# Patient Record
Sex: MALE | Race: WHITE | NOT HISPANIC OR LATINO | Employment: OTHER | URBAN - METROPOLITAN AREA
[De-identification: names, ages, dates, MRNs, and addresses within clinical notes are randomized per-mention and may not be internally consistent; named-entity substitution may affect disease eponyms.]

---

## 2023-03-27 LAB
ANION GAP IN SER/PLAS: 11 MMOL/L (ref 10–20)
CALCIUM (MG/DL) IN SER/PLAS: 9.3 MG/DL (ref 8.6–10.3)
CARBON DIOXIDE, TOTAL (MMOL/L) IN SER/PLAS: 29 MMOL/L (ref 21–32)
CHLORIDE (MMOL/L) IN SER/PLAS: 106 MMOL/L (ref 98–107)
CREATININE (MG/DL) IN SER/PLAS: 1.02 MG/DL (ref 0.5–1.3)
ERYTHROCYTE DISTRIBUTION WIDTH (RATIO) BY AUTOMATED COUNT: 14 % (ref 11.5–14.5)
ERYTHROCYTE MEAN CORPUSCULAR HEMOGLOBIN CONCENTRATION (G/DL) BY AUTOMATED: 30.2 G/DL (ref 32–36)
ERYTHROCYTE MEAN CORPUSCULAR VOLUME (FL) BY AUTOMATED COUNT: 98 FL (ref 80–100)
ERYTHROCYTES (10*6/UL) IN BLOOD BY AUTOMATED COUNT: 3.12 X10E12/L (ref 4.5–5.9)
GFR MALE: 72 ML/MIN/1.73M2
GLUCOSE (MG/DL) IN SER/PLAS: 75 MG/DL (ref 74–99)
HEMATOCRIT (%) IN BLOOD BY AUTOMATED COUNT: 30.5 % (ref 41–52)
HEMOGLOBIN (G/DL) IN BLOOD: 9.2 G/DL (ref 13.5–17.5)
LEUKOCYTES (10*3/UL) IN BLOOD BY AUTOMATED COUNT: 3.2 X10E9/L (ref 4.4–11.3)
NRBC (PER 100 WBCS) BY AUTOMATED COUNT: 0 /100 WBC (ref 0–0)
PLATELETS (10*3/UL) IN BLOOD AUTOMATED COUNT: 263 X10E9/L (ref 150–450)
POTASSIUM (MMOL/L) IN SER/PLAS: 4.3 MMOL/L (ref 3.5–5.3)
SODIUM (MMOL/L) IN SER/PLAS: 142 MMOL/L (ref 136–145)
UREA NITROGEN (MG/DL) IN SER/PLAS: 17 MG/DL (ref 6–23)

## 2023-05-17 LAB
ANION GAP IN SER/PLAS: 14 MMOL/L (ref 10–20)
CALCIUM (MG/DL) IN SER/PLAS: 9.1 MG/DL (ref 8.6–10.3)
CARBON DIOXIDE, TOTAL (MMOL/L) IN SER/PLAS: 26 MMOL/L (ref 21–32)
CHLORIDE (MMOL/L) IN SER/PLAS: 106 MMOL/L (ref 98–107)
CREATININE (MG/DL) IN SER/PLAS: 0.89 MG/DL (ref 0.5–1.3)
ERYTHROCYTE DISTRIBUTION WIDTH (RATIO) BY AUTOMATED COUNT: 14.9 % (ref 11.5–14.5)
ERYTHROCYTE MEAN CORPUSCULAR HEMOGLOBIN CONCENTRATION (G/DL) BY AUTOMATED: 29.8 G/DL (ref 32–36)
ERYTHROCYTE MEAN CORPUSCULAR VOLUME (FL) BY AUTOMATED COUNT: 85 FL (ref 80–100)
ERYTHROCYTES (10*6/UL) IN BLOOD BY AUTOMATED COUNT: 4.25 X10E12/L (ref 4.5–5.9)
GFR MALE: 84 ML/MIN/1.73M2
GLUCOSE (MG/DL) IN SER/PLAS: 103 MG/DL (ref 74–99)
HEMATOCRIT (%) IN BLOOD BY AUTOMATED COUNT: 36.2 % (ref 41–52)
HEMOGLOBIN (G/DL) IN BLOOD: 10.8 G/DL (ref 13.5–17.5)
IRON (UG/DL) IN SER/PLAS: 25 UG/DL (ref 35–150)
IRON BINDING CAPACITY (UG/DL) IN SER/PLAS: 447 UG/DL (ref 240–445)
IRON SATURATION (%) IN SER/PLAS: 6 % (ref 25–45)
LEUKOCYTES (10*3/UL) IN BLOOD BY AUTOMATED COUNT: 4.4 X10E9/L (ref 4.4–11.3)
NATRIURETIC PEPTIDE B (PG/ML) IN SER/PLAS: 396 PG/ML (ref 0–99)
NRBC (PER 100 WBCS) BY AUTOMATED COUNT: 0 /100 WBC (ref 0–0)
PLATELETS (10*3/UL) IN BLOOD AUTOMATED COUNT: 276 X10E9/L (ref 150–450)
POTASSIUM (MMOL/L) IN SER/PLAS: 4.6 MMOL/L (ref 3.5–5.3)
SODIUM (MMOL/L) IN SER/PLAS: 141 MMOL/L (ref 136–145)
UREA NITROGEN (MG/DL) IN SER/PLAS: 15 MG/DL (ref 6–23)

## 2023-09-24 ENCOUNTER — HOSPITAL ENCOUNTER (OUTPATIENT)
Dept: DATA CONVERSION | Facility: HOSPITAL | Age: 88
Setting detail: OBSERVATION
Discharge: OTHER INSTITUTION | End: 2023-09-24
Attending: INTERNAL MEDICINE | Admitting: INTERNAL MEDICINE
Payer: MEDICARE

## 2023-09-24 DIAGNOSIS — K92.2 GASTROINTESTINAL HEMORRHAGE, UNSPECIFIED: ICD-10-CM

## 2023-09-24 LAB
ABO GROUP (TYPE) IN BLOOD: NORMAL
ACTIVATED PARTIAL THROMBOPLASTIN TIME IN PPP BY COAGULATION ASSAY: 31 SEC (ref 27–38)
ALANINE AMINOTRANSFERASE (SGPT) (U/L) IN SER/PLAS: 11 U/L (ref 10–52)
ALBUMIN (G/DL) IN SER/PLAS: 3.7 G/DL (ref 3.4–5)
ALKALINE PHOSPHATASE (U/L) IN SER/PLAS: 63 U/L (ref 33–136)
ANION GAP IN SER/PLAS: 11 MMOL/L (ref 10–20)
ANTIBODY SCREEN: NORMAL
ASPARTATE AMINOTRANSFERASE (SGOT) (U/L) IN SER/PLAS: 18 U/L (ref 9–39)
BASOPHILS (10*3/UL) IN BLOOD BY AUTOMATED COUNT: 0.02 X10E9/L (ref 0–0.1)
BASOPHILS/100 LEUKOCYTES IN BLOOD BY AUTOMATED COUNT: 0.3 % (ref 0–2)
BILIRUBIN DIRECT (MG/DL) IN SER/PLAS: 0.2 MG/DL (ref 0–0.3)
BILIRUBIN TOTAL (MG/DL) IN SER/PLAS: 0.7 MG/DL (ref 0–1.2)
CALCIUM (MG/DL) IN SER/PLAS: 9.1 MG/DL (ref 8.6–10.3)
CARBON DIOXIDE, TOTAL (MMOL/L) IN SER/PLAS: 28 MMOL/L (ref 21–32)
CHLORIDE (MMOL/L) IN SER/PLAS: 104 MMOL/L (ref 98–107)
CREATININE (MG/DL) IN SER/PLAS: 0.98 MG/DL (ref 0.5–1.3)
EOSINOPHILS (10*3/UL) IN BLOOD BY AUTOMATED COUNT: 0.14 X10E9/L (ref 0–0.4)
EOSINOPHILS/100 LEUKOCYTES IN BLOOD BY AUTOMATED COUNT: 1.8 % (ref 0–6)
ERYTHROCYTE DISTRIBUTION WIDTH (RATIO) BY AUTOMATED COUNT: 17.3 % (ref 11.5–14.5)
ERYTHROCYTE MEAN CORPUSCULAR HEMOGLOBIN CONCENTRATION (G/DL) BY AUTOMATED: 31.8 G/DL (ref 32–36)
ERYTHROCYTE MEAN CORPUSCULAR VOLUME (FL) BY AUTOMATED COUNT: 86 FL (ref 80–100)
ERYTHROCYTES (10*6/UL) IN BLOOD BY AUTOMATED COUNT: 4.63 X10E12/L (ref 4.5–5.9)
GFR MALE: 75 ML/MIN/1.73M2
GLUCOSE (MG/DL) IN SER/PLAS: 110 MG/DL (ref 74–99)
HEMATOCRIT (%) IN BLOOD BY AUTOMATED COUNT: 39.9 % (ref 41–52)
HEMOGLOBIN (G/DL) IN BLOOD: 12.7 G/DL (ref 13.5–17.5)
IMMATURE GRANULOCYTES/100 LEUKOCYTES IN BLOOD BY AUTOMATED COUNT: 0.1 % (ref 0–0.9)
INR IN PPP BY COAGULATION ASSAY: 1.1 (ref 0.9–1.1)
LACTATE (MMOL/L) IN SER/PLAS: 1.2 MMOL/L (ref 0.4–2)
LEUKOCYTES (10*3/UL) IN BLOOD BY AUTOMATED COUNT: 8 X10E9/L (ref 4.4–11.3)
LYMPHOCYTES (10*3/UL) IN BLOOD BY AUTOMATED COUNT: 1.03 X10E9/L (ref 0.8–3)
LYMPHOCYTES/100 LEUKOCYTES IN BLOOD BY AUTOMATED COUNT: 12.9 % (ref 13–44)
MONOCYTES (10*3/UL) IN BLOOD BY AUTOMATED COUNT: 0.54 X10E9/L (ref 0.05–0.8)
MONOCYTES/100 LEUKOCYTES IN BLOOD BY AUTOMATED COUNT: 6.8 % (ref 2–10)
NEUTROPHILS (10*3/UL) IN BLOOD BY AUTOMATED COUNT: 6.24 X10E9/L (ref 1.6–5.5)
NEUTROPHILS/100 LEUKOCYTES IN BLOOD BY AUTOMATED COUNT: 78.1 % (ref 40–80)
NRBC (PER 100 WBCS) BY AUTOMATED COUNT: 0 /100 WBC (ref 0–0)
OCCULT BLOOD, STOOL X1: POSITIVE
PLATELETS (10*3/UL) IN BLOOD AUTOMATED COUNT: 208 X10E9/L (ref 150–450)
POTASSIUM (MMOL/L) IN SER/PLAS: 3.6 MMOL/L (ref 3.5–5.3)
PROTEIN TOTAL: 6.2 G/DL (ref 6.4–8.2)
PROTHROMBIN TIME (PT) IN PPP BY COAGULATION ASSAY: 12.2 SEC (ref 9.8–12.8)
RH FACTOR: NORMAL
SODIUM (MMOL/L) IN SER/PLAS: 139 MMOL/L (ref 136–145)
UREA NITROGEN (MG/DL) IN SER/PLAS: 12 MG/DL (ref 6–23)

## 2023-09-25 LAB
APPEARANCE, URINE: NORMAL
ASCORBIC ACID: NORMAL MG/DL
BILIRUBIN, URINE: NORMAL
BLOOD, URINE: NORMAL
COLOR, URINE: NORMAL
ERYTHROCYTE DISTRIBUTION WIDTH (RATIO) BY AUTOMATED COUNT: 17.4 % (ref 11.5–14.5)
ERYTHROCYTE DISTRIBUTION WIDTH (RATIO) BY AUTOMATED COUNT: NORMAL
ERYTHROCYTE MEAN CORPUSCULAR HEMOGLOBIN CONCENTRATION (G/DL) BY AUTOMATED: 31.3 G/DL (ref 32–36)
ERYTHROCYTE MEAN CORPUSCULAR HEMOGLOBIN CONCENTRATION (G/DL) BY AUTOMATED: NORMAL
ERYTHROCYTE MEAN CORPUSCULAR VOLUME (FL) BY AUTOMATED COUNT: 87 FL (ref 80–100)
ERYTHROCYTE MEAN CORPUSCULAR VOLUME (FL) BY AUTOMATED COUNT: NORMAL
ERYTHROCYTES (10*6/UL) IN BLOOD BY AUTOMATED COUNT: 4.48 X10E12/L (ref 4.5–5.9)
ERYTHROCYTES (10*6/UL) IN BLOOD BY AUTOMATED COUNT: NORMAL
GLUCOSE, URINE: NORMAL
HEMATOCRIT (%) IN BLOOD BY AUTOMATED COUNT: 39 % (ref 41–52)
HEMATOCRIT (%) IN BLOOD BY AUTOMATED COUNT: NORMAL
HEMOGLOBIN (G/DL) IN BLOOD: 12.2 G/DL (ref 13.5–17.5)
HEMOGLOBIN (G/DL) IN BLOOD: NORMAL
KETONES, URINE: NORMAL
LEUKOCYTE ESTERASE, URINE: NORMAL
LEUKOCYTES (10*3/UL) IN BLOOD BY AUTOMATED COUNT: 5.4 X10E9/L (ref 4.4–11.3)
LEUKOCYTES (10*3/UL) IN BLOOD BY AUTOMATED COUNT: NORMAL
NITRITE, URINE: NORMAL
NRBC (PER 100 WBCS) BY AUTOMATED COUNT: 0 /100 WBC (ref 0–0)
NRBC (PER 100 WBCS) BY AUTOMATED COUNT: NORMAL
PH, URINE: NORMAL
PLATELETS (10*3/UL) IN BLOOD AUTOMATED COUNT: 167 X10E9/L (ref 150–450)
PLATELETS (10*3/UL) IN BLOOD AUTOMATED COUNT: NORMAL
PROTEIN, URINE: NORMAL
SPECIFIC GRAVITY, URINE: NORMAL
UROBILINOGEN, URINE: NORMAL

## 2023-09-26 LAB
AMORPHOUS CRYSTALS, URINE: NORMAL /HPF
ANION GAP IN SER/PLAS: 12 MMOL/L (ref 10–20)
APPEARANCE, URINE: ABNORMAL
BILIRUBIN, URINE: NEGATIVE
BLOOD, URINE: ABNORMAL
CALCIUM (MG/DL) IN SER/PLAS: 8.7 MG/DL (ref 8.6–10.3)
CARBON DIOXIDE, TOTAL (MMOL/L) IN SER/PLAS: 31 MMOL/L (ref 21–32)
CHLORIDE (MMOL/L) IN SER/PLAS: 103 MMOL/L (ref 98–107)
COLOR, URINE: YELLOW
CREATININE (MG/DL) IN SER/PLAS: 1.12 MG/DL (ref 0.5–1.3)
ERYTHROCYTE DISTRIBUTION WIDTH (RATIO) BY AUTOMATED COUNT: 17.4 % (ref 11.5–14.5)
ERYTHROCYTE MEAN CORPUSCULAR HEMOGLOBIN CONCENTRATION (G/DL) BY AUTOMATED: 31.1 G/DL (ref 32–36)
ERYTHROCYTE MEAN CORPUSCULAR VOLUME (FL) BY AUTOMATED COUNT: 87 FL (ref 80–100)
ERYTHROCYTES (10*6/UL) IN BLOOD BY AUTOMATED COUNT: 4.65 X10E12/L (ref 4.5–5.9)
GFR MALE: 64 ML/MIN/1.73M2
GLUCOSE (MG/DL) IN SER/PLAS: 85 MG/DL (ref 74–99)
GLUCOSE, URINE: NEGATIVE MG/DL
HEMATOCRIT (%) IN BLOOD BY AUTOMATED COUNT: 40.5 % (ref 41–52)
HEMOGLOBIN (G/DL) IN BLOOD: 12.6 G/DL (ref 13.5–17.5)
KETONES, URINE: NEGATIVE MG/DL
LEUKOCYTE ESTERASE, URINE: NEGATIVE
LEUKOCYTES (10*3/UL) IN BLOOD BY AUTOMATED COUNT: 4.9 X10E9/L (ref 4.4–11.3)
MAGNESIUM (MG/DL) IN SER/PLAS: 2.28 MG/DL (ref 1.6–2.4)
NITRITE, URINE: NEGATIVE
NRBC (PER 100 WBCS) BY AUTOMATED COUNT: 0 /100 WBC (ref 0–0)
PH, URINE: 7 (ref 5–8)
PLATELETS (10*3/UL) IN BLOOD AUTOMATED COUNT: 175 X10E9/L (ref 150–450)
POTASSIUM (MMOL/L) IN SER/PLAS: 3.5 MMOL/L (ref 3.5–5.3)
PROTEIN, URINE: NEGATIVE MG/DL
RBC, URINE: 1 /HPF (ref 0–5)
SODIUM (MMOL/L) IN SER/PLAS: 142 MMOL/L (ref 136–145)
SPECIFIC GRAVITY, URINE: 1.01 (ref 1–1.03)
UREA NITROGEN (MG/DL) IN SER/PLAS: 10 MG/DL (ref 6–23)
UROBILINOGEN, URINE: <2 MG/DL (ref 0–1.9)
WBC, URINE: <1 /HPF (ref 0–5)

## 2023-09-27 LAB
ANION GAP IN SER/PLAS: 11 MMOL/L (ref 10–20)
CALCIUM (MG/DL) IN SER/PLAS: 8.7 MG/DL (ref 8.6–10.3)
CARBON DIOXIDE, TOTAL (MMOL/L) IN SER/PLAS: 30 MMOL/L (ref 21–32)
CHLORIDE (MMOL/L) IN SER/PLAS: 105 MMOL/L (ref 98–107)
CREATININE (MG/DL) IN SER/PLAS: 1.05 MG/DL (ref 0.5–1.3)
ERYTHROCYTE DISTRIBUTION WIDTH (RATIO) BY AUTOMATED COUNT: 17.1 % (ref 11.5–14.5)
ERYTHROCYTE MEAN CORPUSCULAR HEMOGLOBIN CONCENTRATION (G/DL) BY AUTOMATED: 31.8 G/DL (ref 32–36)
ERYTHROCYTE MEAN CORPUSCULAR VOLUME (FL) BY AUTOMATED COUNT: 86 FL (ref 80–100)
ERYTHROCYTES (10*6/UL) IN BLOOD BY AUTOMATED COUNT: 4.56 X10E12/L (ref 4.5–5.9)
GFR MALE: 69 ML/MIN/1.73M2
GLUCOSE (MG/DL) IN SER/PLAS: 89 MG/DL (ref 74–99)
HEMATOCRIT (%) IN BLOOD BY AUTOMATED COUNT: 39.3 % (ref 41–52)
HEMOGLOBIN (G/DL) IN BLOOD: 12.5 G/DL (ref 13.5–17.5)
LEUKOCYTES (10*3/UL) IN BLOOD BY AUTOMATED COUNT: 4.3 X10E9/L (ref 4.4–11.3)
MAGNESIUM (MG/DL) IN SER/PLAS: 2.28 MG/DL (ref 1.6–2.4)
NRBC (PER 100 WBCS) BY AUTOMATED COUNT: 0 /100 WBC (ref 0–0)
PLATELETS (10*3/UL) IN BLOOD AUTOMATED COUNT: 181 X10E9/L (ref 150–450)
POTASSIUM (MMOL/L) IN SER/PLAS: 3.3 MMOL/L (ref 3.5–5.3)
SODIUM (MMOL/L) IN SER/PLAS: 143 MMOL/L (ref 136–145)
UREA NITROGEN (MG/DL) IN SER/PLAS: 11 MG/DL (ref 6–23)

## 2023-09-29 VITALS
SYSTOLIC BLOOD PRESSURE: 101 MMHG | OXYGEN SATURATION: 97 % | WEIGHT: 195.33 LBS | HEART RATE: 60 BPM | TEMPERATURE: 97.5 F | HEIGHT: 70 IN | DIASTOLIC BLOOD PRESSURE: 52 MMHG | BODY MASS INDEX: 27.96 KG/M2 | RESPIRATION RATE: 16 BRPM

## 2023-09-30 NOTE — PROGRESS NOTES
Service: General Internal Medicine     Subjective Data:   RICA FALLON is a 87 year old Male who is Hospital Day # 2.     Today patient continues to endorse bright red rectal bleeding with clots and no diarrhea. He states he had abdomen pain in the LLQ and suprapubic area. The patient has been urinating more frequently  but this is attributed to being started on torsemide last week.  Patient denies dysuria, nausea or vomiting, chest pain, SOB, and weakness.    Objective Data:     Objective Information:      T   P  R  BP   MAP  SpO2   Value  36.4  60  19  157/74   107  96%  Date/Time 9/24 21:07 9/25 7:00 9/25 7:00 9/25 4:00  9/25 4:00 9/25 4:00  Range  (36.4C - 36.4C )  (59 - 60 )  (13 - 39 )  (99 - 157 )/ (52 - 83 )  (74 - 107 )  (95% - 98% )      Pain reported at 9/24 21:07: 3 = Mild    Physical Exam Narrative:  ·  Physical Exam:    GENERAL: Mild acute distress, alert and interactive   HEENT: NC/AT, clear sclera, nares patent   NECK: Supple, no JVD, no obvious injuries   CARDIOVASCULAR: Regular rate and rhythm  RESPIRATORY: Normal respiratory effort, CTAB, no adventitious sounds   ABDOMEN: LLQ pain on palpation, RLQ pain on palpation with radiation of pain to penis. Soft, non-distended. No guarding.   SKIN: Warm and dry, intact.   EXTREMITIES: Spontaneous ROM intact. No peripheral edema   NEUROLOGY: A/O x 3, no focal neurological deficits   PSYCH: Appropriate mood and behavior     Recent Lab Results:    Results:    CBC: 9/24/2023 21:31              \     Hgb     /                              \     12.7 L    /  WBC  ----------------  Plt               8.0       ----------------    208              /     Hct     \                              /     39.9 L    \            RBC: 4.63     MCV: 86     Neutrophil %: 78.1      BMP: 9/24/2023 21:31  NA+        Cl-     BUN  /                         139    104    12  /  --------------------------------  Glucose                ---------------------------  110  H    K+     HCO3-   Creat \                         3.6  28    0.98  \  Calcium : 9.1     Anion Gap : 11      Coagulation: 9/24/2023 21:31  PT  /                    12.2  /  -------<    INR          ----------<      1.1  PTT\                    31  \                       Assessment and Plan:   Code Status:  ·  Code Status Full Code     Assessment:    Acute diverticulitis,  Lower GI bleed  History of rectal ulcer 3/2023  -Continue IV zosyn   -GI consulted  -NPO until GI consult, continue IVF while NPO  -Trend CBC for hemodynamic stability  -Hold DVT prophylaxis because current bleed  -Obtain access with 2 large bore IV. In anticipation of potential hemodynamic instability due to current bleed    Potential UTI  -CT abd showed concentric bladder wall thickening and surrounding standing  -Obtain UA. On zosyn as above.    Chronic Medical Conditions:  Chronic HFpEF  HTN  HLD  Permanent A fib s/p watchman  SSS s/p PPM  GERD   CAD s/p PCI  History of prostate cancer s/p radiation (1/2023)  -Continue home pantoprazole. Hold plavix, statin, torsemide for now    Mecca Montes   PA-S2    Attestation:   Note Completion:  I am a:  Resident/Fellow   Attending Attestation I saw and evaluated the patient.  I personally obtained the key and critical portions of the history and physical exam or was physically present for key and  critical portions performed by the resident/fellow. I reviewed the resident/fellow?s documentation and discussed the patient with the resident/fellow.  I agree with the resident/fellow?s medical decision making as documented in the note.     I personally evaluated the patient on 25-Sep-2023   Comments/ Additional Findings    Continue with zosyn.  NPO for GI eval.           Electronic Signatures:  mecca montes (JESSICA)  (Signed 25-Sep-2023 13:41)   Authored: Service, Subjective Data, Objective Data, Assessment  and Plan  Jagdish Cagle)  (Signed 25-Sep-2023 14:36)   Authored: Note  Completion   Co-Signer: Service, Subjective Data, Objective Data, Assessment and Plan, Note Completion  Malgorzata Silva (DO (Resident))  (Signed 25-Sep-2023 13:59)   Entered: Subjective Data, Assessment and Plan, Note Completion   Authored: Service, Subjective Data, Objective Data, Assessment and Plan, Note Completion      Last Updated: 25-Sep-2023 14:36 by Jagdish Cagle)

## 2023-09-30 NOTE — PROGRESS NOTES
Service: Gastroenterology     Subjective Data:   RICA FALLON is a 87 year old Male who is Hospital Day # 3.     Patient states his symptoms have improved.  States he had 4-5 bowel movements yesterday and one this morning which was clear, no blood noted.    Objective Data:     Objective Information:      T   P  R  BP   MAP  SpO2   Value  36.6  60  18  104/56   73  95%  Date/Time 9/26 13:18 9/26 13:18 9/26 4:21 9/26 13:18  9/26 13:18 9/26 13:18  Range  (35.9C - 36.6C )  (59 - 70 )  (11 - 25 )  (104 - 157 )/ (56 - 92 )  (73 - 108 )  (95% - 97% )      Pain reported at 9/26 8:25: 0 = None    Physical Exam Narrative:  ·  Physical Exam:    Physical Exam:  General: Alert, awake.  Cooperative.  HEENT:  Normocephalic, atraumatic.  Neck:  Trachea midline.  No JVD.    Chest:  Clear to auscultation bilaterally. No wheezes, rales, or rhonchi.  CV:  Regular rate and rhythm.  Positive S1/S2. No murmur, no gallops, no rubs  GI: Bowel sounds present in all four quadrants, abdomen is soft, nondistended, tender to palpation LLQ.  Extremities:  No lower extremity edema or cyanosis.   Neurological:  AAOx3.  Moving all extremities.  Skin:  Warm and dry.    Medication:    Medications:          Continuous Medications       --------------------------------  No continuous medications are active       Scheduled Medications       --------------------------------    1. Clopidogrel:  75  mg  Oral  Daily    2. Pantoprazole:  40  mg  Oral  Daily    3. Piperacillin - Tazobactam 3.375 gram/Iso-osmotic 50 mL Premix IVPB:  50  mL  IntraVenous Piggyback  Every 6 Hours         PRN Medications       --------------------------------    1. Acetaminophen:  650  mg  Oral  Every 4 Hours    2. Ondansetron Injectable:  4  mg  IntraVenous Push  Every 4 Hours    3. oxyCODONE Immediate Release:  5  mg  Oral  Every 4 Hours    4. Sodium Chloride 0.9% Injectable Flush:  10  mL  IntraVenous Flush  Every 8 Hours and as Needed        Recent Lab  Results:    Results:    CBC: 9/26/2023 06:59              \     Hgb     /                              \     12.6 L    /  WBC  ----------------  Plt               4.9       ----------------    175              /     Hct     \                              /     40.5 L    \            RBC: 4.65     MCV: 87           BMP: 9/26/2023 06:59  NA+        Cl-     BUN  /                         142    103    10  /  --------------------------------  Glucose                ---------------------------  85    K+     HCO3-   Creat \                         3.5  31    1.12  \  Calcium : 8.7     Anion Gap : 12      Assessment and Plan:   Code Status:  ·  Code Status Full Code     Assessment:    87 year old Male with past medical history significant fo rCAD with h/o PCI, chronic diastolic CHF (EF 65-70% 5/2023), HTN, HLD, permanent afib not on AC due to GI  bleeding s/p Watchman, mild AS, SSS s/p PPM, 4.9 cm ascending aortic aneurysm (TTE 5/2023), PFO, CVA, GERD, anemia, BPH, and prostate CA s/p radical prostatectomy 2000 who presented for BRBPR and lower abdominal pain.     #Acute sigmoid diverticulitis  -Blood pressure and hemoglobin remain stable.  Symptoms are improving.  No indication for colonoscopy at this time.  -Advance diet as tolerated  -Maintain outpatient follow-up with Dr. Garcia.  -GI will sign off at this time.    Nga Wagoner D.O.  Internal Medicine PGY-3  [id number] or [facility]    This is a preliminary note with physician attestation to follow.      Attestation:   Note Completion:  I am a:  Resident/Fellow   Attending Attestation I saw and evaluated the patient.  I personally obtained the key and critical portions of the history and physical exam or was physically present for key and  critical portions performed by the resident/fellow. I reviewed the resident/fellow?s documentation and discussed the patient with the resident/fellow.  I agree with the resident/fellow?s medical decision making as documented in the  note.     I personally evaluated the patient on 26-Sep-2023   Comments/ Additional Findings    Patient told to follow up with Dr. Garcia regarding rectal ulcer. At this time he is getting treated for sigmoid diverticulitis. bleeding has stopped  and does not need intervention with colonoscopy as risk is increased given acute sigmoid diverticulitis.          Electronic Signatures:  Nga Wagoner (DO (Resident))  (Signed 26-Sep-2023 15:18)   Authored: Service, Subjective Data, Objective Data, Assessment  and Plan, Note Completion  Araceli Bolanos)  (Signed 27-Sep-2023 21:37)   Authored: Note Completion   Co-Signer: Service, Subjective Data, Objective Data, Assessment and Plan, Note Completion      Last Updated: 27-Sep-2023 21:37 by Araceli Bolanos)

## 2023-09-30 NOTE — H&P
History of Present Illness:   HPI:    RICA FALLON is a 87 year old Male who presented to the ED with BRBPR and lower abd pain since yesterday afternoon. Symptoms initially started as non-bloody diarrhea  then turned bloody. He noted passing clots of blood. In march of this year patient was found with bleeding rectal ulcer. CT imaging in the ED consistent with acute diverticulitis. While not a CTA, IV contrast was given without any extravasation. Hgb and  vitals are stable.     A 10 point ROS was performed with the patient denying any complaints at this time aside from those listed in the HPI above.    Past Medical History:  1.  CAD with previous PCI  2.  Chronic HFpEF  3.  HTN  4.  HLD  5.  Permanent Afib s/p watchman  6.  SSS s/p PPM  7.  PFO  8.  Ascending Aortic Aneurysm   9.  GERD  10.  Prostate CA  11.  Rectal Ulcer March 2023  12.  Diverticulosis     Past Surgical History:  1.  L atrial appendage ligation   2.  PPM   3.  LHC with PCI  4.  Cholecystectomy   5.  Prostatectomy   6.  Colonoscopy with epi injection to rectal ulcer  7.  tonsillectomy       Social Hx: denies smoking, drugs or etoh abuse   Family Hx: CAD, HTN, dementia     Physical Exam:    GENERAL: no distress, alert and cooperative  VITALS: reviewed, see below  HEENT: normocephalic, atraumatic  CARDIOVASCULAR: irregular rate and rhythm. S1/S2  RESPIRATORY: Clear to auscultation b/l. No distress  ABDOMEN: Soft, non-distended. No rebound or guarding. LLQ tenderness   EXTREMITIES: No peripheral edema  NEUROLOGICAL: A&O X 3. CN II-XII grossly intact. No focal deficits.   SKIN: Warm and dry, no lesions, no rashes  PSYCH: appropriate mood and affect      Assessment/Plan:    1.  Acute Diverticulitis  2.  Lower GIB  3.  CAD with previous PCI  4.  Chronic HFpEF  5.  HTN  6.  HLD  7.  Permanent Afib s/p watchman  8.  SSS s/p PPM  9.  PFO  10.  Ascending Aortic Aneurysm   11.  GERD  12.  Hx of Prostate CA  13.  Rectal Ulcer March 2023  14.   Diverticulosis       ·  IV Zosyn, NPO, trend H&H, hold blood thinners, consult GI   ·  reconcile appropriate home meds     DVT Prophylaxis: SCDs only due to bleeding             Allergies:  ·  No Known Allergies :     Medications Prior to Admission:   Admission Medication Reconciliation has not been completed for this patient.    Objective:     Objective Information:      T   P  R  BP   MAP  SpO2   Value  36.4  60  13  142/68   95  96%  Date/Time 9/24 21:07 9/25 2:30 9/25 2:30 9/25 2:30  9/25 2:30 9/25 2:30  Range  (36.4C - 36.4C )  (60 - 60 )  (13 - 39 )  (99 - 142 )/ (52 - 83 )  (74 - 95 )  (95% - 98% )      Pain reported at 9/24 21:07: 3 = Mild    Recent Lab Results:    Results:    CBC: 9/24/2023 21:31              \     Hgb     /                              \     12.7 L    /  WBC  ----------------  Plt               8.0       ----------------    208              /     Hct     \                              /     39.9 L    \            RBC: 4.63     MCV: 86     Neutrophil %: 78.1      BMP: 9/24/2023 21:31  NA+        Cl-     BUN  /                         139    104    12  /  --------------------------------  Glucose                ---------------------------  110 H    K+     HCO3-   Creat \                         3.6  28    0.98  \  Calcium : 9.1     Anion Gap : 11      Coagulation: 9/24/2023 21:31  PT  /                    12.2  /  -------<    INR          ----------<      1.1  PTT\                    31  \                         Electronic Signatures:  Brandon Bolanos)  (Signed 25-Sep-2023 04:35)   Authored: History of Present Illness, Allergies, Medications  Prior to Admission, Objective, Note Completion      Last Updated: 25-Sep-2023 04:35 by Brandon Bolanos ()

## 2023-09-30 NOTE — DISCHARGE SUMMARY
Send Summary:   Discharge Summary Providers:   Provider Role Provider Name   · Attending Elpidio Mauro   · Consulting Darren Garcia       Note Recipients: Darren Garcia MD       Discharge:    Summary:   Admission Date: .24-Sep-2023 20:28:00   Discharge Date: 27-Sep-2023   Attending Physician at Discharge: Elpidio Mauro   Admission Reason: diverticulitis, lower GI bleed   Final Discharge Diagnoses: Prostate cancer, A-fib,  Bleeding per rectum, CAD (coronary artery disease), Congestive heart failure, H/O sick sinus syndrome, diverticulitis, Presence of Watchman left atrial appendage closure device, Primary hypertension   Procedures: none   Condition at Discharge: Fair   Disposition at Discharge: .Home   Vital Signs:        T   P  R  BP   MAP  SpO2   Value  35.6  83  16  129/73   79  94%  Date/Time 9/27 3:55 9/27 3:55 9/27 3:55 9/27 3:55  9/26 19:58 9/27 3:55  Range  (35.6C - 36.6C )  (60 - 83 )  (16 - 18 )  (104 - 129 )/ (55 - 73 )  (73 - 79 )  (94% - 97% )    Date:            Weight/Scale Type:  Height:   25-Sep-2023 09:35  88.6  kg / bed  177.8  cm  Physical Exam:    Physical Exam:  General:  Pleasant and cooperative. No apparent distress.  HEENT:  Normocephalic, atraumatic, mucus membranes moist.   Chest:  Clear to auscultation bilaterally. No wheezes, rales, or rhonchi. Non-labored breathing.  CV:  Regular rate and rhythm.  No murmur, no gallops, no rubs  Abdomen: Abdomen is soft, mild LLQ tenderness, non-distended. No guarding or rigidity.  Extremities:  Trace LE edema  Neurological:  Alert. Moves all extremities.  Skin:  Warm and dry.  Psych: Appropriate mood and behavior.    Hospital Course:    87 year old male with a-fib s/p watchman, GERD, CAD s/p PCI, HFpEF, SSS s/p PPM, history of rectal ulcer (3/2023) and prostate cancer s/p prostatectomy and radiation  who presented to [facility] for lower abdominal pain and bloody diarrhea. CT revealed diverticulitis. He was started on IV fluids and zosyn.  GI was consulted, no  inpatient endoscopy indicated given his diverticulitis, stable hemoglobin and bleeding resolved.  The patient's pain improved and he was able to tolerate oral intake. He will be discharged home with augmentin for 8 days to complete a 10 day total course. It was also recommended to start colace and metamucil to avoid constipation. Patient was instructed  to establish with a PCP to be seen next week and to follow up with Dr. Garcia.      Discharge Information:    and Continuing Care:   Lab Results - Pending:    None  Radiology Results - Pending: None   Discharge Instructions:    Additional Orders:           Additional Instructions:   Thank you for choosing [facility] - it has been a pleasure taking part in your medical care. Please follow up with your Primary Medical Physician within 1 week of discharge and any specialists  as noted for further workup. If your symptoms should persist or worsen, please return immediately to the nearest Emergency Room for further care. If you have any questions about the care you received please call [facility] at [phone].    Additional Instructions  *You have been started on a new medication, please carefully review dosing regimen.  Start augmentin 1 tab three times a day for 8 days.   Start taking metamucil daily and docusate 1 tab twice a day for constipation.    Please call to establish and follow up with a primary care provider to be seen next week.   Dr. Jose Antonio Hinton  [address], [address]  [place] [address]  [phone]    Please follow up with Dr. Garcia (GI) in 2-3 months, earlier if you have any bleeding again.    Discharge Medications: Home Medication   calcium (as carbonate) 600 mg oral tablet - 1 tab(s) orally once a day  magnesium oxide 400 mg oral tablet - 1 tab(s) orally once a day  Turmeric 500 mg oral capsule - 1 cap(s) orally once a day  B Complex 50 oral tablet, extended release - 1 tab(s) orally once a day  Co Q-10  100 mg oral capsule - 1 cap(s) orally once a day  Multiple Vitamins with Minerals oral tablet - 1 tab(s) orally once a day  clopidogrel 75 mg oral tablet - 1 tab(s) orally once a day (in the morning)  potassium gluconate 595 mg (99 mg elemental potassium) oral tablet - 1 tab(s) orally once a day  docusate sodium 100 mg oral tablet - 1 tab(s) orally 2 times a day   psyllium 3.4 g/11 g oral powder for reconstitution - 3.4 gram(s) orally once a day   amoxicillin-clavulanate 875 mg-125 mg oral tablet - 1 tab(s) orally 3 times a day   rosuvastatin 20 mg oral tablet - 1 tab(s) orally 4 times a week  torsemide 10 mg oral tablet - 1 tab(s) orally once a day     PRN Medication   pantoprazole 40 mg oral delayed release tablet - 1 tab(s) orally once a day before breakfast, As Needed  ondansetron 4 mg oral tablet - 1 tab(s) orally every 8 hours, As Needed - for nausea and vomiting     DNR Status:   ·  Code Status Code Status order at time of discharge: Full Code     Attestation:   Note Completion:  I am a:  Resident/Fellow   Attending Attestation I saw and evaluated the patient.  I personally obtained the key and critical portions of the history and physical exam or was physically present for key and  critical portions performed by the resident/fellow. I reviewed the resident/fellow?s documentation and discussed the patient with the resident/fellow.  I agree with the resident/fellow?s medical decision making as documented in the note.     I personally evaluated the patient on 27-Sep-2023         Electronic Signatures:  Elpidio Mauro (DO)  (Signed 27-Sep-2023 14:16)   Authored: Send Summary, Note Completion   Co-Signer: Send Summary, Summary Content, Ongoing Care, DNR Status, Note Completion  Malgorzata Silva ( (Resident))  (Signed 27-Sep-2023 13:55)   Authored: Send Summary, Summary Content, Ongoing Care,  DNR Status, Note Completion      Last Updated: 27-Sep-2023 14:16 by Elpidio Mauro ()

## 2023-09-30 NOTE — PROGRESS NOTES
Service: General Internal Medicine     Subjective Data:   RICA FALLON is a 87 year old Male who is Hospital Day # 3.     Patient had no acute overnight events. Today he reported loose stool but was no longer reporting passing blood. Patient states he is feeling much better and only has LLQ discomfort that persists. Patient  requests discharge soon.    Objective Data:     Objective Information:      T   P  R  BP   MAP  SpO2   Value  35.9  70  18  129/68   98  97%  Date/Time 9/26 4:21 9/26 4:21 9/26 4:21 9/26 4:21  9/25 20:33 9/26 4:21  Range  (35.9C - 36.2C )  (59 - 70 )  (11 - 25 )  (113 - 157 )/ (66 - 92 )  (86 - 108 )  (95% - 97% )      Pain reported at 9/26 8:25: 0 = None    Physical Exam Narrative:  ·  Physical Exam:    GENERAL: no distress, alert and interactive   HEENT: NC/AT, clear sclera, nares patent   NECK: Supple, no JVD, no obvious injuries   CARDIOVASCULAR: Regular rate and rhythm  RESPIRATORY: Normal respiratory effort, CTAB, no adventitious sounds   ABDOMEN: LLQ tender on palpation, other quadrants non-tender. Soft, non-distended. No guarding.   SKIN: Warm and dry, intact.   EXTREMITIES: Spontaneous ROM intact. No peripheral edema   NEUROLOGY: A/O x 3, no focal neurological deficits   PSYCH: Appropriate mood and behavior     Medication:    Medications:          Continuous Medications       --------------------------------  No continuous medications are active       Scheduled Medications       --------------------------------    1. Clopidogrel:  75  mg  Oral  Daily    2. Pantoprazole:  40  mg  Oral  Daily    3. Piperacillin - Tazobactam 3.375 gram/Iso-osmotic 50 mL Premix IVPB:  50  mL  IntraVenous Piggyback  Every 6 Hours         PRN Medications       --------------------------------    1. Acetaminophen:  650  mg  Oral  Every 4 Hours    2. Ondansetron Injectable:  4  mg  IntraVenous Push  Every 4 Hours    3. oxyCODONE Immediate Release:  5  mg  Oral  Every 4 Hours    4. Sodium Chloride 0.9%  Injectable Flush:  10  mL  IntraVenous Flush  Every 8 Hours and as Needed        Recent Lab Results:    Results:    CBC: 9/26/2023 06:59              \     Hgb     /                              \     12.6 L    /  WBC  ----------------  Plt               4.9       ----------------    175              /     Hct     \                              /     40.5 L    \            RBC: 4.65     MCV: 87           BMP: 9/26/2023 06:59  NA+        Cl-     BUN  /                         142    103    10  /  --------------------------------  Glucose                ---------------------------  85    K+     HCO3-   Creat \                         3.5  31    1.12  \  Calcium : 8.7     Anion Gap : 12      Assessment and Plan:   Code Status:  ·  Code Status Full Code     Assessment:    Acute diverticulitis,  Lower GI bleed, resolved  History of rectal ulcer 3/2023  -Continue IV zosyn   -GI consulted, no inpatient scopes indicated. Hgb stable. Can follow up outpatient.  -Advance to soft diet    Potential UTI  -CT abd showed concentric bladder wall thickening and surrounding standing  -UA was negative for acute cystitis findings but had also received multiple zosyn doses before UA was obtained. Either way, he is already on antibiotics which should cover urinary pathogens.    Chronic Medical Conditions:  Chronic HFpEF  HTN  HLD  Permanent A fib s/p watchman  SSS s/p PPM  GERD   CAD s/p PCI  History of prostate cancer s/p radiation (1/2023)  -Continue home pantoprazole. Hgb stable, resume plavix. Hold statin and torsemide for now    Mecca LEGER-S2    Attestation:   Note Completion:  I am a:  Resident/Fellow   Attending Attestation I saw and evaluated the patient.  I personally obtained the key and critical portions of the history and physical exam or was physically present for key and  critical portions performed by the resident/fellow. I reviewed the resident/fellow?s documentation and discussed the patient with the  resident/fellow.  I agree with the resident/fellow?s medical decision making as documented in the note.     I personally evaluated the patient on 26-Sep-2023   Comments/ Additional Findings    Agree with above.  Patient clinically doing well and improving, has some tenderness in LLQ.  Advance diet to soft.  Explained to patient what diverticulitis  is and treatment plan.  All questions answered to his satisfaction.  Anticipate DC tmrw if patient is able to tolerate a diet.  No further episodes of GI bleeding.           Electronic Signatures:  Elpidio Mauro ()  (Signed 26-Sep-2023 14:13)   Authored: Note Completion   Co-Signer: Service, Subjective Data, Objective Data, Assessment and Plan, Note Completion  luther chavez (JESSICA)  (Signed 26-Sep-2023 12:59)   Authored: Service, Subjective Data, Objective Data, Assessment  and Plan  Malgorzata Silva (DO (Resident))  (Signed 26-Sep-2023 13:14)   Entered: Objective Data, Assessment and Plan, Note Completion   Authored: Service, Subjective Data, Objective Data, Assessment and Plan, Note Completion      Last Updated: 26-Sep-2023 14:13 by Elpidio Mauro ()

## 2023-10-17 PROBLEM — I20.89 EXERTIONAL ANGINA (CMS-HCC): Status: ACTIVE | Noted: 2023-10-17

## 2023-10-17 PROBLEM — G62.9 POLYNEUROPATHY: Status: ACTIVE | Noted: 2023-10-17

## 2023-10-17 PROBLEM — I49.5 SICK SINUS SYNDROME (MULTI): Status: ACTIVE | Noted: 2023-10-17

## 2023-10-17 PROBLEM — Z95.0 PACEMAKER: Status: ACTIVE | Noted: 2023-10-17

## 2023-10-17 PROBLEM — I63.9 CEREBRAL INFARCTION (MULTI): Status: ACTIVE | Noted: 2023-10-17

## 2023-10-17 PROBLEM — D50.0 IRON DEFICIENCY ANEMIA DUE TO CHRONIC BLOOD LOSS: Status: ACTIVE | Noted: 2023-10-17

## 2023-10-17 PROBLEM — K92.1 HEMATOCHEZIA: Status: ACTIVE | Noted: 2023-10-17

## 2023-10-17 PROBLEM — R42 VERTIGO: Status: ACTIVE | Noted: 2023-10-17

## 2023-10-17 PROBLEM — I70.213 INTERMITTENT CLAUDICATION OF BOTH LOWER EXTREMITIES DUE TO ATHEROSCLEROSIS (CMS-HCC): Status: ACTIVE | Noted: 2023-10-17

## 2023-10-17 PROBLEM — I34.0 SEVERE MITRAL REGURGITATION: Status: ACTIVE | Noted: 2023-10-17

## 2023-10-17 PROBLEM — I77.4 CELIAC ARTERY STENOSIS: Status: ACTIVE | Noted: 2023-10-17

## 2023-10-17 PROBLEM — Z92.3 HISTORY OF RADIATION THERAPY: Status: ACTIVE | Noted: 2023-10-17

## 2023-10-17 PROBLEM — R42 EPISODIC LIGHTHEADEDNESS: Status: ACTIVE | Noted: 2023-10-17

## 2023-10-17 PROBLEM — Z85.46 HISTORY OF PROSTATE CANCER: Status: ACTIVE | Noted: 2023-10-17

## 2023-10-17 PROBLEM — I50.32 CHRONIC DIASTOLIC CONGESTIVE HEART FAILURE (MULTI): Status: ACTIVE | Noted: 2023-10-17

## 2023-10-17 PROBLEM — Z86.59 HISTORY OF CLAUSTROPHOBIA: Status: ACTIVE | Noted: 2023-10-17

## 2023-10-17 PROBLEM — I10 HYPERTENSION: Status: ACTIVE | Noted: 2023-10-17

## 2023-10-17 PROBLEM — I77.1 CELIAC ARTERY STENOSIS (CMS-HCC): Status: ACTIVE | Noted: 2023-10-17

## 2023-10-17 PROBLEM — K40.90 RIGHT INGUINAL HERNIA: Status: ACTIVE | Noted: 2023-10-17

## 2023-10-17 PROBLEM — R07.89 CHEST TIGHTNESS: Status: ACTIVE | Noted: 2023-10-17

## 2023-10-17 PROBLEM — I48.91 ATRIAL FIBRILLATION WITH SLOW VENTRICULAR RESPONSE (MULTI): Status: ACTIVE | Noted: 2023-10-17

## 2023-10-17 PROBLEM — M75.101 ROTATOR CUFF TEAR, RIGHT: Status: ACTIVE | Noted: 2023-10-17

## 2023-10-17 PROBLEM — I71.21 ASCENDING AORTIC ANEURYSM (CMS-HCC): Status: ACTIVE | Noted: 2023-10-17

## 2023-10-17 PROBLEM — R60.0 LEG EDEMA: Status: ACTIVE | Noted: 2023-10-17

## 2023-10-17 PROBLEM — I25.10 CORONARY ARTERY DISEASE: Status: ACTIVE | Noted: 2023-10-17

## 2023-10-17 PROBLEM — Z95.5 PRESENCE OF STENT IN CORONARY ARTERY: Status: ACTIVE | Noted: 2023-10-17

## 2023-10-17 PROBLEM — R00.1 BRADYCARDIA: Status: ACTIVE | Noted: 2023-10-17

## 2023-10-17 PROBLEM — R29.898 WEAKNESS OF BOTH LOWER EXTREMITIES: Status: ACTIVE | Noted: 2023-10-17

## 2023-10-17 PROBLEM — I07.1 MODERATE TRICUSPID REGURGITATION: Status: ACTIVE | Noted: 2023-10-17

## 2023-10-17 PROBLEM — E78.5 HYPERLIPIDEMIA: Status: ACTIVE | Noted: 2023-10-17

## 2023-10-17 RX ORDER — LOSARTAN POTASSIUM 50 MG/1
1 TABLET ORAL DAILY
COMMUNITY
Start: 2019-11-12 | End: 2023-10-25 | Stop reason: ALTCHOICE

## 2023-10-17 RX ORDER — TORSEMIDE 10 MG/1
1 TABLET ORAL
COMMUNITY
Start: 2021-11-19 | End: 2023-12-08 | Stop reason: SDUPTHER

## 2023-10-17 RX ORDER — VIT C/E/ZN/COPPR/LUTEIN/ZEAXAN 250MG-90MG
25 CAPSULE ORAL DAILY
COMMUNITY
Start: 2022-07-25

## 2023-10-17 RX ORDER — PANTOPRAZOLE SODIUM 40 MG/1
40 TABLET, DELAYED RELEASE ORAL
COMMUNITY
Start: 2021-01-20 | End: 2023-12-08 | Stop reason: ALTCHOICE

## 2023-10-17 RX ORDER — ROSUVASTATIN CALCIUM 20 MG/1
20 TABLET, COATED ORAL
COMMUNITY
Start: 2020-11-18 | End: 2023-12-08 | Stop reason: SDUPTHER

## 2023-10-17 RX ORDER — PARSLEY 450 MG
1 CAPSULE ORAL DAILY
COMMUNITY
Start: 2022-07-25

## 2023-10-17 RX ORDER — ASCORBIC ACID 500 MG
TABLET ORAL
COMMUNITY
Start: 2022-07-25 | End: 2023-10-25 | Stop reason: ALTCHOICE

## 2023-10-17 RX ORDER — LANOLIN ALCOHOL/MO/W.PET/CERES
1 CREAM (GRAM) TOPICAL DAILY
COMMUNITY
Start: 2022-07-25

## 2023-10-17 RX ORDER — VITAMIN B COMPLEX
1 CAPSULE ORAL DAILY
COMMUNITY
Start: 2022-07-25

## 2023-10-17 RX ORDER — CLOPIDOGREL BISULFATE 75 MG/1
75 TABLET ORAL DAILY
COMMUNITY
Start: 2021-09-30 | End: 2023-12-08 | Stop reason: SDUPTHER

## 2023-10-17 RX ORDER — CYANOCOBALAMIN 1000 UG/ML
1000 INJECTION, SOLUTION INTRAMUSCULAR; SUBCUTANEOUS
COMMUNITY
End: 2024-05-24 | Stop reason: HOSPADM

## 2023-10-17 RX ORDER — PROPRANOLOL/HYDROCHLOROTHIAZID 40 MG-25MG
1 TABLET ORAL DAILY
COMMUNITY

## 2023-10-17 RX ORDER — EPINEPHRINE 0.22MG
100 AEROSOL WITH ADAPTER (ML) INHALATION NIGHTLY
COMMUNITY
Start: 2022-07-25

## 2023-10-17 RX ORDER — NITROGLYCERIN 0.4 MG/1
0.4 TABLET SUBLINGUAL EVERY 5 MIN PRN
COMMUNITY
Start: 2020-03-23

## 2023-10-17 RX ORDER — CALCIUM CARBONATE 600 MG
1 TABLET ORAL
COMMUNITY
Start: 2022-07-25

## 2023-10-17 RX ORDER — L.ACID,FERM,PLA,RHA/B.BIF,LONG 126 MG
1 TABLET, DELAYED AND EXTENDED RELEASE ORAL DAILY
COMMUNITY
Start: 2022-07-25 | End: 2024-05-24 | Stop reason: HOSPADM

## 2023-10-17 RX ORDER — WARFARIN SODIUM 5 MG/1
TABLET ORAL
COMMUNITY
End: 2023-10-25 | Stop reason: ALTCHOICE

## 2023-10-17 RX ORDER — LUTEIN 6 MG
1 TABLET ORAL DAILY
COMMUNITY
Start: 2022-07-25

## 2023-10-17 RX ORDER — FUROSEMIDE 20 MG/1
TABLET ORAL
COMMUNITY
End: 2023-10-25 | Stop reason: ALTCHOICE

## 2023-10-18 ENCOUNTER — EVALUATION (OUTPATIENT)
Dept: PHYSICAL THERAPY | Facility: CLINIC | Age: 88
End: 2023-10-18
Payer: MEDICARE

## 2023-10-18 DIAGNOSIS — R42 DIZZINESS AND GIDDINESS: ICD-10-CM

## 2023-10-18 DIAGNOSIS — R42 DIZZINESS: ICD-10-CM

## 2023-10-18 DIAGNOSIS — R26.81 UNSTEADY GAIT: Primary | ICD-10-CM

## 2023-10-18 PROCEDURE — 97112 NEUROMUSCULAR REEDUCATION: CPT | Mod: GP

## 2023-10-18 PROCEDURE — 97162 PT EVAL MOD COMPLEX 30 MIN: CPT | Mod: GP

## 2023-10-18 ASSESSMENT — PATIENT HEALTH QUESTIONNAIRE - PHQ9
1. LITTLE INTEREST OR PLEASURE IN DOING THINGS: NOT AT ALL
SUM OF ALL RESPONSES TO PHQ9 QUESTIONS 1 AND 2: 0
2. FEELING DOWN, DEPRESSED OR HOPELESS: NOT AT ALL

## 2023-10-18 ASSESSMENT — BALANCE ASSESSMENTS
PLACE ALTERNATE FOOT ON STEP OR STOOL WHILE STANDING UNSUPPORTED: NEEDS ASSISTANCE TO KEEP FROM FALLING/UNABLE TO TRY
TURN 360 DEGREES: NEEDS ASSISTANCE WHILE TURNING
PICK UP OBJECT FROM THE FLOOR FROM A STANDING POSITION: UNABLE TO TRY/NEEDS ASSIST TO KEEP FROM LOSING BALANCE OR FALLING
SITTING WITH BACK UNSUPPORTED BUT FEET SUPPORTED ON FLOOR OR ON A STOOL: ABLE TO SIT SAFELY AND SECURELY FOR 2 MINUTES
STANDING UNSUPPORTED ONE FOOT IN FRONT: LOSES BALANCE WHILE STEPPING OR STANDING
LONG VERSION TOTAL SCORE (MAX 56): 9
PLACE ALTERNATE FOOT ON STEP OR STOOL WHILE STANDING UNSUPPORTED: NEEDS ASSIST TO KEEP FROM LOSING BALANCE OR FALLING
STANDING UNSUPPORTED WITH EYES CLOSED: NEEDS HELP TO KEEP FROM FALLING
STANDING UNSUPPORTED: UNABLE TO STAND 30 SECONDS UNSUPPORTED
STANDING ON ONE LEG: UNABLE TO TRY NEEDS ASSIST TO PREVENT FALL
STANDING TO SITTING: CONTROLS DESCENT BY USING HANDS
REACHING FORWARD WITH OUTSTRETCHED ARM WHILE STANDING: LOSES BALANCE WHILE TRYING/REQUIRES EXTERNAL SUPPORT
STANDING UNSUPPORTED WITH FEET TOGETHER: NEEDS HELP TO ATTAIN POSITION AND UNABLE TO HOLD FOR 15 SECONDS
STANDING TO SITTING: ABLE TO STAND USING HANDS AFTER SEVERAL TRIES
TRANSFERS: NEEDS TWO PEOPLE TO ASSIST OR SUPERVISE TO BE SAFE

## 2023-10-18 ASSESSMENT — PAIN SCALES - GENERAL: PAINLEVEL_OUTOF10: 0 - NO PAIN

## 2023-10-18 ASSESSMENT — PAIN - FUNCTIONAL ASSESSMENT: PAIN_FUNCTIONAL_ASSESSMENT: 0-10

## 2023-10-18 NOTE — PROGRESS NOTES
Physical Therapy    Physical Therapy Evaluation    Patient Name: Giuseppe Davila  MRN: [id number]  Today's Date: 10/18/2023  Time Calculation  Start Time: 0915  Stop Time: 1010  Time Calculation (min): 55 min    Assessment  PT Assessment Results: Decreased strength, Decreased endurance, Impaired balance, Decreased mobility, Decreased safety awareness  Rehab Prognosis: Fair    Giuseppe Davila  is a 87 y.o. old patient who participated in a physical therapy evaluation today due to chronic dizziness which he describes as imbalance associated with all mobility. Pt's impairments include: balance deficits, B foot drop, functional strength deficits.  Due to these impairments, he has the following functional limitations and participation restrictions: requires device or assistance with walking, increased fall risk, difficulty with transfers, difficulty performing household/recreational activities. Oculomotor testing noted impaired smooth pursuits, VOR and VOR cancellation which are central findings, but pt has hx of CVAs.  He is at increased fall risk based on SOLANO and 5xSTS score and skilled PT warranted to decrease fall risk.  Skilled physical therapy services are appropriate and beneficial in order to achieve measurable and meaningful change in the above objective tests and measures. Utilization of skilled physical therapy services will aid in advancing his functional status and attaining his therapy-related goals. The patient verbalized understanding and is in agreement with all goals and plan of care.     Plan  Treatment/Interventions: Canalith repositioning, Education/ Instruction, Gait training, Neuromuscular re-education, Self care/ home management, Therapeutic activities, Therapeutic exercises  PT Plan: Skilled PT  PT Frequency: 2 times per week  Duration: 8 weeks  Onset Date: 10/18/83  Plan of Care Agreement: Patient      Subjective   General:    Giuseppe presenting to the clinic with chief complaint of dizziness which  "he describes as imbalance.  Pt denies any vertigo/spinning. Chief complaint is unsteady when walking.  Started using a walker x 1-2 months due to difficulty in the community due to imbalance. Giuseppe reports symptoms began approximately 40 years ago. Pt reports gradual onset and at the same time he reports onset of neuropathy. Pt reports hx of motion sickness and drop feet which \"runs in the family.\"     Giuseppe reports symptoms are intermittent associated with walking/mobility.       Reports symptoms are better with sitting.      Reports symptoms are worse with walking.      Giuseppe denies any current  numbness, tingling, diplopia, drop attacks, dysarthria.      Prior Treatment/diagnostic images: none    Ear pain - denies  Previous Ear surgery - denies  TInnitus -  denies, takes medication which helps  Hearing aides in B ears    Pt goals: to be able to walk without having to lean on objects or using WW    Functional limitations: uses wall or walker in the community, difficulty getting in/out of chairs (low chairs)  General  Reason for Referral: Dizziness/imbalance  Referred By: Donnie Carmichael  Precautions:  Precautions  Precautions Comment: Fall risk: high. Use gait belt. The patient was educated regarding their fall risk and PT recommendation of use of WW at all times on 10/18/23.  PMH: multiple CVAs (last one in 2013), neuropathy (familiar x 40 years per pt), B foot drop but no longer wears braces, pacemaker, a-fib s/p watchman, GERD, CAD s/p PCI, HFpEF, SSS s/p PPM, history of rectal ulcer (3/2023) and prostate cancer s/p prostatectomy and radiation.    Pain:  Pain Assessment: 0-10  Pain Score: 0 - No pain  Home Living:  Home Living Comment[place] basement, 1st floor laundry.  Lives with wife.  Wife does indoor/outdoor home managment.  Pt uses riding lawnmower.  Prior Function Per Pt/Caregiver Report:  Level of [place]:  (Does not drive due to neuroapthy, using WW in the community, no device at home. IND " with bathing/dressing.)    Objective   Posture:   Forward head and rounded shoulders  Strength:   Decreased LE strength noted in tranfers     Gait:    Ambulates with WW, slow andrew with steppage gait due to B foot drop     Transfers:   Increased use of B UE to stand, mild unsteadiness, decreased eccentric control and plops to sit  Unable to stand without UE, and in clinic chair during evaluation required mod A x1 to stabilize due to posterior LOB when standing with B UE and 9 seconds to complete.  Other:   Oculomotor Exam:   Convergence =  WNL  Extraocular ROM = WNL  Smooth pursuits =  impaired, but mild improvement after decreased speed with hx of CVA  Horizontal saccades = WNL with no consistent deviations  Vertical saccades = WNL with no consistent deviations  Spontaneous Nystagmus = negative  Gaze Evoked Nystagmus = negative  VOR = unable to perform to the R with corrective saccade all trials with increase nausea that resolved quickly with hx of CVA  Cover/uncover = negative  Cross cover = no consistent deviations but mild horizontal saccades at times  VOR cancellation = impaired with corrective saccade most trials with increase nausea that resolved quickly with hx of CVA    Outcome Measures:  Buchanan Balance Scale  1. Sitting to Standing: Able to stand using hands after several tries  2. Standing Unsupported: Unable to stand 30 seconds unsupported  3. Sitting with Back Unsupported but Feet Supported on Floor or on a Stool: Able to sit safely and securely for 2 minutes  4. Standing to Sitting: Controls descent by using hands  5.  Transfers: Needs two people to assist or supervise to be safe  6. Standing Unsupported with Eyes Closed: Needs help to keep from falling  7. Standing Unsupported with Feet Together: Needs help to attain position and unable to hold for 15 seconds  8. Reach Forward with Outstretched Arm While Standing: Loses balance while trying/requires external support  9.  Object from Floor from  a Standing Position: Unable to try/needs assist to keep from losing balance or falling  10. Turning to Look Behind Over Left and Right Shoulders While Standing: Needs assist to keep from losing balance or falling  11. Turn 360 Degrees: Needs assistance while turning  12. Place Alternate Foot on Step or Stool While Standing Unsupported: Needs assistance to keep from falling/unable to try  13. Standing Unsupported One Foot in Front: Loses balance while stepping or standing  14. Standing on One Leg: Unable to try needs assist to prevent fall  Buchanan Balance Score: 9    Other Measures  5x Sit to Stand: 0 unable without UE; attempted with B UE but required PT A (mod A x1)  due to LOB  Dizziness Handicap Inventory: 64/100     Treatment:   -Role of PT and PT POC  -Educated Giuseppe regarding benefit and purpose of skilled PT services along with results of examination findings and how this correlates to their chief complaint.   -Answered Giuseppe' questions in full.  -Reviewed balance system and components and rationale for balance exercise  -safety with balance ex and to perform near stable surface  -fall risk and safety info  -recommendation of resume using AFO's to decrease fall risk    Access Code: [email or web address]  URL: [email or web address]  Date: 10/18/2023  Prepared by:     Exercises  - Wide Stance with Counter Support  - 2-3 x daily - 7 x weekly - 10 reps - 5-10 sec hold    Adjusted WW and instructed how to use/PT recommendation of using WW at all times based on outcomes    Next session: need HEP for strengthening, balance ex as tolerated    OP EDUCATION:  Education  Individual(s) Educated: Patient  Education Provided: Fall Risk, Home Exercise Program, Home Safety  Risk and Benefits Discussed with Patient/Caregiver/Other: yes  Patient/Caregiver Demonstrated Understanding: yes  Plan of Care Discussed and Agreed Upon: yes  Patient Response to Education: Patient/Caregiver Verbalized Understanding of  Information    Goals:    STG's (within 2 weeks)    Giuseppe will be consistently use WW at all times in order to decrease fall risk to safely participate in ADLs.     Giuseppe will demonstrate independence, excellent understanding of and report compliance with at least 3 exercises in his HEP to facilitate the learning process to maximize PT benefits and to facilitate independent rehab program upon discharge.    LTG's (by discharge)    Giuseppe will increase SOLANO by >/= 45 points in order to improve safety at home and decrease falls risk.  Scores less that 45 indicates individuals may be at increased fall risk with scores less that 40 is associated with almost 100% fall risk.     Giuseppe will perform 5x sit to  < 15 seconds to decrease fall risk.     Giuseppe will complete TUG within 12 seconds or less (indicative of higher fall risk) in order to INC balance and enhance safety during ADLs/ IADLs. (> or equal to 12 seconds indicates high risk for falls in older adults. 11-20 seconds is normal for the frail and elderly.)    Giuseppe will complete sit <> stand transfers using BUE, equal weight bearing on LEs, and no major LOB at completion of transfer during 3/3 trials in order to enhance functional mobility and safety.    Giuseppe will demonstrate independence and report compliance with HEP to facilitate independent rehab program upon discharge.    Giuseppe will IND ambulate WW on even surfaces for community distances without imbalance to safely return to [facility] and enhance access to the community.

## 2023-10-18 NOTE — LETTER
October 18, 2023     Patient: Giuseppe Davila   YOB: 1935   Date of Visit: 10/18/2023       To Whom it May Concern:    Giuseppe Davila was seen in my clinic on 10/18/2023. He {Return to school/sport:21756}.    If you have any questions or concerns, please don't hesitate to call.         Sincerely,          Ambarse BE Moser, PT        CC: No Recipients

## 2023-10-18 NOTE — LETTER
October 18, 2023     Patient: Giuseppe Davila   YOB: 1935   Date of Visit: 10/18/2023       To Whom It May Concern:    It is my medical opinion that Giuseppe Davila {Work release (duty restriction):50799}.    If you have any questions or concerns, please don't hesitate to call.         Sincerely,        Ambar Moser, PT    CC: No Recipients

## 2023-10-25 ENCOUNTER — OFFICE VISIT (OUTPATIENT)
Dept: PRIMARY CARE | Facility: CLINIC | Age: 88
End: 2023-10-25
Payer: MEDICARE

## 2023-10-25 VITALS
WEIGHT: 192 LBS | HEIGHT: 70 IN | SYSTOLIC BLOOD PRESSURE: 128 MMHG | DIASTOLIC BLOOD PRESSURE: 76 MMHG | BODY MASS INDEX: 27.49 KG/M2

## 2023-10-25 DIAGNOSIS — D50.9 IRON DEFICIENCY ANEMIA, UNSPECIFIED IRON DEFICIENCY ANEMIA TYPE: Primary | ICD-10-CM

## 2023-10-25 DIAGNOSIS — E55.9 VITAMIN D DEFICIENCY, UNSPECIFIED: ICD-10-CM

## 2023-10-25 DIAGNOSIS — F41.9 ANXIETY DISORDER, UNSPECIFIED TYPE: ICD-10-CM

## 2023-10-25 DIAGNOSIS — Z00.00 HEALTHCARE MAINTENANCE: ICD-10-CM

## 2023-10-25 PROBLEM — I48.91 ATRIAL FIBRILLATION (MULTI): Status: ACTIVE | Noted: 2022-06-23

## 2023-10-25 PROBLEM — M21.371 FOOT DROP, BILATERAL: Status: ACTIVE | Noted: 2022-06-23

## 2023-10-25 PROBLEM — Z95.5 STENTED CORONARY ARTERY: Status: ACTIVE | Noted: 2022-06-23

## 2023-10-25 PROBLEM — C79.51 PROSTATE CANCER METASTATIC TO BONE (MULTI): Status: ACTIVE | Noted: 2017-10-03

## 2023-10-25 PROBLEM — I71.20 THORACIC AORTIC ANEURYSM (TAA) (CMS-HCC): Status: ACTIVE | Noted: 2022-06-23

## 2023-10-25 PROBLEM — M21.372 FOOT DROP, BILATERAL: Status: ACTIVE | Noted: 2022-06-23

## 2023-10-25 PROBLEM — C61: Status: ACTIVE | Noted: 2021-12-03

## 2023-10-25 PROBLEM — I50.32 CHRONIC DIASTOLIC HEART FAILURE (MULTI): Status: ACTIVE | Noted: 2022-06-29

## 2023-10-25 PROBLEM — I63.9 CVA (CEREBROVASCULAR ACCIDENT) (MULTI): Chronic | Status: ACTIVE | Noted: 2023-02-05

## 2023-10-25 PROBLEM — C79.51 MALIGNANT NEOPLASM METASTATIC TO BONE (MULTI): Status: ACTIVE | Noted: 2017-10-05

## 2023-10-25 PROBLEM — Z86.73 HISTORY OF CEREBROVASCULAR ACCIDENT: Status: ACTIVE | Noted: 2022-06-23

## 2023-10-25 PROBLEM — I25.10 CAD (CORONARY ARTERY DISEASE): Status: ACTIVE | Noted: 2022-06-23

## 2023-10-25 PROBLEM — K62.6 RECTAL ULCERATION: Status: ACTIVE | Noted: 2023-03-01

## 2023-10-25 PROBLEM — G62.9 NEUROPATHY: Status: ACTIVE | Noted: 2022-06-23

## 2023-10-25 PROBLEM — C61 PROSTATE CANCER METASTATIC TO BONE (MULTI): Status: ACTIVE | Noted: 2017-10-03

## 2023-10-25 PROCEDURE — 1160F RVW MEDS BY RX/DR IN RCRD: CPT | Performed by: STUDENT IN AN ORGANIZED HEALTH CARE EDUCATION/TRAINING PROGRAM

## 2023-10-25 PROCEDURE — 1159F MED LIST DOCD IN RCRD: CPT | Performed by: STUDENT IN AN ORGANIZED HEALTH CARE EDUCATION/TRAINING PROGRAM

## 2023-10-25 PROCEDURE — 99204 OFFICE O/P NEW MOD 45 MIN: CPT | Performed by: STUDENT IN AN ORGANIZED HEALTH CARE EDUCATION/TRAINING PROGRAM

## 2023-10-25 PROCEDURE — 3078F DIAST BP <80 MM HG: CPT | Performed by: STUDENT IN AN ORGANIZED HEALTH CARE EDUCATION/TRAINING PROGRAM

## 2023-10-25 PROCEDURE — 1126F AMNT PAIN NOTED NONE PRSNT: CPT | Performed by: STUDENT IN AN ORGANIZED HEALTH CARE EDUCATION/TRAINING PROGRAM

## 2023-10-25 PROCEDURE — 3074F SYST BP LT 130 MM HG: CPT | Performed by: STUDENT IN AN ORGANIZED HEALTH CARE EDUCATION/TRAINING PROGRAM

## 2023-10-25 PROCEDURE — 1036F TOBACCO NON-USER: CPT | Performed by: STUDENT IN AN ORGANIZED HEALTH CARE EDUCATION/TRAINING PROGRAM

## 2023-10-25 RX ORDER — LOSARTAN POTASSIUM 25 MG/1
12.5 TABLET ORAL DAILY
COMMUNITY
Start: 2023-07-06 | End: 2023-10-25 | Stop reason: ALTCHOICE

## 2023-10-25 RX ORDER — FAMOTIDINE 40 MG/1
40 TABLET, FILM COATED ORAL NIGHTLY
COMMUNITY
Start: 2023-04-18 | End: 2023-10-25 | Stop reason: ALTCHOICE

## 2023-10-25 RX ORDER — ERYTHROMYCIN 5 MG/G
OINTMENT OPHTHALMIC
COMMUNITY
Start: 2023-10-16 | End: 2023-10-25 | Stop reason: ALTCHOICE

## 2023-10-25 RX ORDER — MULTIVITAMIN
1 TABLET ORAL
COMMUNITY

## 2023-10-25 RX ORDER — ASPIRIN 81 MG/1
81 TABLET ORAL DAILY
COMMUNITY
Start: 2023-04-21 | End: 2023-10-25 | Stop reason: ALTCHOICE

## 2023-10-25 RX ORDER — ENOXAPARIN SODIUM 100 MG/ML
INJECTION SUBCUTANEOUS
COMMUNITY
Start: 2023-01-04 | End: 2023-10-25 | Stop reason: ALTCHOICE

## 2023-10-25 RX ORDER — PSYLLIUM HUSK 3.4 G/5.8G
1 POWDER ORAL DAILY PRN
COMMUNITY
Start: 2023-03-22 | End: 2024-05-24 | Stop reason: HOSPADM

## 2023-10-25 RX ORDER — HYDROCORTISONE 25 MG/G
1 CREAM TOPICAL 2 TIMES DAILY PRN
COMMUNITY
Start: 2023-03-02

## 2023-10-25 RX ORDER — CHOLESTYRAMINE 4 G/9G
POWDER, FOR SUSPENSION ORAL
COMMUNITY
Start: 2023-01-04 | End: 2023-10-25 | Stop reason: ALTCHOICE

## 2023-10-25 RX ORDER — ONDANSETRON 4 MG/1
4 TABLET, FILM COATED ORAL EVERY 8 HOURS PRN
COMMUNITY
Start: 2023-08-19 | End: 2023-11-27 | Stop reason: SDUPTHER

## 2023-10-25 RX ORDER — TAMSULOSIN HYDROCHLORIDE 0.4 MG/1
CAPSULE ORAL
COMMUNITY
Start: 2023-08-07 | End: 2023-10-25 | Stop reason: ALTCHOICE

## 2023-10-25 RX ORDER — SUCRALFATE 1 G/1
TABLET ORAL
COMMUNITY
Start: 2023-03-02 | End: 2023-12-08 | Stop reason: ALTCHOICE

## 2023-10-25 RX ORDER — DOCUSATE SODIUM 100 MG/1
100 CAPSULE, LIQUID FILLED ORAL 2 TIMES DAILY
COMMUNITY
Start: 2023-09-27 | End: 2023-11-27 | Stop reason: SDUPTHER

## 2023-10-25 NOTE — PROGRESS NOTES
Subjective   Patient ID: Giuseppe Davila is a 87 y.o. male who presents for \[facility]\"" Care.  HPI  Giuseppe presents to [facility] today. Pre[vious patient of Dr. Dave. He has been admitted to the hospital ~12 times in the past 1 year for various GI bleeding, diverticulitis, etc. His Cardiologist is Dr. Carmichael.  He tries to focus on eating a balanced diet. Does not eat seeds/nuts. Is careful about limiting salt intake. He does get some leg swelling, weight has been stable. He feels well at this time. Does get intermittently nauseous, takes zofran PRN.    PMHx: Prostate CA, CAD, CHF, atrial fibrillation, diverticulitis, primary HTN  SurgHx: Cholecystectomy, CAD s/p PCI x2, s/p ppm, s/p watchman procedure  FamHx: CAD  SocialHx: Never smoked cigarette. Cmoked a pipe for 2 years remoately. No EtOH use. No drug use. Lives in [facility] [place]. They have a luna restriever.    Review of Systems  12-point ROS was reviewed and is negative, unless otherwise noted in HPI    Objective   Vitals:    10/25/23 0958   BP: 128/76      Physical Exam  GEN: alert, conversant, NAD  HEENT: PERRL, EOMI, MMM, Tms pearly gray bilaterally  NECK: supple, no LAD appreciated  CHEST: CTAB  CV: S1, S2, RRR, no murmurs appreciated  ABD: soft, NT, ND  EXT: no significant LE edema  SKIN: warm, dry    Assessment/Plan   #prostate CA  PSA trended with Urology at [facility]  - lupron injections with Urology    #CAD  #DLD  #HFpEF  #lower extremity swelling 2/2 above  - Followed by Dr. Carmichael  - Maintained on Torsemide 10mg nightly  - Wearing compression stocking when he can, prescribes to a low salt diet per patient and wife  - maintained on plavix, rosuvastatin 20mg    #iron deficiency anemia 2/2 GI bleeding  - check CBC, iron studies  - maintained on protonix  - Zofran PRN    #bilateral lower extremity neuropathy  #ambulatory dysfunction 2/2 above  - Check B12, folate, TSH, HgbA1c    Health Maintenance:  Vaccines: COVID (declines), Flu (declines), TDAP (declines),  Shingles (declines), Pneumonia (declines)  Screening: N/A  Labs: obtain CBC, CMP, TSH, HgbA1c, iron studies, folate, B12     RTC in 6 months, or sooner PRN    Jose Antonio Hinton, DO

## 2023-10-30 NOTE — PROGRESS NOTES
"Physical Therapy    Physical Therapy Treatment    Patient Name: Giuseppe Davila  MRN: [id number]  Today's Date: 10/31/2023  Time Calculation  Start Time: 0800  Stop Time: 0843  Time Calculation (min): 43 min  [facility]  Authorization: 15 visits 10/18/23-1/16/24  Visit 2/15    Assessment:   Added seated LE strengthening and pt was challenged requiring rest after 5 reps all ex but alternated legs when able to improve tolerance.  Denies any pain with ex. With standing ex, B LE weakness noted and only able to stand without UE A 1 sec max and B LE buckled 1x due to weakness using // bars to A.  Added standing ex to address this weakness but did not add to HEP due to mod cues for technique/correct muscle activation. He does require mod-max cues for safe transfers including hand placement, body position behind chair prior to sitting and hand placement. Patient left session with all questions answered and no increase in symptoms.      Plan:   Monitor fatigue, compliance with HEP, sit to stands using airex next session or on elevated plinth    Current Problem  1. Dizziness        2. Unsteady gait            Subjective   General   Pt reports that he doesn't want to wear his AFO's.  Denies any falls since last session.  Reports that he feels his \"knees give out\" when performing balance ex at counter.      Precautions  Precautions  Precautions Comment: Fall risk: high. Use gait belt. The patient was educated regarding their fall risk and PT recommendation of use of WW at all times on 10/18/23. PMH: multiple CVAs (last one in 2013), neuropathy (familiar x 40 years per pt), B foot drop but no longer wears braces, pacemaker, a-fib s/p watchman, GERD, CAD s/p PCI, HFpEF, SSS s/p PPM, history of rectal ulcer (3/2023) and prostate cancer s/p prostatectomy and radiation.    Pain  Pain Assessment: 0-10  Pain Score: 0 - No pain    Objective   Seated ankle AROM: no AROM B without LE compensation  ankle DF: R 3-, L 2  Ankle EV: R/L " 2  Ankle IV: R/L 2    Treatments: Access Code: [email or web address]  NuStep, seat 12, UE 9, L4 x 5 minutes  - Wide Stance with Counter Support  - 10 reps - 1 sec hold max  - Seated Long Arc Quad  - 2 sets - 5 reps with 5 sec hold  - Seated Isometric Hip Adduction with Ball    - 2 sets - 5 reps   - Seated Hip Abduction with yellow Resistance   - 2 sets - 5 reps   - Seated March  - 2 sets - 5 reps   - Seated Gluteal Sets  - 2 sets - 5 reps with 5 sec hold  - Isometric Ankle Inversion using ball - 2 sets - 5 reps with 5 sec hold  -standing march with B UE A - 2 sets - 10 reps   -standing hip abduction with B UE A - 2 sets - 5 reps each leg  -standing hip extension with B UE A - 2 sets - 5 reps   -standing HS curl - 2 sets - 5 reps   Cues for technique with ex (upright posture, correct muscle activation), rationale for ex provided, cues to avoid valsalva with ex, cues for technique for safe transfers/mod A to stabilize chair with transfers  Next session: sit to stands from airex, progress strengthening as tolerated

## 2023-10-31 ENCOUNTER — TREATMENT (OUTPATIENT)
Dept: PHYSICAL THERAPY | Facility: CLINIC | Age: 88
End: 2023-10-31
Payer: MEDICARE

## 2023-10-31 DIAGNOSIS — R26.81 UNSTEADY GAIT: ICD-10-CM

## 2023-10-31 DIAGNOSIS — R42 DIZZINESS: Primary | ICD-10-CM

## 2023-10-31 PROCEDURE — 97110 THERAPEUTIC EXERCISES: CPT | Mod: GP

## 2023-10-31 ASSESSMENT — PAIN - FUNCTIONAL ASSESSMENT: PAIN_FUNCTIONAL_ASSESSMENT: 0-10

## 2023-10-31 ASSESSMENT — PAIN SCALES - GENERAL: PAINLEVEL_OUTOF10: 0 - NO PAIN

## 2023-11-01 NOTE — PROGRESS NOTES
"Physical Therapy    Physical Therapy Treatment    Patient Name: Giuseppe Davila  MRN: [id number]  Today's Date: 11/2/2023  Time Calculation  Start Time: 0900  Stop Time: 0943  Time Calculation (min): 43 min  [facility]  Authorization: 15 visits 10/18/23-1/16/24  Visit 3/15    Assessment:  Increased quad strengthening to improve sit to stand transfers: Pt MOD to SEVERE challenged standing from 22\" surface w/ use of B Ues.  Improved quad activation noted w/ sit to stands and 4\" step ups after verbal cuing and demo from clinician. Pt receptive towards all info provided.  Gait: pt demo steppage gait due to B foot drop (does not wear AFOs).  Initially, flexed posture w/ heavy reliance on WW and pt stands posterior to device.  This improved following gait in clinic, pt was able to demo carryover of correct gait pattern exiting clinic.  W/o use of Ues, pt demo MOD to SEVERE unsteady gait: decreased proprioception of B feet and MOD lateral sway.  Pt able to complete tx session today w/ minimal rest periods required, no signs of fatigue.   Addl PT is recommended to address these deficits and maximize safety in home and community.      Plan:  Cont per poc    Current Problem  1. Dizziness        2. Unsteady gait            Subjective   General  \"SERGIO\"  Pt denies any pain, significant fatigue, nor falls since last visit.  Pt reports he does not use WW at all times in home: grabs on to walls, etc.  Feels this works better for him vs using cane.  Pt reports very difficult standing from chairs or toilet: uses UE support on windowsill to stand from toilet.     Precautions    Precautions Comment: Fall risk: high. Use gait belt. The patient was educated regarding their fall risk and PT recommendation of use of WW at all times on 10/18/23.  PMH: multiple CVAs (last one in 2013), neuropathy (familiar x 40 years per pt), B foot drop but no longer wears braces, pacemaker, a-fib s/p watchman, GERD, CAD s/p PCI, HFpEF, SSS s/p PPM, history of " rectal ulcer (3/2023) and prostate cancer s/p prostatectomy and radiation.     Pain 0/10         Treatments:   There ex, 25 minutes, 2 units  Access Code: [email or web address]  NuStep, seat 12, UE 9, L5 x 5 minutes  - Seated Long Arc Quad w/ YTB AROUND ANKLES  - 2 sets - 5 reps with 5 sec hold  - Seated Isometric Hip Adduction with Ball    - 2 sets - 5 reps :NP   - Seated Hip Abduction with yellow Resistance   - 2 sets - 5 reps   - Seated March  - 2 sets - 5 reps :NP  - Seated Gluteal Sets  - 2 sets - 5 reps with 5 sec hold: NP  - Isometric Ankle Inversion using ball - NP, pt reports he is unable to perform at home: review nv?  -standing march with B UE A - 2 sets - 10 reps   -standing hip abduction with B UE A - 2 sets - 5 reps each leg: NP  -standing hip extension with B UE A - 2 sets - 5 reps :NP  -standing HS curl - 2 sets - 5 reps : NP  - sit to stands fwith chair + 2 airex 2x5 w/ use of // bars  -4in step ups 10x per leg (step, shift, push)       NEURO RE-EDUCATION  18 minutes, 1 unit  - Wide Stance with Counter Support  -+ BUCKLING B KNEES, HELD, FOCUSED ON DYNAMIC BALANCE   -walk in // bars w/o ue support: MOD lateral sway  -F/R Walking w/ U HR 5x  -1/4 turns w/ U HR 5x  -FR step over belt 10x  -ML step over belt 10x  -gait in clinic and serpentine walk thru cones w/ WW: cues for posture, stay inside WW    Pt education: ex, gait, safety ed.  HEP review/update.

## 2023-11-02 ENCOUNTER — TREATMENT (OUTPATIENT)
Dept: PHYSICAL THERAPY | Facility: CLINIC | Age: 88
End: 2023-11-02
Payer: MEDICARE

## 2023-11-02 DIAGNOSIS — R42 DIZZINESS: Primary | ICD-10-CM

## 2023-11-02 DIAGNOSIS — R26.81 UNSTEADY GAIT: ICD-10-CM

## 2023-11-02 PROCEDURE — 97110 THERAPEUTIC EXERCISES: CPT | Mod: GP,CQ

## 2023-11-02 PROCEDURE — 97112 NEUROMUSCULAR REEDUCATION: CPT | Mod: GP,CQ

## 2023-11-06 NOTE — PROGRESS NOTES
"Physical Therapy    Physical Therapy Treatment    Patient Name: Giuseppe Davila  MRN: [id number]  Today's Date: 11/7/2023  Time Calculation  Start Time: 0845  Stop Time: 0927  Time Calculation (min): 42 min  [facility]  Authorization: 15 visits 10/18/23-1/16/24  Visit 4/15    Assessment:  Pt challenged with stepping over objects L>R with.  He required cues for LE engagement vs UE with transfers but able to improve with cues/reps.  He was challenged standing from standard chair in lobby with min A x1 and increased time/trials.  Cues for upright posture with gait ex but no buckling noted.  Improved tolerance to alternating standing/sitting ex due to fatigue with standing ex.  Updated HEP with increasing cardiovascular endurance to address decreased standing tolerance. Unbilled time as seated rest. Patient left session with all questions answered and no increase in symptoms.      Plan:  Monitor fatigue, progress with functional strengthening to improve gait/transfers and balance ex as tolerated to decrease fall risk at home.     Current Problem  1. Dizziness        2. Unsteady gait            Subjective   General    Giuseppe Davila denies any falls or complications since last session. Giuseppe Davila reports still most challenged getting out of a chair.    Giuseppe Davila reports compliance with HEP. Has been able to find a kitchen chair to use to practice transfers.     Pain:  0/10    Precautions    Precautions Comment: Fall risk: high. Use gait belt. The patient was educated regarding their fall risk and PT recommendation of use of WW at all times on 10/18/23.  PMH: multiple CVAs (last one in 2013), neuropathy (familiar x 40 years per pt), B foot drop but no longer wears braces, pacemaker, a-fib s/p watchman, GERD, CAD s/p PCI, HFpEF, SSS s/p PPM, history of rectal ulcer (3/2023) and prostate cancer s/p prostatectomy and radiation.   Pt goes by \"Soto\"       Objective     Treatments:     Access Code: [email or web address]  Jacque, " seat 12, UE 9, L5 x 5 minutes for endurance (issued building cardio endurance for HEP)  - Seated Long Arc Quad   - 2 sets - 5 reps with 5 sec hold (during sitting breaks)  - Seated Isometric Hip Adduction with towel    - 4 sets - 5 reps (during sitting breaks)  - Isometric Ankle Inversion using ball - changed to AROM only and pt able to perform  - Ankle Eversion x 10 each leg  - sit to stands fwith chair + 2 airex 2x5 w/ use of // bars- cues for LE engagement  -step over gait belt in series with B UE A x 10 each (cues for clearance on the L  -FR step over belt 10x (more challenged stepping to the L)  -ML step over belt 10x (more challenged stepping to the L)  -F/R Walking w/ U HR x 10 each   -1/4 turns w/ U HR 5x each way     Pt education: ex, gait, safety ed.  HEP review/update. Issued/reviewed how to increase cardio/endurance at home (pt has Cubi)

## 2023-11-07 ENCOUNTER — TREATMENT (OUTPATIENT)
Dept: PHYSICAL THERAPY | Facility: CLINIC | Age: 88
End: 2023-11-07
Payer: MEDICARE

## 2023-11-07 DIAGNOSIS — R42 DIZZINESS: Primary | ICD-10-CM

## 2023-11-07 DIAGNOSIS — R26.81 UNSTEADY GAIT: ICD-10-CM

## 2023-11-07 PROCEDURE — 97110 THERAPEUTIC EXERCISES: CPT | Mod: GP

## 2023-11-07 PROCEDURE — 97112 NEUROMUSCULAR REEDUCATION: CPT | Mod: GP

## 2023-11-09 ENCOUNTER — TREATMENT (OUTPATIENT)
Dept: PHYSICAL THERAPY | Facility: CLINIC | Age: 88
End: 2023-11-09
Payer: MEDICARE

## 2023-11-09 DIAGNOSIS — R26.81 UNSTEADY GAIT: ICD-10-CM

## 2023-11-09 DIAGNOSIS — R42 DIZZINESS: Primary | ICD-10-CM

## 2023-11-09 PROCEDURE — 97110 THERAPEUTIC EXERCISES: CPT | Mod: GP

## 2023-11-09 PROCEDURE — 97112 NEUROMUSCULAR REEDUCATION: CPT | Mod: GP

## 2023-11-09 NOTE — PROGRESS NOTES
"Physical Therapy    Physical Therapy Treatment    Patient Name: Giuseppe Davila  MRN: [id number]  Today's Date: 11/9/2023  Time Calculation  Start Time: 0832  Stop Time: 0917  Time Calculation (min): 45 min  [facility]  Authorization: 15 visits 10/18/23-1/16/24  Visit 4/15    Assessment:  Pt challenged with mini-squats, sit to stands and stepping over hurdles.  Does catch toes during swing at times which improves with cues. Pt fatigued with current ex.  Continued good response from alternating sitting ex with standing ex. Cues for technique with seated ex (increase ROM, slow/controlled) to increase challenge.  Able to decrease seat height from 2 airex to 1 today but he still requires moderate UE to stand. Patient left session with all questions answered and no increase in symptoms.      Plan:  Monitor fatigue, progress with functional strengthening to improve gait/transfers and balance ex as tolerated to decrease fall risk at home.     Current Problem  1. Dizziness        2. Unsteady gait            Subjective   General    Giuseppe Davila denies any falls or complications since last session. Giuseppe Davila reports that he rode the bike for 5 minutes and felt nauseated after he got of the bike.  States he has hx of motion sickness and this is baseline for him.     Giuseppe Davila reports compliance with HEP.    Pain:  4-5/10  Pain location: right shoulder     Precautions    Precautions Comment: Fall risk: high. Use gait belt. The patient was educated regarding their fall risk and PT recommendation of use of WW at all times on 10/18/23.  PMH: multiple CVAs (last one in 2013), neuropathy (familiar x 40 years per pt), B foot drop but no longer wears braces, pacemaker, a-fib s/p watchman, GERD, CAD s/p PCI, HFpEF, SSS s/p PPM, history of rectal ulcer (3/2023) and prostate cancer s/p prostatectomy and radiation.   Pt goes by \"Soto\"       Objective     Treatments:     Access Code: [email or web address]  NuStep, seat 12, UE 9, L4 x 6 " "minutes for endurance (reviewed modifications for building cardio endurance for HEP)  -4\" (min challenge)then 6\" FWD step ups with B UE on // bars 2 x 5 each  - Seated Long Arc Quad   - 2 sets - 5 reps with 5 sec hold (during sitting breaks)  - Seated Isometric Hip Adduction with towel    - 4 sets - 5 reps (during sitting breaks)  - Seated Isometric Hip Abduction with yellow band   - 4 sets - 5 reps (during sitting breaks)  - Ankle Inversion AROM x 10- reviewed for HEP  - Ankle Eversion x 10 each leg - reviewed for HEP  -seated marches with yellow band 2 x 10 (during sitting breaks)  - sit to stands with chair + 1 airex 2x5 w/ use of // bars- cues for LE engagement  -step over gait belt in series with B UE A x 10 each (cues for clearance on the L  -FR step over 5 hurdles in // bars 10x each  -ML step over 5 hurdles in // bars 10x each  -mini-squats with B UE A 2x 5 - good LE engagement  -TKE with yellow band behind B LE x 10 each leg     Pt education: ex, gait, safety ed.  HEP review/update. Reviewed how to increase cardio/endurance handout for home (pt has Cubi), option of walking with WW vs using Cubi based on subjective comments  "

## 2023-11-12 NOTE — PROGRESS NOTES
"Physical Therapy    Physical Therapy Treatment    Patient Name: Giuseppe Davila  MRN: [id number]  Today's Date: 11/13/2023  Time Calculation  Start Time: 0803  Stop Time: 0842  Time Calculation (min): 39 min  [facility]  Authorization: 15 visits 10/18/23-1/16/24  Visit 5/15 (updated 11/13/2023)     Assessment:  Cues required to avoid valsalva and to perform therex with proper eccentric control. Less UE support required with dynamic balance interventions today compared to last visit per documentation in exercise log. B LE weakness evident in HS, quad, and proximal hip based upon gait observations today - HEP updated to address. Pt reports no INC in pain or symptoms during or post tx. He was overall receptive to all provided education today.     Plan:  Monitor fatigue, progress with functional strengthening to improve gait/transfers and balance ex as tolerated to decrease fall risk at home.     Current Problem  1. Dizziness        2. Unsteady gait            Subjective   General    Giuseppe Davila denies any falls or complications since last session. Reports that if he over exerts he will have nausea and need to sit down.   Giuseppe Davila reports compliance with HEP.    Pain:  no pain   Pain location: N/A    Precautions    Precautions Comment: Fall risk: high. Use gait belt. The patient was educated regarding their fall risk and PT recommendation of use of WW at all times on 10/18/23.  PMH: multiple CVAs (last one in 2013), neuropathy (familiar x 40 years per pt), B foot drop but no longer wears braces, pacemaker, a-fib s/p watchman, GERD, CAD s/p PCI, HFpEF, SSS s/p PPM, history of rectal ulcer (3/2023) and prostate cancer s/p prostatectomy and radiation.   Pt goes by \"Soto\"       Objective     Treatments:     Access Code: [email or web address]  Therapeutic Exercise x 27 minutes, 2 units   NuStep, seat 12, UE 9, L4 x 6 minutes for endurance, subjective   -4\" (min challenge)then 6\" FWD step ups with B UE on // bars 2 x 5 " "each  Educated pt regarding avoding valsalva and to \"exhale on the exertion\"  - Seated Long Arc Quad 2# ankle weights  x 10 reps each LE   - Seated Isometric Hip Adduction with ball   x 20 reps   - Seated Isometric Hip Abduction with GTB x 12 reps - HEP  - standing HS curls x 10 reps each Le with B UE support - HEP   - seated HS curls with RTB x 10 reps each LE   - standing wall squats with FWW placed in front of pt, small ROM only x 5 reps - HEP   The following interventions were not performed this visit:   - Ankle Inversion AROM x 10- reviewed for HEP  - Ankle Eversion x 10 each leg - reviewed for HEP  -seated marches with yellow band 2 x 10 (during sitting breaks)    Neuromuscular Re-education x 12 minutes, 1 units   Gait belt donned:   -FWD step over gait belt in series with B UE A >> single UE A >> intermittent UE A, multiple reps, CGA   - LAT step over gait belts with single UE support, multiple reps, CGA  - static standing balance with B UE fingertip support with EO even surface 2 x 30 seconds- HEP update  - sit to stands with chair + 1 airex 2x5 w/ use of // bars- cues for LE engagement  Educated pt regarding importance of using FWW versus wall support at home and how use of AFO's can help improve his walking and encouraged him to bring AFO's to next PT session to help improve balance and walking skills.     The following interventions were not performed this visit:   -FR step over 5 hurdles in // bars 10x each  -ML step over 5 hurdles in // bars 10x each  -mini-squats with B UE A 2x 5 - good LE engagement  -TKE with yellow band behind B LE x 10 each leg     Pt education: ex, gait, safety ed.  HEP review/update.   "

## 2023-11-13 ENCOUNTER — TREATMENT (OUTPATIENT)
Dept: PHYSICAL THERAPY | Facility: CLINIC | Age: 88
End: 2023-11-13
Payer: MEDICARE

## 2023-11-13 DIAGNOSIS — R42 DIZZINESS: Primary | ICD-10-CM

## 2023-11-13 DIAGNOSIS — R26.81 UNSTEADY GAIT: ICD-10-CM

## 2023-11-13 PROCEDURE — 97110 THERAPEUTIC EXERCISES: CPT | Mod: GP

## 2023-11-13 PROCEDURE — 97112 NEUROMUSCULAR REEDUCATION: CPT | Mod: GP

## 2023-11-16 ENCOUNTER — TREATMENT (OUTPATIENT)
Dept: PHYSICAL THERAPY | Facility: CLINIC | Age: 88
End: 2023-11-16
Payer: MEDICARE

## 2023-11-16 DIAGNOSIS — R26.81 UNSTEADY GAIT: ICD-10-CM

## 2023-11-16 DIAGNOSIS — R42 DIZZINESS: Primary | ICD-10-CM

## 2023-11-16 PROCEDURE — 97112 NEUROMUSCULAR REEDUCATION: CPT | Mod: GP

## 2023-11-16 PROCEDURE — 97110 THERAPEUTIC EXERCISES: CPT | Mod: GP

## 2023-11-16 NOTE — PROGRESS NOTES
Physical Therapy    Physical Therapy Treatment    Patient Name: Giuseppe Davila  MRN: [id number]  Today's Date: 11/16/2023  Time Calculation  Start Time: 0830  Stop Time: 0915  Time Calculation (min): 45 min  [facility]  Authorization: 15 visits 10/18/23-1/16/24  Visit 6/15 (updated 11/16/23 )     Assessment:  Posterior LOB getting off the Nustep with min A x1 to stabilize.  Pt reports due to not picking up foot when walking.  Reviewed option of AFO to improve swing and pt instructed to bring next session to assess to see if we need to pursue new AFO's.  Pt in agreement.  Changed sit to stands to elevated plinth for pt to be able to  perform without UE with increased reliance of B LE.  Pt challenged with B LE strengthening ex and required B UE with walking ex due to fatigue. Reviewed importance of compliance with strengthening ex regularly in order to be able to progress ex in clinic and progress towards pt's goal of walking without device. Patient left session with all questions answered and no increase in symptoms.      Plan:  Monitor fatigue, progress with functional strengthening to improve gait/transfers and balance ex as tolerated to decrease fall risk at home.  Monitor compliance with HEP. Pt to bring AFO's for gait assessment.     Current Problem  1. Dizziness        2. Unsteady gait            Subjective   General    Giuseppe Davila denies any falls or complications since last session. Giuseppe Davila reports that his shoulders were sore x 2 days after using them during NuStep last session.  Pain has resolved.  He does know where his AFO's are, but not interested in using as he feels the springs are no longer working.  Advised pt to bring for gait assessment next session and option of new AFO's since they are > 10 years old.      Giuseppe Davila reports compliance with HEP 1-2x/week.  Reviewed importance of increased compliance to make strengthening gains.     Pain:  0/10    Precautions    Precautions  "Comment: Fall risk: high. Use gait belt. The patient was educated regarding their fall risk and PT recommendation of use of WW at all times on 10/18/23.  PMH: multiple CVAs (last one in 2013), neuropathy (familiar x 40 years per pt), B foot drop but no longer wears braces, pacemaker, a-fib s/p watchman, GERD, CAD s/p PCI, HFpEF, SSS s/p PPM, history of rectal ulcer (3/2023) and prostate cancer s/p prostatectomy and radiation.   Pt goes by \"Soto\"       Objective     Treatments:     Access Code: [email or web address]    NuStep, seat 12, UE 9, L4 x 6 minutes LE only for endurance, subjective   -6\" FWD step ups with B UE on // bars 4 x 5 each  - sit to stands with chair + 1 airex 1x5 w/ use of // bars- cues for LE engagement  -sit to stands from elevated plinth (62 cm) 2 x 10 (cues for anterior lean/use B LE) - no UE but CGA x 1 for balance  - static standing balance after sit to stand (< 10 seconds each)  - Seated Long Arc Quad 2# ankle weights 2 x 6 reps each LE (unsupported sitting and cues to avoid trunk extension)  - seated HS curls with YTB 2x 10 reps each LE - added to HEP  -FWD step over hurdles in series with B UE A due to fatigue multiple reps, CGA   - LAT step over hurdles in series with B UE support due to fatigue multiple reps, CGA     Pt education: ex, gait, safety ed.  HEP review/update, importance of compliance with HEP, rationale for HEP  "

## 2023-11-21 ENCOUNTER — TREATMENT (OUTPATIENT)
Dept: PHYSICAL THERAPY | Facility: CLINIC | Age: 88
End: 2023-11-21
Payer: MEDICARE

## 2023-11-21 DIAGNOSIS — M21.371 FOOT DROP, BILATERAL: Primary | ICD-10-CM

## 2023-11-21 DIAGNOSIS — R26.81 UNSTEADY GAIT: ICD-10-CM

## 2023-11-21 DIAGNOSIS — R42 DIZZINESS: Primary | ICD-10-CM

## 2023-11-21 DIAGNOSIS — M21.372 FOOT DROP, BILATERAL: Primary | ICD-10-CM

## 2023-11-21 PROCEDURE — 97112 NEUROMUSCULAR REEDUCATION: CPT | Mod: GP

## 2023-11-21 PROCEDURE — 97110 THERAPEUTIC EXERCISES: CPT | Mod: GP

## 2023-11-21 NOTE — PROGRESS NOTES
Physical Therapy    Physical Therapy Treatment    Patient Name: Giuseppe Davila  MRN: [id number]  Today's Date: 11/21/2023  Time Calculation  Start Time: 0845  Stop Time: 0930  Time Calculation (min): 45 min  [facility]  Authorization: 15 visits 10/18/23-1/16/24  Visit 7/15 (updated 11/21/23 )     Assessment:  Pt able to improve andrew with decreased steppage gait with B AFO donned.  For this reason feel pt should wear B AFO at all times to decrease fall risk which was reviewed.  Pt to discuss obtaining new AFO's with PCP at next appointment as his current AFO's are > 10 years old per pt.  Improved clearance with stepping tasks with no errors at times using B UE but demonstrated more errors with decreased accuracy with less UE A.  Able to progress strengthening ex to 2.5# leg weights with LAQ and standing HS curls and decreased height of plinth with sit to stands with B LE only.  He had weakness R LE > L LE with step ups but used B LE with less UE A compared to previous sessions. He also was able to stand for a few seconds without UE before grabbing WW for stability.  Additional skilled PT warranted to improve functional LE strength and balance to improve gait/stairs/transfers.     Plan:  Monitor fatigue, progress with functional strengthening to improve gait/transfers and balance ex as tolerated to decrease fall risk at home.  Monitor compliance with HEP.     Current Problem  1. Dizziness        2. Unsteady gait            Subjective   General    Giuseppe Davila denies any falls or complications since last session. Giuseppe Davila reports that he has been able to get up from his toilet only using one UE which is an improvement. Presents wearing B AFO today.     Giuseppe Davila reports compliance with HEP.    Pain:  0/10    Precautions    Precautions Comment: Fall risk: high. Use gait belt. The patient was educated regarding their fall risk and PT recommendation of use of WW at all times on 10/18/23.  PMH: multiple  "CVAs (last one in 2013), neuropathy (familiar x 40 years per pt), B foot drop but no longer wears braces, pacemaker, a-fib s/p watchman, GERD, CAD s/p PCI, HFpEF, SSS s/p PPM, history of rectal ulcer (3/2023) and prostate cancer s/p prostatectomy and radiation.   Pt goes by \"Soto\"       Objective     Treatments:     Access Code: [email or web address]    NuStep, seat 12, UE 9, L4 x 5 minutes, 11 seconds due to fatigued, LE only for endurance, subjective   -6\" FWD step ups with B UE on // bars 4 x 5 each - able to push more with LE > UE  - sit to stands with chair + 1 airex 1x5   -sit to stands from elevated plinth (60 cm) 2 x 10 (cues for use of B LE) - no UE but CGA x 1 for balance with WW in front  - static standing balance after sit to stand (< 10 seconds each) with intermittent touch of WW to stabilize  - Seated Long Arc Quad 2.5# ankle weights 2 x 10 reps each LE (unsupported sitting and cues to avoid trunk extension when fatigued at the end)  - standing HS curls with 2.5# ankle weight  x 10 reps each LE  -FWD step over hurdles in series with 2>1 UE A multiple reps, CGA   - LAT step over hurdles in series with 2>1 UE  multiple reps, CGA     Pt education: ex, gait, safety ed, rationale for B AFO and how they affect gait.  HEP review/update, importance of compliance with HEP, rationale for HEP and strengthening ex. Cues to avoid valsalva.  "

## 2023-11-27 ENCOUNTER — TELEPHONE (OUTPATIENT)
Dept: GASTROENTEROLOGY | Facility: CLINIC | Age: 88
End: 2023-11-27
Payer: MEDICARE

## 2023-11-27 DIAGNOSIS — R11.0 NAUSEA: ICD-10-CM

## 2023-11-27 DIAGNOSIS — K59.00 CONSTIPATION, UNSPECIFIED CONSTIPATION TYPE: Primary | ICD-10-CM

## 2023-11-27 RX ORDER — ONDANSETRON 4 MG/1
4 TABLET, FILM COATED ORAL EVERY 8 HOURS PRN
Qty: 20 TABLET | Refills: 1 | Status: SHIPPED | OUTPATIENT
Start: 2023-11-27 | End: 2024-02-05 | Stop reason: SDUPTHER

## 2023-11-27 RX ORDER — DOCUSATE SODIUM 100 MG/1
100 CAPSULE, LIQUID FILLED ORAL 2 TIMES DAILY
Qty: 60 CAPSULE | Refills: 2 | Status: SHIPPED | OUTPATIENT
Start: 2023-11-27

## 2023-11-27 NOTE — TELEPHONE ENCOUNTER
PT needs a refill of IC ONDANSETRON HCL 4 MG and IC DOCUSATE SODIUM 100 MG. [phone] would like a call to confirm receipt. as

## 2023-11-28 ENCOUNTER — TREATMENT (OUTPATIENT)
Dept: PHYSICAL THERAPY | Facility: CLINIC | Age: 88
End: 2023-11-28
Payer: MEDICARE

## 2023-11-28 DIAGNOSIS — R26.81 UNSTEADY GAIT: ICD-10-CM

## 2023-11-28 DIAGNOSIS — R42 DIZZINESS: Primary | ICD-10-CM

## 2023-11-28 PROCEDURE — 97110 THERAPEUTIC EXERCISES: CPT | Mod: GP

## 2023-11-28 NOTE — PROGRESS NOTES
"Physical Therapy    Physical Therapy Treatment    Patient Name: Giuseppe Davila  MRN: [id number]  Today's Date: 11/28/2023  Time Calculation  Start Time: 0842  Stop Time: 0926  Time Calculation (min): 44 min  [facility]  Authorization: 15 visits 10/18/23-1/16/24  Visit 8/15 (updated 11/28/23 )     Assessment:  No coughing or SOB with activity but with edema per subjective pt most likely in yellow zone per CHF CGP. Reviewed this with pt and he is to start weighing himself at home.  Able to engage B LE more with activities today but still B LE weakness limits ability to get out of standard chair or low toilet without UE or A by family.  Additional PT warranted to improve functional strength and balance to improve safety with gait and transfers.  He was able to perform step ups without B UE A on 4\" step with good challenge.  Unsteadiness without device and sequencing with cane was variable with unsteadiness so feel that WW still most appropriate device which we discussed.  Patient left session with all questions answered and no increase in symptoms.      Plan:  Monitor fatigue, progress with functional strengthening to improve gait/transfers and balance ex as tolerated to decrease fall risk at home.  Monitor compliance with HEP.     Current Problem  1. Dizziness        2. Unsteady gait            Subjective   General    Giuseppe Davila denies any falls or complications since last session. Giuseppe Davila reports that he decreased his medication and now how more swelling in B LE.  Will follow up with cardiologist to discuss on 12/8..  This has been causing difficulty donning/doffing brace and pants.  Feels he has been slowly gaining weight due to increased sugar consumption. Does not weigh himself regularly.    Giuseppe Davila reports compliance with HEP.  Did try to increase walking time but did not pace activity.     Pain:  0/10      Precautions    Precautions Comment: Fall risk: high. Use gait belt. The patient was " "educated regarding their fall risk and PT recommendation of use of WW at all times on 10/18/23.  PMH: multiple CVAs (last one in 2013), neuropathy (familiar x 40 years per pt), B foot drop but no longer wears braces, pacemaker, a-fib s/p watchman, GERD, CAD s/p PCI, HFpEF, SSS s/p PPM, history of rectal ulcer (3/2023) and prostate cancer s/p prostatectomy and radiation.   Pt goes by \"Soto\"       Objective     Treatments:     Access Code: [email or web address]    NuStep, seat 12, no UE, L4 x 6 minutes, unbilled  -6\" FWD step ups with B UE on // bars 3x 5 each - able to push more with LE > UE  -4\" FWD step ups without UE A - challenging 2 x 5  -sit to stands from elevated plinth (60 cm) 2 x 10 (cues for use of B LE) - no UE but CGA x 1 for balance with WW in front  - static standing balance after sit to stand (< 10 seconds each) with intermittent touch of WW to stabilize  - Seated Long Arc Quad 2.5# ankle weights 2 x 10 reps each LE (unsupported sitting and cues to avoid trunk extension when fatigued at the end)  - standing HS curls with 2.5# ankle weight  x 10 reps each LE  -pre-gait in // bars to assess readiness for cane (1 UE on // bar)  -gait with straight cane - cues for sequencing (varied between modified 2 point and 3 point patterns)     Pt education:  reviewed CPG for CHF and guidelines for activity/how to monitor symptoms,  Issued/reviewed how to increase cardiovascular endurance based on subjective comments,  pt to monitor symptoms at home for CHF exacerbation and reviewed when to call MD.  Pt verbalized understanding.  "

## 2023-11-29 NOTE — PROGRESS NOTES
"Physical Therapy    Physical Therapy Treatment    Patient Name: Giuseppe Davila  MRN: [id number]  Today's Date: 11/30/2023  Time Calculation  Start Time: 0823  Stop Time: 0904  Time Calculation (min): 41 min  [facility]  Authorization: 15 visits 10/18/23-1/16/24  Visit 9/15 (updated 11/30/23 )     Assessment:  Pt Challenged with L LE > R with step ups but R quad buckled 1x with ascending stairs when fatigued. Unbilled time as seated rest breaks.  Did not progress weight/reps today due to fatigue in subjective and weakness noted today in B LE.  Accuracy improved with stepping over objects but he did catch R LE > L at times on hurdles. Vitals monitored and stable and pt denies any other symptoms of exacerbation (denies chest pain, dizziness, SOB > baseline and no coughing noted).  Did review guidelines from last session and importance of pacing activity, pt fairly receptive.  Patient left session with all questions answered and no increase in symptoms.      Plan:  Monitor fatigue, progress with functional strengthening to improve gait/transfers and balance ex as tolerated to decrease fall risk at home.  Monitor compliance with HEP.     Current Problem  1. Dizziness        2. Unsteady gait              Subjective   General    Giuseppe Davila denies any falls or complications since last session. Giuseppe Davila reports increase in fatigue after increase work yesterday in his garage.      Giuseppe Davila reports compliance with HEP, \"but probably not as good as I should be.\"  Reviewed pacing HEP/activity    Pain:  0/10      Precautions    Precautions Comment: Fall risk: high. Use gait belt. The patient was educated regarding their fall risk and PT recommendation of use of WW at all times on 10/18/23.  PMH: multiple CVAs (last one in 2013), neuropathy (familiar x 40 years per pt), B foot drop but no longer wears braces, pacemaker, a-fib s/p watchman, GERD, CAD s/p PCI, HFpEF, SSS s/p PPM, history of rectal ulcer (3/2023) " "and prostate cancer s/p prostatectomy and radiation.   Pt goes by \"Soto\"       Objective     baseline  /57 mmHg  HR 73 bpm  SpO2 97% on room air    Post session  /69 mmHg  HR 83 bpm    Treatments:     Access Code: [email or web address]    NuStep, seat 12, no UE, L4 x 6 minutes 15 seconds, subjective-education provided during including self monitoring symptoms, pacing activity, etc  -4\" FWD step ups with light UE A - challenging 2 x 5  -ascending/descending stairs with B UE - reciprocal ascending, backwards descending  -sit to stands from elevated plinth 2 x 10 (cues for use of B LE) - no UE but CGA x 1 for balance with WW in front  - Seated Long Arc Quad 2.5# ankle weights 2 x 10 reps each LE (unsupported sitting and cues gor technique when fatigued at the end)  - standing HS curls with 2.5# ankle weight 2 x 10 reps each LE  -step over hurdles in // bars     Pt education:  reviewed CPG for CHF and guidelines for activity/how to monitor symptoms,  reviewed how to increase cardiovascular endurance based on subjective comments,  reviewed that pt to monitor symptoms at home for CHF exacerbation (pt to start weighing himself daily as has not been) and reviewed when to call MD.  Pt verbalized understanding.  "

## 2023-11-30 ENCOUNTER — TREATMENT (OUTPATIENT)
Dept: PHYSICAL THERAPY | Facility: CLINIC | Age: 88
End: 2023-11-30
Payer: MEDICARE

## 2023-11-30 DIAGNOSIS — R42 DIZZINESS: Primary | ICD-10-CM

## 2023-11-30 DIAGNOSIS — R26.81 UNSTEADY GAIT: ICD-10-CM

## 2023-11-30 PROCEDURE — 97110 THERAPEUTIC EXERCISES: CPT | Mod: GP

## 2023-12-04 NOTE — PROGRESS NOTES
"Physical Therapy    Physical Therapy Treatment    Patient Name: Giuseppe Davila  MRN: [id number]  Today's Date: 12/4/2023     [facility]  Authorization: 15 visits 10/18/23-1/16/24  Visit 10/15 (updated 12/04/23 )     Assessment:      Plan:  Monitor fatigue, progress with functional strengthening to improve gait/transfers and balance ex as tolerated to decrease fall risk at home.  Monitor compliance with HEP.     Current Problem  1. Dizziness        2. Unsteady gait              Subjective   General    Giuseppe Davila denies any falls or complications since last session. Giuseppe Davila reports increase in fatigue after increase work yesterday in his garage.      Giuseppe Davila reports compliance with HEP, \"but probably not as good as I should be.\"  Reviewed pacing HEP/activity    Pain:  0/10      Precautions    Precautions Comment: Fall risk: high. Use gait belt. The patient was educated regarding their fall risk and PT recommendation of use of WW at all times on 10/18/23.  PMH: multiple CVAs (last one in 2013), neuropathy (familiar x 40 years per pt), B foot drop but no longer wears braces, pacemaker, a-fib s/p watchman, GERD, CAD s/p PCI, HFpEF, SSS s/p PPM, history of rectal ulcer (3/2023) and prostate cancer s/p prostatectomy and radiation.   Pt goes by \"Soto\"       Objective     baseline  BP mmHg  HR  bpm    During session:  RPE max:  HR max: bpm    Post session:  BP mmHg  HR  bpm    Treatments:     Access Code: [email or web address]    NuStep, seat 12, no UE, L4 x 6 minutes 15 seconds, subjective-education provided during including self monitoring symptoms, pacing activity, etc  -4\" FWD step ups with light UE A - challenging 2 x 5  -ascending/descending stairs with B UE - reciprocal ascending, backwards descending  -sit to stands from elevated plinth 2 x 10 (cues for use of B LE) - no UE but CGA x 1 for balance with WW in front  - Seated Long Arc Quad 2.5# ankle weights 2 x 10 reps each LE (unsupported sitting and cues gor " technique when fatigued at the end)  - standing HS curls with 2.5# ankle weight 2 x 10 reps each LE  -step over hurdles in // bars     Pt education:  reviewed CPG for CHF and guidelines for activity/how to monitor symptoms,  reviewed how to increase cardiovascular endurance based on subjective comments,  reviewed that pt to monitor symptoms at home for CHF exacerbation (pt to start weighing himself daily as has not been) and reviewed when to call MD.  Pt verbalized understanding.

## 2023-12-05 ENCOUNTER — TREATMENT (OUTPATIENT)
Dept: PHYSICAL THERAPY | Facility: CLINIC | Age: 88
End: 2023-12-05
Payer: MEDICARE

## 2023-12-05 ENCOUNTER — DOCUMENTATION (OUTPATIENT)
Dept: PHYSICAL THERAPY | Facility: CLINIC | Age: 88
End: 2023-12-05
Payer: MEDICARE

## 2023-12-05 DIAGNOSIS — R26.81 UNSTEADY GAIT: ICD-10-CM

## 2023-12-05 DIAGNOSIS — R42 DIZZINESS: Primary | ICD-10-CM

## 2023-12-05 NOTE — PROGRESS NOTES
Physical Therapy                 Therapy Communication Note    Patient Name: Giuseppe Davila  MRN: [id number]  Today's Date: 12/5/2023     Discipline: Physical Therapy    Missed Visit Reason: illness    Missed Time: Cancel

## 2023-12-06 ENCOUNTER — HOSPITAL ENCOUNTER (OUTPATIENT)
Dept: CARDIOLOGY | Facility: CLINIC | Age: 88
Discharge: HOME | End: 2023-12-06
Payer: MEDICARE

## 2023-12-06 DIAGNOSIS — Z95.0 PRESENCE OF CARDIAC PACEMAKER: ICD-10-CM

## 2023-12-06 PROCEDURE — 93279 PRGRMG DEV EVAL PM/LDLS PM: CPT

## 2023-12-06 PROCEDURE — 93279 PRGRMG DEV EVAL PM/LDLS PM: CPT | Performed by: STUDENT IN AN ORGANIZED HEALTH CARE EDUCATION/TRAINING PROGRAM

## 2023-12-07 ENCOUNTER — TREATMENT (OUTPATIENT)
Dept: PHYSICAL THERAPY | Facility: CLINIC | Age: 88
End: 2023-12-07
Payer: MEDICARE

## 2023-12-07 DIAGNOSIS — R26.81 UNSTEADY GAIT: ICD-10-CM

## 2023-12-07 DIAGNOSIS — R42 DIZZINESS: Primary | ICD-10-CM

## 2023-12-07 PROBLEM — I50.32 CHRONIC DIASTOLIC HEART FAILURE (MULTI): Status: RESOLVED | Noted: 2022-06-29 | Resolved: 2023-12-07

## 2023-12-07 PROBLEM — I25.10 CORONARY ARTERY DISEASE: Status: RESOLVED | Noted: 2023-10-17 | Resolved: 2023-12-07

## 2023-12-07 PROBLEM — I48.91 ATRIAL FIBRILLATION (MULTI): Status: RESOLVED | Noted: 2022-06-23 | Resolved: 2023-12-07

## 2023-12-07 PROCEDURE — 97110 THERAPEUTIC EXERCISES: CPT | Mod: GP

## 2023-12-07 NOTE — PROGRESS NOTES
Physical Therapy    Physical Therapy Treatment    Patient Name: Giuseppe Davila  MRN: [id number]  Today's Date: 12/7/2023  Time Calculation  Start Time: 0916  Stop Time: 0958  Time Calculation (min): 42 min  Adolph RICHEY  Authorization: 15 visits 10/18/23-1/16/24  Visit 10/15 (updated 12/07/23 )     Assessment:  Giuseppe Davila Completed treatment today that was progressed with increased in resistance with LAQ/HS curls and decreased seat height with decreased UE A with transfers without significant changes in symptoms. Giuseppe Davila requires min physical and verbal cueing throughout treatment for proper technique and muscle activation, and to avoid valsalva with ex. Giuseppe Davila is challenged with current program. Giuseppe Davila was able to stand without UE A for a few seconds without B LE buckling or LOB in front of plinth.  Also able to use less UE A with step ups and sit<>stands but additional PT warranted to improve sit<>stands all surface to be able to be out in the community and family members homes. Patient left session with all questions answered and no increase in symptoms.      Plan:  Monitor fatigue, progress with functional strengthening to improve gait/transfers and balance ex as tolerated to decrease fall risk at home.  Monitor compliance with HEP.     Current Problem  1. Dizziness        2. Unsteady gait            Subjective   General    Giuseppe Davila denies any falls or complications since last session. Giuseppe Davila reports that he had abdominal pain/GI issues over the weekend that resolved.  Has been able to take a few steps in the bathroom without holding on which he couldn't do prior to onset of PT.  He also require less UE A to stand from toilet at his home. Still requires A getting up from low toilet when visiting family.    Giuseppe Davila reports compliance with HEP.    Pain:  0/10    Precautions    Precautions Comment: Fall risk: high. Use gait belt. The patient was educated regarding  "their fall risk and PT recommendation of use of WW at all times on 10/18/23.  PMH: multiple CVAs (last one in 2013), neuropathy (familiar x 40 years per pt), B foot drop but no longer wears braces, pacemaker, a-fib s/p watchman, GERD, CAD s/p PCI, HFpEF, SSS s/p PPM, history of rectal ulcer (3/2023) and prostate cancer s/p prostatectomy and radiation.   Pt goes by \"Soto\"       Objective     Treatments:     Access Code: [email or web address]    NuStep, seat 12, no UE, L4 x 7 minutes, subjective-education provided during   -4\" FWD step ups with light UE A - challenging 2 x 5  -sit to stands from elevated plinth 4 x 5 with cues for use of B LE vs no UE but CGA x 1 for balance with WW in front  - Seated Long Arc Quad 3.5# ankle weights 4 x 5 reps each LE (unsupported sitting and cues gor technique when fatigued at the end) - pause with LE straight then slowly lower.    - standing HS curls with 3.5# ankle weight 4 x 5 reps each LE  -TKE with red band 2 x 10 each  -Mini-squats with/without B UE - cues to increase posterior chain 2 x 5 each -    Pt education:  cues for hand placement with transfers (use the arms of chair vs WW to stand, cues for slow/controlled technique with ex and to avoid momentum, reviewed again increasing cardiovascular endurance.   "

## 2023-12-08 ENCOUNTER — OFFICE VISIT (OUTPATIENT)
Dept: CARDIOLOGY | Facility: CLINIC | Age: 88
End: 2023-12-08
Payer: MEDICARE

## 2023-12-08 VITALS
HEIGHT: 70 IN | HEART RATE: 76 BPM | BODY MASS INDEX: 28.49 KG/M2 | OXYGEN SATURATION: 96 % | SYSTOLIC BLOOD PRESSURE: 128 MMHG | WEIGHT: 199 LBS | DIASTOLIC BLOOD PRESSURE: 76 MMHG

## 2023-12-08 DIAGNOSIS — R07.89 CHEST TIGHTNESS: ICD-10-CM

## 2023-12-08 DIAGNOSIS — E78.49 OTHER HYPERLIPIDEMIA: ICD-10-CM

## 2023-12-08 DIAGNOSIS — I10 PRIMARY HYPERTENSION: ICD-10-CM

## 2023-12-08 DIAGNOSIS — I50.32 CHRONIC DIASTOLIC CONGESTIVE HEART FAILURE (MULTI): ICD-10-CM

## 2023-12-08 DIAGNOSIS — I71.21 ANEURYSM OF ASCENDING AORTA WITHOUT RUPTURE (CMS-HCC): Primary | ICD-10-CM

## 2023-12-08 DIAGNOSIS — I07.1 MODERATE TRICUSPID REGURGITATION: ICD-10-CM

## 2023-12-08 DIAGNOSIS — I70.213 INTERMITTENT CLAUDICATION OF BOTH LOWER EXTREMITIES DUE TO ATHEROSCLEROSIS (CMS-HCC): ICD-10-CM

## 2023-12-08 DIAGNOSIS — I34.0 SEVERE MITRAL REGURGITATION: ICD-10-CM

## 2023-12-08 DIAGNOSIS — Z95.0 PACEMAKER: ICD-10-CM

## 2023-12-08 DIAGNOSIS — I48.91 ATRIAL FIBRILLATION WITH SLOW VENTRICULAR RESPONSE (MULTI): ICD-10-CM

## 2023-12-08 DIAGNOSIS — Z95.5 STENTED CORONARY ARTERY: ICD-10-CM

## 2023-12-08 DIAGNOSIS — I25.10 CORONARY ARTERY DISEASE INVOLVING NATIVE CORONARY ARTERY OF NATIVE HEART WITHOUT ANGINA PECTORIS: ICD-10-CM

## 2023-12-08 DIAGNOSIS — Z95.5 PRESENCE OF STENT IN CORONARY ARTERY: ICD-10-CM

## 2023-12-08 PROBLEM — I71.20 THORACIC AORTIC ANEURYSM (TAA) (CMS-HCC): Status: RESOLVED | Noted: 2022-06-23 | Resolved: 2023-12-08

## 2023-12-08 PROCEDURE — 99214 OFFICE O/P EST MOD 30 MIN: CPT | Performed by: INTERNAL MEDICINE

## 2023-12-08 PROCEDURE — 1159F MED LIST DOCD IN RCRD: CPT | Performed by: INTERNAL MEDICINE

## 2023-12-08 PROCEDURE — 1160F RVW MEDS BY RX/DR IN RCRD: CPT | Performed by: INTERNAL MEDICINE

## 2023-12-08 PROCEDURE — 3074F SYST BP LT 130 MM HG: CPT | Performed by: INTERNAL MEDICINE

## 2023-12-08 PROCEDURE — 1126F AMNT PAIN NOTED NONE PRSNT: CPT | Performed by: INTERNAL MEDICINE

## 2023-12-08 PROCEDURE — 1036F TOBACCO NON-USER: CPT | Performed by: INTERNAL MEDICINE

## 2023-12-08 PROCEDURE — 3078F DIAST BP <80 MM HG: CPT | Performed by: INTERNAL MEDICINE

## 2023-12-08 RX ORDER — SPIRONOLACTONE 25 MG/1
25 TABLET ORAL DAILY
Qty: 30 TABLET | Refills: 11 | Status: SHIPPED | OUTPATIENT
Start: 2023-12-08 | End: 2024-05-24 | Stop reason: HOSPADM

## 2023-12-08 ASSESSMENT — ENCOUNTER SYMPTOMS
VERTIGO: 1
NAUSEA: 1
ABDOMINAL PAIN: 1
HEMATOCHEZIA: 1
DYSPNEA ON EXERTION: 1
HEMATURIA: 1

## 2023-12-08 NOTE — PROGRESS NOTES
Subjective   Giuseppe Davila is a 88 y.o. male.    Chief Complaint:  Follow-up atrial fibrillation, coronary disease.  Chest pain.  Follow-up hospitalization for gastrointestinal bleeding.    HPI    He complains of chest pain.  This is in the left anterior chest area.  It is sharp.  It is fairly focal.  It last for a few seconds.  It is not related to activities.  Continues to struggle with vertigo and lightheadedness.  He is undergoing physical therapy.  He feels that there is some improvement in his vertigo.  He has had no further episodes of bleeding.  He has had some abdominal issues and has narrowing of the celiac artery.  In September he was admitted with gastrointestinal bleeding.  Felt to be secondary to gastroenteritis.  Also has a history of prostate cancer and has had in the past hematuria.    The patient underwent a Watchman procedure on April 21, 2023 at [facility]. Prior to the procedure he had a CT of the chest which showed a 4.7 cm ascending aortic aneurysm. On her last echo he measured 4.9 cm which was quite similar.     Cardiac problems include a history of permanent atrial fibrillation, pacemaker, aortic stenosis, diastolic heart failure, hypertension, tricuspid regurgitation, and mitral regurgitation.     In October 2022, he presented to us with symptoms of near syncope. He was seen and evaluated by the electrophysiology service. He was deemed to be a candidate for a pacemaker. Because of his atrial fibrillation, we elected to go with a Micra device. He tolerated the procedure well.      Cardiac catheterization was performed on July 26, 2022. This demonstrated nonobstructive coronary artery disease. Right heart cath demonstrated normal pulmonary artery pressures and normal left ventricular end-diastolic pressure. Cardiac output was normal.     A transesophageal echocardiographic study demonstrated severe mitral regurgitation.     An echocardiographic study done on July 26, 2022  "demonstrated normal left ventricular systolic function. There was moderate to severe mitral regurgitation and moderate severe tricuspid regurgitation with mild aortic valve regurgitation. The ascending aorta was moderately dilated.     His last Holter monitor done in May 2022 demonstrated an average heart rate of 53. He is in chronic atrial fibrillation. He is low heart rate was approximately 34. He had pauses up to 3 seconds.     He presented on 6/15/18 with symptoms of a stroke. Became very confused and disoriented. He presented to the hospital where he was noted have evidence of an acute stroke. He received TPA.     Allergies  Medication    · No Known Drug Allergies   Recorded By: Fabiola Rivas; 2/5/2015 9:58:44 AM     Family History  Mother    · Family history of cardiac disorder (V17.49) (Z82.49)   · Family history of TIA (transient ischemic attack)  Father    · Family history of malignant neoplasm (V16.9) (Z80.9)  Sister    · Family history of cardiac disorder (V17.49) (Z82.49)  Paternal Uncle    · Family history of Alzheimer's disease (V17.2) (Z82.0)   · FH: Parkinson's disease (V17.2) (Z82.0)     Social History  Problems    · Alcohol use (V49.89) (Z78.9)   · Never a smoker   · No alcohol use   · No drug use      Review of Systems   Cardiovascular:  Positive for dyspnea on exertion.   Musculoskeletal:  Positive for arthritis.   Gastrointestinal:  Positive for abdominal pain, hematochezia and nausea.   Genitourinary:  Positive for hematuria.   Neurological:  Positive for vertigo.       Visit Vitals  /76 (BP Location: Left arm, Patient Position: Sitting, BP Cuff Size: Large adult)   Pulse 76   Ht 1.778 m (5' 10\")   Wt 90.3 kg (199 lb)   SpO2 96%   BMI 28.55 kg/m²   Smoking Status Never   BSA 2.11 m²        Objective     Constitutional:       Appearance: Not in distress.   Neck:      Vascular: JVD normal.   Pulmonary:      Breath sounds: Normal breath sounds.   Cardiovascular:      Normal rate. " Regular rhythm. S1 with normal intensity. S2 with normal intensity.       Murmurs: There is a grade 1/6 systolic murmur.      No gallop.    Pulses:     Intact distal pulses.   Edema:     Peripheral edema absent.   Abdominal:      General: Bowel sounds are normal.   Neurological:      Mental Status: Alert and oriented to person, place and time.         Lab Review:   Lab Results   Component Value Date     09/27/2023    K 3.3 (L) 09/27/2023     09/27/2023    CO2 30 09/27/2023    BUN 11 09/27/2023    CREATININE 1.05 09/27/2023    GLUCOSE 89 09/27/2023    CALCIUM 8.7 09/27/2023     Lab Results   Component Value Date    WBC 4.3 (L) 09/27/2023    HGB 12.5 (L) 09/27/2023    HCT 39.3 (L) 09/27/2023    MCV 86 09/27/2023     09/27/2023         Assessment:    1.  Coronary artery disease.  Nonobstructive coronary disease on the basis of cardiac catheterization performed in 2022.  No typical anginal symptoms.  The patient's chest pain symptoms do not sound cardiac in nature.    2.  Atrial fibrillation.  Permanent atrial fibrillation.  Has a watchman in.  On clopidogrel therapy only.  Should he develop bleeding in the future, that clopidogrel can be temporarily stopped.    3.  Ascending aortic aneurysm.  Measures 4.9 by CT.  Will get repeat echo study in the future.    4.  Vertigo.  Had an episode of dizziness lightheadedness and vertigo in the office.  Became very dizzy and lightheaded.  Had to sit down for a while.  This ultimately resolved.  He is getting physical therapy for this problem.  I do not think this has anything to do with his other issues.  It should be noted however he has severe intracranial disease.    5.  Hyperlipidemia.  Most recent labs show cholesterol 119, HDL 57, LDL 48.

## 2023-12-08 NOTE — PATIENT INSTRUCTIONS
Take torsemide 10 mg daily    Take spironolactone 25 mg on Monday Wednesday and Friday    You need to get blood tests in one week.    We will see you back in 3 months to make sure that you are getting better.

## 2023-12-09 RX ORDER — CLOPIDOGREL BISULFATE 75 MG/1
75 TABLET ORAL DAILY
Qty: 90 TABLET | Refills: 3 | Status: SHIPPED | OUTPATIENT
Start: 2023-12-09

## 2023-12-09 RX ORDER — ROSUVASTATIN CALCIUM 20 MG/1
20 TABLET, COATED ORAL
Qty: 50 TABLET | Refills: 3 | Status: SHIPPED | OUTPATIENT
Start: 2023-12-09

## 2023-12-09 RX ORDER — TORSEMIDE 10 MG/1
TABLET ORAL
Qty: 90 TABLET | Refills: 3 | Status: SHIPPED | OUTPATIENT
Start: 2023-12-09 | End: 2024-03-22 | Stop reason: SDUPTHER

## 2023-12-11 NOTE — PROGRESS NOTES
Physical Therapy    Physical Therapy Treatment    Patient Name: Giuseppe Davila  MRN: [id number]  Today's Date: 12/12/2023  Time Calculation  Start Time: 0931  Stop Time: 1016  Time Calculation (min): 45 min  [facility]  Authorization: 15 visits 10/18/23-1/16/24  Visit 11/15 (updated 12/12/23 )     Assessment:  Giuseppe Davila  has completed 11 physical therapy sessions to address unsteady gait.  Treatment has included: TherEx, TherAct, NMR.  Giuseppe Davila  reports compliance with the prescribed physical therapy home exercise program @ 3x/week.  Giuseppe Davila  has made some progress towards therapy goals with improvement in transfers per subjective/objective and able to stand up to 3 seconds without UE A.  Giuseppe Davila is still at increased fall risk based on SOLANO, 10mWT, ABC, 5xSTS, 30 sec chair stand and TUG scores which we discussed and reviewed PT recommendation of WW at all times.  Pt fairly receptive to this education.  At this time I recommend: Giuseppe Davila continue with physical therapy at frequency of 1x/week for an additional 8-12 weeks to monitor compliance with HEP in order to improve static and walking balance to decrease fall risk.      Plan:  Recommend continuing with physical therapy at frequency of 1x/week for an additional 8-12 weeks to monitor compliance with HEP closely in order to improve static and walking balance to decrease fall risk.  Monitor fatigue, progress with functional strengthening to improve gait/transfers and balance ex as tolerated to decrease fall risk at home.  Monitor compliance with HEP.     Current Problem  1. Dizziness        2. Unsteady gait            Subjective   General    Giuseppe Davila denies any falls or complications since last session. Giuseppe Davila reports that he is able to take a few steps in the bathroom without use of B UE, prior to this he had to hold onto 2 surfaces.  Less assistance to stand from toilet and easier to stand from computer chair. He  "enters without AFO as screw broke off of the one.     Giuseppe KRIS Giovanni reports compliance with HEP.    Pain:  0/10    Precautions    Precautions Comment: Fall risk: high. Use gait belt. The patient was educated regarding their fall risk and PT recommendation of use of WW at all times on 10/18/23.  PMH: multiple CVAs (last one in 2013), neuropathy (familiar x 40 years per pt), B foot drop but no longer wears braces, pacemaker, a-fib s/p watchman, GERD, CAD s/p PCI, HFpEF, SSS s/p PPM, history of rectal ulcer (3/2023) and prostate cancer s/p prostatectomy and radiation.   Pt goes by \"Soto\"       Objective     Treatments:     Outcome Measures:  Buchanan Balance Scale  1. Sitting to Standing: Able to stand using hands after several tries  2. Standing Unsupported: Unable to stand 30 seconds unsupported  3. Sitting with Back Unsupported but Feet Supported on Floor or on a Stool: Able to sit safely and securely for 2 minutes  4. Standing to Sitting: Controls descent by using hands  5.  Transfers: Needs one person to assist  6. Standing Unsupported with Eyes Closed: Needs help to keep from falling  7. Standing Unsupported with Feet Together: Needs help to attain position and unable to hold for 15 seconds  8. Reach Forward with Outstretched Arm While Standing: Loses balance while trying/requires external support  9.  Object from Floor from a Standing Position: Unable to try/needs assist to keep from losing balance or falling  10. Turning to Look Behind Over Left and Right Shoulders While Standing: Needs assist to keep from losing balance or falling  11. Turn 360 Degrees: Needs assistance while turning  12. Place Alternate Foot on Step or Stool While Standing Unsupported: Able to complete greater than 2 steps needs minimal assist  13. Standing Unsupported One Foot in Front: Loses balance while stepping or standing  14. Standing on One Leg: Unable to try needs assist to prevent fall  Buchanan Balance Score: 11    Timed Up and Go " "Test  How many seconds did it take to complete the 5 tasks?: 22 seconds  Observe the patient’s postural stability, gait, stride length, and sway. Select All that Apply::  (steppage gait, difficulty with turns, uncontrolled descent)    Other Measures  5x Sit to Stand: 0 unable without UE; 44 sec with B UE and CGA  30 Second Sit to Stand: 3 rises  Activities - Specific Balance Confidence Scale: 17.813%    Access Code: [email or web address]    NuStep, seat 12, no UE, L3 x 5 minutes, subjective- read/completed ABC with pt provided during     Assessment of Giuseppe Davila's POC goals completed today- see updated goals, objective, and assessment for further information. Educated Giuseppe Davila  regarding results of examination findings and discussed next steps in PT POC. Educated Giuseppe KRIS Giovanni regarding importance of continuing with good HEP compliance for optimal rehab results.    Pt education: fall risk and importance of compliance with HEP to make gains, reviewed again increasing cardiovascular endurance.     Goals:   STG's (within 2 weeks)    Giuseppe will be consistently use WW at all times in order to decrease fall risk to safely participate in ADLs.  GOAL IN PROGRESS.  Pt reports that he uses WW in the community, does not use within the home because \"I don't want to become dependent on the walker.\"    Giuseppe will demonstrate independence, excellent understanding of and report compliance with at least 3 exercises in his HEP to facilitate the learning process to maximize PT benefits and to facilitate independent rehab program upon discharge. GOAL MET    LTG's (by discharge)    Giuseppe will increase SOLANO by >/= 45 points in order to improve safety at home and decrease falls risk.  Scores less that 45 indicates individuals may be at increased fall risk with scores less that 40 is associated with almost 100% fall risk.  GOAL IN PROGRESS, unable to stand without UE A but score increased from 9/56 to 11/56.      Giuseppe will perform 5x sit " to  < 15 seconds to decrease fall risk.  GOAL IN PROGRESS increase from unable with PT A on eval, but able to perform today with CGA x 1.     Giuseppe will complete TUG within 12 seconds or less (indicative of higher fall risk) in order to INC balance and enhance safety during ADLs/ IADLs. (> or equal to 12 seconds indicates high risk for falls in older adults. 11-20 seconds is normal for the frail and elderly.) GOAL IN PROGRESS    Giuseppe will complete sit <> stand transfers using BUE, equal weight bearing on LEs, and no major LOB at completion of transfer during 3/3 trials in order to enhance functional mobility and safety. GOAL IN PROGRESS able to stand with less UE A and less time from standard chair, but still requires B UE and increased time.     Giuseppe will demonstrate independence and report compliance with HEP to facilitate independent rehab program upon discharge.  GOAL in PROGRESS pt reports compliance with ex 3x/week.     Giuseppe will IND ambulate WW on even surfaces for community distances without imbalance to safely return to [facility] and enhance access to the community. GOAL IN PROGRESS, pt reports using WW in the community, does feel imbalanced at times.

## 2023-12-12 ENCOUNTER — TREATMENT (OUTPATIENT)
Dept: PHYSICAL THERAPY | Facility: CLINIC | Age: 88
End: 2023-12-12
Payer: MEDICARE

## 2023-12-12 DIAGNOSIS — R26.81 UNSTEADY GAIT: ICD-10-CM

## 2023-12-12 DIAGNOSIS — R42 DIZZINESS: Primary | ICD-10-CM

## 2023-12-12 PROCEDURE — 97530 THERAPEUTIC ACTIVITIES: CPT | Mod: GP

## 2023-12-12 ASSESSMENT — BALANCE ASSESSMENTS
TURN 360 DEGREES: NEEDS ASSISTANCE WHILE TURNING
REACHING FORWARD WITH OUTSTRETCHED ARM WHILE STANDING: LOSES BALANCE WHILE TRYING/REQUIRES EXTERNAL SUPPORT
STANDING UNSUPPORTED WITH FEET TOGETHER: NEEDS HELP TO ATTAIN POSITION AND UNABLE TO HOLD FOR 15 SECONDS
PLACE ALTERNATE FOOT ON STEP OR STOOL WHILE STANDING UNSUPPORTED: ABLE TO COMPLETE GREATER THAN 2 STEPS NEEDS MINIMAL ASSIST
PLACE ALTERNATE FOOT ON STEP OR STOOL WHILE STANDING UNSUPPORTED: NEEDS ASSIST TO KEEP FROM LOSING BALANCE OR FALLING
PICK UP OBJECT FROM THE FLOOR FROM A STANDING POSITION: UNABLE TO TRY/NEEDS ASSIST TO KEEP FROM LOSING BALANCE OR FALLING
TRANSFERS: NEEDS ONE PERSON TO ASSIST
STANDING UNSUPPORTED WITH EYES CLOSED: NEEDS HELP TO KEEP FROM FALLING
STANDING TO SITTING: ABLE TO STAND USING HANDS AFTER SEVERAL TRIES
STANDING UNSUPPORTED: UNABLE TO STAND 30 SECONDS UNSUPPORTED
STANDING UNSUPPORTED ONE FOOT IN FRONT: LOSES BALANCE WHILE STEPPING OR STANDING
LONG VERSION TOTAL SCORE (MAX 56): 11
STANDING ON ONE LEG: UNABLE TO TRY NEEDS ASSIST TO PREVENT FALL
STANDING TO SITTING: CONTROLS DESCENT BY USING HANDS
SITTING WITH BACK UNSUPPORTED BUT FEET SUPPORTED ON FLOOR OR ON A STOOL: ABLE TO SIT SAFELY AND SECURELY FOR 2 MINUTES

## 2023-12-15 ENCOUNTER — OFFICE VISIT (OUTPATIENT)
Dept: PRIMARY CARE | Facility: CLINIC | Age: 88
End: 2023-12-15
Payer: MEDICARE

## 2023-12-15 VITALS
HEIGHT: 70 IN | DIASTOLIC BLOOD PRESSURE: 70 MMHG | HEART RATE: 51 BPM | OXYGEN SATURATION: 98 % | SYSTOLIC BLOOD PRESSURE: 134 MMHG | WEIGHT: 183 LBS | BODY MASS INDEX: 26.2 KG/M2

## 2023-12-15 DIAGNOSIS — M21.371 BILATERAL FOOT-DROP: Primary | ICD-10-CM

## 2023-12-15 DIAGNOSIS — M21.372 BILATERAL FOOT-DROP: Primary | ICD-10-CM

## 2023-12-15 PROCEDURE — 1160F RVW MEDS BY RX/DR IN RCRD: CPT | Performed by: STUDENT IN AN ORGANIZED HEALTH CARE EDUCATION/TRAINING PROGRAM

## 2023-12-15 PROCEDURE — 99213 OFFICE O/P EST LOW 20 MIN: CPT | Performed by: STUDENT IN AN ORGANIZED HEALTH CARE EDUCATION/TRAINING PROGRAM

## 2023-12-15 PROCEDURE — 3075F SYST BP GE 130 - 139MM HG: CPT | Performed by: STUDENT IN AN ORGANIZED HEALTH CARE EDUCATION/TRAINING PROGRAM

## 2023-12-15 PROCEDURE — 1036F TOBACCO NON-USER: CPT | Performed by: STUDENT IN AN ORGANIZED HEALTH CARE EDUCATION/TRAINING PROGRAM

## 2023-12-15 PROCEDURE — 3078F DIAST BP <80 MM HG: CPT | Performed by: STUDENT IN AN ORGANIZED HEALTH CARE EDUCATION/TRAINING PROGRAM

## 2023-12-15 PROCEDURE — 1159F MED LIST DOCD IN RCRD: CPT | Performed by: STUDENT IN AN ORGANIZED HEALTH CARE EDUCATION/TRAINING PROGRAM

## 2023-12-15 PROCEDURE — 1126F AMNT PAIN NOTED NONE PRSNT: CPT | Performed by: STUDENT IN AN ORGANIZED HEALTH CARE EDUCATION/TRAINING PROGRAM

## 2023-12-15 NOTE — PROGRESS NOTES
Giuseppe Davila is a 88 y.o. who presents for foot drop (Face to face for leg braces.).  Giuseppe is here for follow-up visit.  He does not have any acute medical complaints today, he does have a history of foot drop in his bilateral lower extremities from neuropathy.  He has been using leg braces for over 10 years and actually does fairly well with them.  He does physical therapy regularly.  Unfortunately, his leg braces are quite old and they are falling apart a bit.  As such, he was told he needs to come into the physician's office for a face-to-face visit to evaluate his gait and to reapprove the leg braces for him.    All other systems were reviewed and are negative unless stated.     Physical Exam:  General: well appearing, no distress  HEENT: no obvious lesions  Neck: supple without obviously elevated JVP  Cardiovascular: regular rate, regular rhythm, no peripheral edema  Respiratory: appropriate respiratory effort, no use of accessory muscles  Abdominal: no organomegaly or tenderness to palpation, no peritoneal signs  MSK: grossly normal ROM without deformities  Skin: no obvious lesions or rashes  Neurologic: grossly oriented, no obvious deficits  Psychatric: appropriate mood, cooperative    Visit Vitals  /70   Pulse 51        All other imaging, labs, and recent documents were reviewed and discussed.    Assessment & Plan:  1.  Ambulatory dysfunction  In the setting of bilateral lower extremity neuropathy, foot drop.  Has had the same leg braces for a while, they are unfortunately falling apart.  Necessitates these leg braces for proper ambulation, Order placed for new orthotic foot drop braces.  Follows with PT regularly.    2.  CAD, HFpEF  Maintained on statin and clopidogrel.  No anginal symptoms.  Maintained on torsemide at night.    3.  Prostate cancer status post radiation  Followed at [facility]. Maintain on Lupron injections    4.  Iron deficiency anemia secondary to GI bleeding  No signs of overt  bleeding, doing well.  No abdominal symptoms or blood per rectum.    Health Maintenance:  Vaccines: Declines covid/flu/shingles/pna  Screening: UTD  Labs: none needed today    RTC in 4 months, or sooner PRN    This note is not finalized until attending attestation is present.    Brandon Oconnor    Trainee role: Resident    I saw and evaluated the patient. I personally obtained the key and critical portions of the history and physical exam or was physically present for key and critical portions performed by the trainee. I reviewed the trainee's documentation and discussed the patient with the trainee. I agree with the trainee's medical decision making as documented on the trainee's notes.    Jose Antonio Hinton, DO

## 2023-12-21 NOTE — PROGRESS NOTES
"Physical Therapy    Physical Therapy Treatment    Patient Name: Giuseppe Davila  MRN: [id number]  Today's Date: 12/26/2023  Time Calculation  Start Time: 1115  Stop Time: 1155  Time Calculation (min): 40 min  Adolph RICHEY  Authorization: 16 visits 10/18/23-1/16/24  Visit 11/15 (updated 12/26/23 )     Assessment:  Reviewed/updated LE strengthening HEP to improve sit to stand transfers.  Today, pt challenged standing from 24\" surface w/ light UE support.  MOD fxnl quad weakness obsd B.   Able to progress dynamic balance using U UE support without incident: pt demo less steppage gait pattern as tx session progressed.   Pt able to walk short distances, no UE support, inside // bars, however, MOD unsteady gait in lateral direction at times and pt fatigues easily: WW remains safe choice for assistive device at this time.  Pt unable to perform standing/seated HR/TR and is unable to  WBOS w/o ue support due to neuropathy. In process of obtaining replacement B AFO.    Plan: continue per POC, consider resuming PT once fitted for new AFOs? Progress dynamic gait as able... use sp cane in clinic w/ CGA??    Current Problem  1. Dizziness  Follow Up In Physical Therapy      2. Unsteady gait  Follow Up In Physical Therapy          Subjective   General    Pt reports his legs feel weak despite HEP compliance: pt had difficulty standing from low toilet at family member's home. This weekend   Pt going to call today to schedule appt for AFO fitting.  Pt feels he has made these improvements since PT began: less UE support required to stand from surface  And is now able to walk length of bathroom w/o use of hands     Pain:  0/10    Precautions    Precautions Comment: Fall risk: high. Use gait belt. The patient was educated regarding their fall risk and PT recommendation of use of WW at all times on 10/18/23.  PMH: multiple CVAs (last one in 2013), neuropathy (familiar x 40 years per pt), B foot drop but no longer wears braces, " "pacemaker, a-fib s/p watchman, GERD, CAD s/p PCI, HFpEF, SSS s/p PPM, history of rectal ulcer (3/2023) and prostate cancer s/p prostatectomy and radiation.   Pt goes by \"Soto\"       Objective     Treatments:        Access Code: [email or web address]  Gait belt and constant SBA/CGA  Therapeutic exercise 13 minutes, 1 unit  NuStep, seat 11, no UE, L5 x 5 minutes, subjective-  Reviewed HEP, issued Y/R/G Tbands to improve LE strength (hip ABD in sit, LAQ)  Sit to stands from 24\" plinth 10x, light to no use of hands, \"how to stand\"    Neuro Re-Education, 27 minutes, 2 units  Walk in // bars : sp cane, SBQC, and no device (sp cane good training tool, though do not feel this safe choice for assistive device at this time)  WBOS no ue's on land: pt unable  Seated, standing HR/TR: pt unable  FR, ML step in/out of upside down 2\" box 5x ea, per foot  FR walk in // bars 3x   Sidestep in // bars 2x  1/4 turns in // bars, 6x L/R w/ U UE    Pt education: ex technique cues, safety ed, \"how to stand\", gait training using sp cane if indoors next to wall or counter only        "

## 2023-12-26 ENCOUNTER — TREATMENT (OUTPATIENT)
Dept: PHYSICAL THERAPY | Facility: CLINIC | Age: 88
End: 2023-12-26
Payer: MEDICARE

## 2023-12-26 DIAGNOSIS — R42 DIZZINESS: Primary | ICD-10-CM

## 2023-12-26 DIAGNOSIS — R26.81 UNSTEADY GAIT: ICD-10-CM

## 2023-12-26 PROCEDURE — 97110 THERAPEUTIC EXERCISES: CPT | Mod: GP,CQ

## 2023-12-26 PROCEDURE — 97112 NEUROMUSCULAR REEDUCATION: CPT | Mod: GP,CQ

## 2023-12-28 ENCOUNTER — TELEPHONE (OUTPATIENT)
Dept: PRIMARY CARE | Facility: CLINIC | Age: 88
End: 2023-12-28
Payer: MEDICARE

## 2023-12-28 DIAGNOSIS — M21.371 BILATERAL FOOT-DROP: Primary | ICD-10-CM

## 2023-12-28 DIAGNOSIS — M21.372 BILATERAL FOOT-DROP: Primary | ICD-10-CM

## 2024-01-02 NOTE — PROGRESS NOTES
"Physical Therapy    Physical Therapy Treatment    Patient Name: Giuseppe Davila  MRN: [id number]  Today's Date: 1/18/2024  Time Calculation  Start Time: 0945  Stop Time: 1028  Time Calculation (min): 43 min  [facility]  Authorization: 16 visits 10/18/23-1/16/24  Visit 11/15 (updated 01/18/24 )     Assessment:  Per subjective reports, focused on improving LE strength required to stand from lower surfaces.  Pt MOD challenged.  + quad lag w/ SLR demo B.    Plan: re-eval nv.....consider resuming PT once fitted for new AFOs? Progress dynamic gait as able... use sp cane in clinic w/ CGA??    Current Problem  1. Dizziness  Follow Up In Physical Therapy      2. Unsteady gait  Follow Up In Physical Therapy          Subjective   General    Pt reports he is frustrated re: his legs feel weak despite HEP compliance: pt had difficulty standing from pews in [employer]. Does report greater ease standing from computer chair at home or his toilet at home, with B UE support required.    AFO fitting scheduled for next week       Pain:  0/10    Precautions    Precautions Comment: Fall risk: high. Use gait belt. The patient was educated regarding their fall risk and PT recommendation of use of WW at all times on 10/18/23.  PMH: multiple CVAs (last one in 2013), neuropathy (familiar x 40 years per pt), B foot drop but no longer wears braces, pacemaker, a-fib s/p watchman, GERD, CAD s/p PCI, HFpEF, SSS s/p PPM, history of rectal ulcer (3/2023) and prostate cancer s/p prostatectomy and radiation.   Pt goes by \"Soto\"       Objective     Treatments:        Access Code: [email or web address]  Gait belt and constant SBA/CGA  Therapeutic exercise 43 minutes, 3 unit  Access Code: [email or web address]  -nu step (seat 12, no arms), L5 x 7 minutes: subjective  - Seated Hamstring Curls with green Resistance  - 1 x daily - 3-7 x weekly - 1-2 sets - 10 reps  - Clam with Y Resistance  - 1 x daily - 3-7 x weekly - 2 sets - 10 reps  - Small Range Straight Leg Raise  - 1 x daily - 3-7 x " "weekly - 2 sets - 5 reps: + QUAD LAG PRESENT B  - Supine Bridge  - 1 x daily - 3-7 x weekly - 2-3 sets - 5 reps  - Seated Knee Extension with G Resistance  - 1 x daily - 3-7 x weekly - 2 sets - 10 reps  - Seated March with G Resistance  - 1 x daily - 3-7 x weekly - 2 sets - 10 reps  -Sit to stands from 24\" plinth 10x, light  use of hands, \"how to stand\"  -HEP reviewed/modified       Neuro Re-Education,minutes,  units  Walk in // bars : sp cane, SBQC, and no device (sp cane good training tool, though do not feel this safe choice for assistive device at this time)  WBOS no ue's on land: pt unable  Seated, standing HR/TR: pt unable  FR, ML step in/out of upside down 2\" box 5x ea, per foot  FR walk in // bars 3x   Sidestep in // bars 2x  1/4 turns in // bars, 6x L/R w/ U UE    Pt education: ex technique cues, safety ed, \"how to stand\"HEP update      "

## 2024-01-04 ENCOUNTER — TREATMENT (OUTPATIENT)
Dept: PHYSICAL THERAPY | Facility: CLINIC | Age: 89
End: 2024-01-04
Payer: MEDICARE

## 2024-01-04 DIAGNOSIS — R26.81 UNSTEADY GAIT: ICD-10-CM

## 2024-01-04 DIAGNOSIS — R42 DIZZINESS: Primary | ICD-10-CM

## 2024-01-04 PROCEDURE — 97110 THERAPEUTIC EXERCISES: CPT | Mod: GP,CQ

## 2024-01-08 NOTE — PROGRESS NOTES
Physical Therapy    Physical Therapy Treatment    Patient Name: Giuseppe Davila  MRN: [id number]  Today's Date: 1/9/2024  Time Calculation  Start Time: 1015  Stop Time: 1103  Time Calculation (min): 48 min  [facility]  Authorization: 16 visits 10/18/23-1/16/24  Visit 15 (updated 01/09/24 )   Reassess 12/12/23, 1/9/24    Assessment:  Giuseppe Davila  has completed 15 physical therapy sessions to address gait and balance deficits.  Treatment has included: therEx for strengthening, therAct to improve transfers, NMR for balance.  Giuseppe Davila  reports compliance with the prescribed physical therapy home exercise program.  Giuseppe Davila has made some progress towards therapy goals with improvement in ability to transfer based on the 5xSTS and 30 sec chair to stand tests. He is still at increased fall risk based on his SOLANO, TUG and 5xSTS and 30 sec chair to stand tests which we discussed.  Reviewed PT recommendation of use of WW at ALL times based on these findings and pt verbalized understanding.   Giuseppe Davila   demonstrated good understanding of HEP and importance of continuing to maintain gains made from PT POC. Discussion with Giuseppe Davila  today regarding discharge from physical therapy and Giuseppe Davila  is agreeable to discharge. Giuseppe Davila   does not require further skilled therapy services because remaining functional deficits can be addressed through HEP administered.  At this time I recommend: Giuseppe Davila   be discharged from physical therapy and continue with HEP, to use WW at all times, and to return to PT after he is no longer challenged with current HEP.  Pt verbalized agreement with plan and was advised that he would require new Rx to return to PT.      Plan:   Discharge pt from skilled PT with IND HEP and under care of referring provider. Pt in agreement with plan.      Current Problem  1. Dizziness  Follow Up In Physical Therapy      2. Unsteady gait  Follow Up In Physical Therapy  "         Subjective   General    Giuseppe Davila denies any falls or complications since last session. Giuseppe Davila reports that he will get new AFOs shortly.  Left foot has been catching more at home.  Pt reports that he feels his balance is getting worse.      Giuseppe Davila reports compliance with HEP.    Pain:  0/10    Precautions    Precautions Comment: Fall risk: high. Use gait belt. The patient was educated regarding their fall risk and PT recommendation of use of WW at all times on 10/18/23.  PMH: multiple CVAs (last one in 2013), neuropathy (familiar x 40 years per pt), B foot drop but no longer wears braces, pacemaker, a-fib s/p watchman, GERD, CAD s/p PCI, HFpEF, SSS s/p PPM, history of rectal ulcer (3/2023) and prostate cancer s/p prostatectomy and radiation.   Pt goes by \"Soto\"       Objective     Outcome Measures:  Buchanan Balance Scale  1. Sitting to Standing: Able to stand independently using hands  2. Standing Unsupported: Unable to stand 30 seconds unsupported  3. Sitting with Back Unsupported but Feet Supported on Floor or on a Stool: Able to sit safely and securely for 2 minutes  4. Standing to Sitting: Controls descent by using hands  5.  Transfers: Able to transfer with verbal cueing and/or supervision (use of WW during)  6. Standing Unsupported with Eyes Closed: Needs help to keep from falling  7. Standing Unsupported with Feet Together: Needs help to attain position and unable to hold for 15 seconds  8. Reach Forward with Outstretched Arm While Standing: Loses balance while trying/requires external support  9.  Object from Floor from a Standing Position: Unable to try/needs assist to keep from losing balance or falling  10. Turning to Look Behind Over Left and Right Shoulders While Standing: Needs assist to keep from losing balance or falling  11. Turn 360 Degrees: Needs assistance while turning  12. Place Alternate Foot on Step or Stool While Standing Unsupported: Needs assistance to " "keep from falling/unable to try  13. Standing Unsupported One Foot in Front: Loses balance while stepping or standing  14. Standing on One Leg: Unable to try needs assist to prevent fall  Buchanan Balance Score: 12    Timed Up and Go Test  How many seconds did it take to complete the 5 tasks?: 22 seconds (with use of WW and CGA x 1 for safety)    Other Measures  5x Sit to Stand: 0 unable without UE; 24 sec with B UE and CGA  30 Second Sit to Stand: 5 rises  Activities - Specific Balance Confidence Scale: 6.875%    Treatments:   Access Code: [email or web address] orally reviewed final HEP - reviewed building cardio endurance, tranfers and rationale for HEP.  Educated in how to progress to remain challenged  Assessment of Giuseppe KRIS Giovanni's POC goals completed today- see updated goals, objective, and assessment for further information. Educated Giuseppe Davila  regarding results of examination findings and discussed next steps in PT POC. Educated Giuseppe Davila regarding importance of continuing with good HEP compliance for optimal rehab results.  Educated Giuseppe Davila  regarding PT recommendation of use of WW at all times especially since he is unable to stand statically > 2 seconds without LOB.   Educated Giuseppe Davila  regarding safety and fall risk based on outcomes.      Goals:   STG's (within 2 weeks)    Giuseppe will be consistently use WW at all times in order to decrease fall risk to safely participate in ADLs.  12/12/23 GOAL IN PROGRESS.  Pt reports that he uses WW in the community, does not use within the home because \"I don't want to become dependent on the walker.\"  1/9/24 GOAL IN PROGRESS pt reports still not using WW at home but at least \"takes it\" in the community.     Giuseppe will demonstrate independence, excellent understanding of and report compliance with at least 3 exercises in his HEP to facilitate the learning process to maximize PT benefits and to facilitate independent rehab program upon discharge. GOAL MET " 12/12/23, 1/9/24 - pt reports compliance with HEP daily.      LTG's (by discharge)    Giuseppe will increase SOLANO by >/= 45 points in order to improve safety at home and decrease falls risk.  Scores less that 45 indicates individuals may be at increased fall risk with scores less that 40 is associated with almost 100% fall risk.  12/12/23 GOAL IN PROGRESS, unable to stand without UE A but score increased from 9/56 to 11/56.  1/9/24 GOAL IN PROGRESS with increase to 12/56.  He was able to stand near 2 seconds best trial without UE and demonstrated stepping response using R LE due to LOB - IMPROVEMENT since eval when unable to stand without UE A.    Giuseppe will perform 5x sit to  < 15 seconds to decrease fall risk.  12/12/23 GOAL IN PROGRESS increase from unable with PT A on eval, but able to perform today with CGA x 1. 1/9/24 GOAL IN PROGRESS.     Giuseppe will complete TUG within 12 seconds or less (indicative of higher fall risk) in order to INC balance and enhance safety during ADLs/ IADLs. (> or equal to 12 seconds indicates high risk for falls in older adults. 11-20 seconds is normal for the frail and elderly.) GOAL IN PROGRESS 12/12/23 and 1/9/24    Giuseppe will complete sit <> stand transfers using BUE, equal weight bearing on LEs, and no major LOB at completion of transfer during 3/3 trials in order to enhance functional mobility and safety. 12/12/23  GOAL IN PROGRESS able to stand with less UE A and less time from standard chair, but still requires B UE and increased time. 1/9/24 GOAL IN PROGRESS improved ability to transfer with less use of UE and improved control with descent.  He does require PT A to stabilize chairs at times.      Giuseppe will demonstrate independence and report compliance with HEP to facilitate independent rehab program upon discharge.  12/12/23 GOAL in PROGRESS pt reports compliance with ex 3x/week. 1/9/24 GOAL MET pt reports compliance with HEP daily.      Giuseppe will IND ambulate WW on  even surfaces for community distances without imbalance to safely return to OF and enhance access to the community. 12/12/23 GOAL IN PROGRESS, pt reports using WW in the community, does feel imbalanced at times.   GOAL UNCHANGED since 12/12/23.

## 2024-01-09 ENCOUNTER — TREATMENT (OUTPATIENT)
Dept: PHYSICAL THERAPY | Facility: CLINIC | Age: 89
End: 2024-01-09
Payer: MEDICARE

## 2024-01-09 DIAGNOSIS — R26.81 UNSTEADY GAIT: ICD-10-CM

## 2024-01-09 DIAGNOSIS — R42 DIZZINESS: Primary | ICD-10-CM

## 2024-01-09 PROCEDURE — 97530 THERAPEUTIC ACTIVITIES: CPT | Mod: GP

## 2024-01-09 ASSESSMENT — BALANCE ASSESSMENTS
STANDING ON ONE LEG: UNABLE TO TRY NEEDS ASSIST TO PREVENT FALL
SITTING WITH BACK UNSUPPORTED BUT FEET SUPPORTED ON FLOOR OR ON A STOOL: ABLE TO SIT SAFELY AND SECURELY FOR 2 MINUTES
STANDING TO SITTING: CONTROLS DESCENT BY USING HANDS
STANDING UNSUPPORTED ONE FOOT IN FRONT: LOSES BALANCE WHILE STEPPING OR STANDING
STANDING UNSUPPORTED WITH FEET TOGETHER: NEEDS HELP TO ATTAIN POSITION AND UNABLE TO HOLD FOR 15 SECONDS
REACHING FORWARD WITH OUTSTRETCHED ARM WHILE STANDING: LOSES BALANCE WHILE TRYING/REQUIRES EXTERNAL SUPPORT
TURN 360 DEGREES: NEEDS ASSISTANCE WHILE TURNING
STANDING UNSUPPORTED WITH EYES CLOSED: NEEDS HELP TO KEEP FROM FALLING
TRANSFERS: ABLE TO TRANSFER WITH VERBAL CUEING AND/OR SUPERVISION
PLACE ALTERNATE FOOT ON STEP OR STOOL WHILE STANDING UNSUPPORTED: NEEDS ASSIST TO KEEP FROM LOSING BALANCE OR FALLING
STANDING TO SITTING: ABLE TO STAND INDEPENDENTLY USING HANDS
PLACE ALTERNATE FOOT ON STEP OR STOOL WHILE STANDING UNSUPPORTED: NEEDS ASSISTANCE TO KEEP FROM FALLING/UNABLE TO TRY
PICK UP OBJECT FROM THE FLOOR FROM A STANDING POSITION: UNABLE TO TRY/NEEDS ASSIST TO KEEP FROM LOSING BALANCE OR FALLING
STANDING UNSUPPORTED: UNABLE TO STAND 30 SECONDS UNSUPPORTED
LONG VERSION TOTAL SCORE (MAX 56): 12

## 2024-02-05 ENCOUNTER — TELEPHONE (OUTPATIENT)
Dept: GASTROENTEROLOGY | Facility: CLINIC | Age: 89
End: 2024-02-05
Payer: MEDICARE

## 2024-02-05 DIAGNOSIS — R11.0 NAUSEA: ICD-10-CM

## 2024-02-05 RX ORDER — ONDANSETRON 4 MG/1
4 TABLET, FILM COATED ORAL EVERY 8 HOURS PRN
Qty: 20 TABLET | Refills: 1 | Status: SHIPPED | OUTPATIENT
Start: 2024-02-05 | End: 2024-03-11 | Stop reason: SDUPTHER

## 2024-02-05 NOTE — TELEPHONE ENCOUNTER
Patient's wife stopped by today asking for a refill of Zofran   Patient's wife would like a call to let her know if the refill will be given as it is helping well with the patient's nausea.

## 2024-02-14 ENCOUNTER — TELEPHONE (OUTPATIENT)
Dept: CARDIOLOGY | Facility: HOSPITAL | Age: 89
End: 2024-02-14
Payer: MEDICARE

## 2024-02-14 DIAGNOSIS — I48.91 ATRIAL FIBRILLATION, UNSPECIFIED TYPE (MULTI): ICD-10-CM

## 2024-03-06 NOTE — CONSULTS
"    Service:   Service: Gastroenterology     Consult:  Consult requested by (Attending Name): Brandon Bolanos   Reason: rectal bleeding     History of Present Illness:   HPI:    RICA FALLON is a 87 year old Male with past medical history significant fo rCAD with h/o PCI, chronic diastolic CHF (EF 65-70% 5/2023), HTN, HLD, permanent afib not on AC due  to GI bleeding s/p Watchman, mild AS, SSS s/p PPM, 4.9 cm ascending aortic aneurysm (TTE 5/2023), PFO, CVA, GERD, anemia, BPH, and prostate CA s/p radical prostatectomy 2000 who presented for BRBPR and lower abdominal pain. Patient states he has been  experiencing diarrhea for the past few days, which was initially nonbloody but the past couple days has been accompanied with bright red blood.  Recently his bowel movements consist of mostly bright red blood without stool.  States he has been having  a BM \"every 5 minutes.  He was also experiencing some abdominal pain, however now he denies any pain with the bowel movements.  He does have LLQ tenderness with palpation on exam. Prior to having a BM he does report urgency.  He is still experiencing  bowel movements with bright red blood, however states they have decreased in frequency since he has been in the ED.  Patient denies chest pain, shortness of breath, nausea, vomiting, lightheadedness/dizziness.    Of note patient was evaluated for hematochezia 3/2023 where he underwent a flex sigmoidoscopy demonstrating diverticulosis and an oozing distal rectal ulcer s/p Hemoclip/ hemostatic spray.  Biopsies were negative for malignancy.  He follows with Dr. Garcia,  and recently followed up in the office 8/2023.  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    Work-up significant for VSS, hemoglobin 12.7, creatinine 0.98.  CT imaging demonstrated findings consistent with acute sigmoid diverticulitis.  GI consulted for further evaluation.      PMHx: As above  PSHx: Watchman, PPM, LHC with PCI, cholecystectomy, proctectomy, tonsillectomy, colonoscopy,   Social: denies tobacco, EtOH, and illicit  Fam Hx: CAD, HTN, HLD, TIA, Alzheimer's dementia    Review of Systems (Bold = positive)  Constitutional; Fever, chills, anorexia, weight loss, weakness   Eyes:  Blurry vision, diplopia, vision loss  ENT: Nasal congestion, earache or sore throat  Chest: Cough, dyspnea, hemoptysis, wheezing, pleurisy, Chest Pain,  palpitations, dyspnea on exertion, orthopnea  Gastrointestinal: Abdominal pain, nausea, vomiting, diarrhea , constipation, melena, hematemesis, hematochezia  Genitourinary: Dysuria, hematuria, frequency, urgency, flank pain  Musculoskeletal: Arthralgia, myalgia, weakness  Neurological: Dizziness, light-headedness, headache, syncope  Psychiatric: anxiety, depression, hallucinations  Skin: Rash, pruritis, rash, lesions  Endocrine: Heat intolerance, cold intolerance, polyuria, polydipsia  Hematologic: Bruising        Review Family/Social History and ROS:   Social History:    Smoking Status: former smoker  (1)   Alcohol Use: denies (1)            Allergies:  ·  No Known Allergies :     Objective:   Physical Exam Narrative:  ·  Physical Exam:    Physical Exam:  General: Alert, awake.  Cooperative.  HEENT:  Normocephalic, atraumatic.  Neck:  Trachea midline.  No JVD.    Chest:  Clear to auscultation bilaterally. No wheezes, rales, or rhonchi.  CV:  Regular rate and rhythm.  Positive S1/S2. No murmur, no gallops, no rubs  GI: Bowel sounds present in all four quadrants, abdomen is soft, nondistended, tender to palpation LLQ.  Extremities:  No lower extremity edema or cyanosis.   Neurological:  AAOx3.  Moving all extremities.  Skin:  Warm and  dry.    Medications:    Medications:          Continuous Medications       --------------------------------  No continuous medications are active       Scheduled Medications       --------------------------------    1. Pantoprazole:  40  mg  Oral  Daily    2. Piperacillin - Tazobactam 3.375 gram/Iso-osmotic 50 mL Premix IVPB:  50  mL  IntraVenous Piggyback  Every 6 Hours         PRN Medications       --------------------------------    1. Acetaminophen:  650  mg  Oral  Every 4 Hours    2. Ondansetron Injectable:  4  mg  IntraVenous Push  Every 4 Hours    3. oxyCODONE Immediate Release:  5  mg  Oral  Every 4 Hours    4. Sodium Chloride 0.9% Injectable Flush:  10  mL  IntraVenous Flush  Every 8 Hours and as Needed        Radiology Results:    Results:        Impression:    Acute sigmoid diverticulitis.     Concentric bladder wall thickening with adjacent stranding. Findings  may be reactive with cystitis not excluded. Recommend correlation  with urinalysis.     Biliary dilatation is stable compared to prior imaging.        CT Abdomen and Pelvis with IV Contrast [Sep 24 2023 11:38PM]        Assessment:    87 year old Male with past medical history significant fo rCAD with h/o PCI, chronic diastolic CHF (EF 65-70% 5/2023), HTN, HLD, permanent afib not on AC due to GI  bleeding s/p Watchman, mild AS, SSS s/p PPM, 4.9 cm ascending aortic aneurysm (TTE 5/2023), PFO, CVA, GERD, anemia, BPH, and prostate CA s/p radical prostatectomy 2000 who presented for BRBPR and lower abdominal pain.     #Acute sigmoid diverticulitis  #Distal rectal ulcer, h/o  -Patient is hemodynamically stable, hemoglobin is at baseline.  Follow-up on repeat CBC.  If symptoms worsen or hemoglobin significantly drops may need to reevaluate rectal ulcer. However, as bowel movements are decreasing in frequency, no indication  for scope at this time as risks outweighed the benefits.   -Continue Zosyn  -Advance to clear liquid diet  -Maintain outpatient  follow-up with Dr. Garcia.    Nga Wagoner D.O.  Internal Medicine PGY-3  [phone] or [place]    This is a preliminary note with physician attestation to follow.      Consult Status:  Consult Status    (select all that apply): initial  consult complete, will follow   Consult Order ID: [id number]     Attestation:   Note Completion:  I am a:  Resident/Fellow   Attending Attestation I saw and evaluated the patient.  I personally obtained the key and critical portions of the history and physical exam or was physically present for key and  critical portions performed by the resident/fellow. I reviewed the resident/fellow?s documentation and discussed the patient with the resident/fellow.  I agree with the resident/fellow?s medical decision making as documented in the note.     I personally evaluated the patient on 25-Sep-2023   Comments/ Additional Findings    Agree with assessment and plan above. Patient here with rectal bleeding. Colonoscopy last time showed rectal ulcer biopsy were negative for malignancy.  Likely could be source of current bleeding versus diverticular bleeding. However with acute sigmoid diverticulitis and pain on palpation would not recommend flex sigm or colonoscopy at this time due to risk of perforation unless he continues to bleed  and drop in hemoglobin. Per patient the bleeding has become less.           Electronic Signatures:  Nga Wagoner (DO (Resident))  (Signed 25-Sep-2023 14:53)   Authored: Service, History of Present Illness, Review  Family/Social History and ROS, Allergies, Objective, Assessment/Recommendations, Note Completion  Araceli Bolanos)  (Signed 25-Sep-2023 15:01)   Authored: Assessment/Recommendations, Note Completion   Co-Signer: Service, History of Present Illness, Review Family/Social History and ROS, Allergies, Objective, Assessment/Recommendations,  Note Completion      Last Updated: 25-Sep-2023 15:01 by Araceli Bolanos)    References:  1.  Data Referenced From  "\"Patient Profile - Adult v2\" 25-Sep-2023 09:35   "

## 2024-03-11 ENCOUNTER — TELEPHONE (OUTPATIENT)
Dept: GASTROENTEROLOGY | Facility: CLINIC | Age: 89
End: 2024-03-11
Payer: MEDICARE

## 2024-03-11 DIAGNOSIS — R11.0 NAUSEA: ICD-10-CM

## 2024-03-11 RX ORDER — ONDANSETRON 4 MG/1
4 TABLET, FILM COATED ORAL EVERY 8 HOURS PRN
Qty: 30 TABLET | Refills: 1 | Status: SHIPPED | OUTPATIENT
Start: 2024-03-11 | End: 2024-03-11 | Stop reason: SDUPTHER

## 2024-03-11 RX ORDER — ONDANSETRON 4 MG/1
4 TABLET, FILM COATED ORAL EVERY 8 HOURS PRN
Qty: 30 TABLET | Refills: 1 | Status: SHIPPED | OUTPATIENT
Start: 2024-03-11 | End: 2024-05-23 | Stop reason: DRUGHIGH

## 2024-03-11 NOTE — TELEPHONE ENCOUNTER
Patient's wife stopped in office.  Her  needs a refill on the Zofran he has been taking one tablet a night and it has been working very well.  Please make the refill for 30 tablets and call in the [facility] [place].  Thanks.

## 2024-03-15 PROBLEM — Q21.12 PATENT FORAMEN OVALE (HHS-HCC): Status: ACTIVE | Noted: 2023-08-08

## 2024-03-15 PROBLEM — M54.9 BACK PAIN: Status: ACTIVE | Noted: 2024-03-15

## 2024-03-15 PROBLEM — Z85.46 HISTORY OF PROSTATE CANCER: Status: RESOLVED | Noted: 2023-10-17 | Resolved: 2024-03-15

## 2024-03-15 PROBLEM — L08.9 INFECTED SEBACEOUS CYST: Status: ACTIVE | Noted: 2024-03-15

## 2024-03-15 PROBLEM — E55.9 VITAMIN D DEFICIENCY: Status: ACTIVE | Noted: 2024-03-15

## 2024-03-15 PROBLEM — M75.100 TEAR OF ROTATOR CUFF: Status: ACTIVE | Noted: 2024-03-15

## 2024-03-15 PROBLEM — R42 DIZZINESS: Status: RESOLVED | Noted: 2023-10-18 | Resolved: 2024-03-15

## 2024-03-15 PROBLEM — C79.51 MALIGNANT NEOPLASM METASTATIC TO BONE (MULTI): Status: RESOLVED | Noted: 2017-10-05 | Resolved: 2024-03-15

## 2024-03-15 PROBLEM — L72.3 INFECTED SEBACEOUS CYST: Status: ACTIVE | Noted: 2024-03-15

## 2024-03-15 PROBLEM — R07.89 ATYPICAL CHEST PAIN: Status: ACTIVE | Noted: 2023-04-03

## 2024-03-15 PROBLEM — R11.2 NAUSEA AND VOMITING: Status: ACTIVE | Noted: 2024-03-15

## 2024-03-15 PROBLEM — M25.511 PAIN OF RIGHT SHOULDER REGION: Status: ACTIVE | Noted: 2024-03-15

## 2024-03-15 PROBLEM — C61: Status: RESOLVED | Noted: 2021-12-03 | Resolved: 2024-03-15

## 2024-03-22 ENCOUNTER — OFFICE VISIT (OUTPATIENT)
Dept: CARDIOLOGY | Facility: CLINIC | Age: 89
End: 2024-03-22
Payer: MEDICARE

## 2024-03-22 VITALS
OXYGEN SATURATION: 98 % | SYSTOLIC BLOOD PRESSURE: 126 MMHG | WEIGHT: 202 LBS | DIASTOLIC BLOOD PRESSURE: 70 MMHG | HEART RATE: 84 BPM | HEIGHT: 71 IN | BODY MASS INDEX: 28.28 KG/M2

## 2024-03-22 DIAGNOSIS — I10 PRIMARY HYPERTENSION: ICD-10-CM

## 2024-03-22 DIAGNOSIS — I70.213 INTERMITTENT CLAUDICATION OF BOTH LOWER EXTREMITIES DUE TO ATHEROSCLEROSIS (CMS-HCC): ICD-10-CM

## 2024-03-22 DIAGNOSIS — E78.49 OTHER HYPERLIPIDEMIA: ICD-10-CM

## 2024-03-22 DIAGNOSIS — I50.32 CHRONIC DIASTOLIC CONGESTIVE HEART FAILURE (MULTI): ICD-10-CM

## 2024-03-22 DIAGNOSIS — I34.0 SEVERE MITRAL REGURGITATION: Primary | ICD-10-CM

## 2024-03-22 DIAGNOSIS — I07.1 MODERATE TRICUSPID REGURGITATION: ICD-10-CM

## 2024-03-22 DIAGNOSIS — Q21.12 PATENT FORAMEN OVALE (HHS-HCC): ICD-10-CM

## 2024-03-22 DIAGNOSIS — I71.21 ANEURYSM OF ASCENDING AORTA WITHOUT RUPTURE (CMS-HCC): ICD-10-CM

## 2024-03-22 DIAGNOSIS — I25.10 CORONARY ARTERY DISEASE INVOLVING NATIVE CORONARY ARTERY OF NATIVE HEART WITHOUT ANGINA PECTORIS: ICD-10-CM

## 2024-03-22 DIAGNOSIS — R07.89 CHEST TIGHTNESS: ICD-10-CM

## 2024-03-22 DIAGNOSIS — I20.89 EXERTIONAL ANGINA (CMS-HCC): ICD-10-CM

## 2024-03-22 DIAGNOSIS — Z95.5 PRESENCE OF STENT IN CORONARY ARTERY: ICD-10-CM

## 2024-03-22 PROBLEM — R00.1 BRADYCARDIA: Status: RESOLVED | Noted: 2023-10-17 | Resolved: 2024-03-22

## 2024-03-22 PROCEDURE — 3078F DIAST BP <80 MM HG: CPT | Performed by: INTERNAL MEDICINE

## 2024-03-22 PROCEDURE — 1157F ADVNC CARE PLAN IN RCRD: CPT | Performed by: INTERNAL MEDICINE

## 2024-03-22 PROCEDURE — 3074F SYST BP LT 130 MM HG: CPT | Performed by: INTERNAL MEDICINE

## 2024-03-22 PROCEDURE — 99214 OFFICE O/P EST MOD 30 MIN: CPT | Performed by: INTERNAL MEDICINE

## 2024-03-22 PROCEDURE — 1159F MED LIST DOCD IN RCRD: CPT | Performed by: INTERNAL MEDICINE

## 2024-03-22 PROCEDURE — 1036F TOBACCO NON-USER: CPT | Performed by: INTERNAL MEDICINE

## 2024-03-22 RX ORDER — ASPIRIN 81 MG/1
81 TABLET ORAL DAILY
COMMUNITY
Start: 2023-04-21 | End: 2025-04-09

## 2024-03-22 RX ORDER — TORSEMIDE 20 MG/1
TABLET ORAL
Qty: 90 TABLET | Refills: 3 | Status: SHIPPED | OUTPATIENT
Start: 2024-03-22

## 2024-03-22 RX ORDER — REGADENOSON 0.08 MG/ML
0.4 INJECTION, SOLUTION INTRAVENOUS
Status: CANCELLED | OUTPATIENT
Start: 2024-03-22

## 2024-03-22 NOTE — PATIENT INSTRUCTIONS
Increase your torsemide to 20 mg daily    On your next visit we are going to do a heart scan and an echocardiogram.

## 2024-03-22 NOTE — PROGRESS NOTES
Subjective   Giuseppe Davila is a 88 y.o. male.    Chief Complaint:  Chest pain.  Shortness of breath.  Leg edema.    HPI    Since his last visit he has had significant deterioration in his cardiac issues.  He has more shortness of breath and dyspnea with exertion.  Complains of chest discomfort.  It is in the midsternal area and radiates up into the left anterior chest into the left shoulder.  Occurs more commonly in the evening.  Not necessarily related to exertion.  Also notes increasing peripheral edema.  Feels his diuretics is not working very well.  Has had some weight gain.    The patient underwent a Watchman procedure on April 21, 2023 at [facility]. Prior to the procedure he had a CT of the chest which showed a 4.7 cm ascending aortic aneurysm. On her last echo he measured 4.9 cm which was quite similar.     Cardiac problems include a history of permanent atrial fibrillation, pacemaker, aortic stenosis, diastolic heart failure, hypertension, tricuspid regurgitation, and mitral regurgitation.     In October 2022, he presented to us with symptoms of near syncope. He was seen and evaluated by the electrophysiology service. He was deemed to be a candidate for a pacemaker. Because of his atrial fibrillation, we elected to go with a Micra device. He tolerated the procedure well.      Cardiac catheterization was performed on July 26, 2022. This demonstrated nonobstructive coronary artery disease. Right heart cath demonstrated normal pulmonary artery pressures and normal left ventricular end-diastolic pressure. Cardiac output was normal.     A transesophageal echocardiographic study demonstrated severe mitral regurgitation.     An echocardiographic study done on July 26, 2022 demonstrated normal left ventricular systolic function. There was moderate to severe mitral regurgitation and moderate severe tricuspid regurgitation with mild aortic valve regurgitation. The ascending aorta was moderately  dilated.     His last Holter monitor done in May 2022 demonstrated an average heart rate of 53. He is in chronic atrial fibrillation. He is low heart rate was approximately 34. He had pauses up to 3 seconds.     He presented on 6/15/18 with symptoms of a stroke. Became very confused and disoriented. He presented to the hospital where he was noted have evidence of an acute stroke. He received TPA.     Allergies  Medication    · No Known Drug Allergies   Recorded By: Fabiola Rivas; 2/5/2015 9:58:44 AM     Family History  Mother    · Family history of cardiac disorder (V17.49) (Z82.49)   · Family history of TIA (transient ischemic attack)  Father    · Family history of malignant neoplasm (V16.9) (Z80.9)  Sister    · Family history of cardiac disorder (V17.49) (Z82.49)  Paternal Uncle    · Family history of Alzheimer's disease (V17.2) (Z82.0)   · FH: Parkinson's disease (V17.2) (Z82.0)     Social History  Problems    · Alcohol use (V49.89) (Z78.9)   · Never a smoker   · No alcohol use   · No drug use        Review of Systems   Cardiovascular:  Positive for dyspnea on exertion. Positive for SOB and edema.  Musculoskeletal:  Positive for arthritis.   Gastrointestinal:  Positive for abdominal pain, hematochezia and nausea.   Genitourinary:  Positive for hematuria.   Neurological:  Positive for vertigo.       Visit Vitals  Smoking Status Never        Objective     Constitutional:       Appearance: Not in distress.   Neck:      Vascular: JVD normal.   Pulmonary:      Breath sounds: Normal breath sounds.   Cardiovascular:      Normal rate. Regular rhythm. S1 with normal intensity. S2 with normal intensity.       Murmurs: There is a grade 2/6 systolic murmur.      No gallop.    Pulses:     Intact distal pulses.   Edema:     Peripheral edema present.     Thigh: bilateral 2+ edema of the thigh.     Pretibial: bilateral 2+ edema of the pretibial area.     Ankle: bilateral 2+ edema of the ankle.  Abdominal:      General:  Bowel sounds are normal.   Neurological:      Mental Status: Alert and oriented to person, place and time.         Lab Review:   Lab Results   Component Value Date     09/27/2023    K 3.3 (L) 09/27/2023     09/27/2023    CO2 30 09/27/2023    BUN 11 09/27/2023    CREATININE 1.05 09/27/2023    GLUCOSE 89 09/27/2023    CALCIUM 8.7 09/27/2023     Lab Results   Component Value Date    CHOL 187 09/11/2020    TRIG 79 09/11/2020    HDL 66.0 09/11/2020       Assessment:    1.  Coronary artery disease.  Not having anginal symptoms.  Will proceed with a nuclear stress imaging study.    2.  Congestive heart failure.  Latest echo studies have demonstrated normal left ventricular systolic function so we are going to assume that this is diastolic congestive heart failure.  Significant peripheral edema.  Will increase torsemide to 20 mg daily.  Will get echo study to evaluate.    3.  Hypertension.  Blood pressures are low.    4.  Ascending aortic aneurysm.  Last echo study showed of 4.9 cm ascending aorta.

## 2024-04-03 ENCOUNTER — APPOINTMENT (OUTPATIENT)
Dept: RADIOLOGY | Facility: HOSPITAL | Age: 89
End: 2024-04-03
Payer: MEDICARE

## 2024-04-18 ENCOUNTER — HOSPITAL ENCOUNTER (OUTPATIENT)
Dept: CARDIOLOGY | Facility: CLINIC | Age: 89
Discharge: HOME | End: 2024-04-18
Payer: MEDICARE

## 2024-04-18 ENCOUNTER — OFFICE VISIT (OUTPATIENT)
Dept: CARDIOLOGY | Facility: CLINIC | Age: 89
End: 2024-04-18
Payer: MEDICARE

## 2024-04-18 ENCOUNTER — HOSPITAL ENCOUNTER (OUTPATIENT)
Dept: RADIOLOGY | Facility: CLINIC | Age: 89
Discharge: HOME | End: 2024-04-18
Payer: MEDICARE

## 2024-04-18 VITALS
HEART RATE: 64 BPM | SYSTOLIC BLOOD PRESSURE: 136 MMHG | WEIGHT: 199 LBS | BODY MASS INDEX: 28.49 KG/M2 | HEIGHT: 70 IN | DIASTOLIC BLOOD PRESSURE: 64 MMHG

## 2024-04-18 DIAGNOSIS — R07.89 CHEST TIGHTNESS: ICD-10-CM

## 2024-04-18 DIAGNOSIS — I71.21 ANEURYSM OF ASCENDING AORTA WITHOUT RUPTURE (CMS-HCC): ICD-10-CM

## 2024-04-18 DIAGNOSIS — Z95.0 PACEMAKER: ICD-10-CM

## 2024-04-18 DIAGNOSIS — I25.10 CORONARY ARTERY DISEASE INVOLVING NATIVE CORONARY ARTERY OF NATIVE HEART WITHOUT ANGINA PECTORIS: ICD-10-CM

## 2024-04-18 DIAGNOSIS — R07.89 OTHER CHEST PAIN: ICD-10-CM

## 2024-04-18 DIAGNOSIS — R94.39 ABNORMAL NUCLEAR STRESS TEST: ICD-10-CM

## 2024-04-18 DIAGNOSIS — Z95.5 PRESENCE OF STENT IN CORONARY ARTERY: ICD-10-CM

## 2024-04-18 DIAGNOSIS — I10 PRIMARY HYPERTENSION: ICD-10-CM

## 2024-04-18 DIAGNOSIS — I25.10 ATHEROSCLEROTIC HEART DISEASE OF NATIVE CORONARY ARTERY WITHOUT ANGINA PECTORIS: ICD-10-CM

## 2024-04-18 DIAGNOSIS — E78.49 OTHER HYPERLIPIDEMIA: ICD-10-CM

## 2024-04-18 DIAGNOSIS — I50.32 CHRONIC DIASTOLIC CONGESTIVE HEART FAILURE (MULTI): ICD-10-CM

## 2024-04-18 DIAGNOSIS — I34.0 SEVERE MITRAL REGURGITATION: ICD-10-CM

## 2024-04-18 DIAGNOSIS — I07.1 MODERATE TRICUSPID REGURGITATION: ICD-10-CM

## 2024-04-18 DIAGNOSIS — I20.89 EXERTIONAL ANGINA (CMS-HCC): ICD-10-CM

## 2024-04-18 DIAGNOSIS — I34.0 SEVERE MITRAL REGURGITATION: Primary | ICD-10-CM

## 2024-04-18 DIAGNOSIS — I71.20 THORACIC AORTIC ANEURYSM, WITHOUT RUPTURE, UNSPECIFIED (CMS-HCC): ICD-10-CM

## 2024-04-18 DIAGNOSIS — I49.5 SICK SINUS SYNDROME (MULTI): ICD-10-CM

## 2024-04-18 DIAGNOSIS — I70.213 INTERMITTENT CLAUDICATION OF BOTH LOWER EXTREMITIES DUE TO ATHEROSCLEROSIS (CMS-HCC): ICD-10-CM

## 2024-04-18 DIAGNOSIS — R07.89 ATYPICAL CHEST PAIN: Primary | ICD-10-CM

## 2024-04-18 DIAGNOSIS — I25.10 CAD (CORONARY ARTERY DISEASE): Primary | ICD-10-CM

## 2024-04-18 PROCEDURE — 93017 CV STRESS TEST TRACING ONLY: CPT

## 2024-04-18 PROCEDURE — 1157F ADVNC CARE PLAN IN RCRD: CPT | Performed by: INTERNAL MEDICINE

## 2024-04-18 PROCEDURE — 78452 HT MUSCLE IMAGE SPECT MULT: CPT | Performed by: INTERNAL MEDICINE

## 2024-04-18 PROCEDURE — 93306 TTE W/DOPPLER COMPLETE: CPT

## 2024-04-18 PROCEDURE — 93306 TTE W/DOPPLER COMPLETE: CPT | Performed by: INTERNAL MEDICINE

## 2024-04-18 PROCEDURE — 99214 OFFICE O/P EST MOD 30 MIN: CPT | Performed by: INTERNAL MEDICINE

## 2024-04-18 PROCEDURE — 3078F DIAST BP <80 MM HG: CPT | Performed by: INTERNAL MEDICINE

## 2024-04-18 PROCEDURE — 2500000004 HC RX 250 GENERAL PHARMACY W/ HCPCS (ALT 636 FOR OP/ED): Performed by: INTERNAL MEDICINE

## 2024-04-18 PROCEDURE — 1036F TOBACCO NON-USER: CPT | Performed by: INTERNAL MEDICINE

## 2024-04-18 PROCEDURE — 1159F MED LIST DOCD IN RCRD: CPT | Performed by: INTERNAL MEDICINE

## 2024-04-18 PROCEDURE — 78452 HT MUSCLE IMAGE SPECT MULT: CPT

## 2024-04-18 PROCEDURE — 3075F SYST BP GE 130 - 139MM HG: CPT | Performed by: INTERNAL MEDICINE

## 2024-04-18 RX ORDER — REGADENOSON 0.08 MG/ML
0.4 INJECTION, SOLUTION INTRAVENOUS
Status: COMPLETED | OUTPATIENT
Start: 2024-04-18 | End: 2024-04-18

## 2024-04-18 RX ADMIN — REGADENOSON 0.4 MG: 0.08 INJECTION, SOLUTION INTRAVENOUS at 09:53

## 2024-04-18 ASSESSMENT — ENCOUNTER SYMPTOMS
VERTIGO: 1
DYSPNEA ON EXERTION: 1

## 2024-04-18 NOTE — PATIENT INSTRUCTIONS
We are going to arrange for you to have a heart catheterization.    We will see you after the catheterization.

## 2024-04-18 NOTE — PROGRESS NOTES
Subjective   Giuseppe Davila is a 88 y.o. male.    Chief Complaint:  Follow-up echo study and stress test.    HPI    On his last visit he presented with significant deterioration of his overall status.  He described more shortness of breath dyspnea with exertion.  He complained of chest discomfort described as midsternal radiating up into the left anterior chest and into the left shoulder.  We elected to proceed with a stress thallium study and an echocardiographic study.  He is here for follow-up of the results.    The patient underwent a Watchman procedure on April 21, 2023 at [facility]. Prior to the procedure he had a CT of the chest which showed a 4.7 cm ascending aortic aneurysm. On her last echo he measured 4.9 cm which was quite similar.     Cardiac problems include a history of permanent atrial fibrillation, pacemaker, aortic stenosis, diastolic heart failure, hypertension, tricuspid regurgitation, and mitral regurgitation.     In October 2022, he presented to us with symptoms of near syncope. He was seen and evaluated by the electrophysiology service. He was deemed to be a candidate for a pacemaker. Because of his atrial fibrillation, we elected to go with a Micra device. He tolerated the procedure well.      Cardiac catheterization was performed on July 26, 2022. This demonstrated nonobstructive coronary artery disease. Right heart cath demonstrated normal pulmonary artery pressures and normal left ventricular end-diastolic pressure. Cardiac output was normal.     A transesophageal echocardiographic study demonstrated severe mitral regurgitation.     An echocardiographic study done on July 26, 2022 demonstrated normal left ventricular systolic function. There was moderate to severe mitral regurgitation and moderate severe tricuspid regurgitation with mild aortic valve regurgitation. The ascending aorta was moderately dilated.     His last Holter monitor done in May 2022 demonstrated an  "average heart rate of 53. He is in chronic atrial fibrillation. He is low heart rate was approximately 34. He had pauses up to 3 seconds.     He presented on 6/15/18 with symptoms of a stroke. Became very confused and disoriented. He presented to the hospital where he was noted have evidence of an acute stroke. He received TPA.     Allergies  Medication    · No Known Drug Allergies   Recorded By: Fabiola Rivas; 2/5/2015 9:58:44 AM     Family History  Mother    · Family history of cardiac disorder (V17.49) (Z82.49)   · Family history of TIA (transient ischemic attack)  Father    · Family history of malignant neoplasm (V16.9) (Z80.9)  Sister    · Family history of cardiac disorder (V17.49) (Z82.49)  Paternal Uncle    · Family history of Alzheimer's disease (V17.2) (Z82.0)   · FH: Parkinson's disease (V17.2) (Z82.0)     Social History  Problems    · Alcohol use (V49.89) (Z78.9)   · Never a smoker   · No alcohol use   · No drug use        Review of Systems   Constitutional: Positive for malaise/fatigue.   Cardiovascular:  Positive for chest pain and dyspnea on exertion.   Musculoskeletal:  Positive for arthritis and joint pain.   Neurological:  Positive for vertigo.       Visit Vitals  /64 (BP Location: Right arm)   Pulse 64   Ht 1.778 m (5' 10\")   Wt 90.3 kg (199 lb)   BMI 28.55 kg/m²   Smoking Status Never   BSA 2.11 m²        Objective     Constitutional:       Appearance: Not in distress.   Neck:      Vascular: JVD normal.   Pulmonary:      Breath sounds: Normal breath sounds.   Cardiovascular:      Normal rate. Regular rhythm. S1 with normal intensity. S2 with normal intensity.       Murmurs: There is a grade 3/6 systolic murmur.      No gallop.    Pulses:     Intact distal pulses.   Edema:     Pretibial: bilateral 1+ edema of the pretibial area.  Abdominal:      General: Bowel sounds are normal.   Neurological:      Mental Status: Alert and oriented to person, place and time.         Lab Review:   Lab " Results   Component Value Date     09/27/2023    K 3.3 (L) 09/27/2023     09/27/2023    CO2 30 09/27/2023    BUN 11 09/27/2023    CREATININE 1.05 09/27/2023    GLUCOSE 89 09/27/2023    CALCIUM 8.7 09/27/2023     Lab Results   Component Value Date    WBC 4.3 (L) 09/27/2023    HGB 12.5 (L) 09/27/2023    HCT 39.3 (L) 09/27/2023    MCV 86 09/27/2023     09/27/2023     Lab Results   Component Value Date    CHOL 187 09/11/2020    TRIG 79 09/11/2020    HDL 66.0 09/11/2020       Assessment:    1.  Coronary artery disease.  Having symptoms of angina.  Today stress time study is markedly abnormal.  With stress his ejection fraction falls from about 45% to 24%.  Our plan is to proceed with cardiac catheterization including right and left heart cardiac catheterization.    2.  Aortic stenosis.  Moderate to severe with an aortic valve area of 1.1 cm².    3.  Mitral regurgitation.  Moderate to severe.    4.  Ascending aortic aneurysm.  Measures 5.1 cm.    Will get the catheterization done and then discussed the possible options for the future.

## 2024-04-19 LAB
AORTIC VALVE MEAN GRADIENT: 22.5 MMHG
AORTIC VALVE PEAK VELOCITY: 3 M/S
AV PEAK GRADIENT: 35.9 MMHG
AVA (PEAK VEL): 1.11 CM2
AVA (VTI): 1.06 CM2
EJECTION FRACTION APICAL 4 CHAMBER: 55.3
LEFT VENTRICLE INTERNAL DIMENSION DIASTOLE: 5.55 CM (ref 3.5–6)
LEFT VENTRICULAR OUTFLOW TRACT DIAMETER: 2.02 CM
LV EJECTION FRACTION BIPLANE: 59 %
RIGHT VENTRICLE FREE WALL PEAK S': 0.13 CM/S
RIGHT VENTRICLE PEAK SYSTOLIC PRESSURE: 37.1 MMHG
TRICUSPID ANNULAR PLANE SYSTOLIC EXCURSION: 1.9 CM

## 2024-04-29 RX ORDER — SODIUM CHLORIDE 9 MG/ML
100 INJECTION, SOLUTION INTRAVENOUS CONTINUOUS
Status: CANCELLED | OUTPATIENT
Start: 2024-04-29

## 2024-04-29 RX ORDER — NAPROXEN SODIUM 220 MG/1
81 TABLET, FILM COATED ORAL ONCE
Status: CANCELLED | OUTPATIENT
Start: 2024-04-29 | End: 2024-04-29

## 2024-04-30 ENCOUNTER — APPOINTMENT (OUTPATIENT)
Dept: RADIOLOGY | Facility: HOSPITAL | Age: 89
End: 2024-04-30
Payer: MEDICARE

## 2024-04-30 ENCOUNTER — HOSPITAL ENCOUNTER (OUTPATIENT)
Facility: HOSPITAL | Age: 89
Setting detail: OBSERVATION
Discharge: HOME | End: 2024-05-02
Attending: EMERGENCY MEDICINE | Admitting: INTERNAL MEDICINE
Payer: MEDICARE

## 2024-04-30 ENCOUNTER — APPOINTMENT (OUTPATIENT)
Dept: CARDIOLOGY | Facility: HOSPITAL | Age: 89
End: 2024-04-30
Payer: MEDICARE

## 2024-04-30 DIAGNOSIS — U07.1 COVID-19: ICD-10-CM

## 2024-04-30 DIAGNOSIS — I71.21 ANEURYSM OF ASCENDING AORTA WITHOUT RUPTURE (CMS-HCC): Primary | ICD-10-CM

## 2024-04-30 DIAGNOSIS — R07.9 ACUTE CHEST PAIN: ICD-10-CM

## 2024-04-30 PROCEDURE — 87040 BLOOD CULTURE FOR BACTERIA: CPT | Mod: PARLAB | Performed by: EMERGENCY MEDICINE

## 2024-04-30 PROCEDURE — 36415 COLL VENOUS BLD VENIPUNCTURE: CPT | Performed by: EMERGENCY MEDICINE

## 2024-04-30 PROCEDURE — 83605 ASSAY OF LACTIC ACID: CPT | Performed by: EMERGENCY MEDICINE

## 2024-04-30 PROCEDURE — 80053 COMPREHEN METABOLIC PANEL: CPT | Performed by: EMERGENCY MEDICINE

## 2024-04-30 PROCEDURE — 93005 ELECTROCARDIOGRAM TRACING: CPT

## 2024-04-30 PROCEDURE — 99285 EMERGENCY DEPT VISIT HI MDM: CPT | Mod: 25

## 2024-04-30 PROCEDURE — 83880 ASSAY OF NATRIURETIC PEPTIDE: CPT | Performed by: EMERGENCY MEDICINE

## 2024-04-30 PROCEDURE — 85025 COMPLETE CBC W/AUTO DIFF WBC: CPT | Performed by: EMERGENCY MEDICINE

## 2024-04-30 PROCEDURE — 71045 X-RAY EXAM CHEST 1 VIEW: CPT

## 2024-04-30 PROCEDURE — 84484 ASSAY OF TROPONIN QUANT: CPT | Performed by: EMERGENCY MEDICINE

## 2024-04-30 PROCEDURE — 87635 SARS-COV-2 COVID-19 AMP PRB: CPT | Performed by: EMERGENCY MEDICINE

## 2024-04-30 PROCEDURE — 83690 ASSAY OF LIPASE: CPT | Performed by: EMERGENCY MEDICINE

## 2024-04-30 PROCEDURE — 71045 X-RAY EXAM CHEST 1 VIEW: CPT | Performed by: RADIOLOGY

## 2024-04-30 ASSESSMENT — LIFESTYLE VARIABLES
TOTAL SCORE: 0
HAVE PEOPLE ANNOYED YOU BY CRITICIZING YOUR DRINKING: NO
HAVE YOU EVER FELT YOU SHOULD CUT DOWN ON YOUR DRINKING: NO
EVER HAD A DRINK FIRST THING IN THE MORNING TO STEADY YOUR NERVES TO GET RID OF A HANGOVER: NO
EVER FELT BAD OR GUILTY ABOUT YOUR DRINKING: NO

## 2024-05-01 ENCOUNTER — APPOINTMENT (OUTPATIENT)
Dept: CARDIOLOGY | Facility: HOSPITAL | Age: 89
End: 2024-05-01
Payer: MEDICARE

## 2024-05-01 PROBLEM — U07.1 COVID: Status: ACTIVE | Noted: 2024-05-01

## 2024-05-01 LAB
ALBUMIN SERPL BCP-MCNC: 3.4 G/DL (ref 3.4–5)
ALBUMIN SERPL BCP-MCNC: 3.8 G/DL (ref 3.4–5)
ALP SERPL-CCNC: 55 U/L (ref 33–136)
ALT SERPL W P-5'-P-CCNC: 15 U/L (ref 10–52)
ANION GAP SERPL CALC-SCNC: 11 MMOL/L (ref 10–20)
ANION GAP SERPL CALC-SCNC: 13 MMOL/L (ref 10–20)
ANION GAP SERPL CALC-SCNC: 8 MMOL/L (ref 10–20)
APPEARANCE UR: CLEAR
APTT PPP: 33 SECONDS (ref 27–38)
AST SERPL W P-5'-P-CCNC: 22 U/L (ref 9–39)
BASOPHILS # BLD AUTO: 0.02 X10*3/UL (ref 0–0.1)
BASOPHILS NFR BLD AUTO: 0.3 %
BILIRUB SERPL-MCNC: 1.5 MG/DL (ref 0–1.2)
BILIRUB UR STRIP.AUTO-MCNC: NEGATIVE MG/DL
BNP SERPL-MCNC: 467 PG/ML (ref 0–99)
BUN SERPL-MCNC: 10 MG/DL (ref 6–23)
BUN SERPL-MCNC: 11 MG/DL (ref 6–23)
BUN SERPL-MCNC: 12 MG/DL (ref 6–23)
CALCIUM SERPL-MCNC: 8.4 MG/DL (ref 8.6–10.3)
CALCIUM SERPL-MCNC: 8.6 MG/DL (ref 8.6–10.3)
CALCIUM SERPL-MCNC: 8.8 MG/DL (ref 8.6–10.3)
CARDIAC TROPONIN I PNL SERPL HS: 21 NG/L (ref 0–20)
CHLORIDE SERPL-SCNC: 104 MMOL/L (ref 98–107)
CHLORIDE SERPL-SCNC: 105 MMOL/L (ref 98–107)
CHLORIDE SERPL-SCNC: 108 MMOL/L (ref 98–107)
CO2 SERPL-SCNC: 26 MMOL/L (ref 21–32)
CO2 SERPL-SCNC: 26 MMOL/L (ref 21–32)
CO2 SERPL-SCNC: 27 MMOL/L (ref 21–32)
COLOR UR: ABNORMAL
CREAT SERPL-MCNC: 0.81 MG/DL (ref 0.5–1.3)
CREAT SERPL-MCNC: 0.86 MG/DL (ref 0.5–1.3)
CREAT SERPL-MCNC: 1 MG/DL (ref 0.5–1.3)
EGFRCR SERPLBLD CKD-EPI 2021: 73 ML/MIN/1.73M*2
EGFRCR SERPLBLD CKD-EPI 2021: 84 ML/MIN/1.73M*2
EGFRCR SERPLBLD CKD-EPI 2021: 86 ML/MIN/1.73M*2
EOSINOPHIL # BLD AUTO: 0.01 X10*3/UL (ref 0–0.4)
EOSINOPHIL NFR BLD AUTO: 0.1 %
ERYTHROCYTE [DISTWIDTH] IN BLOOD BY AUTOMATED COUNT: 14.1 % (ref 11.5–14.5)
ERYTHROCYTE [DISTWIDTH] IN BLOOD BY AUTOMATED COUNT: 14.2 % (ref 11.5–14.5)
ERYTHROCYTE [DISTWIDTH] IN BLOOD BY AUTOMATED COUNT: 14.3 % (ref 11.5–14.5)
GLUCOSE SERPL-MCNC: 112 MG/DL (ref 74–99)
GLUCOSE SERPL-MCNC: 112 MG/DL (ref 74–99)
GLUCOSE SERPL-MCNC: 134 MG/DL (ref 74–99)
GLUCOSE UR STRIP.AUTO-MCNC: NEGATIVE MG/DL
HCT VFR BLD AUTO: 37 % (ref 41–52)
HCT VFR BLD AUTO: 40.6 % (ref 41–52)
HCT VFR BLD AUTO: 42.1 % (ref 41–52)
HGB BLD-MCNC: 12.2 G/DL (ref 13.5–17.5)
HGB BLD-MCNC: 13.9 G/DL (ref 13.5–17.5)
HGB BLD-MCNC: 14.5 G/DL (ref 13.5–17.5)
HOLD SPECIMEN: NORMAL
IMM GRANULOCYTES # BLD AUTO: 0.01 X10*3/UL (ref 0–0.5)
IMM GRANULOCYTES NFR BLD AUTO: 0.1 % (ref 0–0.9)
INR PPP: 1.1 (ref 0.9–1.1)
KETONES UR STRIP.AUTO-MCNC: NEGATIVE MG/DL
LACTATE SERPL-SCNC: 1.2 MMOL/L (ref 0.4–2)
LEUKOCYTE ESTERASE UR QL STRIP.AUTO: NEGATIVE
LIPASE SERPL-CCNC: 17 U/L (ref 9–82)
LYMPHOCYTES # BLD AUTO: 0.28 X10*3/UL (ref 0.8–3)
LYMPHOCYTES NFR BLD AUTO: 4.2 %
MCH RBC QN AUTO: 30.4 PG (ref 26–34)
MCH RBC QN AUTO: 31 PG (ref 26–34)
MCH RBC QN AUTO: 31.2 PG (ref 26–34)
MCHC RBC AUTO-ENTMCNC: 33 G/DL (ref 32–36)
MCHC RBC AUTO-ENTMCNC: 34.2 G/DL (ref 32–36)
MCHC RBC AUTO-ENTMCNC: 34.4 G/DL (ref 32–36)
MCV RBC AUTO: 91 FL (ref 80–100)
MCV RBC AUTO: 91 FL (ref 80–100)
MCV RBC AUTO: 92 FL (ref 80–100)
MONOCYTES # BLD AUTO: 0.51 X10*3/UL (ref 0.05–0.8)
MONOCYTES NFR BLD AUTO: 7.6 %
MUCOUS THREADS #/AREA URNS AUTO: ABNORMAL /LPF
NEUTROPHILS # BLD AUTO: 5.86 X10*3/UL (ref 1.6–5.5)
NEUTROPHILS NFR BLD AUTO: 87.7 %
NITRITE UR QL STRIP.AUTO: NEGATIVE
NRBC BLD-RTO: 0 /100 WBCS (ref 0–0)
PH UR STRIP.AUTO: 6 [PH]
PHOSPHATE SERPL-MCNC: 2.8 MG/DL (ref 2.5–4.9)
PLATELET # BLD AUTO: 118 X10*3/UL (ref 150–450)
PLATELET # BLD AUTO: 126 X10*3/UL (ref 150–450)
PLATELET # BLD AUTO: 130 X10*3/UL (ref 150–450)
POTASSIUM SERPL-SCNC: 3.3 MMOL/L (ref 3.5–5.3)
POTASSIUM SERPL-SCNC: 3.4 MMOL/L (ref 3.5–5.3)
POTASSIUM SERPL-SCNC: 3.5 MMOL/L (ref 3.5–5.3)
PROT SERPL-MCNC: 6.6 G/DL (ref 6.4–8.2)
PROT UR STRIP.AUTO-MCNC: ABNORMAL MG/DL
PROTHROMBIN TIME: 12.9 SECONDS (ref 9.8–12.8)
RBC # BLD AUTO: 4.01 X10*6/UL (ref 4.5–5.9)
RBC # BLD AUTO: 4.48 X10*6/UL (ref 4.5–5.9)
RBC # BLD AUTO: 4.65 X10*6/UL (ref 4.5–5.9)
RBC # UR STRIP.AUTO: NEGATIVE /UL
RBC #/AREA URNS AUTO: ABNORMAL /HPF
SARS-COV-2 RNA RESP QL NAA+PROBE: DETECTED
SODIUM SERPL-SCNC: 138 MMOL/L (ref 136–145)
SODIUM SERPL-SCNC: 140 MMOL/L (ref 136–145)
SODIUM SERPL-SCNC: 140 MMOL/L (ref 136–145)
SP GR UR STRIP.AUTO: 1.02
UROBILINOGEN UR STRIP.AUTO-MCNC: 2 MG/DL
WBC # BLD AUTO: 5.5 X10*3/UL (ref 4.4–11.3)
WBC # BLD AUTO: 6.5 X10*3/UL (ref 4.4–11.3)
WBC # BLD AUTO: 6.7 X10*3/UL (ref 4.4–11.3)
WBC #/AREA URNS AUTO: ABNORMAL /HPF

## 2024-05-01 PROCEDURE — 85610 PROTHROMBIN TIME: CPT | Performed by: NURSE PRACTITIONER

## 2024-05-01 PROCEDURE — G0378 HOSPITAL OBSERVATION PER HR: HCPCS

## 2024-05-01 PROCEDURE — 2500000004 HC RX 250 GENERAL PHARMACY W/ HCPCS (ALT 636 FOR OP/ED): Performed by: EMERGENCY MEDICINE

## 2024-05-01 PROCEDURE — 2500000001 HC RX 250 WO HCPCS SELF ADMINISTERED DRUGS (ALT 637 FOR MEDICARE OP): Performed by: STUDENT IN AN ORGANIZED HEALTH CARE EDUCATION/TRAINING PROGRAM

## 2024-05-01 PROCEDURE — 2500000004 HC RX 250 GENERAL PHARMACY W/ HCPCS (ALT 636 FOR OP/ED)

## 2024-05-01 PROCEDURE — 96372 THER/PROPH/DIAG INJ SC/IM: CPT

## 2024-05-01 PROCEDURE — 36415 COLL VENOUS BLD VENIPUNCTURE: CPT

## 2024-05-01 PROCEDURE — 80051 ELECTROLYTE PANEL: CPT | Performed by: NURSE PRACTITIONER

## 2024-05-01 PROCEDURE — 93005 ELECTROCARDIOGRAM TRACING: CPT

## 2024-05-01 PROCEDURE — 85027 COMPLETE CBC AUTOMATED: CPT

## 2024-05-01 PROCEDURE — 36415 COLL VENOUS BLD VENIPUNCTURE: CPT | Performed by: NURSE PRACTITIONER

## 2024-05-01 PROCEDURE — 80069 RENAL FUNCTION PANEL: CPT

## 2024-05-01 PROCEDURE — 85027 COMPLETE CBC AUTOMATED: CPT | Mod: 91 | Performed by: NURSE PRACTITIONER

## 2024-05-01 PROCEDURE — 2500000006 HC RX 250 W HCPCS SELF ADMINISTERED DRUGS (ALT 637 FOR ALL PAYERS): Mod: MUE | Performed by: STUDENT IN AN ORGANIZED HEALTH CARE EDUCATION/TRAINING PROGRAM

## 2024-05-01 PROCEDURE — 2500000001 HC RX 250 WO HCPCS SELF ADMINISTERED DRUGS (ALT 637 FOR MEDICARE OP)

## 2024-05-01 PROCEDURE — 81001 URINALYSIS AUTO W/SCOPE: CPT | Performed by: EMERGENCY MEDICINE

## 2024-05-01 RX ORDER — ACETAMINOPHEN 650 MG/1
650 SUPPOSITORY RECTAL EVERY 4 HOURS PRN
Status: DISCONTINUED | OUTPATIENT
Start: 2024-05-01 | End: 2024-05-02 | Stop reason: HOSPADM

## 2024-05-01 RX ORDER — ACETAMINOPHEN 325 MG/1
650 TABLET ORAL EVERY 4 HOURS PRN
Status: DISCONTINUED | OUTPATIENT
Start: 2024-05-01 | End: 2024-05-02 | Stop reason: HOSPADM

## 2024-05-01 RX ORDER — IPRATROPIUM BROMIDE AND ALBUTEROL SULFATE 2.5; .5 MG/3ML; MG/3ML
3 SOLUTION RESPIRATORY (INHALATION) EVERY 4 HOURS PRN
Status: DISCONTINUED | OUTPATIENT
Start: 2024-05-01 | End: 2024-05-01

## 2024-05-01 RX ORDER — ACETAMINOPHEN 160 MG/5ML
650 SOLUTION ORAL EVERY 4 HOURS PRN
Status: DISCONTINUED | OUTPATIENT
Start: 2024-05-01 | End: 2024-05-02 | Stop reason: HOSPADM

## 2024-05-01 RX ORDER — HYDROCORTISONE 25 MG/G
1 CREAM TOPICAL 2 TIMES DAILY PRN
Status: DISCONTINUED | OUTPATIENT
Start: 2024-05-01 | End: 2024-05-02 | Stop reason: HOSPADM

## 2024-05-01 RX ORDER — DOCUSATE SODIUM 100 MG/1
100 CAPSULE, LIQUID FILLED ORAL 2 TIMES DAILY
Status: DISCONTINUED | OUTPATIENT
Start: 2024-05-01 | End: 2024-05-02 | Stop reason: HOSPADM

## 2024-05-01 RX ORDER — ASPIRIN 81 MG/1
81 TABLET ORAL DAILY
Status: DISCONTINUED | OUTPATIENT
Start: 2024-05-01 | End: 2024-05-02 | Stop reason: HOSPADM

## 2024-05-01 RX ORDER — SPIRONOLACTONE 25 MG/1
25 TABLET ORAL DAILY
Status: DISCONTINUED | OUTPATIENT
Start: 2024-05-01 | End: 2024-05-02 | Stop reason: HOSPADM

## 2024-05-01 RX ORDER — ONDANSETRON 4 MG/1
4 TABLET, FILM COATED ORAL EVERY 8 HOURS PRN
Status: DISCONTINUED | OUTPATIENT
Start: 2024-05-01 | End: 2024-05-02 | Stop reason: HOSPADM

## 2024-05-01 RX ORDER — NITROGLYCERIN 0.4 MG/1
0.4 TABLET SUBLINGUAL EVERY 5 MIN PRN
Status: DISCONTINUED | OUTPATIENT
Start: 2024-05-01 | End: 2024-05-02 | Stop reason: HOSPADM

## 2024-05-01 RX ORDER — ONDANSETRON HYDROCHLORIDE 2 MG/ML
4 INJECTION, SOLUTION INTRAVENOUS EVERY 8 HOURS PRN
Status: DISCONTINUED | OUTPATIENT
Start: 2024-05-01 | End: 2024-05-02 | Stop reason: HOSPADM

## 2024-05-01 RX ORDER — ENOXAPARIN SODIUM 100 MG/ML
40 INJECTION SUBCUTANEOUS EVERY 24 HOURS
Status: DISCONTINUED | OUTPATIENT
Start: 2024-05-01 | End: 2024-05-02 | Stop reason: HOSPADM

## 2024-05-01 RX ORDER — ROSUVASTATIN CALCIUM 10 MG/1
20 TABLET, COATED ORAL
Status: DISCONTINUED | OUTPATIENT
Start: 2024-05-02 | End: 2024-05-02 | Stop reason: HOSPADM

## 2024-05-01 RX ORDER — NAPROXEN SODIUM 220 MG/1
81 TABLET, FILM COATED ORAL ONCE
Status: DISCONTINUED | OUTPATIENT
Start: 2024-05-01 | End: 2024-05-01

## 2024-05-01 RX ORDER — TORSEMIDE 20 MG/1
20 TABLET ORAL DAILY
Status: DISCONTINUED | OUTPATIENT
Start: 2024-05-01 | End: 2024-05-02 | Stop reason: HOSPADM

## 2024-05-01 RX ORDER — IPRATROPIUM BROMIDE AND ALBUTEROL SULFATE 2.5; .5 MG/3ML; MG/3ML
3 SOLUTION RESPIRATORY (INHALATION) EVERY 2 HOUR PRN
Status: DISCONTINUED | OUTPATIENT
Start: 2024-05-01 | End: 2024-05-02 | Stop reason: HOSPADM

## 2024-05-01 RX ORDER — POLYETHYLENE GLYCOL 3350 17 G/17G
17 POWDER, FOR SOLUTION ORAL DAILY PRN
Status: DISCONTINUED | OUTPATIENT
Start: 2024-05-01 | End: 2024-05-02 | Stop reason: HOSPADM

## 2024-05-01 RX ORDER — SODIUM CHLORIDE 9 MG/ML
100 INJECTION, SOLUTION INTRAVENOUS CONTINUOUS
Status: DISCONTINUED | OUTPATIENT
Start: 2024-05-01 | End: 2024-05-01

## 2024-05-01 RX ORDER — SODIUM CHLORIDE 9 MG/ML
60 INJECTION, SOLUTION INTRAVENOUS CONTINUOUS
Status: ACTIVE | OUTPATIENT
Start: 2024-05-01 | End: 2024-05-02

## 2024-05-01 RX ORDER — CLOPIDOGREL BISULFATE 75 MG/1
75 TABLET ORAL DAILY
Status: DISCONTINUED | OUTPATIENT
Start: 2024-05-01 | End: 2024-05-01

## 2024-05-01 RX ADMIN — ASPIRIN 81 MG: 81 TABLET, COATED ORAL at 08:54

## 2024-05-01 RX ADMIN — POLYETHYLENE GLYCOL 3350 17 G: 17 POWDER, FOR SOLUTION ORAL at 08:54

## 2024-05-01 RX ADMIN — ENOXAPARIN SODIUM 40 MG: 40 INJECTION SUBCUTANEOUS at 08:53

## 2024-05-01 RX ADMIN — GUAIFENESIN AND DEXTROMETHORPHAN HYDROBROMIDE 1 TABLET: 600; 30 TABLET, EXTENDED RELEASE ORAL at 20:48

## 2024-05-01 RX ADMIN — SODIUM CHLORIDE 500 ML: 9 INJECTION, SOLUTION INTRAVENOUS at 00:03

## 2024-05-01 RX ADMIN — ACETAMINOPHEN 650 MG: 325 TABLET ORAL at 04:00

## 2024-05-01 RX ADMIN — DOCUSATE SODIUM 100 MG: 100 CAPSULE, LIQUID FILLED ORAL at 20:49

## 2024-05-01 RX ADMIN — TORSEMIDE 20 MG: 20 TABLET ORAL at 12:07

## 2024-05-01 RX ADMIN — DOCUSATE SODIUM 100 MG: 100 CAPSULE, LIQUID FILLED ORAL at 08:54

## 2024-05-01 RX ADMIN — SPIRONOLACTONE 25 MG: 25 TABLET, FILM COATED ORAL at 12:06

## 2024-05-01 RX ADMIN — SODIUM CHLORIDE 60 ML/HR: 9 INJECTION, SOLUTION INTRAVENOUS at 12:06

## 2024-05-01 RX ADMIN — ACETAMINOPHEN 650 MG: 325 TABLET ORAL at 19:46

## 2024-05-01 RX ADMIN — GUAIFENESIN AND DEXTROMETHORPHAN HYDROBROMIDE 1 TABLET: 600; 30 TABLET, EXTENDED RELEASE ORAL at 08:55

## 2024-05-01 SDOH — SOCIAL STABILITY: SOCIAL INSECURITY: ARE THERE ANY APPARENT SIGNS OF INJURIES/BEHAVIORS THAT COULD BE RELATED TO ABUSE/NEGLECT?: NO

## 2024-05-01 SDOH — SOCIAL STABILITY: SOCIAL INSECURITY: HAVE YOU HAD THOUGHTS OF HARMING ANYONE ELSE?: NO

## 2024-05-01 SDOH — SOCIAL STABILITY: SOCIAL INSECURITY: WERE YOU ABLE TO COMPLETE ALL THE BEHAVIORAL HEALTH SCREENINGS?: YES

## 2024-05-01 SDOH — SOCIAL STABILITY: SOCIAL INSECURITY: DO YOU FEEL UNSAFE GOING BACK TO THE PLACE WHERE YOU ARE LIVING?: NO

## 2024-05-01 SDOH — SOCIAL STABILITY: SOCIAL INSECURITY: HAS ANYONE EVER THREATENED TO HURT YOUR FAMILY OR YOUR PETS?: NO

## 2024-05-01 SDOH — SOCIAL STABILITY: SOCIAL INSECURITY: ABUSE: ADULT

## 2024-05-01 SDOH — SOCIAL STABILITY: SOCIAL INSECURITY: ARE YOU OR HAVE YOU BEEN THREATENED OR ABUSED PHYSICALLY, EMOTIONALLY, OR SEXUALLY BY ANYONE?: NO

## 2024-05-01 SDOH — SOCIAL STABILITY: SOCIAL INSECURITY: DOES ANYONE TRY TO KEEP YOU FROM HAVING/CONTACTING OTHER FRIENDS OR DOING THINGS OUTSIDE YOUR HOME?: NO

## 2024-05-01 SDOH — SOCIAL STABILITY: SOCIAL INSECURITY: HAVE YOU HAD ANY THOUGHTS OF HARMING ANYONE ELSE?: NO

## 2024-05-01 SDOH — SOCIAL STABILITY: SOCIAL INSECURITY: DO YOU FEEL ANYONE HAS EXPLOITED OR TAKEN ADVANTAGE OF YOU FINANCIALLY OR OF YOUR PERSONAL PROPERTY?: NO

## 2024-05-01 ASSESSMENT — PAIN SCALES - GENERAL
PAINLEVEL_OUTOF10: 4
PAINLEVEL_OUTOF10: 4
PAINLEVEL_OUTOF10: 1
PAINLEVEL_OUTOF10: 0 - NO PAIN
PAINLEVEL_OUTOF10: 3
PAINLEVEL_OUTOF10: 5 - MODERATE PAIN

## 2024-05-01 ASSESSMENT — PAIN - FUNCTIONAL ASSESSMENT
PAIN_FUNCTIONAL_ASSESSMENT: 0-10

## 2024-05-01 ASSESSMENT — ACTIVITIES OF DAILY LIVING (ADL)
ADEQUATE_TO_COMPLETE_ADL: YES
PATIENT'S MEMORY ADEQUATE TO SAFELY COMPLETE DAILY ACTIVITIES?: YES
FEEDING YOURSELF: INDEPENDENT
LACK_OF_TRANSPORTATION: NO
ASSISTIVE_DEVICE: WALKER
DRESSING YOURSELF: NEEDS ASSISTANCE
BATHING: NEEDS ASSISTANCE
JUDGMENT_ADEQUATE_SAFELY_COMPLETE_DAILY_ACTIVITIES: YES
HEARING - LEFT EAR: DIFFICULTY WITH NOISE
HEARING - RIGHT EAR: DIFFICULTY WITH NOISE
WALKS IN HOME: NEEDS ASSISTANCE
GROOMING: INDEPENDENT
TOILETING: NEEDS ASSISTANCE

## 2024-05-01 ASSESSMENT — PATIENT HEALTH QUESTIONNAIRE - PHQ9
1. LITTLE INTEREST OR PLEASURE IN DOING THINGS: NOT AT ALL
SUM OF ALL RESPONSES TO PHQ9 QUESTIONS 1 & 2: 0
2. FEELING DOWN, DEPRESSED OR HOPELESS: NOT AT ALL

## 2024-05-01 ASSESSMENT — LIFESTYLE VARIABLES
SKIP TO QUESTIONS 9-10: 1
AUDIT-C TOTAL SCORE: 0
AUDIT-C TOTAL SCORE: 0
HOW OFTEN DO YOU HAVE A DRINK CONTAINING ALCOHOL: NEVER
SUBSTANCE_ABUSE_PAST_12_MONTHS: NO
HOW MANY STANDARD DRINKS CONTAINING ALCOHOL DO YOU HAVE ON A TYPICAL DAY: PATIENT DOES NOT DRINK
HOW OFTEN DO YOU HAVE 6 OR MORE DRINKS ON ONE OCCASION: NEVER
PRESCIPTION_ABUSE_PAST_12_MONTHS: NO

## 2024-05-01 ASSESSMENT — COGNITIVE AND FUNCTIONAL STATUS - GENERAL
MOVING FROM LYING ON BACK TO SITTING ON SIDE OF FLAT BED WITH BEDRAILS: A LITTLE
DRESSING REGULAR LOWER BODY CLOTHING: A LITTLE
WALKING IN HOSPITAL ROOM: A LOT
MOBILITY SCORE: 15
STANDING UP FROM CHAIR USING ARMS: A LOT
TURNING FROM BACK TO SIDE WHILE IN FLAT BAD: A LITTLE
DAILY ACTIVITIY SCORE: 20
PATIENT BASELINE BEDBOUND: NO
TOILETING: A LITTLE
DRESSING REGULAR UPPER BODY CLOTHING: A LITTLE
CLIMB 3 TO 5 STEPS WITH RAILING: A LOT
MOVING TO AND FROM BED TO CHAIR: A LITTLE
HELP NEEDED FOR BATHING: A LITTLE

## 2024-05-01 ASSESSMENT — COLUMBIA-SUICIDE SEVERITY RATING SCALE - C-SSRS
6. HAVE YOU EVER DONE ANYTHING, STARTED TO DO ANYTHING, OR PREPARED TO DO ANYTHING TO END YOUR LIFE?: NO
2. HAVE YOU ACTUALLY HAD ANY THOUGHTS OF KILLING YOURSELF?: NO
1. IN THE PAST MONTH, HAVE YOU WISHED YOU WERE DEAD OR WISHED YOU COULD GO TO SLEEP AND NOT WAKE UP?: NO

## 2024-05-01 ASSESSMENT — PAIN DESCRIPTION - PROGRESSION
CLINICAL_PROGRESSION: NOT CHANGED
CLINICAL_PROGRESSION: GRADUALLY IMPROVING

## 2024-05-01 ASSESSMENT — PAIN DESCRIPTION - LOCATION
LOCATION: HEAD
LOCATION: HEAD

## 2024-05-01 NOTE — H&P
History Of Present Illness  Giuseppe Davila is a 88 y.o. male medical history significant for CAD, aortic stenosis, hypertension, atrial fibrillation, HFpEF-EF 50 to 55%, prior TIA, and history of Watchman procedure, presents to hospital with 2 to 3-day history of generalized malaise, nausea, fever, flulike symptoms, nonproductive cough, and dizziness.  He states that he has been feeling generally unwell and fatigued over the last couple of days and had a fever 100.6.  He denies any abdominal pain, vomiting, urinary issues, shortness of breath, chest pain, palpitations.  Denies any diarrhea.  He does get constipated at times..  He was supposed to get an outpatient heart catheterization on day of admission however he canceled it due to feeling unwell.  COVID positive.  He usually lives at home with his wife.  He is a non-smoker and is usually independent in ADLs.    On arrival to the ED, vitals showed /60.  RR 20.  HR 67.  94% SpO2 on RA.  Afebrile.  Significant labs showed a positive COVID test.  .  Bilirubin 1.5.  CXR shows stable cardiomegaly with mild interstitial prominence which could chronic with pulm developing interstitial edema.  There is left basilar atelectasis no consolidation.  He was started on gentle IV fluids and antiemetic in the ED.  He is being admitted to medicine team for further management of symptomatic COVID.    CODE STATUS: DNR/DNI     Past Medical History  Past Medical History:   Diagnosis Date    Anorectal abscess     Anorectal abscess    Essential (primary) hypertension     Benign essential HTN    Old myocardial infarction     History of myocardial infarction    Other hyperlipidemia     Other hyperlipidemia    Personal history of malignant neoplasm, unspecified     History of malignant neoplasm    Personal history of other diseases of the circulatory system     History of coronary artery disease    Personal history of other diseases of the circulatory system     History of  angina pectoris    Personal history of other diseases of the nervous system and sense organs 05/02/2019    History of polyneuropathy    Personal history of other specified conditions     History of chest pain    Personal history of other specified conditions     History of epistaxis    Personal history of other specified conditions     History of bradycardia    Personal history of transient ischemic attack (TIA), and cerebral infarction without residual deficits     History of stroke    Personal history of transient ischemic attack (TIA), and cerebral infarction without residual deficits     History of stroke    Syncope and collapse     Near syncope    Thoracic aortic aneurysm, without rupture, unspecified (CMS-[facility])     Thoracic aortic aneurysm    Unspecified atrial fibrillation (Multi) 03/10/2020    Atrial fibrillation with slow ventricular response    Ventricular premature depolarization     Frequent PVCs       Surgical History  Past Surgical History:   Procedure Laterality Date    CHOLECYSTECTOMY  02/05/2015    Cholecystectomy    COLONOSCOPY  02/05/2015    Colonoscopy (Fiberoptic)    CORONARY ANGIOPLASTY WITH STENT PLACEMENT  05/21/2018    Cath Placement Of Stent 1    CT ABDOMEN PELVIS ANGIOGRAM W AND/OR WO IV CONTRAST  3/17/2023    CT ABDOMEN PELVIS ANGIOGRAM W AND/OR WO IV CONTRAST PAR CT    KIDNEY SURGERY  02/05/2015    Kidney Surgery    OTHER SURGICAL HISTORY  12/13/2018    Prostatectomy    OTHER SURGICAL HISTORY  05/21/2018    Recent Surgery    TONSILLECTOMY  02/05/2015    Tonsillectomy        Social History  He reports that he has never smoked. He has never used smokeless tobacco. He reports that he does not currently use alcohol. He reports that he does not currently use drugs.    Family History  Family History   Problem Relation Name Age of Onset    Other (cardiac disorder) Mother      Transient ischemic attack Mother      Cancer Father      Other (cardiac disorder) Sister      Alzheimer's disease  "Father's Brother      Parkinsonism Father's Brother          Allergies  Patient has no known allergies.    Review of Systems   A 12 point review of systems was performed and otherwise negative except as stated in the HPI.   Physical Exam  General:  Pleasant and cooperative. No apparent distress.  HEENT:  Normocephalic, atraumatic, mucus membranes moist.   Neck:  Trachea midline.  No JVD.    Chest: Mildly decreased breath sounds bilaterally. No wheezes, rales, or rhonchi.  CV:  Regular rate and rhythm.  Positive S1/S2.   Abdomen: Bowel sounds present in all four quadrants, abdomen is soft, non-tender, non-distended.  Extremities:  No lower extremity edema or cyanosis.   Neurological:  AAOx3. No focal deficits.  Skin:  Warm and dry.   Last Recorded Vitals  Blood pressure 133/65, pulse 60, temperature 36.7 °C (98.1 °F), temperature source Temporal, resp. rate 16, height 1.803 m (5' 11\"), weight 93 kg (205 lb), SpO2 94%.    Relevant Results  All labs and images were reviewed by myself     Assessment/Plan   Giuseppe Davila is a 88 y.o. male medical history significant for hypertension, atrial fibrillation, HFpEF-EF 50 to 55%, prior TIA, and history of Watchman procedure, presents to hospital with 2 to 3-day history of flulike symptoms, generalized unwellness, nausea, dizziness, and malaise.  He is COVID-positive.  He is being admitted to medicine team for further management of symptomatic COVID.    # COVID Infection  - This is a patient presenting with symptomatic COVID.  CXR shows no infiltrates.  This is no COVID-pneumonia.  Does not require any oxygen and is not in any respiratory distress.  He does not have a productive cough of yellow-green phlegm.    Plan:  -We will treat his COVID with supportive treatment including Mucinex, DuoNebs as needed.  Vital signs are stable.  Continue to monitor and ensure patient is hemodynamically stable.  He does not require any oxygen.  - Patient has HFpEF with EF of 50 to 55%.  BNP " is slightly elevated however patient is not clinically fluid overloaded.  Neuro bilateral peripheral leg swelling.  Patient is also not in any respiratory distress.  No diuresis is warranted at this time.    # HTN  # A-fib s/p watchman procedure  # HFpEF-EF 50 to 55%   # Aortic stenosis  # Ascending aortic aneurysm  # Prior TIA  - Continue home aspirin, aldactone, lasix, Colace, rosuvastatin hydrocortisone cream as needed, nitroglycerin as needed, and Metamucil as needed  - Patient was scheduled to have heart catheterization on day of admission however canceled it.  He has moderate to severe aortic stenosis with aortic valve area of 1.1 cm².  He has an ascending aortic aneurysm that measures 5.1 cm. He recently had an abnormal stress test.  He will need to have his heart catheterization rescheduled.    - DVT PPx: Nikkinox          Danelle Chen MD   PGY1 internal medicine

## 2024-05-01 NOTE — H&P (VIEW-ONLY)
History Of Present Illness  Giuseppe Davila is a 88 y.o. male medical history significant for CAD, aortic stenosis, hypertension, atrial fibrillation, HFpEF-EF 50 to 55%, prior TIA, and history of Watchman procedure, presents to hospital with 2 to 3-day history of generalized malaise, nausea, fever, flulike symptoms, nonproductive cough, and dizziness.  He states that he has been feeling generally unwell and fatigued over the last couple of days and had a fever 100.6.  He denies any abdominal pain, vomiting, urinary issues, shortness of breath, chest pain, palpitations.  Denies any diarrhea.  He does get constipated at times..  He was supposed to get an outpatient heart catheterization on day of admission however he canceled it due to feeling unwell.  COVID positive.  He usually lives at home with his wife.  He is a non-smoker and is usually independent in ADLs.    On arrival to the ED, vitals showed /60.  RR 20.  HR 67.  94% SpO2 on RA.  Afebrile.  Significant labs showed a positive COVID test.  .  Bilirubin 1.5.  CXR shows stable cardiomegaly with mild interstitial prominence which could chronic with pulm developing interstitial edema.  There is left basilar atelectasis no consolidation.  He was started on gentle IV fluids and antiemetic in the ED.  He is being admitted to medicine team for further management of symptomatic COVID.    CODE STATUS: DNR/DNI     Past Medical History  Past Medical History:   Diagnosis Date    Anorectal abscess     Anorectal abscess    Essential (primary) hypertension     Benign essential HTN    Old myocardial infarction     History of myocardial infarction    Other hyperlipidemia     Other hyperlipidemia    Personal history of malignant neoplasm, unspecified     History of malignant neoplasm    Personal history of other diseases of the circulatory system     History of coronary artery disease    Personal history of other diseases of the circulatory system     History of  angina pectoris    Personal history of other diseases of the nervous system and sense organs 05/02/2019    History of polyneuropathy    Personal history of other specified conditions     History of chest pain    Personal history of other specified conditions     History of epistaxis    Personal history of other specified conditions     History of bradycardia    Personal history of transient ischemic attack (TIA), and cerebral infarction without residual deficits     History of stroke    Personal history of transient ischemic attack (TIA), and cerebral infarction without residual deficits     History of stroke    Syncope and collapse     Near syncope    Thoracic aortic aneurysm, without rupture, unspecified (CMS-[facility])     Thoracic aortic aneurysm    Unspecified atrial fibrillation (Multi) 03/10/2020    Atrial fibrillation with slow ventricular response    Ventricular premature depolarization     Frequent PVCs       Surgical History  Past Surgical History:   Procedure Laterality Date    CHOLECYSTECTOMY  02/05/2015    Cholecystectomy    COLONOSCOPY  02/05/2015    Colonoscopy (Fiberoptic)    CORONARY ANGIOPLASTY WITH STENT PLACEMENT  05/21/2018    Cath Placement Of Stent 1    CT ABDOMEN PELVIS ANGIOGRAM W AND/OR WO IV CONTRAST  3/17/2023    CT ABDOMEN PELVIS ANGIOGRAM W AND/OR WO IV CONTRAST PAR CT    KIDNEY SURGERY  02/05/2015    Kidney Surgery    OTHER SURGICAL HISTORY  12/13/2018    Prostatectomy    OTHER SURGICAL HISTORY  05/21/2018    Recent Surgery    TONSILLECTOMY  02/05/2015    Tonsillectomy        Social History  He reports that he has never smoked. He has never used smokeless tobacco. He reports that he does not currently use alcohol. He reports that he does not currently use drugs.    Family History  Family History   Problem Relation Name Age of Onset    Other (cardiac disorder) Mother      Transient ischemic attack Mother      Cancer Father      Other (cardiac disorder) Sister      Alzheimer's disease  "Father's Brother      Parkinsonism Father's Brother          Allergies  Patient has no known allergies.    Review of Systems   A 12 point review of systems was performed and otherwise negative except as stated in the HPI.   Physical Exam  General:  Pleasant and cooperative. No apparent distress.  HEENT:  Normocephalic, atraumatic, mucus membranes moist.   Neck:  Trachea midline.  No JVD.    Chest: Mildly decreased breath sounds bilaterally. No wheezes, rales, or rhonchi.  CV:  Regular rate and rhythm.  Positive S1/S2.   Abdomen: Bowel sounds present in all four quadrants, abdomen is soft, non-tender, non-distended.  Extremities:  No lower extremity edema or cyanosis.   Neurological:  AAOx3. No focal deficits.  Skin:  Warm and dry.   Last Recorded Vitals  Blood pressure 133/65, pulse 60, temperature 36.7 °C (98.1 °F), temperature source Temporal, resp. rate 16, height 1.803 m (5' 11\"), weight 93 kg (205 lb), SpO2 94%.    Relevant Results  All labs and images were reviewed by myself     Assessment/Plan   Giuseppe Davila is a 88 y.o. male medical history significant for hypertension, atrial fibrillation, HFpEF-EF 50 to 55%, prior TIA, and history of Watchman procedure, presents to hospital with 2 to 3-day history of flulike symptoms, generalized unwellness, nausea, dizziness, and malaise.  He is COVID-positive.  He is being admitted to medicine team for further management of symptomatic COVID.    # COVID Infection  - This is a patient presenting with symptomatic COVID.  CXR shows no infiltrates.  This is no COVID-pneumonia.  Does not require any oxygen and is not in any respiratory distress.  He does not have a productive cough of yellow-green phlegm.    Plan:  -We will treat his COVID with supportive treatment including Mucinex, DuoNebs as needed.  Vital signs are stable.  Continue to monitor and ensure patient is hemodynamically stable.  He does not require any oxygen.  - Patient has HFpEF with EF of 50 to 55%.  BNP " is slightly elevated however patient is not clinically fluid overloaded.  Neuro bilateral peripheral leg swelling.  Patient is also not in any respiratory distress.  No diuresis is warranted at this time.    # HTN  # A-fib s/p watchman procedure  # HFpEF-EF 50 to 55%   # Aortic stenosis  # Ascending aortic aneurysm  # Prior TIA  - Continue home aspirin, aldactone, lasix, Colace, rosuvastatin hydrocortisone cream as needed, nitroglycerin as needed, and Metamucil as needed  - Patient was scheduled to have heart catheterization on day of admission however canceled it.  He has moderate to severe aortic stenosis with aortic valve area of 1.1 cm².  He has an ascending aortic aneurysm that measures 5.1 cm. He recently had an abnormal stress test.  He will need to have his heart catheterization rescheduled.    - DVT PPx: Nikkinox          Danelle Chen MD   PGY1 internal medicine

## 2024-05-01 NOTE — CARE PLAN
The patient's goals for the shift include  keep pt safe    The clinical goals for the shift include  keep pt free from falls    Over the shift, the patient did not make progress toward the following goals. Barriers to progression include hard of hearing. Recommendations to address these barriers include communication.

## 2024-05-01 NOTE — ED PROVIDER NOTES
HPI   Chief Complaint   Patient presents with    Chest Pain     Pt c/o cp, abd pain, nausea, lethargy.  and feeling generally ill, pt sts was suppose to have heart cath today but felt so ill he didn't got and rescheduled.        Patient is an 88-year-old male, medical history significant for hypertension, atrial fibrillation, prior TIA, and history of Watchman procedure the presents to the emergency department with fever and generalized malaise.  Patient states that he was actually scheduled to have an outpatient heart catheterization today.  He states he had canceled the procedure because of symptoms.  He states when he woke, he just felt like he had no energy.  He has had mild cough and some dyspnea.  He describes some anterior chest pain.  His wife states he had a fever of 100.6 today.  He denies any abdominal pain.  He has not had any vomiting.  He is still urinating without issue.  He denies any rash.                          Patricio Coma Scale Score: 15                     Patient History   Past Medical History:   Diagnosis Date    Anorectal abscess     Anorectal abscess    Essential (primary) hypertension     Benign essential HTN    Old myocardial infarction     History of myocardial infarction    Other hyperlipidemia     Other hyperlipidemia    Personal history of malignant neoplasm, unspecified     History of malignant neoplasm    Personal history of other diseases of the circulatory system     History of coronary artery disease    Personal history of other diseases of the circulatory system     History of angina pectoris    Personal history of other diseases of the nervous system and sense organs 05/02/2019    History of polyneuropathy    Personal history of other specified conditions     History of chest pain    Personal history of other specified conditions     History of epistaxis    Personal history of other specified conditions     History of bradycardia    Personal history of transient ischemic  attack (TIA), and cerebral infarction without residual deficits     History of stroke    Personal history of transient ischemic attack (TIA), and cerebral infarction without residual deficits     History of stroke    Syncope and collapse     Near syncope    Thoracic aortic aneurysm, without rupture, unspecified (CMS-HCC)     Thoracic aortic aneurysm    Unspecified atrial fibrillation (Multi) 03/10/2020    Atrial fibrillation with slow ventricular response    Ventricular premature depolarization     Frequent PVCs     Past Surgical History:   Procedure Laterality Date    CHOLECYSTECTOMY  02/05/2015    Cholecystectomy    COLONOSCOPY  02/05/2015    Colonoscopy (Fiberoptic)    CORONARY ANGIOPLASTY WITH STENT PLACEMENT  05/21/2018    Cath Placement Of Stent 1    CT ABDOMEN PELVIS ANGIOGRAM W AND/OR WO IV CONTRAST  3/17/2023    CT ABDOMEN PELVIS ANGIOGRAM W AND/OR WO IV CONTRAST PAR CT    KIDNEY SURGERY  02/05/2015    Kidney Surgery    OTHER SURGICAL HISTORY  12/13/2018    Prostatectomy    OTHER SURGICAL HISTORY  05/21/2018    Recent Surgery    TONSILLECTOMY  02/05/2015    Tonsillectomy     Family History   Problem Relation Name Age of Onset    Other (cardiac disorder) Mother      Transient ischemic attack Mother      Cancer Father      Other (cardiac disorder) Sister      Alzheimer's disease Father's Brother      Parkinsonism Father's Brother       Social History     Tobacco Use    Smoking status: Never    Smokeless tobacco: Never   Vaping Use    Vaping status: Never Used   Substance Use Topics    Alcohol use: Not Currently     Comment: rarely    Drug use: Not Currently       Physical Exam   ED Triage Vitals [04/30/24 2324]   Temperature Heart Rate Respirations BP   36.7 °C (98.1 °F) 67 20 135/60      Pulse Ox Temp Source Heart Rate Source Patient Position   94 % Temporal Monitor Sitting      BP Location FiO2 (%)     -- --       Physical Exam  Vitals and nursing note reviewed.   Constitutional:       General: He is not in  acute distress.     Appearance: He is well-developed.   HENT:      Head: Normocephalic and atraumatic.   Eyes:      Conjunctiva/sclera: Conjunctivae normal.   Cardiovascular:      Rate and Rhythm: Normal rate and regular rhythm.      Heart sounds: Normal heart sounds. No murmur heard.  Pulmonary:      Effort: Pulmonary effort is normal. No respiratory distress.      Breath sounds: Normal breath sounds.   Abdominal:      General: Bowel sounds are normal.      Palpations: Abdomen is soft.      Tenderness: There is no abdominal tenderness.   Musculoskeletal:         General: No swelling. Normal range of motion.      Cervical back: Neck supple.      Right lower leg: No tenderness.      Left lower leg: No tenderness.   Skin:     General: Skin is warm and dry.      Capillary Refill: Capillary refill takes less than 2 seconds.   Neurological:      General: No focal deficit present.      Mental Status: He is alert and oriented to person, place, and time.   Psychiatric:         Mood and Affect: Mood normal.         ED Course & MDM   ED Course as of 05/01/24 0046   Wed May 01, 2024   0002 CBC demonstrates mild thrombocytopenia.  No leukocytosis. [MK]   0015 Metabolic panel unremarkable. [MK]   0015 Lactic acid normal.  Lipase normal. [MK]   0023 Troponin indeterminant 21.  BNP mildly elevated 467. [MK]   0035 COVID is positive. [MK]      ED Course User Index  [MK] Devin Augustin MD         Diagnoses as of 05/01/24 0046   Acute chest pain   COVID-19       Medical Decision Making  Medical Decision Making: Patient presents emergency department nausea, generalized malaise, and fever at home.  On arrival, he is afebrile.  EKG was obtained which does show PVCs.  Patient is not requiring supplemental oxygen.  Broad metabolic workup was pursued.  Labs do demonstrate mildly elevation of the troponin and BNP.  He was given gentle fluids and antiemetics.  Patient was found to be COVID-positive.  He was feeling improved but still  having intermittent chest pain.  Given advanced cardiac disease, indeterminant troponin, and the fact that he is now COVID-positive I do feel the patient benefit from observation.    EKG interpreted by myself (ED attending physician): A-fib.  Rate of 69.  Occasional PVC.  Mild left axis.  No acute ischemia.  No STEMI.    Differential Diagnoses Considered: COVID, pneumonia, influenza, bronchitis, gastroenteritis    Chronic Medical Conditions Significantly Affecting Care: Cardiovascular disease, aortic aneurysm, valvular disease, atrial fibrillation    External Records Reviewed: I reviewed recent and relevant outside records including: Outpatient cardiology visits    Independent Interpretation of Studies:  I independently interpreted: Chest x-ray obtained and reviewed    Escalation of Care:  Appropriate for observation    Social Determinants of Health Significantly Affecting Care:  No social determinants    Prescription Drug Consideration: Gentle fluids, antiemetic    Diagnostic testing considered: This noncontributory    Discussion of Management with Other Providers:   I discussed the patient/results with: Admitting provider agrees plan of care          Procedure  Procedures     Devin Augustin MD  05/01/24 0046

## 2024-05-02 VITALS
HEART RATE: 67 BPM | DIASTOLIC BLOOD PRESSURE: 71 MMHG | BODY MASS INDEX: 28.7 KG/M2 | WEIGHT: 205 LBS | TEMPERATURE: 96.1 F | OXYGEN SATURATION: 96 % | RESPIRATION RATE: 18 BRPM | HEIGHT: 71 IN | SYSTOLIC BLOOD PRESSURE: 134 MMHG

## 2024-05-02 LAB
ANION GAP SERPL CALC-SCNC: 12 MMOL/L (ref 10–20)
ATRIAL RATE: 0 BPM
BUN SERPL-MCNC: 13 MG/DL (ref 6–23)
CALCIUM SERPL-MCNC: 8.8 MG/DL (ref 8.6–10.3)
CHLORIDE SERPL-SCNC: 103 MMOL/L (ref 98–107)
CO2 SERPL-SCNC: 28 MMOL/L (ref 21–32)
CREAT SERPL-MCNC: 0.92 MG/DL (ref 0.5–1.3)
EGFRCR SERPLBLD CKD-EPI 2021: 81 ML/MIN/1.73M*2
ERYTHROCYTE [DISTWIDTH] IN BLOOD BY AUTOMATED COUNT: 13.9 % (ref 11.5–14.5)
GLUCOSE SERPL-MCNC: 114 MG/DL (ref 74–99)
HCT VFR BLD AUTO: 41.5 % (ref 41–52)
HGB BLD-MCNC: 14.1 G/DL (ref 13.5–17.5)
MAGNESIUM SERPL-MCNC: 1.96 MG/DL (ref 1.6–2.4)
MCH RBC QN AUTO: 31.1 PG (ref 26–34)
MCHC RBC AUTO-ENTMCNC: 34 G/DL (ref 32–36)
MCV RBC AUTO: 91 FL (ref 80–100)
NRBC BLD-RTO: 0 /100 WBCS (ref 0–0)
P AXIS: 0 DEGREES
PLATELET # BLD AUTO: 129 X10*3/UL (ref 150–450)
POTASSIUM SERPL-SCNC: 3.4 MMOL/L (ref 3.5–5.3)
PR INTERVAL: 104 MS
Q ONSET: 252 MS
QRS COUNT: 10 BEATS
QRS DURATION: 132 MS
QT INTERVAL: 420 MS
QTC CALCULATION(BAZETT): 457 MS
QTC FREDERICIA: 444 MS
R AXIS: 69 DEGREES
RBC # BLD AUTO: 4.54 X10*6/UL (ref 4.5–5.9)
SODIUM SERPL-SCNC: 140 MMOL/L (ref 136–145)
T AXIS: 244 DEGREES
T OFFSET: 462 MS
VENTRICULAR RATE: 71 BPM
WBC # BLD AUTO: 5.4 X10*3/UL (ref 4.4–11.3)

## 2024-05-02 PROCEDURE — 2500000004 HC RX 250 GENERAL PHARMACY W/ HCPCS (ALT 636 FOR OP/ED)

## 2024-05-02 PROCEDURE — 97161 PT EVAL LOW COMPLEX 20 MIN: CPT | Mod: GP

## 2024-05-02 PROCEDURE — 97165 OT EVAL LOW COMPLEX 30 MIN: CPT | Mod: GO

## 2024-05-02 PROCEDURE — 36415 COLL VENOUS BLD VENIPUNCTURE: CPT

## 2024-05-02 PROCEDURE — 2500000001 HC RX 250 WO HCPCS SELF ADMINISTERED DRUGS (ALT 637 FOR MEDICARE OP): Performed by: STUDENT IN AN ORGANIZED HEALTH CARE EDUCATION/TRAINING PROGRAM

## 2024-05-02 PROCEDURE — 83735 ASSAY OF MAGNESIUM: CPT

## 2024-05-02 PROCEDURE — 2500000006 HC RX 250 W HCPCS SELF ADMINISTERED DRUGS (ALT 637 FOR ALL PAYERS): Mod: MUE | Performed by: STUDENT IN AN ORGANIZED HEALTH CARE EDUCATION/TRAINING PROGRAM

## 2024-05-02 PROCEDURE — G0378 HOSPITAL OBSERVATION PER HR: HCPCS

## 2024-05-02 PROCEDURE — 96372 THER/PROPH/DIAG INJ SC/IM: CPT

## 2024-05-02 PROCEDURE — 2500000006 HC RX 250 W HCPCS SELF ADMINISTERED DRUGS (ALT 637 FOR ALL PAYERS): Mod: MUE

## 2024-05-02 PROCEDURE — 2500000001 HC RX 250 WO HCPCS SELF ADMINISTERED DRUGS (ALT 637 FOR MEDICARE OP)

## 2024-05-02 PROCEDURE — 85027 COMPLETE CBC AUTOMATED: CPT

## 2024-05-02 PROCEDURE — 82374 ASSAY BLOOD CARBON DIOXIDE: CPT

## 2024-05-02 RX ORDER — POTASSIUM CHLORIDE 1.5 G/1.58G
20 POWDER, FOR SOLUTION ORAL ONCE
Status: COMPLETED | OUTPATIENT
Start: 2024-05-02 | End: 2024-05-02

## 2024-05-02 RX ADMIN — DOCUSATE SODIUM 100 MG: 100 CAPSULE, LIQUID FILLED ORAL at 09:16

## 2024-05-02 RX ADMIN — ASPIRIN 81 MG: 81 TABLET, COATED ORAL at 09:15

## 2024-05-02 RX ADMIN — ACETAMINOPHEN 650 MG: 325 TABLET ORAL at 05:55

## 2024-05-02 RX ADMIN — TORSEMIDE 20 MG: 20 TABLET ORAL at 09:16

## 2024-05-02 RX ADMIN — ENOXAPARIN SODIUM 40 MG: 40 INJECTION SUBCUTANEOUS at 09:15

## 2024-05-02 RX ADMIN — SPIRONOLACTONE 25 MG: 25 TABLET, FILM COATED ORAL at 09:16

## 2024-05-02 RX ADMIN — GUAIFENESIN AND DEXTROMETHORPHAN HYDROBROMIDE 1 TABLET: 600; 30 TABLET, EXTENDED RELEASE ORAL at 09:15

## 2024-05-02 RX ADMIN — ROSUVASTATIN CALCIUM 20 MG: 10 TABLET, FILM COATED ORAL at 09:15

## 2024-05-02 RX ADMIN — POTASSIUM CHLORIDE 20 MEQ: 1.5 POWDER, FOR SOLUTION ORAL at 09:15

## 2024-05-02 ASSESSMENT — COGNITIVE AND FUNCTIONAL STATUS - GENERAL
TOILETING: A LITTLE
DRESSING REGULAR UPPER BODY CLOTHING: A LITTLE
MOBILITY SCORE: 15
TURNING FROM BACK TO SIDE WHILE IN FLAT BAD: A LITTLE
STANDING UP FROM CHAIR USING ARMS: A LITTLE
MOBILITY SCORE: 15
WALKING IN HOSPITAL ROOM: A LOT
HELP NEEDED FOR BATHING: A LITTLE
MOVING FROM LYING ON BACK TO SITTING ON SIDE OF FLAT BED WITH BEDRAILS: A LITTLE
HELP NEEDED FOR BATHING: A LITTLE
STANDING UP FROM CHAIR USING ARMS: A LOT
TOILETING: A LITTLE
DAILY ACTIVITIY SCORE: 20
TURNING FROM BACK TO SIDE WHILE IN FLAT BAD: A LITTLE
CLIMB 3 TO 5 STEPS WITH RAILING: A LOT
MOVING TO AND FROM BED TO CHAIR: A LITTLE
DAILY ACTIVITIY SCORE: 20
HELP NEEDED FOR BATHING: A LITTLE
MOVING TO AND FROM BED TO CHAIR: A LITTLE
DRESSING REGULAR UPPER BODY CLOTHING: A LITTLE
DRESSING REGULAR LOWER BODY CLOTHING: A LITTLE
MOVING TO AND FROM BED TO CHAIR: A LITTLE
TOILETING: A LITTLE
DAILY ACTIVITIY SCORE: 20
DRESSING REGULAR LOWER BODY CLOTHING: A LITTLE
DRESSING REGULAR UPPER BODY CLOTHING: A LITTLE
DRESSING REGULAR LOWER BODY CLOTHING: A LITTLE
CLIMB 3 TO 5 STEPS WITH RAILING: A LITTLE
WALKING IN HOSPITAL ROOM: A LITTLE
STANDING UP FROM CHAIR USING ARMS: A LOT
MOVING FROM LYING ON BACK TO SITTING ON SIDE OF FLAT BED WITH BEDRAILS: A LITTLE
WALKING IN HOSPITAL ROOM: A LOT
MOVING FROM LYING ON BACK TO SITTING ON SIDE OF FLAT BED WITH BEDRAILS: A LITTLE
TURNING FROM BACK TO SIDE WHILE IN FLAT BAD: A LITTLE
MOBILITY SCORE: 18
CLIMB 3 TO 5 STEPS WITH RAILING: A LOT

## 2024-05-02 ASSESSMENT — PAIN - FUNCTIONAL ASSESSMENT
PAIN_FUNCTIONAL_ASSESSMENT: 0-10

## 2024-05-02 ASSESSMENT — PAIN DESCRIPTION - LOCATION: LOCATION: HEAD

## 2024-05-02 ASSESSMENT — PAIN SCALES - GENERAL
PAINLEVEL_OUTOF10: 0 - NO PAIN
PAINLEVEL_OUTOF10: 4
PAINLEVEL_OUTOF10: 0 - NO PAIN

## 2024-05-02 NOTE — DISCHARGE SUMMARY
Discharge Diagnosis  Acute Issues:  COVID  Chronic Issues:  HTN  A-fib s/p watchman procedure  HFpEF-EF 50 to 55%   Aortic stenosis  Ascending aortic aneurysm  Prior TIA    Issues Requiring Follow-Up  Follow-up with primary care provider in 1-2 weeks    Discharge Meds     Your medication list        CHANGE how you take these medications        Instructions Last Dose Given Next Dose Due   torsemide 20 mg tablet  Commonly known as: Demadex  What changed:   how much to take  how to take this  when to take this  additional instructions      1 TAB Daily              CONTINUE taking these medications        Instructions Last Dose Given Next Dose Due   ashwagandha root extract 500 mg capsule           aspirin 81 mg EC tablet           b complex vitamins capsule           calcium carbonate 600 mg calcium (1,500 mg) tablet           cholecalciferol 25 MCG (1000 UT) capsule  Commonly known as: Vitamin D-3           clopidogrel 75 mg tablet  Commonly known as: Plavix      Take 1 tablet (75 mg) by mouth once daily.       coenzyme Q-10 100 mg capsule           Controlled Delivery Probiotic 126 mg (2 billion cell) tablet,delayed and ext.release  Generic drug: L.acid,ferm,carol,rha-B.bif,long           cyanocobalamin 1,000 mcg/mL injection  Commonly known as: Vitamin B-12           docusate sodium 100 mg capsule  Commonly known as: Colace      Take 1 capsule (100 mg) by mouth 2 times a day.       hydrocortisone 2.5 % rectal cream  Commonly known as: Anusol-HC           MagOx 400 mg (241.3 mg magnesium) tablet  Generic drug: magnesium oxide           Metamucil Fiber Singles 3.4 gram packet  Generic drug: psyllium           multivitamin tablet           nitroglycerin 0.4 mg SL tablet  Commonly known as: Nitrostat           ondansetron 4 mg tablet  Commonly known as: Zofran      Take 1 tablet (4 mg) by mouth every 8 hours if needed for nausea.       rosuvastatin 20 mg tablet  Commonly known as: Crestor      Take 1 tablet (20 mg) by  mouth 4 times a week.       spironolactone 25 mg tablet  Commonly known as: Aldactone      Take 1 tablet (25 mg) by mouth once daily.       turmeric-turmeric root extract 450-50 mg capsule           vitamin E succinate 268 mg (400 unit) tablet                    Test Results Pending At Discharge  Pending Labs       Order Current Status    Blood Culture Preliminary result    Blood Culture Preliminary result            Hospital Course   Giuseppe Davila is a 88 y.o. male medical history significant for hypertension, atrial fibrillation, HFpEF-EF 50 to 55%, prior TIA, and history of Watchman procedure, presents to hospital with 2 to 3-day history of flulike symptoms, generalized unwellness, nausea, dizziness, and malaise.  He is COVID-positive.  He is being admitted to medicine team for further management of symptomatic COVID.  Patient was treated supportively with fluids and felt significantly improved today.  Not interested in home health care.  Patient was deemed medically stable for discharge on 5/2/2024.    Pertinent Physical Exam At Time of Discharge  Physical Exam  General:  Pleasant and cooperative. No apparent distress.  HEENT:  Normocephalic, atraumatic, mucus membranes moist.   Neck:  Trachea midline.  No JVD.    Chest: Clear lung sounds bilaterally  CV:  Regular rate and rhythm.  Positive S1/S2.   Abdomen: Bowel sounds present in all four quadrants, abdomen is soft, non-tender, non-distended.  Extremities:  No lower extremity edema or cyanosis.   Neurological:  AAOx3. No focal deficits.  Skin:  Warm and dry.   Outpatient Follow-Up  No future appointments.      Pascual Owens MD

## 2024-05-02 NOTE — CARE PLAN
The patient's goals for the shift include      The clinical goals for the shift include patient to remain safe throughout the shift    Over the shift, the patient did not make progress toward the following goals. Barriers to progression include hard of hearing. Recommendations to address these barriers include clear communications.

## 2024-05-02 NOTE — CARE PLAN
The patient's goals for the shift include safety     The clinical goals for the shift include patient to remain safe throughout the shift

## 2024-05-02 NOTE — CARE PLAN
The patient's goals for the shift include  to remain safe    The clinical goals for the shift include to remain safe    Over the shift, the patient did not make progress toward the following goals. Barriers to progression include acute illness. Recommendations to address these barriers include communication.

## 2024-05-02 NOTE — PROGRESS NOTES
Occupational Therapy    Evaluation    Patient Name: Giuseppe Davila  MRN: [id number]  Today's Date: 5/2/2024  Time Calculation  Start Time: 1050  Stop Time: 1102  Time Calculation (min): 12 min    Assessment  IP OT Assessment  OT Assessment: Patient presents with a decline in functional status and would benefit from O.T. services at a low intensity to improve independence with ADLs, transfers & mobility.  Prognosis: Good  End of Session Patient Position: Bed, 2 rail up (call light in reach)    Plan:  Treatment Interventions: ADL retraining, Functional transfer training, Neuromuscular reeducation, Compensatory technique education  OT Frequency: 3 times per week  OT Discharge Recommendations: Low intensity level of continued care (24 hour support for safety)  OT - OK to Discharge: Yes (from an O.T. standpoint)    Subjective     Current Problem:  Patient Active Problem List   Diagnosis    Ascending aortic aneurysm (CMS-HCC)    Atrial fibrillation with slow ventricular response (Multi)    Cerebral infarction (Multi)    Chest tightness    Episodic lightheadedness    Exertional angina (CMS-HCC)    History of claustrophobia    Hyperlipidemia    Hypertension    Intermittent claudication of both lower extremities due to atherosclerosis (CMS-HCC)    Leg edema    Moderate tricuspid regurgitation    Polyneuropathy    Presence of stent in coronary artery    Right inguinal hernia    Rotator cuff tear, right    Severe mitral regurgitation    Weakness of both lower extremities    Celiac artery stenosis (CMS-HCC)    History of radiation therapy    Iron deficiency anemia due to chronic blood loss    Hematochezia    Pacemaker    Sick sinus syndrome (Multi)    Vertigo    Unsteady gait    Ramon's esophagus    Cerumen impaction    CVA (cerebrovascular accident) (Multi)    Epistaxis    Foot drop, bilateral    Globus syndrome    Lip lesion    History of cerebrovascular accident    Neuropathy    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 (+) covid. PMH: was scheduled for cardiac cath 4/30/24, procedure was cancelled due to symptoms.  A fib, HTN, prior TIA, watchman procedure, aortic stenosis  Prior to Session Communication: Bedside nurse  General Comment: Per conference with RN, patient is medically stable for therapy eval    Precautions:  Precautions Comment: Covid: contact, droplet plus      Pain:  Pain Assessment  Pain Assessment: 0-10  Pain Score: 0 - No pain    Objective     Cognition:  Overall Cognitive Status: Within Functional Limits  Orientation Level: Oriented X4           Home Living:  Home Living Comments: Lives with wife, bed/bath 1st floor. Independent ADLs, transfers & mobility with occasional use of wheeled walker. Primarily holds onto furniture for balance. Used stall shower with seat & grab bar high toilet. Has BLE AFO for chronic foot drop. Wife manages IADLs, patient does not drive.          ADL:  Grooming Assistance: Stand by  UE Dressing Assistance: Stand by  LE Dressing Assistance:  (CGA for balance in standing)  Toileting Assistance with Device:  (CGA for balance in standing)    Activity Tolerance:  Endurance: Endurance does not limit participation in activity    Bed Mobility/Transfers:   Bed Mobility  Bed Mobility:  (supervision supine <-> sit)  Transfers  Transfer:  (CGA sit to stand from edge of bed, pulling up on walker; CGA/SBA sit to stand from chair with armrests. Min cues for safety/hand  placement)    Ambulation/Gait Training:  Functional Mobility  Functional Mobility Performed:  (CGA with wheeled walker; patient does not have BLE AFO here in hospital.)    Sitting Balance:  Dynamic Sitting Balance  Dynamic Sitting-Balance:  (SBA with UE support)    Standing Balance:  Dynamic Standing Balance  Dynamic Standing-Balance:  (CGA with BUE support)    Strength:  Strength Comments: BUE grossly 4-/5       Coordination:  Movements are Fluid and Coordinated: Yes       Outcome Measures: [facility] Daily Activity  Putting on and taking off regular lower body clothing: A little  Bathing (including washing, rinsing, drying): A little  Putting on and taking off regular upper body clothing: A little  Toileting, which includes using toilet, bedpan or urinal: A little  Taking care of personal grooming such as brushing teeth: None  Eating Meals: None  Daily Activity - Total Score: 20           EDUCATION:     Education Documentation  ADL Training, taught by Wendy Lopez OT at 5/2/2024 12:29 PM.  Learner: Patient  Readiness: Acceptance  Method: Explanation, Demonstration  Response: Verbalizes Understanding    Education Comments  No comments found.        Goals:   Encounter Problems       Encounter Problems (Active)       OT Goals       Increase LE bathing & dressing to supervision with adaptive equipment as needed.  (Progressing)       Start:  05/02/24    Expected End:  05/16/24            Increase functional transfers to/from bed, chair & commode to supervision with DME for safety  (Progressing)       Start:  05/02/24    Expected End:  05/16/24            Demonstrate compliance to energy conservation techs and safety techs during ADLs   (Progressing)       Start:  05/02/24    Expected End:  05/16/24            Increase dynamic stand balance to supervision with UE support to promote increased independence with ADL & functional transfers  (Progressing)       Start:  05/02/24    Expected End:  05/16/24

## 2024-05-02 NOTE — PROGRESS NOTES
Physical Therapy    Physical Therapy Evaluation    Patient Name: Giuseppe Davila  MRN: [id number]  Today's Date: 5/2/2024   Time Calculation  Start Time: 1049  Stop Time: 1102  Time Calculation (min): 13 min    Assessment/Plan   PT Assessment  PT Assessment Results: Decreased mobility  End of Session Communication: Bedside nurse  End of Session Patient Position: Bed, 2 rail up, Alarm off, not on at start of session (All needs in reach and no complaints noted)  IP OR SWING BED PT PLAN  Inpatient or Swing Bed: Inpatient  PT Plan  Treatment/Interventions: Bed mobility, Transfer training, Gait training  PT Plan: Skilled PT  PT Frequency: 3 times per week  PT Discharge Recommendations: Low intensity level of continued care (with 24hr SUP)    Subjective     Current Problem:  Patient Active Problem List   Diagnosis    Ascending aortic aneurysm (CMS-[facility])    Atrial fibrillation with slow ventricular response (Multi)    Cerebral infarction (Multi)    Chest tightness    Episodic lightheadedness    Exertional angina (CMS-[facility])    History of claustrophobia    Hyperlipidemia    Hypertension    Intermittent claudication of both lower extremities due to atherosclerosis (CMS-[facility])    Leg edema    Moderate tricuspid regurgitation    Polyneuropathy    Presence of stent in coronary artery    Right inguinal hernia    Rotator cuff tear, right    Severe mitral regurgitation    Weakness of both lower extremities    Celiac artery stenosis (CMS-HCC)    History of radiation therapy    Iron deficiency anemia due to chronic blood loss    Hematochezia    Pacemaker    Sick sinus syndrome (Multi)    Vertigo    Unsteady gait    Ramon's esophagus    Cerumen impaction    CVA (cerebrovascular accident) (Multi)    Epistaxis    Foot drop, bilateral    Globus syndrome    Lip lesion    History of cerebrovascular accident    Neuropathy    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 male medical history significant for CAD, aortic stenosis, hypertension, atrial fibrillation, HFpEF-EF 50 to 55%, prior TIA, and history of Watchman procedure, presents to hospital with 2 to 3-day history of generalized malaise, nausea, fever, flulike symptoms, nonproductive cough, and dizziness.  He states that he has been feeling generally unwell and fatigued over the last couple of days and had a fever 100.6.  He denies any abdominal pain, vomiting, urinary issues, shortness of breath, chest pain, palpitations.  Denies any diarrhea.  He does get constipated at times..  He was supposed to get an outpatient heart catheterization on day of admission however he canceled it due to feeling unwell.  COVID positive.  Prior to Session Communication: Bedside nurse  Patient Position Received: Bed, 2 rail up, Alarm off, not on at start of session (Agreeable to PT)    Home Living:  Home Living  Home Living Comments: Pt lives with his wife in a 1 story house with 0 LORI and only 1 small step between kitchen and living room. Bathroom has a walk in shower with a seat and grab bar, and a higher toilet. Pt sleeps in a regular bed.    Prior Level of Function:  Prior Function Per Pt/Caregiver Report  Level of [place]: Independent with ADLs and functional transfers (Pt uses RW outdoors and furniture walks indoors, wife does all IADLS, (-) drive)    Precautions:  Precautions  Precautions Comment: Covid+ Droplet Plus, Fall precautions    Objective     Pain:  Pain Assessment  Pain Assessment:   (0/10)    Cognition:  Cognition  Overall Cognitive Status: Within Functional Limits    General Assessments:  Sensation  Light Touch:  (chronic B feet neuropathy)  Strength  Strength Comments: B LE ROM WFL, strength 4-/5 with the exception of B foot drop  Dynamic Standing Balance  Dynamic Standing-Comments: Fair- with RW    Functional Assessments:  Bed Mobility  Bed Mobility:  (Supine <> sitting: SUP)  Transfers  Transfer:  (STS from EOB x 1 and recliner x 3 with armrests: CGA to SBA with max VCs for technique and safety awareness)  Ambulation/Gait Training  Ambulation/Gait Training Performed:  (Pt was able to amb 25' x 2 using RW with CGA to SBA with increased hip and knee flexion to clear B LE in swing 2/2 foot drop. Pt reports using B AFOs at baseline.)     Outcome Measures:  [facility] Basic Mobility  Turning from your back to your side while in a flat bed without using bedrails: A little  Moving from lying on your back to sitting on the side of a flat bed without using bedrails: A little  Moving to and from bed to chair (including a wheelchair): A little  Standing up from a chair using your arms (e.g. wheelchair or bedside chair): A little  To walk in hospital room: A little  Climbing 3-5 steps with railing: A little  Basic Mobility - Total Score: 18    Goals:  Encounter Problems       Encounter Problems (Active)       PT Problem       STG - Pt will transition supine <> sitting with mod I   (Progressing)       Start:  05/02/24    Expected End:  05/16/24            STG - Pt will transfer STS with SUP  (Progressing)       Start:  05/02/24    Expected End:  05/16/24            STG - Pt will amb 50' using RW with SUP  (Progressing)       Start:  05/02/24    Expected End:  05/16/24                 Education Documentation  Precautions, taught by Monika Thompson PT at 5/2/2024  1:21 PM.  Learner: Patient  Readiness: Acceptance  Method: Explanation  Response: Verbalizes Understanding    Mobility Training, taught by Monika  Donna PT at 5/2/2024  1:21 PM.  Learner: Patient  Readiness: Acceptance  Method: Explanation  Response: Verbalizes Understanding    Education Comments  No comments found.

## 2024-05-03 ENCOUNTER — PATIENT OUTREACH (OUTPATIENT)
Dept: PRIMARY CARE | Facility: CLINIC | Age: 89
End: 2024-05-03
Payer: MEDICARE

## 2024-05-03 NOTE — PROGRESS NOTES
Discharge Facility: [facility]  Discharge Diagnosis: covid  Admission Date: 5/1/24  Discharge Date:  5/2/24    PCP Appointment Date: Declined- changed PCPs  Specialist Appointment Date:   Hospital Encounter and Summary: Linked     Spoke with patient briefly for TCM outreach. Patient reports he changed primary care doctors and is no longer seeing Dr. Zamora. Denies any needs at this time.

## 2024-05-04 LAB
ATRIAL RATE: 0 BPM
PR INTERVAL: 206 MS
Q ONSET: 253 MS
QRS COUNT: 11 BEATS
QRS DURATION: 99 MS
QT INTERVAL: 473 MS
QTC CALCULATION(BAZETT): 507 MS
QTC FREDERICIA: 495 MS
R AXIS: 22 DEGREES
T OFFSET: 490 MS
VENTRICULAR RATE: 69 BPM

## 2024-05-05 LAB
BACTERIA BLD CULT: NORMAL
BACTERIA BLD CULT: NORMAL

## 2024-05-23 ENCOUNTER — TELEPHONE (OUTPATIENT)
Dept: GASTROENTEROLOGY | Facility: CLINIC | Age: 89
End: 2024-05-23
Payer: MEDICARE

## 2024-05-23 DIAGNOSIS — R11.0 NAUSEA: ICD-10-CM

## 2024-05-23 RX ORDER — ONDANSETRON 4 MG/1
4 TABLET, FILM COATED ORAL EVERY 8 HOURS PRN
Qty: 30 TABLET | Refills: 1 | Status: CANCELLED | OUTPATIENT
Start: 2024-05-23

## 2024-05-23 RX ORDER — ONDANSETRON 4 MG/1
4 TABLET, FILM COATED ORAL EVERY 8 HOURS PRN
Qty: 30 TABLET | Refills: 1 | Status: SHIPPED | OUTPATIENT
Start: 2024-05-23 | End: 2024-06-22

## 2024-05-23 NOTE — TELEPHONE ENCOUNTER
Patient's wife stopped in today stating that patient was all out of Zofran - patient needs that medication asap   Patient is requesting refill be sent to preferred pharmacy.

## 2024-05-24 ENCOUNTER — HOSPITAL ENCOUNTER (OUTPATIENT)
Facility: HOSPITAL | Age: 89
Setting detail: OUTPATIENT SURGERY
Discharge: HOME | End: 2024-05-24
Attending: STUDENT IN AN ORGANIZED HEALTH CARE EDUCATION/TRAINING PROGRAM | Admitting: STUDENT IN AN ORGANIZED HEALTH CARE EDUCATION/TRAINING PROGRAM
Payer: MEDICARE

## 2024-05-24 VITALS
HEART RATE: 71 BPM | HEIGHT: 71 IN | TEMPERATURE: 96.4 F | SYSTOLIC BLOOD PRESSURE: 130 MMHG | OXYGEN SATURATION: 98 % | WEIGHT: 196.43 LBS | RESPIRATION RATE: 15 BRPM | BODY MASS INDEX: 27.5 KG/M2 | DIASTOLIC BLOOD PRESSURE: 75 MMHG

## 2024-05-24 DIAGNOSIS — R06.02 SHORTNESS OF BREATH: ICD-10-CM

## 2024-05-24 DIAGNOSIS — R94.39 ABNORMAL NUCLEAR STRESS TEST: ICD-10-CM

## 2024-05-24 DIAGNOSIS — I71.21 ANEURYSM OF ASCENDING AORTA WITHOUT RUPTURE (CMS-HCC): Primary | ICD-10-CM

## 2024-05-24 LAB
BASE EXCESS BLDA CALC-SCNC: 1.1 MMOL/L (ref -2–3)
BASE EXCESS BLDV CALC-SCNC: 1.7 MMOL/L (ref -2–3)
BASE EXCESS BLDV CALC-SCNC: 2.6 MMOL/L (ref -2–3)
BODY TEMPERATURE: 37 DEGREES CELSIUS
HCO3 BLDA-SCNC: 25.1 MMOL/L (ref 22–26)
HCO3 BLDV-SCNC: 26.6 MMOL/L (ref 22–26)
HCO3 BLDV-SCNC: 27.2 MMOL/L (ref 22–26)
INHALED O2 CONCENTRATION: 21 %
OXYHGB MFR BLDA: 94.6 % (ref 94–98)
OXYHGB MFR BLDV: 67.5 % (ref 45–75)
OXYHGB MFR BLDV: 67.5 % (ref 45–75)
PCO2 BLDA: 37 MM HG (ref 38–42)
PCO2 BLDV: 41 MM HG (ref 41–51)
PCO2 BLDV: 42 MM HG (ref 41–51)
PH BLDA: 7.44 PH (ref 7.38–7.42)
PH BLDV: 7.41 PH (ref 7.33–7.43)
PH BLDV: 7.43 PH (ref 7.33–7.43)
PO2 BLDA: 78 MM HG (ref 85–95)
PO2 BLDV: 41 MM HG (ref 35–45)
PO2 BLDV: 43 MM HG (ref 35–45)
SAO2 % BLDA: 97 % (ref 94–100)
SAO2 % BLDV: 69 % (ref 45–75)
SAO2 % BLDV: 69 % (ref 45–75)
SITE OF ARTERIAL PUNCTURE: ABNORMAL

## 2024-05-24 PROCEDURE — 82810 BLOOD GASES O2 SAT ONLY: CPT | Mod: CCI,91

## 2024-05-24 PROCEDURE — 99153 MOD SED SAME PHYS/QHP EA: CPT | Performed by: STUDENT IN AN ORGANIZED HEALTH CARE EDUCATION/TRAINING PROGRAM

## 2024-05-24 PROCEDURE — C1894 INTRO/SHEATH, NON-LASER: HCPCS | Performed by: STUDENT IN AN ORGANIZED HEALTH CARE EDUCATION/TRAINING PROGRAM

## 2024-05-24 PROCEDURE — 2720000007 HC OR 272 NO HCPCS: Performed by: STUDENT IN AN ORGANIZED HEALTH CARE EDUCATION/TRAINING PROGRAM

## 2024-05-24 PROCEDURE — 99152 MOD SED SAME PHYS/QHP 5/>YRS: CPT | Performed by: STUDENT IN AN ORGANIZED HEALTH CARE EDUCATION/TRAINING PROGRAM

## 2024-05-24 PROCEDURE — 93456 R HRT CORONARY ARTERY ANGIO: CPT | Performed by: STUDENT IN AN ORGANIZED HEALTH CARE EDUCATION/TRAINING PROGRAM

## 2024-05-24 PROCEDURE — 93010 ELECTROCARDIOGRAM REPORT: CPT | Performed by: STUDENT IN AN ORGANIZED HEALTH CARE EDUCATION/TRAINING PROGRAM

## 2024-05-24 PROCEDURE — C1769 GUIDE WIRE: HCPCS | Performed by: STUDENT IN AN ORGANIZED HEALTH CARE EDUCATION/TRAINING PROGRAM

## 2024-05-24 PROCEDURE — 2500000004 HC RX 250 GENERAL PHARMACY W/ HCPCS (ALT 636 FOR OP/ED): Performed by: STUDENT IN AN ORGANIZED HEALTH CARE EDUCATION/TRAINING PROGRAM

## 2024-05-24 PROCEDURE — C1887 CATHETER, GUIDING: HCPCS | Performed by: STUDENT IN AN ORGANIZED HEALTH CARE EDUCATION/TRAINING PROGRAM

## 2024-05-24 PROCEDURE — 2500000005 HC RX 250 GENERAL PHARMACY W/O HCPCS: Performed by: STUDENT IN AN ORGANIZED HEALTH CARE EDUCATION/TRAINING PROGRAM

## 2024-05-24 PROCEDURE — C1760 CLOSURE DEV, VASC: HCPCS | Performed by: STUDENT IN AN ORGANIZED HEALTH CARE EDUCATION/TRAINING PROGRAM

## 2024-05-24 PROCEDURE — 7100000010 HC PHASE TWO TIME - EACH INCREMENTAL 1 MINUTE: Performed by: STUDENT IN AN ORGANIZED HEALTH CARE EDUCATION/TRAINING PROGRAM

## 2024-05-24 PROCEDURE — 7100000009 HC PHASE TWO TIME - INITIAL BASE CHARGE: Performed by: STUDENT IN AN ORGANIZED HEALTH CARE EDUCATION/TRAINING PROGRAM

## 2024-05-24 PROCEDURE — 96373 THER/PROPH/DIAG INJ IA: CPT | Performed by: STUDENT IN AN ORGANIZED HEALTH CARE EDUCATION/TRAINING PROGRAM

## 2024-05-24 RX ORDER — SODIUM CHLORIDE 0.9 % (FLUSH) 0.9 %
SYRINGE (ML) INJECTION AS NEEDED
Status: DISCONTINUED | OUTPATIENT
Start: 2024-05-24 | End: 2024-05-24 | Stop reason: HOSPADM

## 2024-05-24 RX ORDER — SODIUM CHLORIDE 9 MG/ML
INJECTION, SOLUTION INTRAVENOUS CONTINUOUS PRN
Status: COMPLETED | OUTPATIENT
Start: 2024-05-24 | End: 2024-05-24

## 2024-05-24 RX ORDER — HEPARIN SODIUM 1000 [USP'U]/ML
INJECTION, SOLUTION INTRAVENOUS; SUBCUTANEOUS AS NEEDED
Status: DISCONTINUED | OUTPATIENT
Start: 2024-05-24 | End: 2024-05-24 | Stop reason: HOSPADM

## 2024-05-24 RX ORDER — LIDOCAINE HYDROCHLORIDE 20 MG/ML
INJECTION, SOLUTION INFILTRATION; PERINEURAL AS NEEDED
Status: DISCONTINUED | OUTPATIENT
Start: 2024-05-24 | End: 2024-05-24 | Stop reason: HOSPADM

## 2024-05-24 RX ORDER — VERAPAMIL HYDROCHLORIDE 2.5 MG/ML
INJECTION, SOLUTION INTRAVENOUS AS NEEDED
Status: DISCONTINUED | OUTPATIENT
Start: 2024-05-24 | End: 2024-05-24 | Stop reason: HOSPADM

## 2024-05-24 RX ORDER — NITROGLYCERIN 40 MG/100ML
INJECTION INTRAVENOUS AS NEEDED
Status: DISCONTINUED | OUTPATIENT
Start: 2024-05-24 | End: 2024-05-24 | Stop reason: HOSPADM

## 2024-05-24 RX ORDER — MIDAZOLAM HYDROCHLORIDE 5 MG/ML
INJECTION, SOLUTION INTRAMUSCULAR; INTRAVENOUS AS NEEDED
Status: DISCONTINUED | OUTPATIENT
Start: 2024-05-24 | End: 2024-05-24 | Stop reason: HOSPADM

## 2024-05-24 RX ORDER — FENTANYL CITRATE 50 UG/ML
INJECTION, SOLUTION INTRAMUSCULAR; INTRAVENOUS AS NEEDED
Status: DISCONTINUED | OUTPATIENT
Start: 2024-05-24 | End: 2024-05-24 | Stop reason: HOSPADM

## 2024-05-24 ASSESSMENT — PAIN - FUNCTIONAL ASSESSMENT: PAIN_FUNCTIONAL_ASSESSMENT: 0-10

## 2024-05-24 ASSESSMENT — COLUMBIA-SUICIDE SEVERITY RATING SCALE - C-SSRS
2. HAVE YOU ACTUALLY HAD ANY THOUGHTS OF KILLING YOURSELF?: NO
6. HAVE YOU EVER DONE ANYTHING, STARTED TO DO ANYTHING, OR PREPARED TO DO ANYTHING TO END YOUR LIFE?: NO
1. IN THE PAST MONTH, HAVE YOU WISHED YOU WERE DEAD OR WISHED YOU COULD GO TO SLEEP AND NOT WAKE UP?: NO

## 2024-05-24 ASSESSMENT — PAIN SCALES - GENERAL
PAINLEVEL_OUTOF10: 0 - NO PAIN
PAINLEVEL_OUTOF10: 0 - NO PAIN

## 2024-05-24 NOTE — Clinical Note
From: Avis Zapata  To: Gladys Carroll  Sent: 6/27/2022 4:17 PM CDT  Subject: Return to work     I returned to work today Mon 6-27 and I am not able to keep up with the amount of reaching and lifting that I am required to do. Is it possible to add some restrictions so I am not expected to do too much too soon?    PA catheter inserted. Measurements taken: pressures.

## 2024-05-24 NOTE — NURSING NOTE
Right radial site dressing and arm board applied. No signs of bleeding or hematoma. Pt ambulated to bathroom with no complaints of pain. Right groin site stable with no signs of bleeding or hematoma.

## 2024-05-24 NOTE — NURSING NOTE
.  Going to pull right femoral venous sheath.    1410: venous sheath removed from right femoral vein, RN holding pressure

## 2024-05-24 NOTE — DISCHARGE INSTRUCTIONS

## 2024-05-24 NOTE — NURSING NOTE
Discharge instructions reviewed with patient. Patient verbalized understanding. IV access removed. Belongings returned. TR band removed with clean dry dressing and splint applied. No bleeding or hematoma observed. No complaints of pain or nausea. Patient taken to lobby in wheelchair.

## 2024-05-28 ENCOUNTER — TELEPHONE (OUTPATIENT)
Dept: CARDIOLOGY | Facility: HOSPITAL | Age: 89
End: 2024-05-28
Payer: MEDICARE

## 2024-05-28 ENCOUNTER — LAB (OUTPATIENT)
Dept: LAB | Facility: LAB | Age: 89
End: 2024-05-28
Payer: MEDICARE

## 2024-05-28 DIAGNOSIS — I35.0 NONRHEUMATIC AORTIC VALVE STENOSIS: Primary | ICD-10-CM

## 2024-05-28 DIAGNOSIS — I08.0 MITRAL REGURGITATION AND AORTIC STENOSIS: Primary | ICD-10-CM

## 2024-05-28 DIAGNOSIS — I35.0 NONRHEUMATIC AORTIC VALVE STENOSIS: ICD-10-CM

## 2024-05-28 PROBLEM — I25.2 HISTORY OF MYOCARDIAL INFARCTION: Status: ACTIVE | Noted: 2024-05-28

## 2024-05-28 LAB
ALBUMIN SERPL BCP-MCNC: 4 G/DL (ref 3.4–5)
ANION GAP SERPL CALC-SCNC: 11 MMOL/L (ref 10–20)
BASE EXCESS BLDMV CALC-SCNC: 2.2 MMOL/L (ref -2–3)
BODY TEMPERATURE: 37 DEGREES CELSIUS
BUN SERPL-MCNC: 15 MG/DL (ref 6–23)
CALCIUM SERPL-MCNC: 9.3 MG/DL (ref 8.6–10.3)
CHLORIDE SERPL-SCNC: 107 MMOL/L (ref 98–107)
CO2 SERPL-SCNC: 27 MMOL/L (ref 21–32)
CREAT SERPL-MCNC: 1.14 MG/DL (ref 0.5–1.3)
EGFRCR SERPLBLD CKD-EPI 2021: 63 ML/MIN/1.73M*2
GLUCOSE SERPL-MCNC: 91 MG/DL (ref 74–99)
HCO3 BLDMV-SCNC: 27.3 MMOL/L (ref 22–26)
INHALED O2 CONCENTRATION: 21 %
OXYHGB MFR BLDMV: 66.3 % (ref 45–75)
PCO2 BLDMV: 43 MM HG (ref 41–51)
PH BLDMV: 7.41 PH (ref 7.33–7.43)
PHOSPHATE SERPL-MCNC: 4.1 MG/DL (ref 2.5–4.9)
PO2 BLDMV: 42 MM HG (ref 35–45)
POTASSIUM SERPL-SCNC: 4.3 MMOL/L (ref 3.5–5.3)
SAO2 % BLDMV: 68 % (ref 45–75)
SODIUM SERPL-SCNC: 141 MMOL/L (ref 136–145)

## 2024-05-28 PROCEDURE — 80069 RENAL FUNCTION PANEL: CPT

## 2024-05-28 PROCEDURE — 36415 COLL VENOUS BLD VENIPUNCTURE: CPT

## 2024-05-29 LAB
ATRIAL RATE: 69 BPM
Q ONSET: 195 MS
QRS COUNT: 12 BEATS
QRS DURATION: 164 MS
QT INTERVAL: 464 MS
QTC CALCULATION(BAZETT): 504 MS
QTC FREDERICIA: 491 MS
R AXIS: -31 DEGREES
T AXIS: 93 DEGREES
T OFFSET: 427 MS
VENTRICULAR RATE: 71 BPM

## 2024-06-03 ENCOUNTER — TELEMEDICINE (OUTPATIENT)
Dept: CARDIOLOGY | Facility: HOSPITAL | Age: 89
End: 2024-06-03
Payer: MEDICARE

## 2024-06-03 ENCOUNTER — TELEMEDICINE (OUTPATIENT)
Dept: CARDIAC SURGERY | Facility: HOSPITAL | Age: 89
End: 2024-06-03
Payer: MEDICARE

## 2024-06-03 DIAGNOSIS — I35.0 NONRHEUMATIC AORTIC VALVE STENOSIS: ICD-10-CM

## 2024-06-03 DIAGNOSIS — I34.0 NONRHEUMATIC MITRAL VALVE REGURGITATION: ICD-10-CM

## 2024-06-03 DIAGNOSIS — I08.0 MITRAL REGURGITATION AND AORTIC STENOSIS: ICD-10-CM

## 2024-06-03 PROCEDURE — 1157F ADVNC CARE PLAN IN RCRD: CPT | Performed by: THORACIC SURGERY (CARDIOTHORACIC VASCULAR SURGERY)

## 2024-06-03 PROCEDURE — 99204 OFFICE O/P NEW MOD 45 MIN: CPT | Performed by: INTERNAL MEDICINE

## 2024-06-03 PROCEDURE — 1157F ADVNC CARE PLAN IN RCRD: CPT | Performed by: INTERNAL MEDICINE

## 2024-06-03 PROCEDURE — 99205 OFFICE O/P NEW HI 60 MIN: CPT | Performed by: THORACIC SURGERY (CARDIOTHORACIC VASCULAR SURGERY)

## 2024-06-05 NOTE — PROGRESS NOTES
Cardio: Jany       HPI: 88-year-old male with past medical history of hypertension, TIA, A-fib, MI, PPM CAD s/p PCI, prostate cancer was referred to us for aortic stenosis and mitral regurgitation.    Patient endorses exertional shortness of breath and fatigue all the time.  Activities such as showering, putting shoes on makes him short of breath and fatigue.  Patient also endorses bilateral lower extremity edema.  Denies any loss of consciousness or chest pain.        ROS:    Constitutional: fatigue  Cardiovascular: +dyspnea on exertion, no chest pain  Respiratory: no chronic cough, not coughing up sputum, no wheezing that is consistent with asthma  Gastrointestinal: no acute bowel complaints  Musculoskeletal: no acute arthralgias, no acute myalgias, no acute joint swelling  Skin: no skin rashes, no change in skin color and pigmentation, no skin lesions and no skin lumps.   Neurological: no headaches, no dizziness, no tingling, no fainting and no limb weakness.   Psychiatric:  no suicidal ideation, no confusion, no personality change and no emotional problems.   Hematologic/Lymphatic: no bleeding issues.   All other systems have been reviewed and are negative for complaint.       TAVR Workup:   - NYHA: Class II-III  - Frailty: 1/5  - EKG: Normal sinus left bundle branch block  - TTE: 4/18/2024 EF 50 to 55% mean gradient across aortic valve 22.5 mmHg peak velocity 3 m/s aortic valve area 1.06 cm².  moderate mitral regurgitation, moderate AI  - CT TAVR: Pending  - [facility]: 5/24/2024 patent RCA stent, Nonobstructive CAD rest of the vessels  - dental clearance: Last appointment 1 year ago,  will need appointment    - STS  Procedure Type: Isolated AVR  PERIOPERATIVE OUTCOME ESTIMATE %  Operative Mortality 3.69%  Morbidity & Mortality 13.9%  Stroke 1.88%  Renal Failure 2.36%  Reoperation 4.47%  Prolonged Ventilation 5.79%  Deep Sternal Wound Infection 0.039%  Long Hospital Stay (>14 days) 6.45%  Short Hospital Stay (<6  "days)* 26.9%    Physical Exam:  Virtual visit          5/1/2024     3:44 PM 5/1/2024     7:00 PM 5/1/2024     8:41 PM 5/2/2024    12:26 AM 5/2/2024     1:00 AM 5/2/2024     5:00 AM 5/24/2024     7:55 AM   Vitals   Systolic 131 137 137 105 105 134 130   Diastolic 72 63 63 64 64 71 75   Heart Rate 77 78 78 80 80 67 71   Temp 36.4 °C (97.5 °F) 36.4 °C (97.5 °F) 36.4 °C (97.5 °F) 35.9 °C (96.6 °F) 35.9 °C (96.6 °F) 35.6 °C (96.1 °F) 35.8 °C (96.4 °F)   Resp 16 18 18 18 18 18 15   Height (in)       1.803 m (5' 11\")   Weight (lb)       196.43   BMI       27.4 kg/m2   BSA (m2)       2.11 m2        Current Outpatient Medications   Medication Instructions    ashwagandha root extract 500 mg capsule 1 capsule, oral, Daily    aspirin 81 mg, oral, Daily, As needed    b complex vitamins capsule 1 capsule, oral, Daily    calcium carbonate 600 mg calcium (1,500 mg) tablet 1 tablet, oral, 2 times daily (morning and late afternoon)    cholecalciferol (VITAMIN D-3) 25 mcg, oral, Daily    clopidogrel (PLAVIX) 75 mg, oral, Daily    coenzyme Q-10 100 mg, oral, Nightly    docusate sodium (COLACE) 100 mg, oral, 2 times daily    hydrocortisone (Anusol-HC) 2.5 % rectal cream 1 Application, rectal, 2 times daily PRN    magnesium oxide (MagOx) 400 mg (241.3 mg magnesium) tablet 1 tablet, oral, Daily    multivitamin tablet 1 tablet, oral, Daily RT    nitroglycerin (NITROSTAT) 0.4 mg, sublingual, Every 5 min PRN    ondansetron (ZOFRAN) 4 mg, oral, Every 8 hours PRN    rosuvastatin (CRESTOR) 20 mg, oral, 4 times weekly    torsemide (Demadex) 20 mg tablet 1 TAB Daily    turmeric-turmeric root extract 450-50 mg capsule 1 capsule, oral, Daily    vitamin E acid succinate (vitamin E succinate) 268 mg (400 unit) tablet 1 capsule, oral, Daily        Impression:   Patient is a 88-year-old male with past medical history of hypertension, CAD s/p PCI, TIA, A-fib, PPM who is symptomatic and has at least moderate aortic stenosis and moderate aortic " regurgitation on echocardiogram.  Patient also has moderate mitral regurgitation.    -At this time, given his significant symptoms and the combined stenosis and regurgitation of the aortic valve, we would further evaluate him for aortic valve replacement, likely transcatheter approach if he is considered a good candidate.    -We will do a CT TAVR to better evaluate the aortic valve and access strategy.  Will also calculate the AVCS.     We discussed all the risks associated with the procedure, including but not limited to stroke, MI, pericardial tamponade, vascular complications, infection and death were discussed with the patient. The risk of needing a permament pacemaker was also discussed in detail. The patient verbalized understanding and decided to proceed with the procedure.     We will discuss this patient's case at our Valve Team meeting with representatives from Structural Heart and Cardiac Surgery. Our nurse navigators will contact patient with further diagnostic needs and formal plan.

## 2024-06-05 NOTE — PROGRESS NOTES
Cardio: Jany       HPI: 88-year-old male with past medical history of hypertension, TIA, A-fib, MI, PPM CAD s/p PCI, prostate cancer was referred to us for aortic stenosis and mitral regurgitation.  Patient endorses exertional shortness of breath and fatigue all the time.  Activities such as showering, putting shoes on makes him short of breath and fatigue.  Patient also endorses bilateral lower extremity edema.  Denies any loss of consciousness or chest pain.        ROS:    Constitutional: fatigue  Cardiovascular: +dyspnea on exertion, no chest pain  Respiratory: no chronic cough, not coughing up sputum, no wheezing that is consistent with asthma  Gastrointestinal: no acute bowel complaints  Musculoskeletal: no acute arthralgias, no acute myalgias, no acute joint swelling  Skin: no skin rashes, no change in skin color and pigmentation, no skin lesions and no skin lumps.   Neurological: no headaches, no dizziness, no tingling, no fainting and no limb weakness.   Psychiatric:  no suicidal ideation, no confusion, no personality change and no emotional problems.   Hematologic/Lymphatic: no bleeding issues.   All other systems have been reviewed and are negative for complaint.       TAVR Workup:   - NYHA: Class II-III  - Frailty: 1/5  - EKG: Normal sinus left bundle branch block  - TTE: 4/18/2024 EF 50 to 55% mean gradient across aortic valve 22.5 mmHg peak velocity 3 m/s aortic valve area 1.06 cm².  moderate mitral regurgitation, moderate AI  - CT TAVR: Pending  - [facility]: 5/24/2024 patent RCA stent, Nonobstructive CAD rest of the vessels  - dental clearance: Last appointment 1 year ago,  will need appointment    - STS  Procedure Type: Isolated AVR  PERIOPERATIVE OUTCOME ESTIMATE %  Operative Mortality 3.69%  Morbidity & Mortality 13.9%  Stroke 1.88%  Renal Failure 2.36%  Reoperation 4.47%  Prolonged Ventilation 5.79%  Deep Sternal Wound Infection 0.039%  Long Hospital Stay (>14 days) 6.45%  Short Hospital Stay (<6  "days)* 26.9%    Physical Exam:  Virtual visit          5/1/2024     3:44 PM 5/1/2024     7:00 PM 5/1/2024     8:41 PM 5/2/2024    12:26 AM 5/2/2024     1:00 AM 5/2/2024     5:00 AM 5/24/2024     7:55 AM   Vitals   Systolic 131 137 137 105 105 134 130   Diastolic 72 63 63 64 64 71 75   Heart Rate 77 78 78 80 80 67 71   Temp 36.4 °C (97.5 °F) 36.4 °C (97.5 °F) 36.4 °C (97.5 °F) 35.9 °C (96.6 °F) 35.9 °C (96.6 °F) 35.6 °C (96.1 °F) 35.8 °C (96.4 °F)   Resp 16 18 18 18 18 18 15   Height (in)       1.803 m (5' 11\")   Weight (lb)       196.43   BMI       27.4 kg/m2   BSA (m2)       2.11 m2        Current Outpatient Medications   Medication Instructions    ashwagandha root extract 500 mg capsule 1 capsule, oral, Daily    aspirin 81 mg, oral, Daily, As needed    b complex vitamins capsule 1 capsule, oral, Daily    calcium carbonate 600 mg calcium (1,500 mg) tablet 1 tablet, oral, 2 times daily (morning and late afternoon)    cholecalciferol (VITAMIN D-3) 25 mcg, oral, Daily    clopidogrel (PLAVIX) 75 mg, oral, Daily    coenzyme Q-10 100 mg, oral, Nightly    docusate sodium (COLACE) 100 mg, oral, 2 times daily    hydrocortisone (Anusol-HC) 2.5 % rectal cream 1 Application, rectal, 2 times daily PRN    magnesium oxide (MagOx) 400 mg (241.3 mg magnesium) tablet 1 tablet, oral, Daily    multivitamin tablet 1 tablet, oral, Daily RT    nitroglycerin (NITROSTAT) 0.4 mg, sublingual, Every 5 min PRN    ondansetron (ZOFRAN) 4 mg, oral, Every 8 hours PRN    rosuvastatin (CRESTOR) 20 mg, oral, 4 times weekly    torsemide (Demadex) 20 mg tablet 1 TAB Daily    turmeric-turmeric root extract 450-50 mg capsule 1 capsule, oral, Daily    vitamin E acid succinate (vitamin E succinate) 268 mg (400 unit) tablet 1 capsule, oral, Daily        Impression:   Patient is a 88-year-old male with past medical history of hypertension, CAD s/p PCI, TIA, A-fib, PPM who is symptomatic and has at least moderate aortic stenosis and moderate aortic " regurgitation on echocardiogram.  Patient also has moderate mitral regurgitation.    -At this time, given his significant symptoms and the combined stenosis and regurgitation of the aortic valve, we would further evaluate him for aortic valve replacement, likely transcatheter approach if he is considered a good candidate.    -We will do a CT TAVR to better evaluate the aortic valve and access strategy.  Will also calculate the AVCS.     We discussed all the risks associated with the procedure, including but not limited to stroke, MI, pericardial tamponade, vascular complications, infection and death were discussed with the patient. The risk of needing a permament pacemaker was also discussed in detail. The patient verbalized understanding and decided to proceed with the procedure.     We will discuss this patient's case at our Valve Team meeting with representatives from Structural Heart and Cardiac Surgery. Our nurse navigators will contact patient with further diagnostic needs and formal plan.

## 2024-06-06 DIAGNOSIS — I35.0 NONRHEUMATIC AORTIC VALVE STENOSIS: Primary | ICD-10-CM

## 2024-06-12 ENCOUNTER — TELEPHONE (OUTPATIENT)
Dept: CARDIOLOGY | Facility: CLINIC | Age: 89
End: 2024-06-12
Payer: MEDICARE

## 2024-06-12 NOTE — TELEPHONE ENCOUNTER
Patient called in stating he is urinating blood after heart cath on 5/24.  He has seen urology and there is no infection but when he urinated there he urinated a clot.

## 2024-06-13 NOTE — TELEPHONE ENCOUNTER
"Jessica came to office to speak to nurse regarding pt's current issues with urinating blood. Jessica states pt with issues since having heart cath of blood in urine. Jessica states they were informed after heart cath to expect blood in the urine post cath. Jessica states initially pt had a large amount of blood in urine the first week with what she describes \"clumping when coming out\". Jessica states there is some improvement this week, pt not having red every time he urinates. Jessica states he is still having \"clumping and sometimes stream is red with yellow\".     After further discussion with Jessica, she noted that there has been improvement since last week but still intermittent blood/clumping with urination. Per Jessica, pt denies any burning or urinary frequency. Jessica states urine color is clear and not cloudy.    Cath done 5/24/24.  Pt taking Plavix and BASA. Instructed Jessica for pt to continue ASA and Plavix as ordered for now and will discuss with Dr. Carmichael and call her with plan of care.    Pt recently saw oncology at [facility] for recurrent prostate cancer. Pt also has urologist through [facility].    Please advise. Defer to Urology??    "

## 2024-06-13 NOTE — TELEPHONE ENCOUNTER
Pt's wife is here, PT is still peeing blood, she wants to know how long this is supposed to be going on.

## 2024-06-14 NOTE — TELEPHONE ENCOUNTER
Jessica walked into office to discuss message from yesterday. I spoke with Jessica and informed her that I attempted to call her this morning and I also left a vm for Giuseppe. Jessica stated she was at the hospital and wanted to see if I spoke with Dr. Carmichael. Informed Jessica that Dr. Carmichael reviewed message and per Dr. Carmichael, pt should follow up with a urologist or even PCP regarding urine. Informed Jessica the current issues with pt's urine are not related to cardiac cath and there may be issues that need to be addressed by urology and also he can see PCP who can order further testing. Jessica verbalizes understanding of all information provided. Once again, I provided Jessica with my business card to reach me in the future.

## 2024-06-14 NOTE — TELEPHONE ENCOUNTER
Pt's wife is here, had an appt next door, missed the phone call from our office, so , she came here.

## 2024-06-20 ENCOUNTER — APPOINTMENT (OUTPATIENT)
Dept: CARDIOLOGY | Facility: CLINIC | Age: 89
End: 2024-06-20
Payer: MEDICARE

## 2024-06-21 ENCOUNTER — HOSPITAL ENCOUNTER (OUTPATIENT)
Dept: RADIOLOGY | Facility: HOSPITAL | Age: 89
Discharge: HOME | End: 2024-06-21
Payer: MEDICARE

## 2024-06-21 DIAGNOSIS — I35.0 NONRHEUMATIC AORTIC VALVE STENOSIS: ICD-10-CM

## 2024-06-21 PROCEDURE — 2550000001 HC RX 255 CONTRASTS: Performed by: INTERNAL MEDICINE

## 2024-06-21 PROCEDURE — 74174 CTA ABD&PLVS W/CONTRAST: CPT

## 2024-06-25 ENCOUNTER — TELEPHONE (OUTPATIENT)
Dept: CARDIOLOGY | Facility: HOSPITAL | Age: 89
End: 2024-06-25
Payer: MEDICARE

## 2024-06-25 DIAGNOSIS — I35.0 NONRHEUMATIC AORTIC VALVE STENOSIS: Primary | ICD-10-CM

## 2024-06-25 NOTE — TELEPHONE ENCOUNTER
[facility]Q/WALK Questionnaire      1  Heart failure affects different people in different ways. Some feel shortness of breath while others feel fatigue. Please indicate how much you are limited by heart failure (shortness of breath or fatigue) in your ability to do the following activities over the past 2 weeks.     A.) Showering/bathing  3. Moderately  B.) Walking 1 block on level ground 1. Extremely  C.) Hurrying or Jogging   6. Limited for other reastons    2.  Over the past 2 weeks, how many times did you have swelling in your feet, ankles or legs when you woke up in the morning? 1. Every morning    3.  Over the past 2 weeks, on average, how many times has fatigue limited your ability to do what you wanted? 2. Several times a day    4.  Over the past 2 weeks, on average, how many times has shortness of breath limited your ability to do what you wanted? 1. All the time    5.  Over the past 2 weeks, on average, how many times have you been forced to sleep sitting up in a chair or with at least 3 pillows to prop you up because of shortness of breath? Never    6. Over the past 2 weeks, how much has your heart failure limited your enjoyment of life? It has moderately limited my enjoyment of life    7. If you had to spend the rest of your life with your heart failure the way it is right now, how would you feel about this? 1. Not at all     8. How much does your heart failure affect your lifestyle? Please indicate how your heart failure may have limited yourparticipation in the following activities over the past 2 weeks    A.)  Hobbies, recreational activities  3. Moderately limited    B.) Working or doing household chores  2. Limited quite a bit    C.) Visiting family or friends out of your home  3. Moderately limited      5 meter walk test 12.24 seconds

## 2024-07-05 ENCOUNTER — LAB (OUTPATIENT)
Dept: LAB | Facility: LAB | Age: 89
End: 2024-07-05
Payer: MEDICARE

## 2024-07-05 DIAGNOSIS — I35.0 NONRHEUMATIC AORTIC VALVE STENOSIS: ICD-10-CM

## 2024-07-05 DIAGNOSIS — I48.91 ATRIAL FIBRILLATION, UNSPECIFIED TYPE (MULTI): ICD-10-CM

## 2024-07-05 LAB
ALBUMIN SERPL BCP-MCNC: 3.8 G/DL (ref 3.4–5)
ANION GAP SERPL CALC-SCNC: 11 MMOL/L (ref 10–20)
BUN SERPL-MCNC: 12 MG/DL (ref 6–23)
CALCIUM SERPL-MCNC: 9.1 MG/DL (ref 8.6–10.3)
CHLORIDE SERPL-SCNC: 106 MMOL/L (ref 98–107)
CO2 SERPL-SCNC: 28 MMOL/L (ref 21–32)
CREAT SERPL-MCNC: 0.94 MG/DL (ref 0.5–1.3)
EGFRCR SERPLBLD CKD-EPI 2021: 79 ML/MIN/1.73M*2
ERYTHROCYTE [DISTWIDTH] IN BLOOD BY AUTOMATED COUNT: 14.1 % (ref 11.5–14.5)
GLUCOSE SERPL-MCNC: 94 MG/DL (ref 74–99)
HCT VFR BLD AUTO: 45.1 % (ref 41–52)
HGB BLD-MCNC: 15.1 G/DL (ref 13.5–17.5)
INR PPP: 1 (ref 0.9–1.1)
MCH RBC QN AUTO: 31.9 PG (ref 26–34)
MCHC RBC AUTO-ENTMCNC: 33.5 G/DL (ref 32–36)
MCV RBC AUTO: 95 FL (ref 80–100)
NRBC BLD-RTO: 0 /100 WBCS (ref 0–0)
PHOSPHATE SERPL-MCNC: 4.3 MG/DL (ref 2.5–4.9)
PLATELET # BLD AUTO: 125 X10*3/UL (ref 150–450)
POTASSIUM SERPL-SCNC: 4.5 MMOL/L (ref 3.5–5.3)
PROTHROMBIN TIME: 10.8 SECONDS (ref 9.8–12.8)
RBC # BLD AUTO: 4.74 X10*6/UL (ref 4.5–5.9)
SODIUM SERPL-SCNC: 140 MMOL/L (ref 136–145)
WBC # BLD AUTO: 3.4 X10*3/UL (ref 4.4–11.3)

## 2024-07-05 PROCEDURE — 85610 PROTHROMBIN TIME: CPT

## 2024-07-05 PROCEDURE — 36415 COLL VENOUS BLD VENIPUNCTURE: CPT

## 2024-07-05 PROCEDURE — 80069 RENAL FUNCTION PANEL: CPT

## 2024-07-05 PROCEDURE — 85027 COMPLETE CBC AUTOMATED: CPT

## 2024-07-08 PROBLEM — Z95.2 S/P TAVR (TRANSCATHETER AORTIC VALVE REPLACEMENT): Status: ACTIVE | Noted: 2024-07-08

## 2024-07-08 NOTE — DISCHARGE INSTRUCTIONS
####  POST VALVE PROCEDURE DISCHARGE INSTRUCTIONS   ####    - Please weigh yourself daily (first thing in the morning) and record reading  - Please take and record your blood pressure 2 times a day (at least 60-90 mins AFTER you take your meds)  PLEASE HAVE THESE READINGS AVAILABLE DURING YOUR FOLLOW-UP APPOINTMENTS!!    - NO DRIVING OR LIFTING/PULLING/PUSHING GREATER THAN 10 POUNDS FOR 1 WEEK FROM YOUR PROCEDURE DATE.    - A lab requisition has been provided for you to draw a CBC, BMP, and PT/INR.  Please take this rec to your local lab to have your labs drawn between 4-7 days post discharge.     - You have been given the order requisition for the 1 month ECHO at the time of your discharge.  Please call and schedule this ECHO at your LOCAL center (no sooner than 23 days after your procedure date).    - You will have 3 appointments that are vital to your post procedure care, these will be scheduled for you IF YOU NEED TO CHANGE YOUR APPOINTMENT TIME/DATE, PLEASE CALL [phone]   - Please follow up with your PCP within 7-14 days of discharge  - You will follow up with your primary cardiologist in 6-10 weeks    **** No elective dental procedures or cleanings for 3 months post procedure - You will need dental prophylaxis (oral antibiotic) prior to dental work/cleanings for life *****    Please call the STRUCTURAL HEART TEAM LINE if you have any questions or concerns - [phone] (M-F 9am-4pm)  On-Call Cardiology Fellow: [phone] (ONLY for urgent issues on nights/weekends/holidays)      ****   CALL PROVIDER IF:   ****  - Breathing faster than normal.   - Breathing harder than normal or having retractions.   - Fever of 100.4 F (38 C) or higher.   - Chills.   - Drinking less than normal.   - Urinating less than normal, over 1 day.   - Acting very sleepy and difficult to awaken.   - Vomiting (throwing up) and not able to eat or drink for 12 hours.   - 3 or more loose, watery bowel movements in 24 hours  (diarrhea).    -Any new concerning symptoms.    - If you develop difficulty breathing, rash, hives, severe nausea, vomiting, light-headedness or any signs of infection, immediately contact your doctor and go to the nearest emergency room.      #####   MISC. HOME-GOING INFO   #####  - DO NOT drink any alcoholic drinks or take any non-prescriptive medications that contain alcohol for the first 24 hours.   - DO NOT make any important decisions for the first 24 hours.      ACTIVITY:  - You are advised to go directly home from the hospital.   - DO NOT lift anything heavier than 10 pounds for one week, this allows for proper healing of the groin.   - No excessive exercise or treadmill use for one week. You may walk and do stairs, slowly.   - No sexual activities for 24 hours after you arrive home.      WOUND CARE:  - If slight bleeding should occur at site, lie down and have someone apply firm pressure just above the puncture site for 5 minutes.  If it continues or is profuse, call 911. Always notify your doctor if bleeding occurs.   - Keep site clean and dry. Let air dry or you may use a simple bandaid.   - Gently cleanse the puncture site in your groin with soap and water only.   - You may experience some tenderness, bruising or minimal inflammation.  If you have any concerns, you may contact the Cath Lab or if any of these symptoms become excessive, contact your cardiologist or go to the emergency room.   - No tub baths, soaking, or swimming for one week.   - May shower the next day after your procedure.      DIET:  - You may resume your normal diet. However, be mindful of your sodium intake.  Ideally you should try to limit your daily intake of sodium to 2-3g a day      HEART FAILURE SPECIFIC INSTRUCTIONS:   - CALL 911 IF YOU HAVE ANY OF THE SIGNS AND SYMPTOMS OF HEART FAILURE:   1. Chest pain   2. Significant Shortness of breath   3. Fainting.   - Notify your physician immediately if you have shortness of breath;  weight gain of 3 lbs. or more; fatigue and loss of energy; swelling of lower extremities or abdomen; dizziness or fainting; change of appetite; and frequent coughing.   - Daily weight on the same scale, same time after voiding and before eating.   - Maintain daily weight log.

## 2024-07-09 ENCOUNTER — APPOINTMENT (OUTPATIENT)
Dept: CARDIOLOGY | Facility: HOSPITAL | Age: 89
DRG: 267 | End: 2024-07-09
Payer: MEDICARE

## 2024-07-09 ENCOUNTER — APPOINTMENT (OUTPATIENT)
Dept: RADIOLOGY | Facility: HOSPITAL | Age: 89
DRG: 267 | End: 2024-07-09
Payer: MEDICARE

## 2024-07-09 ENCOUNTER — HOSPITAL ENCOUNTER (INPATIENT)
Facility: HOSPITAL | Age: 89
LOS: 1 days | Discharge: HOME | DRG: 267 | End: 2024-07-10
Attending: INTERNAL MEDICINE | Admitting: INTERNAL MEDICINE
Payer: MEDICARE

## 2024-07-09 DIAGNOSIS — I48.91 ATRIAL FIBRILLATION WITH SLOW VENTRICULAR RESPONSE (MULTI): ICD-10-CM

## 2024-07-09 DIAGNOSIS — Z95.0 PACEMAKER: ICD-10-CM

## 2024-07-09 DIAGNOSIS — I35.9 NONRHEUMATIC AORTIC VALVE DISORDER, UNSPECIFIED: ICD-10-CM

## 2024-07-09 DIAGNOSIS — I49.5 SICK SINUS SYNDROME (MULTI): ICD-10-CM

## 2024-07-09 DIAGNOSIS — I35.0 NONRHEUMATIC AORTIC VALVE STENOSIS: Primary | ICD-10-CM

## 2024-07-09 DIAGNOSIS — Z95.2 S/P TAVR (TRANSCATHETER AORTIC VALVE REPLACEMENT): ICD-10-CM

## 2024-07-09 LAB
ABO GROUP (TYPE) IN BLOOD: NORMAL
ACT BLD: 350 SEC (ref 83–199)
ANION GAP SERPL CALC-SCNC: 11 MMOL/L (ref 10–20)
ANTIBODY SCREEN: NORMAL
AORTIC VALVE MEAN GRADIENT: 23 MMHG
AORTIC VALVE PEAK VELOCITY: 3.1 M/S
ATRIAL RATE: 61 BPM
AV PEAK GRADIENT: 38.4 MMHG
AVA (PEAK VEL): 1.18 CM2
AVA (VTI): 1.21 CM2
BASOPHILS # BLD AUTO: 0.03 X10*3/UL (ref 0–0.1)
BASOPHILS NFR BLD AUTO: 0.9 %
BODY SURFACE AREA: 2.13 M2
BUN SERPL-MCNC: 12 MG/DL (ref 6–23)
CALCIUM SERPL-MCNC: 8.5 MG/DL (ref 8.6–10.6)
CHLORIDE SERPL-SCNC: 105 MMOL/L (ref 98–107)
CO2 SERPL-SCNC: 28 MMOL/L (ref 21–32)
CREAT SERPL-MCNC: 0.98 MG/DL (ref 0.5–1.3)
EGFRCR SERPLBLD CKD-EPI 2021: 75 ML/MIN/1.73M*2
EJECTION FRACTION APICAL 4 CHAMBER: 57.9
EJECTION FRACTION: 60 %
EOSINOPHIL # BLD AUTO: 0.1 X10*3/UL (ref 0–0.4)
EOSINOPHIL NFR BLD AUTO: 2.9 %
ERYTHROCYTE [DISTWIDTH] IN BLOOD BY AUTOMATED COUNT: 13.6 % (ref 11.5–14.5)
GLUCOSE BLD MANUAL STRIP-MCNC: 105 MG/DL (ref 74–99)
GLUCOSE SERPL-MCNC: 101 MG/DL (ref 74–99)
HCT VFR BLD AUTO: 35.8 % (ref 41–52)
HGB BLD-MCNC: 12.2 G/DL (ref 13.5–17.5)
IMM GRANULOCYTES # BLD AUTO: 0.01 X10*3/UL (ref 0–0.5)
IMM GRANULOCYTES NFR BLD AUTO: 0.3 % (ref 0–0.9)
LEFT VENTRICULAR OUTFLOW TRACT DIAMETER: 2.27 CM
LYMPHOCYTES # BLD AUTO: 1 X10*3/UL (ref 0.8–3)
LYMPHOCYTES NFR BLD AUTO: 29.3 %
MAGNESIUM SERPL-MCNC: 1.94 MG/DL (ref 1.6–2.4)
MCH RBC QN AUTO: 30.5 PG (ref 26–34)
MCHC RBC AUTO-ENTMCNC: 34.1 G/DL (ref 32–36)
MCV RBC AUTO: 90 FL (ref 80–100)
MONOCYTES # BLD AUTO: 0.22 X10*3/UL (ref 0.05–0.8)
MONOCYTES NFR BLD AUTO: 6.5 %
NEUTROPHILS # BLD AUTO: 2.05 X10*3/UL (ref 1.6–5.5)
NEUTROPHILS NFR BLD AUTO: 60.1 %
NRBC BLD-RTO: 0 /100 WBCS (ref 0–0)
PLATELET # BLD AUTO: 147 X10*3/UL (ref 150–450)
POTASSIUM SERPL-SCNC: 3.8 MMOL/L (ref 3.5–5.3)
Q ONSET: 197 MS
QRS COUNT: 10 BEATS
QRS DURATION: 170 MS
QT INTERVAL: 482 MS
QTC CALCULATION(BAZETT): 482 MS
QTC FREDERICIA: 482 MS
R AXIS: -34 DEGREES
RBC # BLD AUTO: 4 X10*6/UL (ref 4.5–5.9)
RH FACTOR (ANTIGEN D): NORMAL
RIGHT VENTRICLE PEAK SYSTOLIC PRESSURE: 40.9 MMHG
SODIUM SERPL-SCNC: 140 MMOL/L (ref 136–145)
T AXIS: 92 DEGREES
T OFFSET: 438 MS
VENTRICULAR RATE: 60 BPM
WBC # BLD AUTO: 3.4 X10*3/UL (ref 4.4–11.3)

## 2024-07-09 PROCEDURE — 82947 ASSAY GLUCOSE BLOOD QUANT: CPT

## 2024-07-09 PROCEDURE — 86901 BLOOD TYPING SEROLOGIC RH(D): CPT | Performed by: NURSE PRACTITIONER

## 2024-07-09 PROCEDURE — 36415 COLL VENOUS BLD VENIPUNCTURE: CPT | Performed by: NURSE PRACTITIONER

## 2024-07-09 PROCEDURE — 93286 PERI-PX EVAL PM/LDLS PM IP: CPT

## 2024-07-09 PROCEDURE — G0269 OCCLUSIVE DEVICE IN VEIN ART: HCPCS | Mod: TC,59 | Performed by: INTERNAL MEDICINE

## 2024-07-09 PROCEDURE — 1200000002 HC GENERAL ROOM WITH TELEMETRY DAILY

## 2024-07-09 PROCEDURE — 99152 MOD SED SAME PHYS/QHP 5/>YRS: CPT | Performed by: INTERNAL MEDICINE

## 2024-07-09 PROCEDURE — C1889 IMPLANT/INSERT DEVICE, NOC: HCPCS | Performed by: INTERNAL MEDICINE

## 2024-07-09 PROCEDURE — 2500000004 HC RX 250 GENERAL PHARMACY W/ HCPCS (ALT 636 FOR OP/ED): Performed by: INTERNAL MEDICINE

## 2024-07-09 PROCEDURE — 80048 BASIC METABOLIC PNL TOTAL CA: CPT | Performed by: NURSE PRACTITIONER

## 2024-07-09 PROCEDURE — 93325 DOPPLER ECHO COLOR FLOW MAPG: CPT

## 2024-07-09 PROCEDURE — 93005 ELECTROCARDIOGRAM TRACING: CPT

## 2024-07-09 PROCEDURE — 71045 X-RAY EXAM CHEST 1 VIEW: CPT

## 2024-07-09 PROCEDURE — C1760 CLOSURE DEV, VASC: HCPCS | Performed by: INTERNAL MEDICINE

## 2024-07-09 PROCEDURE — 2500000004 HC RX 250 GENERAL PHARMACY W/ HCPCS (ALT 636 FOR OP/ED): Performed by: NURSE PRACTITIONER

## 2024-07-09 PROCEDURE — 02RF38Z REPLACEMENT OF AORTIC VALVE WITH ZOOPLASTIC TISSUE, PERCUTANEOUS APPROACH: ICD-10-PCS | Performed by: INTERNAL MEDICINE

## 2024-07-09 PROCEDURE — 83735 ASSAY OF MAGNESIUM: CPT | Performed by: NURSE PRACTITIONER

## 2024-07-09 PROCEDURE — 74018 RADEX ABDOMEN 1 VIEW: CPT | Performed by: RADIOLOGY

## 2024-07-09 PROCEDURE — C1894 INTRO/SHEATH, NON-LASER: HCPCS | Performed by: INTERNAL MEDICINE

## 2024-07-09 PROCEDURE — 85347 COAGULATION TIME ACTIVATED: CPT | Performed by: INTERNAL MEDICINE

## 2024-07-09 PROCEDURE — 93308 TTE F-UP OR LMTD: CPT | Performed by: INTERNAL MEDICINE

## 2024-07-09 PROCEDURE — 2780000003 HC OR 278 NO HCPCS: Performed by: INTERNAL MEDICINE

## 2024-07-09 PROCEDURE — 2720000007 HC OR 272 NO HCPCS: Performed by: INTERNAL MEDICINE

## 2024-07-09 PROCEDURE — 93325 DOPPLER ECHO COLOR FLOW MAPG: CPT | Performed by: INTERNAL MEDICINE

## 2024-07-09 PROCEDURE — 93010 ELECTROCARDIOGRAM REPORT: CPT | Performed by: INTERNAL MEDICINE

## 2024-07-09 PROCEDURE — 85025 COMPLETE CBC W/AUTO DIFF WBC: CPT | Performed by: NURSE PRACTITIONER

## 2024-07-09 PROCEDURE — 86900 BLOOD TYPING SEROLOGIC ABO: CPT | Performed by: NURSE PRACTITIONER

## 2024-07-09 PROCEDURE — C1756 CATH, PACING, TRANSESOPH: HCPCS | Performed by: INTERNAL MEDICINE

## 2024-07-09 PROCEDURE — 2500000001 HC RX 250 WO HCPCS SELF ADMINISTERED DRUGS (ALT 637 FOR MEDICARE OP): Performed by: NURSE PRACTITIONER

## 2024-07-09 PROCEDURE — 93321 DOPPLER ECHO F-UP/LMTD STD: CPT | Performed by: INTERNAL MEDICINE

## 2024-07-09 PROCEDURE — 99153 MOD SED SAME PHYS/QHP EA: CPT | Performed by: INTERNAL MEDICINE

## 2024-07-09 PROCEDURE — 2500000005 HC RX 250 GENERAL PHARMACY W/O HCPCS: Performed by: INTERNAL MEDICINE

## 2024-07-09 PROCEDURE — C1769 GUIDE WIRE: HCPCS | Performed by: INTERNAL MEDICINE

## 2024-07-09 PROCEDURE — 33361 REPLACE AORTIC VALVE PERQ: CPT | Performed by: INTERNAL MEDICINE

## 2024-07-09 PROCEDURE — 93286 PERI-PX EVAL PM/LDLS PM IP: CPT | Performed by: INTERNAL MEDICINE

## 2024-07-09 PROCEDURE — 85347 COAGULATION TIME ACTIVATED: CPT

## 2024-07-09 PROCEDURE — 33361 REPLACE AORTIC VALVE PERQ: CPT | Performed by: THORACIC SURGERY (CARDIOTHORACIC VASCULAR SURGERY)

## 2024-07-09 PROCEDURE — 2550000001 HC RX 255 CONTRASTS: Performed by: INTERNAL MEDICINE

## 2024-07-09 DEVICE — EDWARDS SAPIEN 3 TRANSCATHETER HEART VALVE (29MM)
Type: IMPLANTABLE DEVICE | Site: HEART | Status: FUNCTIONAL
Brand: EDWARDS SAPIEN 3 TRANSCATHETER HEART VALVE (THV)

## 2024-07-09 RX ORDER — ACETAMINOPHEN 650 MG/1
650 SUPPOSITORY RECTAL EVERY 6 HOURS PRN
Status: DISCONTINUED | OUTPATIENT
Start: 2024-07-09 | End: 2024-07-10 | Stop reason: HOSPADM

## 2024-07-09 RX ORDER — CEFAZOLIN SODIUM 2 G/100ML
2 INJECTION, SOLUTION INTRAVENOUS ONCE
Status: DISCONTINUED | OUTPATIENT
Start: 2024-07-09 | End: 2024-07-10 | Stop reason: HOSPADM

## 2024-07-09 RX ORDER — LIDOCAINE HYDROCHLORIDE AND EPINEPHRINE 10; 10 MG/ML; UG/ML
INJECTION, SOLUTION INFILTRATION; PERINEURAL AS NEEDED
Status: DISCONTINUED | OUTPATIENT
Start: 2024-07-09 | End: 2024-07-09 | Stop reason: HOSPADM

## 2024-07-09 RX ORDER — MIDAZOLAM HYDROCHLORIDE 1 MG/ML
INJECTION, SOLUTION INTRAMUSCULAR; INTRAVENOUS AS NEEDED
Status: DISCONTINUED | OUTPATIENT
Start: 2024-07-09 | End: 2024-07-09 | Stop reason: HOSPADM

## 2024-07-09 RX ORDER — FENTANYL CITRATE 50 UG/ML
INJECTION, SOLUTION INTRAMUSCULAR; INTRAVENOUS AS NEEDED
Status: DISCONTINUED | OUTPATIENT
Start: 2024-07-09 | End: 2024-07-09 | Stop reason: HOSPADM

## 2024-07-09 RX ORDER — LIDOCAINE HYDROCHLORIDE 20 MG/ML
INJECTION, SOLUTION INFILTRATION; PERINEURAL AS NEEDED
Status: DISCONTINUED | OUTPATIENT
Start: 2024-07-09 | End: 2024-07-09 | Stop reason: HOSPADM

## 2024-07-09 RX ORDER — MULTIVIT-MIN/IRON FUM/FOLIC AC 7.5 MG-4
1 TABLET ORAL
Status: DISCONTINUED | OUTPATIENT
Start: 2024-07-09 | End: 2024-07-10 | Stop reason: HOSPADM

## 2024-07-09 RX ORDER — CHOLESTYRAMINE 4 G/9G
0.5 POWDER, FOR SUSPENSION ORAL EVERY OTHER DAY
COMMUNITY
Start: 2024-06-25

## 2024-07-09 RX ORDER — PROTAMINE SULFATE 10 MG/ML
INJECTION, SOLUTION INTRAVENOUS CONTINUOUS PRN
Status: COMPLETED | OUTPATIENT
Start: 2024-07-09 | End: 2024-07-09

## 2024-07-09 RX ORDER — ONDANSETRON 4 MG/1
4 TABLET, FILM COATED ORAL EVERY 8 HOURS PRN
COMMUNITY
Start: 2024-06-25

## 2024-07-09 RX ORDER — PANTOPRAZOLE SODIUM 40 MG/10ML
40 INJECTION, POWDER, LYOPHILIZED, FOR SOLUTION INTRAVENOUS
Status: DISCONTINUED | OUTPATIENT
Start: 2024-07-10 | End: 2024-07-10 | Stop reason: HOSPADM

## 2024-07-09 RX ORDER — ONDANSETRON HYDROCHLORIDE 2 MG/ML
INJECTION, SOLUTION INTRAVENOUS AS NEEDED
Status: DISCONTINUED | OUTPATIENT
Start: 2024-07-09 | End: 2024-07-09 | Stop reason: HOSPADM

## 2024-07-09 RX ORDER — ACETAMINOPHEN 325 MG/1
650 TABLET ORAL EVERY 6 HOURS PRN
Status: DISCONTINUED | OUTPATIENT
Start: 2024-07-09 | End: 2024-07-10 | Stop reason: HOSPADM

## 2024-07-09 RX ORDER — HEPARIN SODIUM 1000 [USP'U]/ML
INJECTION, SOLUTION INTRAVENOUS; SUBCUTANEOUS AS NEEDED
Status: DISCONTINUED | OUTPATIENT
Start: 2024-07-09 | End: 2024-07-09 | Stop reason: HOSPADM

## 2024-07-09 RX ORDER — PANTOPRAZOLE SODIUM 40 MG/1
40 TABLET, DELAYED RELEASE ORAL
Status: DISCONTINUED | OUTPATIENT
Start: 2024-07-10 | End: 2024-07-10 | Stop reason: HOSPADM

## 2024-07-09 RX ORDER — ACETAMINOPHEN 160 MG/5ML
650 SOLUTION ORAL EVERY 6 HOURS PRN
Status: DISCONTINUED | OUTPATIENT
Start: 2024-07-09 | End: 2024-07-10 | Stop reason: HOSPADM

## 2024-07-09 RX ORDER — HEPARIN SODIUM 5000 [USP'U]/ML
5000 INJECTION, SOLUTION INTRAVENOUS; SUBCUTANEOUS EVERY 8 HOURS
Status: DISCONTINUED | OUTPATIENT
Start: 2024-07-11 | End: 2024-07-10 | Stop reason: HOSPADM

## 2024-07-09 RX ORDER — CALCIUM CARBONATE 500(1250)
1250 TABLET ORAL
Status: DISCONTINUED | OUTPATIENT
Start: 2024-07-09 | End: 2024-07-10 | Stop reason: HOSPADM

## 2024-07-09 RX ORDER — LANOLIN ALCOHOL/MO/W.PET/CERES
400 CREAM (GRAM) TOPICAL DAILY
Status: DISCONTINUED | OUTPATIENT
Start: 2024-07-09 | End: 2024-07-10 | Stop reason: HOSPADM

## 2024-07-09 RX ORDER — CLOPIDOGREL BISULFATE 75 MG/1
75 TABLET ORAL DAILY
Status: DISCONTINUED | OUTPATIENT
Start: 2024-07-09 | End: 2024-07-10 | Stop reason: HOSPADM

## 2024-07-09 RX ORDER — NAPROXEN SODIUM 220 MG/1
324 TABLET, FILM COATED ORAL ONCE
Status: DISCONTINUED | OUTPATIENT
Start: 2024-07-09 | End: 2024-07-10 | Stop reason: HOSPADM

## 2024-07-09 RX ORDER — ONDANSETRON 4 MG/1
4 TABLET, ORALLY DISINTEGRATING ORAL EVERY 8 HOURS PRN
Status: DISCONTINUED | OUTPATIENT
Start: 2024-07-09 | End: 2024-07-10 | Stop reason: HOSPADM

## 2024-07-09 RX ORDER — SODIUM CHLORIDE, SODIUM LACTATE, POTASSIUM CHLORIDE, CALCIUM CHLORIDE 600; 310; 30; 20 MG/100ML; MG/100ML; MG/100ML; MG/100ML
75 INJECTION, SOLUTION INTRAVENOUS CONTINUOUS
Status: DISCONTINUED | OUTPATIENT
Start: 2024-07-09 | End: 2024-07-10 | Stop reason: HOSPADM

## 2024-07-09 RX ORDER — LOSARTAN POTASSIUM 25 MG/1
25 TABLET ORAL DAILY
Status: DISCONTINUED | OUTPATIENT
Start: 2024-07-09 | End: 2024-07-10 | Stop reason: HOSPADM

## 2024-07-09 RX ORDER — DOCUSATE SODIUM 100 MG/1
100 CAPSULE, LIQUID FILLED ORAL 2 TIMES DAILY
Status: DISCONTINUED | OUTPATIENT
Start: 2024-07-09 | End: 2024-07-10 | Stop reason: HOSPADM

## 2024-07-09 RX ORDER — CEFAZOLIN 1 G/1
INJECTION, POWDER, FOR SOLUTION INTRAVENOUS AS NEEDED
Status: DISCONTINUED | OUTPATIENT
Start: 2024-07-09 | End: 2024-07-09 | Stop reason: HOSPADM

## 2024-07-09 RX ORDER — ASPIRIN 81 MG/1
81 TABLET ORAL DAILY
Status: DISCONTINUED | OUTPATIENT
Start: 2024-07-09 | End: 2024-07-10 | Stop reason: HOSPADM

## 2024-07-09 RX ORDER — ONDANSETRON HYDROCHLORIDE 2 MG/ML
4 INJECTION, SOLUTION INTRAVENOUS EVERY 8 HOURS PRN
Status: DISCONTINUED | OUTPATIENT
Start: 2024-07-09 | End: 2024-07-10 | Stop reason: HOSPADM

## 2024-07-09 RX ORDER — TRAMADOL HYDROCHLORIDE 50 MG/1
50 TABLET ORAL EVERY 6 HOURS PRN
Status: DISCONTINUED | OUTPATIENT
Start: 2024-07-09 | End: 2024-07-10 | Stop reason: HOSPADM

## 2024-07-09 SDOH — SOCIAL STABILITY: SOCIAL INSECURITY: DO YOU FEEL ANYONE HAS EXPLOITED OR TAKEN ADVANTAGE OF YOU FINANCIALLY OR OF YOUR PERSONAL PROPERTY?: NO

## 2024-07-09 SDOH — SOCIAL STABILITY: SOCIAL INSECURITY: ARE YOU OR HAVE YOU BEEN THREATENED OR ABUSED PHYSICALLY, EMOTIONALLY, OR SEXUALLY BY ANYONE?: NO

## 2024-07-09 SDOH — SOCIAL STABILITY: SOCIAL INSECURITY: ABUSE: ADULT

## 2024-07-09 SDOH — SOCIAL STABILITY: SOCIAL INSECURITY: WERE YOU ABLE TO COMPLETE ALL THE BEHAVIORAL HEALTH SCREENINGS?: YES

## 2024-07-09 SDOH — SOCIAL STABILITY: SOCIAL INSECURITY: DO YOU FEEL UNSAFE GOING BACK TO THE PLACE WHERE YOU ARE LIVING?: NO

## 2024-07-09 SDOH — SOCIAL STABILITY: SOCIAL INSECURITY: HAVE YOU HAD ANY THOUGHTS OF HARMING ANYONE ELSE?: NO

## 2024-07-09 SDOH — ECONOMIC STABILITY: INCOME INSECURITY: HOW HARD IS IT FOR YOU TO PAY FOR THE VERY BASICS LIKE FOOD, HOUSING, MEDICAL CARE, AND HEATING?: NOT HARD AT ALL

## 2024-07-09 SDOH — SOCIAL STABILITY: SOCIAL INSECURITY: HAS ANYONE EVER THREATENED TO HURT YOUR FAMILY OR YOUR PETS?: NO

## 2024-07-09 SDOH — SOCIAL STABILITY: SOCIAL INSECURITY: HAVE YOU HAD THOUGHTS OF HARMING ANYONE ELSE?: NO

## 2024-07-09 SDOH — ECONOMIC STABILITY: INCOME INSECURITY: IN THE LAST 12 MONTHS, WAS THERE A TIME WHEN YOU WERE NOT ABLE TO PAY THE MORTGAGE OR RENT ON TIME?: NO

## 2024-07-09 SDOH — SOCIAL STABILITY: SOCIAL INSECURITY: DOES ANYONE TRY TO KEEP YOU FROM HAVING/CONTACTING OTHER FRIENDS OR DOING THINGS OUTSIDE YOUR HOME?: NO

## 2024-07-09 SDOH — SOCIAL STABILITY: SOCIAL INSECURITY: ARE THERE ANY APPARENT SIGNS OF INJURIES/BEHAVIORS THAT COULD BE RELATED TO ABUSE/NEGLECT?: NO

## 2024-07-09 SDOH — ECONOMIC STABILITY: TRANSPORTATION INSECURITY
IN THE PAST 12 MONTHS, HAS LACK OF TRANSPORTATION KEPT YOU FROM MEETINGS, WORK, OR FROM GETTING THINGS NEEDED FOR DAILY LIVING?: NO

## 2024-07-09 SDOH — HEALTH STABILITY: MENTAL HEALTH: EXPERIENCED ANY OF THE FOLLOWING LIFE EVENTS: OTHER (COMMENT)

## 2024-07-09 SDOH — ECONOMIC STABILITY: HOUSING INSECURITY: IN THE LAST 12 MONTHS, HOW MANY PLACES HAVE YOU LIVED?: 1

## 2024-07-09 SDOH — ECONOMIC STABILITY: HOUSING INSECURITY
IN THE LAST 12 MONTHS, WAS THERE A TIME WHEN YOU DID NOT HAVE A STEADY PLACE TO SLEEP OR SLEPT IN A SHELTER (INCLUDING NOW)?: NO

## 2024-07-09 ASSESSMENT — COGNITIVE AND FUNCTIONAL STATUS - GENERAL
TOILETING: A LITTLE
STANDING UP FROM CHAIR USING ARMS: A LITTLE
HELP NEEDED FOR BATHING: A LITTLE
MOVING TO AND FROM BED TO CHAIR: A LITTLE
DAILY ACTIVITIY SCORE: 20
DRESSING REGULAR UPPER BODY CLOTHING: A LITTLE
PATIENT BASELINE BEDBOUND: NO
TURNING FROM BACK TO SIDE WHILE IN FLAT BAD: A LITTLE
CLIMB 3 TO 5 STEPS WITH RAILING: A LITTLE
MOBILITY SCORE: 18
STANDING UP FROM CHAIR USING ARMS: A LITTLE
MOBILITY SCORE: 18
DRESSING REGULAR LOWER BODY CLOTHING: A LITTLE
DAILY ACTIVITIY SCORE: 20
MOVING FROM LYING ON BACK TO SITTING ON SIDE OF FLAT BED WITH BEDRAILS: A LITTLE
TURNING FROM BACK TO SIDE WHILE IN FLAT BAD: A LITTLE
DRESSING REGULAR UPPER BODY CLOTHING: A LITTLE
MOVING FROM LYING ON BACK TO SITTING ON SIDE OF FLAT BED WITH BEDRAILS: A LITTLE
CLIMB 3 TO 5 STEPS WITH RAILING: A LITTLE
TOILETING: A LITTLE
DRESSING REGULAR LOWER BODY CLOTHING: A LITTLE
HELP NEEDED FOR BATHING: A LITTLE
WALKING IN HOSPITAL ROOM: A LITTLE
MOVING TO AND FROM BED TO CHAIR: A LITTLE
WALKING IN HOSPITAL ROOM: A LITTLE

## 2024-07-09 ASSESSMENT — ACTIVITIES OF DAILY LIVING (ADL)
HEARING - RIGHT EAR: FUNCTIONAL
TOILETING: INDEPENDENT
BATHING: INDEPENDENT
FEEDING YOURSELF: INDEPENDENT
GROOMING: INDEPENDENT
ADEQUATE_TO_COMPLETE_ADL: YES
JUDGMENT_ADEQUATE_SAFELY_COMPLETE_DAILY_ACTIVITIES: YES
HEARING - LEFT EAR: FUNCTIONAL
DRESSING YOURSELF: INDEPENDENT
WALKS IN HOME: INDEPENDENT
PATIENT'S MEMORY ADEQUATE TO SAFELY COMPLETE DAILY ACTIVITIES?: YES
ASSISTIVE_DEVICE: WALKER

## 2024-07-09 ASSESSMENT — LIFESTYLE VARIABLES
HOW OFTEN DO YOU HAVE 6 OR MORE DRINKS ON ONE OCCASION: NEVER
HOW OFTEN DO YOU HAVE A DRINK CONTAINING ALCOHOL: NEVER
SUBSTANCE_ABUSE_PAST_12_MONTHS: NO
AUDIT-C TOTAL SCORE: 0
AUDIT-C TOTAL SCORE: 0
SUBSTANCE_ABUSE_PAST_12_MONTHS: NO
HOW MANY STANDARD DRINKS CONTAINING ALCOHOL DO YOU HAVE ON A TYPICAL DAY: PATIENT DOES NOT DRINK
HOW MANY STANDARD DRINKS CONTAINING ALCOHOL DO YOU HAVE ON A TYPICAL DAY: PATIENT DOES NOT DRINK
SKIP TO QUESTIONS 9-10: 1
PRESCIPTION_ABUSE_PAST_12_MONTHS: NO
HOW OFTEN DO YOU HAVE 6 OR MORE DRINKS ON ONE OCCASION: NEVER
SKIP TO QUESTIONS 9-10: 1
HOW OFTEN DO YOU HAVE A DRINK CONTAINING ALCOHOL: NEVER
AUDIT-C TOTAL SCORE: 0
AUDIT-C TOTAL SCORE: 0
PRESCIPTION_ABUSE_PAST_12_MONTHS: NO

## 2024-07-09 ASSESSMENT — PATIENT HEALTH QUESTIONNAIRE - PHQ9
SUM OF ALL RESPONSES TO PHQ9 QUESTIONS 1 & 2: 0
2. FEELING DOWN, DEPRESSED OR HOPELESS: NOT AT ALL
2. FEELING DOWN, DEPRESSED OR HOPELESS: NOT AT ALL
1. LITTLE INTEREST OR PLEASURE IN DOING THINGS: NOT AT ALL
1. LITTLE INTEREST OR PLEASURE IN DOING THINGS: NOT AT ALL
SUM OF ALL RESPONSES TO PHQ9 QUESTIONS 1 & 2: 0

## 2024-07-09 ASSESSMENT — PAIN SCALES - GENERAL
PAINLEVEL_OUTOF10: 0 - NO PAIN
PAINLEVEL_OUTOF10: 0 - NO PAIN

## 2024-07-09 ASSESSMENT — PAIN DESCRIPTION - DESCRIPTORS: DESCRIPTORS: HEADACHE

## 2024-07-09 ASSESSMENT — COLUMBIA-SUICIDE SEVERITY RATING SCALE - C-SSRS
6. HAVE YOU EVER DONE ANYTHING, STARTED TO DO ANYTHING, OR PREPARED TO DO ANYTHING TO END YOUR LIFE?: NO
1. IN THE PAST MONTH, HAVE YOU WISHED YOU WERE DEAD OR WISHED YOU COULD GO TO SLEEP AND NOT WAKE UP?: NO
1. IN THE PAST MONTH, HAVE YOU WISHED YOU WERE DEAD OR WISHED YOU COULD GO TO SLEEP AND NOT WAKE UP?: NO
2. HAVE YOU ACTUALLY HAD ANY THOUGHTS OF KILLING YOURSELF?: NO

## 2024-07-09 ASSESSMENT — PAIN - FUNCTIONAL ASSESSMENT
PAIN_FUNCTIONAL_ASSESSMENT: 0-10
PAIN_FUNCTIONAL_ASSESSMENT: 0-10

## 2024-07-09 NOTE — Clinical Note
Report was given by JA Mckinley to [facility] [place] in [place] [facility] to advise of arrival. Report to RN will be given at bedside on [place] [address]

## 2024-07-09 NOTE — NURSING NOTE
Patient assisted to the bathroom with 2 assists. Tolerated fair. Unsteady on his feet. Able to void and move his bowels. Dressing to groin sites intact with shadowing to left groin site increase marked.  AMINA Call RN

## 2024-07-09 NOTE — Clinical Note
Temporary pacemaker turned on. Temporary pacemaker location through left femoral vein. Temporary pacemaker connected to demand mode. Heart rate: 180. Current 10 (mA).

## 2024-07-09 NOTE — H&P
History Of Present Illness  Giuseppe Davila is a 88 y.o. male presenting with aortic stenosis .     Past Medical History  Past Medical History:   Diagnosis Date    Anorectal abscess     Anorectal abscess    Essential (primary) hypertension     Benign essential HTN    Old myocardial infarction     History of myocardial infarction    Other hyperlipidemia     Other hyperlipidemia    Personal history of malignant neoplasm, unspecified     History of malignant neoplasm    Personal history of other diseases of the circulatory system     History of coronary artery disease    Personal history of other diseases of the circulatory system     History of angina pectoris    Personal history of other diseases of the nervous system and sense organs 05/02/2019    History of polyneuropathy    Personal history of other specified conditions     History of chest pain    Personal history of other specified conditions     History of epistaxis    Personal history of other specified conditions     History of bradycardia    Personal history of transient ischemic attack (TIA), and cerebral infarction without residual deficits     History of stroke    Personal history of transient ischemic attack (TIA), and cerebral infarction without residual deficits     History of stroke    Syncope and collapse     Near syncope    Thoracic aortic aneurysm, without rupture, unspecified (CMS-HCC)     Thoracic aortic aneurysm    Unspecified atrial fibrillation (Multi) 03/10/2020    Atrial fibrillation with slow ventricular response    Ventricular premature depolarization     Frequent PVCs       Surgical History  Past Surgical History:   Procedure Laterality Date    CARDIAC CATHETERIZATION N/A 5/24/2024    Procedure: Left And Right Heart Cath, With LV;  Surgeon: Stepan Jones MD PhD;  Location: [facility] Cardiac [facility] Lab;  Service: Cardiovascular;  Laterality: N/A;  R/LHC poss PCI    CHOLECYSTECTOMY  02/05/2015    Cholecystectomy    COLONOSCOPY  02/05/2015     "Colonoscopy (Fiberoptic)    CORONARY ANGIOPLASTY WITH STENT PLACEMENT  05/21/2018    Cath Placement Of Stent 1    CT ABDOMEN PELVIS ANGIOGRAM W AND/OR WO IV CONTRAST  3/17/2023    CT ABDOMEN PELVIS ANGIOGRAM W AND/OR WO IV CONTRAST PAR CT    KIDNEY SURGERY  02/05/2015    Kidney Surgery    OTHER SURGICAL HISTORY  12/13/2018    Prostatectomy    OTHER SURGICAL HISTORY  05/21/2018    Recent Surgery    TONSILLECTOMY  02/05/2015    Tonsillectomy        Social History  He reports that he has never smoked. He has never used smokeless tobacco. He reports that he does not currently use alcohol. He reports that he does not currently use drugs.    Family History  Family History   Problem Relation Name Age of Onset    Other (cardiac disorder) Mother      Transient ischemic attack Mother      Cancer Father      Other (cardiac disorder) Sister      Alzheimer's disease Father's Brother      Parkinsonism Father's Brother          Allergies  Patient has no known allergies.    Review of Systems  Leadless pacemaker  Watchman    Physical Exam   AO x 3  CVS- murmurs 3/6 sist aortic   Resp -CTAB  Abd- Soft, NT, ND  Neuro  No gross motor. Sensory deficits   Groin access sites no hematoma, swelling   No LE edema      Last Recorded Vitals  Height 1.803 m (5' 11\"), weight 90.7 kg (200 lb).    Relevant Results  paced  rhythm  ECHO TT: EF 55%, Asc Aorta 5.1, MG 22, V max 3.0, Mod aort Reg, area 1.0  Moderate MR          Assessment/Plan   Principal Problem:    Nonrheumatic aortic valve stenosis  Active Problems:    S/P TAVR (transcatheter aortic valve replacement)    Cr 0.94, GFR 79       Mario Alberto Harris MD    "

## 2024-07-09 NOTE — PROGRESS NOTES
Pharmacy Medication History Review    Giuseppe Davila is a 88 y.o. male admitted for Nonrheumatic aortic valve stenosis. Pharmacy reviewed the patient's csxdu-uz-imhmcrvon medications and allergies for accuracy.    The list below reflects the updated PTA list. Comments regarding how patient may be taking medications differently can be found in the Admit Orders Activity  Prior to Admission Medications   Prescriptions Last Dose Informant Patient Reported?   ashwagandha root extract 500 mg capsule 7/5/2024 Spouse/Significant Other Yes   Sig: Take 1 capsule by mouth once daily.   b complex vitamins capsule 7/5/2024 Spouse/Significant Other Yes   Sig: Take 1 capsule by mouth once daily.   calcium carbonate 600 mg calcium (1,500 mg) tablet 7/5/2024 Spouse/Significant Other Yes   Sig: Take 1 tablet (1,500 mg) by mouth 2 times daily (morning and late afternoon).   cholecalciferol (Vitamin D-3) 25 MCG (1000 UT) capsule 7/5/2024 Spouse/Significant Other Yes   Sig: Take 1 capsule (25 mcg) by mouth once daily.   cholestyramine (Questran) 4 gram packet 7/8/2024 Spouse/Significant Other Yes   Sig: Take 0.5 packets by mouth every other day.   clopidogrel (Plavix) 75 mg tablet 7/8/2024 Spouse/Significant Other, Self No   Sig: Take 1 tablet (75 mg) by mouth once daily.   coenzyme Q-10 100 mg capsule 7/5/2024 Spouse/Significant Other Yes   Sig: Take 1 capsule (100 mg) by mouth once daily at bedtime.   docusate sodium (Colace) 100 mg capsule Past Month Spouse/Significant Other No   Sig: Take 1 capsule (100 mg) by mouth 2 times a day.  Take 1 capsule BID PRN constipation    hydrocortisone (Anusol-HC) 2.5 % rectal cream Past Month Spouse/Significant Other Yes   Sig: Insert 1 Application into the rectum 2 times a day as needed for hemorrhoids.   magnesium oxide (MagOx) 400 mg (241.3 mg magnesium) tablet 7/5/2024 Spouse/Significant Other Yes   Sig: Take 1 tablet (400 mg) by mouth once daily.   multivitamin tablet 7/5/2024  Spouse/Significant Other Yes   Sig: Take 1 tablet by mouth once daily.   ondansetron (Zofran) 4 mg tablet 2024 Spouse/Significant Other Yes   Sig: Take 1 tablet (4 mg) by mouth every 8 hours if needed for nausea.   rosuvastatin (Crestor) 20 mg tablet 2024 Spouse/Significant Other, Self No   Sig: Take 1 tablet (20 mg) by mouth 4 times a week.  Monday, Wednesday, Friday, Saturday    torsemide (Demadex) 20 mg tablet 2024 Spouse/Significant Other, Self No   Si TAB Daily   Patient taking differently: Take 1 tablet (20 mg) by mouth once daily.  Takes 1 tablet (20 mg) three times a week (Monday, Wednesday, Friday)    turmeric-turmeric root extract 450-50 mg capsule 2024 Spouse/Significant Other Yes   Sig: Take 1 capsule by mouth once daily.   vitamin E acid succinate (vitamin E succinate) 268 mg (400 unit) tablet 2024 Spouse/Significant Other Yes   Sig: Take 1 capsule by mouth once daily.      Facility-Administered Medications: None        The list below reflects the updated allergy list. Please review each documented allergy for additional clarification and justification.  Allergies  Reviewed by Era Lewis RN on 2024   No Known Allergies         Meds to Beds service not offered prior to procedure, Please reassess prior to patient discharge if Meds to Beds is desired. Pharmacy has been updated to [facility].    Sources used to confirm home medication list include: Patient interview, Interview with wife, Carley, OARRS, Care Everywhere, medication fill history.    Aspirin:  None  Statin:  Rosuvastatin 20 mg four times a week  P2Y12 inhibitor:  Clopidogrel 75 mg daily  Anticoagulant:  None    Medications added: Cholestryramine, Ondansetron    Medications modified: Docusate    Medications to be removed: None    Below are additional concerns with the patient's PTA list.  None to note.    Amira Zhang [facility]   Transitions of Care Pharmacist   Meds Ambulatory and Retail Services  Please  reach out via Secure Chat for questions, or if no response call For Your Imagination or [facility]

## 2024-07-09 NOTE — Clinical Note
Temporary pacemaker tested. Temporary pacemaker location through left femoral vein. Temporary pacemaker connected to demand mode. Heart rate: 110. Current 1 (mA). No backup pacing, patient has permanent pacemaker

## 2024-07-09 NOTE — POST-PROCEDURE NOTE
Indication: Severe Aortic Stenosis  S/p successful TAVR with Sapiens 3 29 (-1) mm     Access  Primary: right CFA s/p 2 proglide  Secondary: left CFA 6 Austrian s/p 1 proglide     TVP: left CF vein, 7 Austrian, removed in the cath lab.      Pre LVEDP: 10 mmHg  Post LVEDP:  2 mmHg     Post gradient TTE: 2 mmHg  Trace PVL  No effusion     Patient has a leadless pacemake.     Conclusion  Monitor tele  Monitor access sites for bleeding  TTE tomorrow  Bed rest  Structural team will continue to follow.   page structural team for any concerning clinical events.

## 2024-07-09 NOTE — CARE PLAN
The patient's goals for the shift include Relief from headache    The clinical goals for the shift include remain HDS

## 2024-07-09 NOTE — PRE-SEDATION DOCUMENTATION
Physical Exam    Airway  Mallampati: III     Cardiovascular   Rhythm: regular  Rate: normal     Dental    Pulmonary - normal exam  Breath sounds clear to auscultation         Plan    ASA 3     Mild

## 2024-07-09 NOTE — CARE PLAN
Problem: Arrythmia/Dysrhythmia  Goal: Verbalize understanding of procedures/devices  Outcome: Progressing     Problem: Arrythmia/Dysrhythmia  Goal: Serial ECG will return to baseline  Outcome: Progressing     Problem: Fall/Injury  Goal: Not fall by end of shift  Outcome: Progressing   The patient's goals for the shift include Relief from headache    The clinical goals for the shift include remain HDS

## 2024-07-10 ENCOUNTER — APPOINTMENT (OUTPATIENT)
Dept: CARDIOLOGY | Facility: HOSPITAL | Age: 89
DRG: 267 | End: 2024-07-10
Payer: MEDICARE

## 2024-07-10 ENCOUNTER — APPOINTMENT (OUTPATIENT)
Dept: RADIOLOGY | Facility: HOSPITAL | Age: 89
DRG: 267 | End: 2024-07-10
Payer: MEDICARE

## 2024-07-10 VITALS
RESPIRATION RATE: 18 BRPM | HEIGHT: 71 IN | OXYGEN SATURATION: 94 % | WEIGHT: 199.96 LBS | BODY MASS INDEX: 27.99 KG/M2 | SYSTOLIC BLOOD PRESSURE: 110 MMHG | DIASTOLIC BLOOD PRESSURE: 67 MMHG | TEMPERATURE: 96.8 F | HEART RATE: 69 BPM

## 2024-07-10 PROBLEM — I35.0 NONRHEUMATIC AORTIC VALVE STENOSIS: Status: RESOLVED | Noted: 2024-06-25 | Resolved: 2024-07-10

## 2024-07-10 LAB
ANION GAP SERPL CALC-SCNC: 12 MMOL/L (ref 10–20)
AORTIC VALVE MEAN GRADIENT: 11 MMHG
AORTIC VALVE PEAK VELOCITY: 2.34 M/S
AV PEAK GRADIENT: 21.9 MMHG
AVA (PEAK VEL): 1.8 CM2
AVA (VTI): 1.76 CM2
BASOPHILS # BLD AUTO: 0.02 X10*3/UL (ref 0–0.1)
BASOPHILS NFR BLD AUTO: 0.3 %
BUN SERPL-MCNC: 12 MG/DL (ref 6–23)
CALCIUM SERPL-MCNC: 9.1 MG/DL (ref 8.6–10.6)
CHLORIDE SERPL-SCNC: 104 MMOL/L (ref 98–107)
CO2 SERPL-SCNC: 26 MMOL/L (ref 21–32)
CREAT SERPL-MCNC: 0.85 MG/DL (ref 0.5–1.3)
EGFRCR SERPLBLD CKD-EPI 2021: 85 ML/MIN/1.73M*2
EJECTION FRACTION APICAL 4 CHAMBER: 62.2
EJECTION FRACTION: 63 %
EOSINOPHIL # BLD AUTO: 0.05 X10*3/UL (ref 0–0.4)
EOSINOPHIL NFR BLD AUTO: 0.7 %
ERYTHROCYTE [DISTWIDTH] IN BLOOD BY AUTOMATED COUNT: 13.3 % (ref 11.5–14.5)
GLUCOSE BLD MANUAL STRIP-MCNC: 155 MG/DL (ref 74–99)
GLUCOSE BLD MANUAL STRIP-MCNC: 174 MG/DL (ref 74–99)
GLUCOSE SERPL-MCNC: 126 MG/DL (ref 74–99)
HCT VFR BLD AUTO: 35.7 % (ref 41–52)
HGB BLD-MCNC: 12.4 G/DL (ref 13.5–17.5)
IMM GRANULOCYTES # BLD AUTO: 0.01 X10*3/UL (ref 0–0.5)
IMM GRANULOCYTES NFR BLD AUTO: 0.1 % (ref 0–0.9)
INR PPP: 1.1 (ref 0.9–1.1)
LEFT ATRIUM VOLUME AREA LENGTH INDEX BSA: 56.2 ML/M2
LEFT VENTRICLE INTERNAL DIMENSION DIASTOLE: 3.9 CM (ref 3.5–6)
LEFT VENTRICULAR OUTFLOW TRACT DIAMETER: 2.2 CM
LYMPHOCYTES # BLD AUTO: 0.81 X10*3/UL (ref 0.8–3)
LYMPHOCYTES NFR BLD AUTO: 11.6 %
MAGNESIUM SERPL-MCNC: 1.96 MG/DL (ref 1.6–2.4)
MCH RBC QN AUTO: 30.9 PG (ref 26–34)
MCHC RBC AUTO-ENTMCNC: 34.7 G/DL (ref 32–36)
MCV RBC AUTO: 89 FL (ref 80–100)
MITRAL VALVE E/E' RATIO: 11.48
MONOCYTES # BLD AUTO: 0.68 X10*3/UL (ref 0.05–0.8)
MONOCYTES NFR BLD AUTO: 9.8 %
NEUTROPHILS # BLD AUTO: 5.4 X10*3/UL (ref 1.6–5.5)
NEUTROPHILS NFR BLD AUTO: 77.5 %
NRBC BLD-RTO: 0 /100 WBCS (ref 0–0)
PLATELET # BLD AUTO: 137 X10*3/UL (ref 150–450)
POTASSIUM SERPL-SCNC: 3.9 MMOL/L (ref 3.5–5.3)
PROTHROMBIN TIME: 12.2 SECONDS (ref 9.8–12.8)
RBC # BLD AUTO: 4.01 X10*6/UL (ref 4.5–5.9)
RIGHT VENTRICLE FREE WALL PEAK S': 11.7 CM/S
RIGHT VENTRICLE PEAK SYSTOLIC PRESSURE: 29.4 MMHG
SODIUM SERPL-SCNC: 138 MMOL/L (ref 136–145)
TRICUSPID ANNULAR PLANE SYSTOLIC EXCURSION: 1.9 CM
WBC # BLD AUTO: 7 X10*3/UL (ref 4.4–11.3)

## 2024-07-10 PROCEDURE — 85610 PROTHROMBIN TIME: CPT | Performed by: NURSE PRACTITIONER

## 2024-07-10 PROCEDURE — 80048 BASIC METABOLIC PNL TOTAL CA: CPT | Performed by: NURSE PRACTITIONER

## 2024-07-10 PROCEDURE — 93325 DOPPLER ECHO COLOR FLOW MAPG: CPT | Performed by: STUDENT IN AN ORGANIZED HEALTH CARE EDUCATION/TRAINING PROGRAM

## 2024-07-10 PROCEDURE — 99238 HOSP IP/OBS DSCHRG MGMT 30/<: CPT | Performed by: NURSE PRACTITIONER

## 2024-07-10 PROCEDURE — 93321 DOPPLER ECHO F-UP/LMTD STD: CPT | Performed by: STUDENT IN AN ORGANIZED HEALTH CARE EDUCATION/TRAINING PROGRAM

## 2024-07-10 PROCEDURE — 83735 ASSAY OF MAGNESIUM: CPT | Performed by: NURSE PRACTITIONER

## 2024-07-10 PROCEDURE — 71045 X-RAY EXAM CHEST 1 VIEW: CPT | Performed by: RADIOLOGY

## 2024-07-10 PROCEDURE — 2500000004 HC RX 250 GENERAL PHARMACY W/ HCPCS (ALT 636 FOR OP/ED): Performed by: NURSE PRACTITIONER

## 2024-07-10 PROCEDURE — 85025 COMPLETE CBC W/AUTO DIFF WBC: CPT | Performed by: NURSE PRACTITIONER

## 2024-07-10 PROCEDURE — 2500000001 HC RX 250 WO HCPCS SELF ADMINISTERED DRUGS (ALT 637 FOR MEDICARE OP): Performed by: NURSE PRACTITIONER

## 2024-07-10 PROCEDURE — 93308 TTE F-UP OR LMTD: CPT | Performed by: STUDENT IN AN ORGANIZED HEALTH CARE EDUCATION/TRAINING PROGRAM

## 2024-07-10 PROCEDURE — 93010 ELECTROCARDIOGRAM REPORT: CPT | Performed by: INTERNAL MEDICINE

## 2024-07-10 PROCEDURE — 2500000005 HC RX 250 GENERAL PHARMACY W/O HCPCS: Performed by: NURSE PRACTITIONER

## 2024-07-10 PROCEDURE — 93005 ELECTROCARDIOGRAM TRACING: CPT

## 2024-07-10 PROCEDURE — 36415 COLL VENOUS BLD VENIPUNCTURE: CPT | Performed by: NURSE PRACTITIONER

## 2024-07-10 PROCEDURE — 93308 TTE F-UP OR LMTD: CPT

## 2024-07-10 PROCEDURE — 71045 X-RAY EXAM CHEST 1 VIEW: CPT

## 2024-07-10 PROCEDURE — 82947 ASSAY GLUCOSE BLOOD QUANT: CPT

## 2024-07-10 RX ORDER — LOSARTAN POTASSIUM 25 MG/1
25 TABLET ORAL DAILY
Qty: 90 TABLET | Refills: 3 | Status: SHIPPED | OUTPATIENT
Start: 2024-07-11 | End: 2025-07-11

## 2024-07-10 RX ORDER — NITROGLYCERIN 0.4 MG/1
0.4 TABLET SUBLINGUAL EVERY 5 MIN PRN
Status: DISCONTINUED | OUTPATIENT
Start: 2024-07-10 | End: 2024-07-10 | Stop reason: HOSPADM

## 2024-07-10 ASSESSMENT — COGNITIVE AND FUNCTIONAL STATUS - GENERAL
HELP NEEDED FOR BATHING: A LITTLE
TOILETING: A LITTLE
STANDING UP FROM CHAIR USING ARMS: A LITTLE
MOVING TO AND FROM BED TO CHAIR: A LITTLE
DAILY ACTIVITIY SCORE: 20
MOBILITY SCORE: 18
TURNING FROM BACK TO SIDE WHILE IN FLAT BAD: A LITTLE
WALKING IN HOSPITAL ROOM: A LITTLE
CLIMB 3 TO 5 STEPS WITH RAILING: A LITTLE
MOVING FROM LYING ON BACK TO SITTING ON SIDE OF FLAT BED WITH BEDRAILS: A LITTLE
DRESSING REGULAR UPPER BODY CLOTHING: A LITTLE
DRESSING REGULAR LOWER BODY CLOTHING: A LITTLE

## 2024-07-10 ASSESSMENT — PAIN SCALES - GENERAL
PAINLEVEL_OUTOF10: 0 - NO PAIN
PAINLEVEL_OUTOF10: 9

## 2024-07-10 NOTE — DISCHARGE SUMMARY
"Discharge Diagnosis  Nonrheumatic aortic valve stenosis    Issues Requiring Follow-Up  None        Hospital Course   Patient Vitals for the past 24 hrs:   BP Temp Temp src Pulse Resp SpO2 Height Weight   07/10/24 0801 110/67 36 °C (96.8 °F) Temporal 69 18 94 % -- --   07/10/24 0324 136/80 36.1 °C (97 °F) -- 60 -- 94 % -- --   07/10/24 0236 124/73 -- -- 60 -- 93 % -- --   07/09/24 1947 136/80 36.6 °C (97.9 °F) Temporal 60 18 96 % -- --   07/09/24 1443 150/89 -- -- 60 16 100 % -- --   07/09/24 1330 150/88 -- -- 60 16 98 % -- --   07/09/24 1300 146/90 36.4 °C (97.5 °F) -- 60 16 98 % -- --   07/09/24 1245 152/88 36.4 °C (97.5 °F) -- 60 16 100 % -- --   07/09/24 1231 (!) 146/99 -- -- 60 14 98 % -- --   07/09/24 1215 (!) 149/93 36.4 °C (97.5 °F) Temporal 60 18 95 % 1.803 m (5' 10.98\") 90.7 kg (199 lb 15.3 oz)   07/09/24 1202 -- -- -- -- -- 93 % -- --   07/09/24 1047 158/88 -- -- 58 16 100 % -- --   07/09/24 1046 -- -- -- -- -- 99 % -- --       Results for orders placed or performed during the hospital encounter of 07/09/24 (from the past 96 hour(s))   ECG 12 lead   Result Value Ref Range    Ventricular Rate 60 BPM    Atrial Rate 61 BPM    QRS Duration 170 ms    QT Interval 482 ms    QTC Calculation(Bazett) 482 ms    R Axis -34 degrees    T Axis 92 degrees    QRS Count 10 beats    Q Onset 197 ms    T Offset 438 ms    QTC Fredericia 482 ms   Type And Screen   Result Value Ref Range    ABO TYPE B     Rh TYPE POS     ANTIBODY SCREEN NEG    Basic metabolic panel   Result Value Ref Range    Glucose 101 (H) 74 - 99 mg/dL    Sodium 140 136 - 145 mmol/L    Potassium 3.8 3.5 - 5.3 mmol/L    Chloride 105 98 - 107 mmol/L    Bicarbonate 28 21 - 32 mmol/L    Anion Gap 11 10 - 20 mmol/L    Urea Nitrogen 12 6 - 23 mg/dL    Creatinine 0.98 0.50 - 1.30 mg/dL    eGFR 75 >60 mL/min/1.73m*2    Calcium 8.5 (L) 8.6 - 10.6 mg/dL   ACTIVATED CLOTTING TIME LOW   Result Value Ref Range    POCT Activated Clotting Time Low Range 350 (H) 83 - 199 sec "   Magnesium   Result Value Ref Range    Magnesium 1.94 1.60 - 2.40 mg/dL   CBC and Auto Differential   Result Value Ref Range    WBC 3.4 (L) 4.4 - 11.3 x10*3/uL    nRBC 0.0 0.0 - 0.0 /100 WBCs    RBC 4.00 (L) 4.50 - 5.90 x10*6/uL    Hemoglobin 12.2 (L) 13.5 - 17.5 g/dL    Hematocrit 35.8 (L) 41.0 - 52.0 %    MCV 90 80 - 100 fL    MCH 30.5 26.0 - 34.0 pg    MCHC 34.1 32.0 - 36.0 g/dL    RDW 13.6 11.5 - 14.5 %    Platelets 147 (L) 150 - 450 x10*3/uL    Neutrophils % 60.1 40.0 - 80.0 %    Immature Granulocytes %, Automated 0.3 0.0 - 0.9 %    Lymphocytes % 29.3 13.0 - 44.0 %    Monocytes % 6.5 2.0 - 10.0 %    Eosinophils % 2.9 0.0 - 6.0 %    Basophils % 0.9 0.0 - 2.0 %    Neutrophils Absolute 2.05 1.60 - 5.50 x10*3/uL    Immature Granulocytes Absolute, Automated 0.01 0.00 - 0.50 x10*3/uL    Lymphocytes Absolute 1.00 0.80 - 3.00 x10*3/uL    Monocytes Absolute 0.22 0.05 - 0.80 x10*3/uL    Eosinophils Absolute 0.10 0.00 - 0.40 x10*3/uL    Basophils Absolute 0.03 0.00 - 0.10 x10*3/uL   Cardiac Device Check - Surgery   Result Value Ref Range    BSA 2.13 m2   Transthoracic Echo (TTE) Limited   Result Value Ref Range    AV pk phuong 3.10 m/s    AV mn grad 23.0 mmHg    LVOT diam 2.27 cm    LV EF 60 %    RVSP 40.9 mmHg    Aortic Valve Area by Continuity of VTI 1.21 cm2    Aortic Valve Area by Continuity of Peak Velocity 1.18 cm2    AV pk grad 38.4 mmHg    LV A4C EF 57.9    POCT GLUCOSE   Result Value Ref Range    POCT Glucose 105 (H) 74 - 99 mg/dL   CBC and Auto Differential   Result Value Ref Range    WBC 7.0 4.4 - 11.3 x10*3/uL    nRBC 0.0 0.0 - 0.0 /100 WBCs    RBC 4.01 (L) 4.50 - 5.90 x10*6/uL    Hemoglobin 12.4 (L) 13.5 - 17.5 g/dL    Hematocrit 35.7 (L) 41.0 - 52.0 %    MCV 89 80 - 100 fL    MCH 30.9 26.0 - 34.0 pg    MCHC 34.7 32.0 - 36.0 g/dL    RDW 13.3 11.5 - 14.5 %    Platelets 137 (L) 150 - 450 x10*3/uL    Neutrophils % 77.5 40.0 - 80.0 %    Immature Granulocytes %, Automated 0.1 0.0 - 0.9 %    Lymphocytes % 11.6 13.0 -  44.0 %    Monocytes % 9.8 2.0 - 10.0 %    Eosinophils % 0.7 0.0 - 6.0 %    Basophils % 0.3 0.0 - 2.0 %    Neutrophils Absolute 5.40 1.60 - 5.50 x10*3/uL    Immature Granulocytes Absolute, Automated 0.01 0.00 - 0.50 x10*3/uL    Lymphocytes Absolute 0.81 0.80 - 3.00 x10*3/uL    Monocytes Absolute 0.68 0.05 - 0.80 x10*3/uL    Eosinophils Absolute 0.05 0.00 - 0.40 x10*3/uL    Basophils Absolute 0.02 0.00 - 0.10 x10*3/uL   Basic Metabolic Panel   Result Value Ref Range    Glucose 126 (H) 74 - 99 mg/dL    Sodium 138 136 - 145 mmol/L    Potassium 3.9 3.5 - 5.3 mmol/L    Chloride 104 98 - 107 mmol/L    Bicarbonate 26 21 - 32 mmol/L    Anion Gap 12 10 - 20 mmol/L    Urea Nitrogen 12 6 - 23 mg/dL    Creatinine 0.85 0.50 - 1.30 mg/dL    eGFR 85 >60 mL/min/1.73m*2    Calcium 9.1 8.6 - 10.6 mg/dL   Magnesium   Result Value Ref Range    Magnesium 1.96 1.60 - 2.40 mg/dL   Protime-INR   Result Value Ref Range    Protime 12.2 9.8 - 12.8 seconds    INR 1.1 0.9 - 1.1   POCT GLUCOSE   Result Value Ref Range    POCT Glucose 155 (H) 74 - 99 mg/dL   Transthoracic Echo (TTE) Limited   Result Value Ref Range    AV pk phuong 2.34 m/s    AV mn grad 11.0 mmHg    LVOT diam 2.20 cm    MV avg E/e' ratio 11.48     LA vol index A/L 56.2 ml/m2    Tricuspid annular plane systolic excursion 1.9 cm    LV EF 63 %    RV free wall pk S' 11.70 cm/s    LVIDd 3.90 cm    RVSP 29.4 mmHg    Aortic Valve Area by Continuity of VTI 1.76 cm2    Aortic Valve Area by Continuity of Peak Velocity 1.80 cm2    AV pk grad 21.9 mmHg    LV A4C EF 62.2        Giuseppe Davila is a 88 y.o. year old male PMHx of CAD with h/o PCI, chronic diastolic CHF (EF 65-70% 5/2023), HTN, HLD, permanent afib not on AC due to GI bleeding s/p Watchman, mild AS, SSS s/p PPM, 4.9 cm ascending aortic aneurysm (TTE 5/2023), PFO, CVA, GERD, anemia, BPH, and prostate CA s/p radical prostatectomy 2000.  S/p Lex 3 29mm  (-1cc) via RFP on 7/9/24 with Tara Rutherford and Chaya    patient with   Past  Medical History:   Diagnosis Date    Anorectal abscess     Anorectal abscess    Essential (primary) hypertension     Benign essential HTN    Old myocardial infarction     History of myocardial infarction    Other hyperlipidemia     Other hyperlipidemia    Personal history of malignant neoplasm, unspecified     History of malignant neoplasm    Personal history of other diseases of the circulatory system     History of coronary artery disease    Personal history of other diseases of the circulatory system     History of angina pectoris    Personal history of other diseases of the nervous system and sense organs 05/02/2019    History of polyneuropathy    Personal history of other specified conditions     History of chest pain    Personal history of other specified conditions     History of epistaxis    Personal history of other specified conditions     History of bradycardia    Personal history of transient ischemic attack (TIA), and cerebral infarction without residual deficits     History of stroke    Personal history of transient ischemic attack (TIA), and cerebral infarction without residual deficits     History of stroke    Syncope and collapse     Near syncope    Thoracic aortic aneurysm, without rupture, unspecified (CMS-HCC)     Thoracic aortic aneurysm    Unspecified atrial fibrillation (Multi) 03/10/2020    Atrial fibrillation with slow ventricular response    Ventricular premature depolarization     Frequent PVCs         EKG shows paced rhythm    Doing well clinically, euvolemic on exam. Bilat groins with DSD no evidence of bleeding, oozing, hematoma or ecchymosis.  Hypertensive post procedure yesterday, started on Losartan 25mg daily.      Plan  -Ambulate and reassess groins  -Echo per protocol   -Losartan 25mg daily  -Plavix as monotherapy  -Cont home medications   -D/C home   -Follow up with PCP in 1-2 weeks  -Follow up with Primary cards in 6-10 weeks  -Follow up with Structural NP in [facility] clinic at   week, 1 month and 1 year with echo at 1 mo and year    D/w Dr. Sangeeta PAGE personally spent 55 minutes with this patient, of which >50% of time was spent counseling and coordination of care            Pertinent Physical Exam At Time of Discharge  Physical Exam  Constitutional: Well developed, awake/alert/oriented x3, no distress,  cooperative  Eyes: PERRL, EOMI, clear sclera  ENMT: mucous membranes moist, no apparent injury, no lesions seen  Head/Neck: Neck supple, no apparent injury, thyroid without mass or tenderness, No JVD, trachea midline, no bruits  Respiratory/Thorax: Patent airways,  good chest expansion, thorax symmetric, cta  Cardiovascular: Regular rate and rhythm, no murmurs, normal S 1and S 2  Gastrointestinal: Nondistended, soft, non-tender, no rebound tenderness or guarding, no masses palpable, no organomegaly, +BS, no bruits  Extremities: normal extremities, no cyanosis,  bilat groins c/d/i with dsd no s/s of hematoma  Neurological: alert and oriented x3, intact senses, motor, response and reflexes, normal strength  Psychological: Appropriate mood and behavior   Skin: Warm and dry, intact   Home Medications     Medication List      ASK your doctor about these medications     ashwagandha root extract 500 mg capsule   b complex vitamins capsule   calcium carbonate 600 mg calcium (1,500 mg) tablet   cholecalciferol 25 MCG (1000 UT) capsule; Commonly known as: Vitamin D-3   cholestyramine 4 gram packet; Commonly known as: Questran   clopidogrel 75 mg tablet; Commonly known as: Plavix; Take 1 tablet (75   mg) by mouth once daily.   coenzyme Q-10 100 mg capsule   docusate sodium 100 mg capsule; Commonly known as: Colace; Take 1   capsule (100 mg) by mouth 2 times a day.   hydrocortisone 2.5 % rectal cream; Commonly known as: Anusol-HC   MagOx 400 mg (241.3 mg magnesium) tablet; Generic drug: magnesium oxide   multivitamin tablet   ondansetron 4 mg tablet; Commonly known as: Zofran   rosuvastatin 20 mg  tablet; Commonly known as: Crestor; Take 1 tablet (20   mg) by mouth 4 times a week.   torsemide 20 mg tablet; Commonly known as: Demadex; 1 TAB Daily   turmeric-turmeric root extract 450-50 mg capsule   vitamin E succinate 268 mg (400 unit) tablet       Outpatient Follow-Up  Future Appointments   Date Time Provider Department Center   8/9/2024 11:30 AM  NEENA [id number] CARD1 [id number] [facility]   7/11/2025 10:00 AM  NEENA [id number] CARD1 [id number] [facility]       Jaquan De La Fuente, APRN-CNP

## 2024-07-10 NOTE — CARE PLAN
The patient's goals for the shift include Relief from headache    The clinical goals for the shift include patient will remain HDS this shift- progressing  Patient had chest discomfort this shift. EKG was done. Patient refused nitroglycerin d/t side effects. Patient was nauseous and having moderate abdominal pain. Elevated HOB 45 degrees. Patient remains hemodynamically stable.     Problem: Arrythmia/Dysrhythmia  Goal: Lab values return to normal range  Outcome: Progressing     Problem: Fall/Injury  Goal: Not fall by end of shift  Outcome: Progressing

## 2024-07-10 NOTE — OP NOTE
TAVR (Transcatheter AV Replacement), TVP for TAVR Operative Note     Date: 2024  OR Location: 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 Transcatheter Aortic Valve Replacement (29 mm Edge Lex Ultra SN: [id number] ) via right femoral approach.   2. Temporary balloon tip pacemaker lead placement, via right jugular approach.  3. Root Aortogram.  4. Left heart catheterization including left ventricular end diastolic pressure.  5. Pre-closure of right common femoral artery.    Surgeons   Panel 1:     * Jackson Rutherford - Primary  Panel 2:     * Jaquan Larkin - Primary    Resident/Fellow/Other Assistant:  Surgeons and Role:  * No surgeons found with a matching role *    Procedure Summary  Anesthesia: Anesthesia type not filed in the log.  ASA: ASA status not filed in the log.  Anesthesia Staff: No anesthesia staff entered.  Estimated Blood Loss: minimal  Intra-op Medications:   Administrations occurring from 1041 to 1234 on 24:   Medication Name Total Dose   ceFAZolin (Ancef) injection 2 g   midazolam (Versed) injection 2 mg   fentaNYL PF (Sublimaze) injection 100 mcg   lidocaine (Xylocaine) 20 mg/mL (2 %) injection 20 mL   heparin 1,000 unit/mL injection 9,000 Units   ondansetron (Zofran) injection 4 mg   protamine injection Cannot be calculated   iohexol (OMNIPaque) 350 mg iodine/mL solution 60 mL   lidocaine-epinephrine (Xylocaine W/EPI) 1 %-1:100,000 injection 10 mL         Intraprocedure I/O Totals       None           Specimen: No specimens collected     Staff:   Circulator:    Scrub Person:   Cortezulator: Marisol  Scrub Person: Margot         Drains and/or Catheters: * None in log *    Tourniquet Times:         Implants:  Implants       Type Name Action Serial No.      Heart Valve Repair VALVE, HEART, 29MM, DANIELLA 3 TRANSCATHETER - [id number] Implanted [id number]              Findings: Tri-leaflet aortic valve with severe senile leaflet calcification.    Deployment angle: Czech 11   Valve deployment 10% ventricular.  Post deployment TTE shows no PVL and a mean AV gradient of 2 mmHg.   29 mm Valve -1ml saline for deployment    Indications: Giuseppe Davila is an 88 y.o. 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     The patient was seen in the preoperative area. The risks, benefits, complications, treatment options, non-operative alternatives, expected recovery and outcomes were discussed with the patient. The possibilities of reaction to medication, pulmonary aspiration, injury to surrounding structures, bleeding, recurrent infection, the need for additional procedures, failure to diagnose a condition, and creating a complication requiring transfusion or operation were discussed with the patient. The patient concurred with the proposed plan, giving informed consent.  The site of surgery was properly noted/marked if necessary per policy. The patient has been actively warmed in preoperative area. Preoperative antibiotics have been ordered and given within 1 hours of incision. Venous thrombosis prophylaxis have been ordered including chemical prophylaxis    Procedure Details: After informed consent was obtained, and all questions asked and answered to the patient's satisfaction, they were brought back to the cardiology catheterization laboratory and placed on the cath lab table.  A timeout was performed with the correct patient, correct procedure, the correct laterality, timing and dosage of antibiotics, the availability of blood products, and all correct  equipment was verified as being present prior to beginning the case.    Ultrasound guidance was utilized to place a 7 [place] citizen locking sheath in the right internal jugular vein.  This was done using a modified Seldinger technique.  A balloon tipped pacing wire was then floated through to the right ventricle and locked into place.  Next fluoroscopic guidance was utilized with a micropuncture needle, to access the left femoral artery and using a modified Seldinger technique a 6 [place] citizen sheath was placed.  Next attention was placed to the right femoral artery, and using fluoroscopic guidance and a micropuncture needle, the right common femoral artery was accessed using modified Seldinger technique.  This was exchanged for an 8 [place] citizen sheath, and then 2 Pro-glide Perclose systems were placed.  Next the right common femoral artery was dilated to a 18 [place] citizen size, and a 18 [place] citizen E- sheath was placed in the common femoral artery under direct fluoroscopic guidance using a Supercore wire.  The patient was then systemically heparinized. Next a pigtail was placed in the non coronary sinus over an exchange length J wire via the right femoral artery. An aortogram was performed to verify equal plane of all three aortic valve cusps.  An 180 cm J-wire was then used to place an AR-1 catheter in the ascending aorta and a floppy tip straight wire was utilized to cross the aortic valve and AR-1 catheter was moved into the left ventricle.  An 180 cm exchange length J-wire was then utilized over the through the AR-1catheter, this was exchanged for a pigtail catheter, and then a Safari wire was placed through the pigtail catheter into the left ventricle.        The valve was then advanced on the delivery system through to the right femoral artery and descending thoracic aorta, the valve was then loaded onto the delivery balloon. The valve was then brought to the  ascending aorta, and we verified our correct projection.  The Lex Ultra  valve was then advanced across the native aortic valve, and the pusher/stablilizer, was pulled back.  The pacemaker was set at 180 bpm. The valve was deployed using balloon dilation  The nose cone was then retracted into the valve centrally, and the delivery system was removed to the descending thoracic aorta.  The pigtail was then removed from behind the cage of the Lex valve, and the delivery system was removed from the body.   Next the pigtail catheter was readvanced through the left ventricular outflow tract and simultaneous pressures in the aorta and the left ventricle showed that the previous gradient across aortic valve was resolved.  Echocardiography was performed showing no perivalvular leak, no effusion, and resolution of aortic stenosis.     Attention was then placed to the right common femoral artery, the E-sheath was removed, and the 2 previously placed Proglide Perclose devices were cinched.  The supracore wire was removed and the Perclose devices were locked.  Next attention was placed to the left femoral artery, the sheath was removed and a 6 Fr angioseal device was deployed. The IJ pacer was removed and the patient taken to the cath holding area in stable condition.   Complications:  None; patient tolerated the procedure well.    Disposition: PACU - hemodynamically stable.  Condition: stable     Jaquan Larkin MD

## 2024-07-11 LAB
ATRIAL RATE: 69 BPM
Q ONSET: 198 MS
QRS COUNT: 10 BEATS
QRS DURATION: 170 MS
QT INTERVAL: 482 MS
QTC CALCULATION(BAZETT): 482 MS
QTC FREDERICIA: 482 MS
R AXIS: -20 DEGREES
T AXIS: 115 DEGREES
T OFFSET: 439 MS
VENTRICULAR RATE: 60 BPM

## 2024-07-12 LAB
ATRIAL RATE: 57 BPM
Q ONSET: 200 MS
QRS COUNT: 10 BEATS
QRS DURATION: 166 MS
QT INTERVAL: 476 MS
QTC CALCULATION(BAZETT): 476 MS
QTC FREDERICIA: 476 MS
R AXIS: -19 DEGREES
T AXIS: 109 DEGREES
T OFFSET: 438 MS
VENTRICULAR RATE: 60 BPM

## 2024-07-13 ENCOUNTER — HOSPITAL ENCOUNTER (EMERGENCY)
Facility: HOSPITAL | Age: 89
Discharge: HOME | End: 2024-07-13
Attending: STUDENT IN AN ORGANIZED HEALTH CARE EDUCATION/TRAINING PROGRAM
Payer: MEDICARE

## 2024-07-13 ENCOUNTER — APPOINTMENT (OUTPATIENT)
Dept: RADIOLOGY | Facility: HOSPITAL | Age: 89
End: 2024-07-13
Payer: MEDICARE

## 2024-07-13 VITALS
WEIGHT: 205 LBS | SYSTOLIC BLOOD PRESSURE: 122 MMHG | TEMPERATURE: 97 F | RESPIRATION RATE: 22 BRPM | OXYGEN SATURATION: 97 % | HEART RATE: 60 BPM | BODY MASS INDEX: 28.7 KG/M2 | HEIGHT: 71 IN | DIASTOLIC BLOOD PRESSURE: 69 MMHG

## 2024-07-13 DIAGNOSIS — L03.115 CELLULITIS OF RIGHT LOWER EXTREMITY: Primary | ICD-10-CM

## 2024-07-13 LAB
ANION GAP SERPL CALC-SCNC: 10 MMOL/L (ref 10–20)
BASOPHILS # BLD AUTO: 0.03 X10*3/UL (ref 0–0.1)
BASOPHILS NFR BLD AUTO: 0.4 %
BUN SERPL-MCNC: 14 MG/DL (ref 6–23)
CALCIUM SERPL-MCNC: 8.9 MG/DL (ref 8.6–10.3)
CHLORIDE SERPL-SCNC: 100 MMOL/L (ref 98–107)
CO2 SERPL-SCNC: 30 MMOL/L (ref 21–32)
CREAT SERPL-MCNC: 1.05 MG/DL (ref 0.5–1.3)
D DIMER PPP FEU-MCNC: 1165 NG/ML FEU
EGFRCR SERPLBLD CKD-EPI 2021: 69 ML/MIN/1.73M*2
EOSINOPHIL # BLD AUTO: 0.16 X10*3/UL (ref 0–0.4)
EOSINOPHIL NFR BLD AUTO: 2.4 %
ERYTHROCYTE [DISTWIDTH] IN BLOOD BY AUTOMATED COUNT: 13.6 % (ref 11.5–14.5)
GLUCOSE SERPL-MCNC: 125 MG/DL (ref 74–99)
HCT VFR BLD AUTO: 36.8 % (ref 41–52)
HGB BLD-MCNC: 12.5 G/DL (ref 13.5–17.5)
IMM GRANULOCYTES # BLD AUTO: 0.02 X10*3/UL (ref 0–0.5)
IMM GRANULOCYTES NFR BLD AUTO: 0.3 % (ref 0–0.9)
LYMPHOCYTES # BLD AUTO: 0.97 X10*3/UL (ref 0.8–3)
LYMPHOCYTES NFR BLD AUTO: 14.5 %
MCH RBC QN AUTO: 31.9 PG (ref 26–34)
MCHC RBC AUTO-ENTMCNC: 34 G/DL (ref 32–36)
MCV RBC AUTO: 94 FL (ref 80–100)
MONOCYTES # BLD AUTO: 0.46 X10*3/UL (ref 0.05–0.8)
MONOCYTES NFR BLD AUTO: 6.9 %
NEUTROPHILS # BLD AUTO: 5.07 X10*3/UL (ref 1.6–5.5)
NEUTROPHILS NFR BLD AUTO: 75.5 %
NRBC BLD-RTO: 0 /100 WBCS (ref 0–0)
PLATELET # BLD AUTO: 117 X10*3/UL (ref 150–450)
POTASSIUM SERPL-SCNC: 3.3 MMOL/L (ref 3.5–5.3)
RBC # BLD AUTO: 3.92 X10*6/UL (ref 4.5–5.9)
SODIUM SERPL-SCNC: 137 MMOL/L (ref 136–145)
WBC # BLD AUTO: 6.7 X10*3/UL (ref 4.4–11.3)

## 2024-07-13 PROCEDURE — 2500000001 HC RX 250 WO HCPCS SELF ADMINISTERED DRUGS (ALT 637 FOR MEDICARE OP): Performed by: EMERGENCY MEDICINE

## 2024-07-13 PROCEDURE — 93970 EXTREMITY STUDY: CPT

## 2024-07-13 PROCEDURE — 99284 EMERGENCY DEPT VISIT MOD MDM: CPT | Mod: 25

## 2024-07-13 PROCEDURE — 85025 COMPLETE CBC W/AUTO DIFF WBC: CPT | Performed by: STUDENT IN AN ORGANIZED HEALTH CARE EDUCATION/TRAINING PROGRAM

## 2024-07-13 PROCEDURE — 85379 FIBRIN DEGRADATION QUANT: CPT | Performed by: STUDENT IN AN ORGANIZED HEALTH CARE EDUCATION/TRAINING PROGRAM

## 2024-07-13 PROCEDURE — 36415 COLL VENOUS BLD VENIPUNCTURE: CPT | Performed by: STUDENT IN AN ORGANIZED HEALTH CARE EDUCATION/TRAINING PROGRAM

## 2024-07-13 PROCEDURE — 80048 BASIC METABOLIC PNL TOTAL CA: CPT | Performed by: STUDENT IN AN ORGANIZED HEALTH CARE EDUCATION/TRAINING PROGRAM

## 2024-07-13 PROCEDURE — 93971 EXTREMITY STUDY: CPT | Performed by: RADIOLOGY

## 2024-07-13 RX ORDER — DOXYCYCLINE 100 MG/1
100 CAPSULE ORAL 2 TIMES DAILY
Qty: 20 CAPSULE | Refills: 0 | Status: ON HOLD | OUTPATIENT
Start: 2024-07-13 | End: 2024-07-23

## 2024-07-13 RX ORDER — DOXYCYCLINE HYCLATE 50 MG/1
100 CAPSULE ORAL ONCE
Status: COMPLETED | OUTPATIENT
Start: 2024-07-13 | End: 2024-07-13

## 2024-07-13 RX ADMIN — DOXYCYCLINE HYCLATE 100 MG: 50 CAPSULE ORAL at 22:38

## 2024-07-13 ASSESSMENT — PAIN DESCRIPTION - PAIN TYPE: TYPE: ACUTE PAIN

## 2024-07-13 ASSESSMENT — LIFESTYLE VARIABLES
HAVE PEOPLE ANNOYED YOU BY CRITICIZING YOUR DRINKING: NO
TOTAL SCORE: 0
EVER FELT BAD OR GUILTY ABOUT YOUR DRINKING: NO
EVER HAD A DRINK FIRST THING IN THE MORNING TO STEADY YOUR NERVES TO GET RID OF A HANGOVER: NO
HAVE YOU EVER FELT YOU SHOULD CUT DOWN ON YOUR DRINKING: NO

## 2024-07-13 ASSESSMENT — COLUMBIA-SUICIDE SEVERITY RATING SCALE - C-SSRS
6. HAVE YOU EVER DONE ANYTHING, STARTED TO DO ANYTHING, OR PREPARED TO DO ANYTHING TO END YOUR LIFE?: NO
1. IN THE PAST MONTH, HAVE YOU WISHED YOU WERE DEAD OR WISHED YOU COULD GO TO SLEEP AND NOT WAKE UP?: NO
2. HAVE YOU ACTUALLY HAD ANY THOUGHTS OF KILLING YOURSELF?: NO

## 2024-07-13 ASSESSMENT — PAIN DESCRIPTION - ORIENTATION: ORIENTATION: RIGHT

## 2024-07-13 ASSESSMENT — PAIN - FUNCTIONAL ASSESSMENT: PAIN_FUNCTIONAL_ASSESSMENT: 0-10

## 2024-07-13 ASSESSMENT — PAIN DESCRIPTION - DESCRIPTORS
DESCRIPTORS: BURNING;STABBING
DESCRIPTORS: SHARP;BURNING

## 2024-07-13 ASSESSMENT — PAIN DESCRIPTION - LOCATION: LOCATION: LEG

## 2024-07-13 ASSESSMENT — PAIN SCALES - GENERAL: PAINLEVEL_OUTOF10: 8

## 2024-07-13 NOTE — ED TRIAGE NOTES
PT. ARRIVED VIA PRIVATE CAR, RIDE PROVIDED, TO ED FROM HOME FOR RT LEG PAIN/SWELLING. PT. STATES HE HAD RECENT AORTIC VALVE REPLACEMENT ON 7/9, AND HAS HAD PAIN/SWELLING IN RT LEG X2DAYS. UPON ASSESSMENT PT. DOES HAVE +1PITTING EDEMA TO RT LEG, IS WARM TO TOUCH, W/ REDNESS. PT. HAS + PULSES, LIMITED ROM AT BASELINE (USES WALKER).

## 2024-07-14 NOTE — ED PROVIDER NOTES
HPI   Chief Complaint   Patient presents with    Leg Pain    Leg Swelling     PT. ARRIVED VIA PRIVATE CAR, RIDE PROVIDED, TO ED FROM HOME FOR RT LEG PAIN/SWELLING. PT. STATES HE HAD RECENT AORTIC VALVE REPLACEMENT ON 7/9, AND HAS HAD PAIN/SWELLING IN RT LEG X2DAYS. UPON ASSESSMENT PT. DOES HAVE +1PITTING EDEMA TO RT LEG, IS WARM TO TOUCH, W/ REDNESS. PT. HAS + PULSES, LIMITED ROM AT BASELINE (USES WALKER).        Past medical history includes atrial fibrillation status post watchman not on anticoagulation secondary to GI bleeding as well as status post TAVR on 7/9/2024 with Tara Rutherford and Chaya for aortic stenosis presents with right leg pain and swelling x 2 days.  Has not taken anything for this.  Denies fevers, chills, chest pain, shortness of breath, nausea, and vomiting.       History provided by:  Patient and spouse   used: No                        Patricio Coma Scale Score: 15                     Patient History   Past Medical History:   Diagnosis Date    Anorectal abscess     Anorectal abscess    Essential (primary) hypertension     Benign essential HTN    Old myocardial infarction     History of myocardial infarction    Other hyperlipidemia     Other hyperlipidemia    Personal history of malignant neoplasm, unspecified     History of malignant neoplasm    Personal history of other diseases of the circulatory system     History of coronary artery disease    Personal history of other diseases of the circulatory system     History of angina pectoris    Personal history of other diseases of the nervous system and sense organs 05/02/2019    History of polyneuropathy    Personal history of other specified conditions     History of chest pain    Personal history of other specified conditions     History of epistaxis    Personal history of other specified conditions     History of bradycardia    Personal history of transient ischemic attack (TIA), and cerebral infarction without residual  deficits     History of stroke    Personal history of transient ischemic attack (TIA), and cerebral infarction without residual deficits     History of stroke    Syncope and collapse     Near syncope    Thoracic aortic aneurysm, without rupture, unspecified (CMS-HCC)     Thoracic aortic aneurysm    Unspecified atrial fibrillation (Multi) 03/10/2020    Atrial fibrillation with slow ventricular response    Ventricular premature depolarization     Frequent PVCs     Past Surgical History:   Procedure Laterality Date    ANOMALOUS PULMONARY VENOUS RETURN REPAIR, 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    Procedure: TAVR-OR;  Surgeon: Jaquan Larkin MD;  Location: [place] [address] Cardiac Cath Lab;  Service: Cardiac Surgery;  Laterality: N/A;    CARDIAC CATHETERIZATION N/A 5/24/2024    Procedure: Left And Right Heart Cath, With LV;  Surgeon: Stepan Jones MD PhD;  Location: [facility] Cardiac Cath Lab;  Service: Cardiovascular;  Laterality: N/A;  R/LHC poss PCI    CARDIAC CATHETERIZATION N/A 7/9/2024    Procedure: TAVR (Transcatheter AV Replacement);  Surgeon: Jackson Rutherford MD;  Location: [place] [address] Cardiac Cath Lab;  Service: Cardiovascular;  Laterality: N/A;  Schedule at 10am, day, Lex 29    CARDIAC CATHETERIZATION N/A 7/9/2024    Procedure: TVP for TAVR;  Surgeon: Jackson Rutherford MD;  Location: [place] [address] Cardiac Cath Lab;  Service: Cardiovascular;  Laterality: N/A;    CHOLECYSTECTOMY  02/05/2015    Cholecystectomy    COLONOSCOPY  02/05/2015    Colonoscopy (Fiberoptic)    CORONARY ANGIOPLASTY WITH STENT PLACEMENT  05/21/2018    Cath Placement Of Stent 1    CT ABDOMEN PELVIS ANGIOGRAM W AND/OR WO IV CONTRAST  3/17/2023    CT ABDOMEN PELVIS ANGIOGRAM W AND/OR WO IV CONTRAST PAR CT    KIDNEY SURGERY  02/05/2015    Kidney Surgery    OTHER SURGICAL HISTORY  12/13/2018    Prostatectomy    OTHER SURGICAL HISTORY  05/21/2018    Recent Surgery    TONSILLECTOMY  02/05/2015    Tonsillectomy     Family History   Problem  Relation Name Age of Onset    Other (cardiac disorder) Mother      Transient ischemic attack Mother      Cancer Father      Other (cardiac disorder) Sister      Alzheimer's disease Father's Brother      Parkinsonism Father's Brother       Social History     Tobacco Use    Smoking status: Never    Smokeless tobacco: Never   Vaping Use    Vaping status: Never Used   Substance Use Topics    Alcohol use: Not Currently     Comment: rarely    Drug use: Not Currently       Physical Exam   ED Triage Vitals [07/13/24 1943]   Temperature Heart Rate Respirations BP   36.1 °C (97 °F) 71 20 105/60      Pulse Ox Temp Source Heart Rate Source Patient Position   96 % Temporal Monitor Sitting      BP Location FiO2 (%)     Right arm --       Physical Exam  Vitals and nursing note reviewed.   HENT:      Head: Atraumatic.      Mouth/Throat:      Mouth: Mucous membranes are moist.   Eyes:      Conjunctiva/sclera: Conjunctivae normal.   Cardiovascular:      Rate and Rhythm: Normal rate and regular rhythm.      Comments: Bruising noted in the right inguinal region status post TAVR; no palpable mass or thrill; no bruit auscultated.  2+ palpable DP pulse with brisk cap refill of all digits of the right lower extremity; pitting edema of the extremity from the proximal leg distally; no obvious phlegmasia.  Pulmonary:      Effort: Pulmonary effort is normal.   Abdominal:      Palpations: Abdomen is soft.      Tenderness: There is no abdominal tenderness.   Musculoskeletal:         General: No deformity.      Cervical back: Normal range of motion.      Comments: Right lower extremity: There is edema from the proximal leg distally to the foot.  There is blanchable erythema localized over the mid anterior shin.  No obvious crepitus.  No bullae.  No dusky appearing tissue.  No pain with passive range of motion.  There is tenderness over this site as well as the posterior aspect of the leg, but would not say that the tenderness is localized to the  deep venous tract.   Skin:     General: Skin is warm and dry.   Neurological:      Mental Status: He is alert.      Comments: Moving all extremities         ED Course & MDM   ED Course as of 07/13/24 2051   Sat Jul 13, 2024 2037 D-Dimer, Quantitative VTE Exclusion(!): 1,165  Will obtain US to eval for DVT [AB]      ED Course User Index  [AB] Pasquale Gale MD       Medical Decision Making  Extensive past medical history that includes CAD with h/o PCI, chronic diastolic CHF (EF 65-70% 5/2023), HTN, HLD, permanent afib not on AC due to GI bleeding s/p Watchman, mild AS, SSS  s/p PPM, 4.9 cm ascending aortic aneurysm (TTE 5/2023), PFO, CVA, GERD, anemia, BPH, and prostate CA s/p radical prostatectomy 2000 that is status post TAVR 7/9/2024 with Tara Rutherford and Chaya for aortic stenosis presents with blanchable erythema, calor, and edema of the right lower extremity.    Clinical exam seems most consistent with cellulitis.  No systemic signs of infection.  Not septic.  No concerning findings to suggest NSTI.    Given recent procedure, dimer obtained to rule evaluate for DVT.  This was elevated.  Will follow-up with ultrasound.    Considered arterial pathology, given recent procedure.  Catheterization site displays appropriate healing with bruising noted.  No palpable thrill or bruit auscultated.  Patient has 2+ DP pulse of the right lower extremity with brisk cap refill of all digits.    Ultrasound pending at the conclusion my shift.  If this is negative, patient can likely be discharged with oral antibiotics and prompt outpatient follow-up.  If this DVT ultrasound is positive, patient will likely require admission for anticoagulation, given his history of GI bleed while on anticoagulation for atrial fibrillation.        Amount and/or Complexity of Data Reviewed  External Data Reviewed: notes.     Details: Most recent discharge summary as well as operative note  Labs: ordered.        Procedure  Procedures      Pasquale Gale MD  07/13/24 1170

## 2024-07-14 NOTE — PROGRESS NOTES
Transition of care note: Ultrasound was obtained which showed no evidence of acute DVT.  Patient's labs are unremarkable.  I did discuss options with the patient he wants to trial outpatient therapy and I feel this is reasonable.  He will be started on doxycycline.    ED Course as of 07/13/24 2205   Sat Jul 13, 202413, 2024 2037 D-Dimer, Quantitative VTE Exclusion(!): 1,165  Will obtain US to eval for DVT [AB]      ED Course User Index  [AB] Pasquale Gale MD         Diagnoses as of 07/13/24 2205   Cellulitis of right lower extremity      Vitals:    07/13/24 2100   BP: 116/59   Pulse: 60   Resp: (!) 22   Temp:    SpO2: 98%     Results for orders placed or performed during the hospital encounter of 07/13/24 (from the past 24 hour(s))   CBC and Auto Differential   Result Value Ref Range    WBC 6.7 4.4 - 11.3 x10*3/uL    nRBC 0.0 0.0 - 0.0 /100 WBCs    RBC 3.92 (L) 4.50 - 5.90 x10*6/uL    Hemoglobin 12.5 (L) 13.5 - 17.5 g/dL    Hematocrit 36.8 (L) 41.0 - 52.0 %    MCV 94 80 - 100 fL    MCH 31.9 26.0 - 34.0 pg    MCHC 34.0 32.0 - 36.0 g/dL    RDW 13.6 11.5 - 14.5 %    Platelets 117 (L) 150 - 450 x10*3/uL    Neutrophils % 75.5 40.0 - 80.0 %    Immature Granulocytes %, Automated 0.3 0.0 - 0.9 %    Lymphocytes % 14.5 13.0 - 44.0 %    Monocytes % 6.9 2.0 - 10.0 %    Eosinophils % 2.4 0.0 - 6.0 %    Basophils % 0.4 0.0 - 2.0 %    Neutrophils Absolute 5.07 1.60 - 5.50 x10*3/uL    Immature Granulocytes Absolute, Automated 0.02 0.00 - 0.50 x10*3/uL    Lymphocytes Absolute 0.97 0.80 - 3.00 x10*3/uL    Monocytes Absolute 0.46 0.05 - 0.80 x10*3/uL    Eosinophils Absolute 0.16 0.00 - 0.40 x10*3/uL    Basophils Absolute 0.03 0.00 - 0.10 x10*3/uL   Basic metabolic panel   Result Value Ref Range    Glucose 125 (H) 74 - 99 mg/dL    Sodium 137 136 - 145 mmol/L    Potassium 3.3 (L) 3.5 - 5.3 mmol/L    Chloride 100 98 - 107 mmol/L    Bicarbonate 30 21 - 32 mmol/L    Anion Gap 10 10 - 20 mmol/L    Urea Nitrogen 14 6 - 23 mg/dL    Creatinine  1.05 0.50 - 1.30 mg/dL    eGFR 69 >60 mL/min/1.73m*2    Calcium 8.9 8.6 - 10.3 mg/dL   D-Dimer, VTE Exclusion   Result Value Ref Range    D-Dimer, Quantitative VTE Exclusion 1,165 (H) <=500 ng/mL FEU   Vascular US lower extremity venous duplex bilateral   Result Value Ref Range    BSA 2.16 m2      Procedures

## 2024-07-15 ENCOUNTER — TELEPHONE (OUTPATIENT)
Dept: CARDIOLOGY | Facility: HOSPITAL | Age: 89
End: 2024-07-15
Payer: MEDICARE

## 2024-07-15 DIAGNOSIS — E87.6 HYPOKALEMIA: Primary | ICD-10-CM

## 2024-07-15 DIAGNOSIS — Z95.2 S/P TAVR (TRANSCATHETER AORTIC VALVE REPLACEMENT): ICD-10-CM

## 2024-07-15 RX ORDER — POTASSIUM CHLORIDE 750 MG/1
10 TABLET, FILM COATED, EXTENDED RELEASE ORAL DAILY
Qty: 30 TABLET | Refills: 11 | Status: SHIPPED | OUTPATIENT
Start: 2024-07-15 | End: 2025-07-15

## 2024-07-16 ENCOUNTER — HOSPITAL ENCOUNTER (INPATIENT)
Facility: HOSPITAL | Age: 89
DRG: 603 | End: 2024-07-16
Attending: EMERGENCY MEDICINE | Admitting: INTERNAL MEDICINE
Payer: MEDICARE

## 2024-07-16 ENCOUNTER — CLINICAL SUPPORT (OUTPATIENT)
Dept: CARDIOLOGY | Facility: CLINIC | Age: 89
End: 2024-07-16
Payer: MEDICARE

## 2024-07-16 ENCOUNTER — APPOINTMENT (OUTPATIENT)
Dept: RADIOLOGY | Facility: HOSPITAL | Age: 89
DRG: 603 | End: 2024-07-16
Payer: MEDICARE

## 2024-07-16 DIAGNOSIS — I48.91 ATRIAL FIBRILLATION WITH SLOW VENTRICULAR RESPONSE (MULTI): ICD-10-CM

## 2024-07-16 DIAGNOSIS — Z95.0 PACEMAKER: ICD-10-CM

## 2024-07-16 DIAGNOSIS — R53.1 GENERALIZED WEAKNESS: Primary | ICD-10-CM

## 2024-07-16 DIAGNOSIS — I25.10 CORONARY ARTERY DISEASE INVOLVING NATIVE CORONARY ARTERY OF NATIVE HEART WITHOUT ANGINA PECTORIS: ICD-10-CM

## 2024-07-16 DIAGNOSIS — I50.9 ACUTE DECOMPENSATED HEART FAILURE (MULTI): ICD-10-CM

## 2024-07-16 LAB
ALBUMIN SERPL BCP-MCNC: 3.9 G/DL (ref 3.4–5)
ALP SERPL-CCNC: 67 U/L (ref 33–136)
ALT SERPL W P-5'-P-CCNC: 17 U/L (ref 10–52)
ANION GAP SERPL CALC-SCNC: 18 MMOL/L (ref 10–20)
APPEARANCE UR: ABNORMAL
APTT PPP: 27 SECONDS (ref 27–38)
AST SERPL W P-5'-P-CCNC: 40 U/L (ref 9–39)
ATRIAL RATE: 57 BPM
BASOPHILS # BLD AUTO: 0.04 X10*3/UL (ref 0–0.1)
BASOPHILS NFR BLD AUTO: 0.6 %
BILIRUB SERPL-MCNC: 0.8 MG/DL (ref 0–1.2)
BILIRUB UR STRIP.AUTO-MCNC: NEGATIVE MG/DL
BNP SERPL-MCNC: 225 PG/ML (ref 0–99)
BUN SERPL-MCNC: 11 MG/DL (ref 6–23)
CALCIUM SERPL-MCNC: 9.2 MG/DL (ref 8.6–10.3)
CARDIAC TROPONIN I PNL SERPL HS: 37 NG/L (ref 0–20)
CARDIAC TROPONIN I PNL SERPL HS: 42 NG/L (ref 0–20)
CHLORIDE SERPL-SCNC: 105 MMOL/L (ref 98–107)
CO2 SERPL-SCNC: 20 MMOL/L (ref 21–32)
COLOR UR: ABNORMAL
CREAT SERPL-MCNC: 0.99 MG/DL (ref 0.5–1.3)
CRP SERPL-MCNC: 5.31 MG/DL
EGFRCR SERPLBLD CKD-EPI 2021: 74 ML/MIN/1.73M*2
EOSINOPHIL # BLD AUTO: 0.17 X10*3/UL (ref 0–0.4)
EOSINOPHIL NFR BLD AUTO: 2.7 %
ERYTHROCYTE [DISTWIDTH] IN BLOOD BY AUTOMATED COUNT: 13.5 % (ref 11.5–14.5)
ERYTHROCYTE [SEDIMENTATION RATE] IN BLOOD BY WESTERGREN METHOD: 62 MM/H (ref 0–20)
GLUCOSE SERPL-MCNC: 111 MG/DL (ref 74–99)
GLUCOSE UR STRIP.AUTO-MCNC: NORMAL MG/DL
HCT VFR BLD AUTO: 39.4 % (ref 41–52)
HGB BLD-MCNC: 13.3 G/DL (ref 13.5–17.5)
IMM GRANULOCYTES # BLD AUTO: 0.03 X10*3/UL (ref 0–0.5)
IMM GRANULOCYTES NFR BLD AUTO: 0.5 % (ref 0–0.9)
INR PPP: 1 (ref 0.9–1.1)
KETONES UR STRIP.AUTO-MCNC: NEGATIVE MG/DL
LACTATE SERPL-SCNC: 2.3 MMOL/L (ref 0.4–2)
LEUKOCYTE ESTERASE UR QL STRIP.AUTO: NEGATIVE
LIPASE SERPL-CCNC: 18 U/L (ref 9–82)
LYMPHOCYTES # BLD AUTO: 1.23 X10*3/UL (ref 0.8–3)
LYMPHOCYTES NFR BLD AUTO: 19.6 %
MAGNESIUM SERPL-MCNC: 1.95 MG/DL (ref 1.6–2.4)
MCH RBC QN AUTO: 31.3 PG (ref 26–34)
MCHC RBC AUTO-ENTMCNC: 33.8 G/DL (ref 32–36)
MCV RBC AUTO: 93 FL (ref 80–100)
MONOCYTES # BLD AUTO: 0.49 X10*3/UL (ref 0.05–0.8)
MONOCYTES NFR BLD AUTO: 7.8 %
NEUTROPHILS # BLD AUTO: 4.33 X10*3/UL (ref 1.6–5.5)
NEUTROPHILS NFR BLD AUTO: 68.8 %
NITRITE UR QL STRIP.AUTO: NEGATIVE
NRBC BLD-RTO: 0 /100 WBCS (ref 0–0)
PH UR STRIP.AUTO: 8 [PH]
PLATELET # BLD AUTO: 163 X10*3/UL (ref 150–450)
POTASSIUM SERPL-SCNC: 4 MMOL/L (ref 3.5–5.3)
PROT SERPL-MCNC: 7.2 G/DL (ref 6.4–8.2)
PROT UR STRIP.AUTO-MCNC: NEGATIVE MG/DL
PROTHROMBIN TIME: 11.4 SECONDS (ref 9.8–12.8)
Q ONSET: 200 MS
QRS COUNT: 10 BEATS
QRS DURATION: 166 MS
QT INTERVAL: 476 MS
QTC CALCULATION(BAZETT): 476 MS
QTC FREDERICIA: 476 MS
R AXIS: -19 DEGREES
RBC # BLD AUTO: 4.25 X10*6/UL (ref 4.5–5.9)
RBC # UR STRIP.AUTO: NEGATIVE /UL
SODIUM SERPL-SCNC: 139 MMOL/L (ref 136–145)
SP GR UR STRIP.AUTO: 1.01
T AXIS: 109 DEGREES
T OFFSET: 438 MS
UROBILINOGEN UR STRIP.AUTO-MCNC: NORMAL MG/DL
VENTRICULAR RATE: 60 BPM
WBC # BLD AUTO: 6.3 X10*3/UL (ref 4.4–11.3)

## 2024-07-16 PROCEDURE — G0378 HOSPITAL OBSERVATION PER HR: HCPCS

## 2024-07-16 PROCEDURE — 36415 COLL VENOUS BLD VENIPUNCTURE: CPT | Performed by: EMERGENCY MEDICINE

## 2024-07-16 PROCEDURE — 83880 ASSAY OF NATRIURETIC PEPTIDE: CPT | Performed by: EMERGENCY MEDICINE

## 2024-07-16 PROCEDURE — 85730 THROMBOPLASTIN TIME PARTIAL: CPT | Performed by: EMERGENCY MEDICINE

## 2024-07-16 PROCEDURE — 96361 HYDRATE IV INFUSION ADD-ON: CPT

## 2024-07-16 PROCEDURE — 71045 X-RAY EXAM CHEST 1 VIEW: CPT | Performed by: RADIOLOGY

## 2024-07-16 PROCEDURE — 80053 COMPREHEN METABOLIC PANEL: CPT | Performed by: EMERGENCY MEDICINE

## 2024-07-16 PROCEDURE — 86140 C-REACTIVE PROTEIN: CPT

## 2024-07-16 PROCEDURE — 71045 X-RAY EXAM CHEST 1 VIEW: CPT

## 2024-07-16 PROCEDURE — 85025 COMPLETE CBC W/AUTO DIFF WBC: CPT | Performed by: EMERGENCY MEDICINE

## 2024-07-16 PROCEDURE — 93000 ELECTROCARDIOGRAM COMPLETE: CPT | Performed by: INTERNAL MEDICINE

## 2024-07-16 PROCEDURE — 85652 RBC SED RATE AUTOMATED: CPT

## 2024-07-16 PROCEDURE — 83605 ASSAY OF LACTIC ACID: CPT | Performed by: EMERGENCY MEDICINE

## 2024-07-16 PROCEDURE — 2500000001 HC RX 250 WO HCPCS SELF ADMINISTERED DRUGS (ALT 637 FOR MEDICARE OP)

## 2024-07-16 PROCEDURE — 85610 PROTHROMBIN TIME: CPT | Performed by: EMERGENCY MEDICINE

## 2024-07-16 PROCEDURE — 96372 THER/PROPH/DIAG INJ SC/IM: CPT

## 2024-07-16 PROCEDURE — 84484 ASSAY OF TROPONIN QUANT: CPT | Performed by: EMERGENCY MEDICINE

## 2024-07-16 PROCEDURE — 96375 TX/PRO/DX INJ NEW DRUG ADDON: CPT

## 2024-07-16 PROCEDURE — 81003 URINALYSIS AUTO W/O SCOPE: CPT | Performed by: EMERGENCY MEDICINE

## 2024-07-16 PROCEDURE — 2500000004 HC RX 250 GENERAL PHARMACY W/ HCPCS (ALT 636 FOR OP/ED): Mod: JZ

## 2024-07-16 PROCEDURE — 83690 ASSAY OF LIPASE: CPT | Performed by: EMERGENCY MEDICINE

## 2024-07-16 PROCEDURE — 2500000004 HC RX 250 GENERAL PHARMACY W/ HCPCS (ALT 636 FOR OP/ED): Performed by: EMERGENCY MEDICINE

## 2024-07-16 PROCEDURE — 83735 ASSAY OF MAGNESIUM: CPT | Performed by: EMERGENCY MEDICINE

## 2024-07-16 PROCEDURE — 99285 EMERGENCY DEPT VISIT HI MDM: CPT

## 2024-07-16 RX ORDER — VANCOMYCIN HYDROCHLORIDE 1 G/20ML
INJECTION, POWDER, LYOPHILIZED, FOR SOLUTION INTRAVENOUS DAILY PRN
Status: DISCONTINUED | OUTPATIENT
Start: 2024-07-16 | End: 2024-07-18

## 2024-07-16 RX ORDER — ONDANSETRON HYDROCHLORIDE 2 MG/ML
4 INJECTION, SOLUTION INTRAVENOUS ONCE
Status: COMPLETED | OUTPATIENT
Start: 2024-07-16 | End: 2024-07-16

## 2024-07-16 RX ORDER — ROSUVASTATIN CALCIUM 10 MG/1
20 TABLET, COATED ORAL
Status: DISCONTINUED | OUTPATIENT
Start: 2024-07-17 | End: 2024-07-22 | Stop reason: HOSPADM

## 2024-07-16 RX ORDER — HEPARIN SODIUM 5000 [USP'U]/ML
5000 INJECTION, SOLUTION INTRAVENOUS; SUBCUTANEOUS EVERY 8 HOURS
Status: DISCONTINUED | OUTPATIENT
Start: 2024-07-16 | End: 2024-07-22 | Stop reason: HOSPADM

## 2024-07-16 RX ORDER — ACETAMINOPHEN 160 MG/5ML
650 SOLUTION ORAL EVERY 4 HOURS PRN
Status: DISCONTINUED | OUTPATIENT
Start: 2024-07-16 | End: 2024-07-22 | Stop reason: HOSPADM

## 2024-07-16 RX ORDER — TORSEMIDE 20 MG/1
20 TABLET ORAL
Status: DISCONTINUED | OUTPATIENT
Start: 2024-07-17 | End: 2024-07-22 | Stop reason: HOSPADM

## 2024-07-16 RX ORDER — CHOLESTYRAMINE 4 G/4.8G
4 POWDER, FOR SUSPENSION ORAL EVERY OTHER DAY
Status: DISCONTINUED | OUTPATIENT
Start: 2024-07-17 | End: 2024-07-22 | Stop reason: HOSPADM

## 2024-07-16 RX ORDER — ACETAMINOPHEN 325 MG/1
650 TABLET ORAL EVERY 4 HOURS PRN
Status: DISCONTINUED | OUTPATIENT
Start: 2024-07-16 | End: 2024-07-22 | Stop reason: HOSPADM

## 2024-07-16 RX ORDER — LOSARTAN POTASSIUM 25 MG/1
25 TABLET ORAL DAILY
Status: DISCONTINUED | OUTPATIENT
Start: 2024-07-16 | End: 2024-07-22 | Stop reason: HOSPADM

## 2024-07-16 RX ORDER — CLOPIDOGREL BISULFATE 75 MG/1
75 TABLET ORAL DAILY
Status: DISCONTINUED | OUTPATIENT
Start: 2024-07-16 | End: 2024-07-22 | Stop reason: HOSPADM

## 2024-07-16 RX ORDER — ACETAMINOPHEN 650 MG/1
650 SUPPOSITORY RECTAL EVERY 4 HOURS PRN
Status: DISCONTINUED | OUTPATIENT
Start: 2024-07-16 | End: 2024-07-22 | Stop reason: HOSPADM

## 2024-07-16 SDOH — SOCIAL STABILITY: SOCIAL INSECURITY: ARE YOU OR HAVE YOU BEEN THREATENED OR ABUSED PHYSICALLY, EMOTIONALLY, OR SEXUALLY BY ANYONE?: NO

## 2024-07-16 SDOH — SOCIAL STABILITY: SOCIAL INSECURITY: DOES ANYONE TRY TO KEEP YOU FROM HAVING/CONTACTING OTHER FRIENDS OR DOING THINGS OUTSIDE YOUR HOME?: NO

## 2024-07-16 SDOH — SOCIAL STABILITY: SOCIAL INSECURITY: ARE THERE ANY APPARENT SIGNS OF INJURIES/BEHAVIORS THAT COULD BE RELATED TO ABUSE/NEGLECT?: NO

## 2024-07-16 SDOH — SOCIAL STABILITY: SOCIAL INSECURITY: HAVE YOU HAD ANY THOUGHTS OF HARMING ANYONE ELSE?: NO

## 2024-07-16 SDOH — SOCIAL STABILITY: SOCIAL INSECURITY: DO YOU FEEL ANYONE HAS EXPLOITED OR TAKEN ADVANTAGE OF YOU FINANCIALLY OR OF YOUR PERSONAL PROPERTY?: NO

## 2024-07-16 SDOH — SOCIAL STABILITY: SOCIAL INSECURITY: ABUSE: ADULT

## 2024-07-16 SDOH — SOCIAL STABILITY: SOCIAL INSECURITY: WERE YOU ABLE TO COMPLETE ALL THE BEHAVIORAL HEALTH SCREENINGS?: YES

## 2024-07-16 SDOH — SOCIAL STABILITY: SOCIAL INSECURITY: HAVE YOU HAD THOUGHTS OF HARMING ANYONE ELSE?: NO

## 2024-07-16 SDOH — SOCIAL STABILITY: SOCIAL INSECURITY: HAS ANYONE EVER THREATENED TO HURT YOUR FAMILY OR YOUR PETS?: NO

## 2024-07-16 SDOH — SOCIAL STABILITY: SOCIAL INSECURITY: DO YOU FEEL UNSAFE GOING BACK TO THE PLACE WHERE YOU ARE LIVING?: NO

## 2024-07-16 ASSESSMENT — COLUMBIA-SUICIDE SEVERITY RATING SCALE - C-SSRS
2. HAVE YOU ACTUALLY HAD ANY THOUGHTS OF KILLING YOURSELF?: NO
2. HAVE YOU ACTUALLY HAD ANY THOUGHTS OF KILLING YOURSELF?: NO
1. IN THE PAST MONTH, HAVE YOU WISHED YOU WERE DEAD OR WISHED YOU COULD GO TO SLEEP AND NOT WAKE UP?: NO
1. IN THE PAST MONTH, HAVE YOU WISHED YOU WERE DEAD OR WISHED YOU COULD GO TO SLEEP AND NOT WAKE UP?: NO
6. HAVE YOU EVER DONE ANYTHING, STARTED TO DO ANYTHING, OR PREPARED TO DO ANYTHING TO END YOUR LIFE?: NO
6. HAVE YOU EVER DONE ANYTHING, STARTED TO DO ANYTHING, OR PREPARED TO DO ANYTHING TO END YOUR LIFE?: NO

## 2024-07-16 ASSESSMENT — COGNITIVE AND FUNCTIONAL STATUS - GENERAL
HELP NEEDED FOR BATHING: A LITTLE
TOILETING: A LITTLE
STANDING UP FROM CHAIR USING ARMS: A LITTLE
TURNING FROM BACK TO SIDE WHILE IN FLAT BAD: A LITTLE
MOVING TO AND FROM BED TO CHAIR: A LITTLE
DAILY ACTIVITIY SCORE: 21
PATIENT BASELINE BEDBOUND: NO
DAILY ACTIVITIY SCORE: 21
WALKING IN HOSPITAL ROOM: A LOT
WALKING IN HOSPITAL ROOM: A LOT
TOILETING: A LITTLE
TURNING FROM BACK TO SIDE WHILE IN FLAT BAD: A LITTLE
DRESSING REGULAR LOWER BODY CLOTHING: A LITTLE
DRESSING REGULAR LOWER BODY CLOTHING: A LITTLE
MOVING TO AND FROM BED TO CHAIR: A LITTLE
MOBILITY SCORE: 17
HELP NEEDED FOR BATHING: A LITTLE
MOBILITY SCORE: 17
CLIMB 3 TO 5 STEPS WITH RAILING: A LOT
CLIMB 3 TO 5 STEPS WITH RAILING: A LOT
STANDING UP FROM CHAIR USING ARMS: A LITTLE

## 2024-07-16 ASSESSMENT — ACTIVITIES OF DAILY LIVING (ADL)
ADEQUATE_TO_COMPLETE_ADL: YES
HEARING - RIGHT EAR: HEARING AID
JUDGMENT_ADEQUATE_SAFELY_COMPLETE_DAILY_ACTIVITIES: YES
HEARING - LEFT EAR: HEARING AID
GROOMING: INDEPENDENT
LACK_OF_TRANSPORTATION: NO
ASSISTIVE_DEVICE: WALKER
FEEDING YOURSELF: INDEPENDENT
DRESSING YOURSELF: NEEDS ASSISTANCE
TOILETING: NEEDS ASSISTANCE
PATIENT'S MEMORY ADEQUATE TO SAFELY COMPLETE DAILY ACTIVITIES?: YES
WALKS IN HOME: NEEDS ASSISTANCE
BATHING: NEEDS ASSISTANCE

## 2024-07-16 ASSESSMENT — PATIENT HEALTH QUESTIONNAIRE - PHQ9
SUM OF ALL RESPONSES TO PHQ9 QUESTIONS 1 & 2: 0
2. FEELING DOWN, DEPRESSED OR HOPELESS: NOT AT ALL
1. LITTLE INTEREST OR PLEASURE IN DOING THINGS: NOT AT ALL

## 2024-07-16 ASSESSMENT — LIFESTYLE VARIABLES
HOW MANY STANDARD DRINKS CONTAINING ALCOHOL DO YOU HAVE ON A TYPICAL DAY: PATIENT DOES NOT DRINK
PRESCIPTION_ABUSE_PAST_12_MONTHS: NO
SUBSTANCE_ABUSE_PAST_12_MONTHS: NO
HOW OFTEN DO YOU HAVE 6 OR MORE DRINKS ON ONE OCCASION: NEVER
SKIP TO QUESTIONS 9-10: 1
AUDIT-C TOTAL SCORE: 0
HOW OFTEN DO YOU HAVE A DRINK CONTAINING ALCOHOL: NEVER
AUDIT-C TOTAL SCORE: 0

## 2024-07-16 ASSESSMENT — PAIN SCALES - GENERAL
PAINLEVEL_OUTOF10: 0 - NO PAIN
PAINLEVEL_OUTOF10: 0 - NO PAIN

## 2024-07-16 ASSESSMENT — PAIN - FUNCTIONAL ASSESSMENT: PAIN_FUNCTIONAL_ASSESSMENT: UNABLE TO SELF-REPORT

## 2024-07-16 NOTE — CARE PLAN
The patient's goals for the shift include      The clinical goals for the shift include Pt will remain safe and free from injury throughout shift    Over the shift, the patient did not make progress toward the following goals. Barriers to progression include acute illness. Recommendations to address these barriers include communication.

## 2024-07-16 NOTE — ED PROVIDER NOTES
HPI   Chief Complaint   Patient presents with    Weakness, Gen     PT. BROUGHT TO ED BY WIFE, SENT FROM DR. CARROLL'S OFFICE. WIFE STATES PT. HAD AORTIC VALVE SURGERY LAST WEEK, NOW C/O NAUSEA/VOMITING, WEAKNESS. WIFE STATES PT. SEEN IN ED ON SATURDAY FOR SAME, C/O PAIN AND SWELLING TO RIGHT LEG AND CHEST. PT. NOT ANSWERING QUESTIONS IN TRIAGE, JUST STATES THAT DR. CARROLL SAYS HE'S BLEEDING FROM SOMEWHERE.        Patient is an 88-year-old male, medical history significant for hypertension, prior MI, recent TAVR, presents the emergency department with generalized weakness, malaise, and right lower extremity edema.  Patient was actually here Saturday evening.  At that point, he did have an ultrasound.  There is no evidence of blood clot.  Patient was started on oral antibiotics.  He states despite this, he has been feeling worse.  He does admit to some dyspnea, generalized malaise, and weakness.  He states he is a hard time standing because he is feeling weak.  He also describes some shortness of breath.  Patient did call his cardiologist and was sent in to the emergency department for further evaluation.              Patient History   Past Medical History:   Diagnosis Date    Anorectal abscess     Anorectal abscess    Essential (primary) hypertension     Benign essential HTN    Old myocardial infarction     History of myocardial infarction    Other hyperlipidemia     Other hyperlipidemia    Personal history of malignant neoplasm, unspecified     History of malignant neoplasm    Personal history of other diseases of the circulatory system     History of coronary artery disease    Personal history of other diseases of the circulatory system     History of angina pectoris    Personal history of other diseases of the nervous system and sense organs 05/02/2019    History of polyneuropathy    Personal history of other specified conditions     History of chest pain    Personal history of other specified conditions     History  of epistaxis    Personal history of other specified conditions     History of bradycardia    Personal history of transient ischemic attack (TIA), and cerebral infarction without residual deficits     History of stroke    Personal history of transient ischemic attack (TIA), and cerebral infarction without residual deficits     History of stroke    Syncope and collapse     Near syncope    Thoracic aortic aneurysm, without rupture, unspecified (CMS-[facility])     Thoracic aortic aneurysm    Unspecified atrial fibrillation (Multi) 03/10/2020    Atrial fibrillation with slow ventricular response    Ventricular premature depolarization     Frequent PVCs     Past Surgical History:   Procedure Laterality Date    ANOMALOUS PULMONARY VENOUS RETURN REPAIR, 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    Procedure: TAVR-OR;  Surgeon: Jaquan Larkin MD;  Location: [place] [address] Cardiac Cath Lab;  Service: Cardiac Surgery;  Laterality: N/A;    CARDIAC CATHETERIZATION N/A 5/24/2024    Procedure: Left And Right Heart Cath, With LV;  Surgeon: Stepan Jones MD PhD;  Location: [facility] Cardiac Cath Lab;  Service: Cardiovascular;  Laterality: N/A;  R/LHC poss PCI    CARDIAC CATHETERIZATION N/A 7/9/2024    Procedure: TAVR (Transcatheter AV Replacement);  Surgeon: Jackson Rutherford MD;  Location: [place] [address] Cardiac Cath Lab;  Service: Cardiovascular;  Laterality: N/A;  Schedule at 10am, dya, Lex 29    CARDIAC CATHETERIZATION N/A 7/9/2024    Procedure: TVP for TAVR;  Surgeon: Jackson Rutherford MD;  Location: [place] [address] Cardiac Cath Lab;  Service: Cardiovascular;  Laterality: N/A;    CHOLECYSTECTOMY  02/05/2015    Cholecystectomy    COLONOSCOPY  02/05/2015    Colonoscopy (Fiberoptic)    CORONARY ANGIOPLASTY WITH STENT PLACEMENT  05/21/2018    Cath Placement Of Stent 1    CT ABDOMEN PELVIS ANGIOGRAM W AND/OR WO IV CONTRAST  3/17/2023    CT ABDOMEN PELVIS ANGIOGRAM W AND/OR WO IV CONTRAST PAR CT    KIDNEY SURGERY  02/05/2015    Kidney Surgery     OTHER SURGICAL HISTORY  12/13/2018    Prostatectomy    OTHER SURGICAL HISTORY  05/21/2018    Recent Surgery    TONSILLECTOMY  02/05/2015    Tonsillectomy     Family History   Problem Relation Name Age of Onset    Other (cardiac disorder) Mother      Transient ischemic attack Mother      Cancer Father      Other (cardiac disorder) Sister      Alzheimer's disease Father's Brother      Parkinsonism Father's Brother       Social History     Tobacco Use    Smoking status: Never    Smokeless tobacco: Never   Vaping Use    Vaping status: Never Used   Substance Use Topics    Alcohol use: Not Currently     Comment: rarely    Drug use: Not Currently       Physical Exam   ED Triage Vitals [07/16/24 1029]   Temperature Heart Rate Respirations BP   35.5 °C (95.9 °F) 57 16 (!) 133/97      Pulse Ox Temp Source Heart Rate Source Patient Position   100 % Temporal Monitor Sitting      BP Location FiO2 (%)     Left arm --       Physical Exam  Vitals and nursing note reviewed.   Constitutional:       General: He is not in acute distress.     Appearance: He is well-developed.   HENT:      Head: Normocephalic and atraumatic.   Eyes:      Extraocular Movements: Extraocular movements intact.      Conjunctiva/sclera: Conjunctivae normal.      Pupils: Pupils are equal, round, and reactive to light.   Cardiovascular:      Rate and Rhythm: Normal rate and regular rhythm.      Heart sounds: Murmur heard.   Pulmonary:      Effort: Pulmonary effort is normal. No respiratory distress.      Breath sounds: Rales present.   Abdominal:      Palpations: Abdomen is soft.      Tenderness: There is no abdominal tenderness.   Musculoskeletal:         General: No swelling.      Cervical back: Neck supple.      Right lower leg: Edema present.   Skin:     General: Skin is warm and dry.      Capillary Refill: Capillary refill takes less than 2 seconds.   Neurological:      General: No focal deficit present.      Mental Status: He is alert and oriented to  person, place, and time.   Psychiatric:         Mood and Affect: Mood normal.           ED Course & MDM   ED Course as of 07/16/24 1202   Tue Jul 16, 2024   1111 Hemogram stable. [MK]   1141 High sensitive troponin mildly elevated at 42. [MK]   1141 Chest x-ray does demonstrate changes consistent with TAVR and mild cardiomegaly.  No evidence of volume overload. [MK]   1156 Metabolic panel does demonstrate mild diminished bicarb.  Potassium normal.  Magnesium normal.  Lipase normal. [MK]   1159 BNP mildly elevated at 225. [MK]      ED Course User Index  [MK] Devin Augustin MD         Diagnoses as of 07/16/24 1202   Generalized weakness   Acute decompensated heart failure (Multi)                       Patricio Coma Scale Score: 15                        Medical Decision Making  Medical Decision Making: Patient arrives with multiple complaints.  He had increasing weakness, lower extremity edema, generalized malaise.  X-ray does not show any significant volume overload, but the patient does have bilateral pitting edema.  He has been on oral antibiotics with little improvement.  Metabolic workup pursued.  Troponin mildly elevated.  BNP mildly elevated.  X-ray does not show any significant heart failure.  However, given patient's edema, dyspnea, and malaise along with recent TAVR I do feel that he would benefit from observation for echo and cardiology consultation.  Family is comfortable with plan of care.    EKG interpreted by myself (ED attending physician): Sinus rhythm.  Rate of 80.  Left bundle branch block.  Left axis deviation.  No acute ischemia.  No STEMI.    Differential Diagnoses Considered: CHF, pneumonia, sepsis, UTI    Chronic Medical Conditions Significantly Affecting Care: History of A-fib, recent TAVR    External Records Reviewed: I reviewed recent and relevant outside records including: Emergency department workup from 2 days ago    Independent Interpretation of Studies:  I independently interpreted:  Chest x-ray obtained and reviewed    Escalation of Care:  Appropriate for observation    Social Determinants of Health Significantly Affecting Care:  No social determinant    Prescription Drug Consideration: Gentle fluids, antiemetic    Diagnostic testing considered: Noncontributory    Discussion of Management with Other Providers:   I discussed the patient/results with: Admitting provider agrees plan of care          Procedure  Procedures     Devin Augustin MD  07/16/24 8032

## 2024-07-16 NOTE — PROGRESS NOTES
EKG done and patient looking pale and now nauseated and very clammy. /80. Patients right leg very swollen also. Discussed EKG and patient status with Dr. Carmichael, he came and looked at the patient and said he needed to go to the ED, for afib, anemia, and swollen Rt leg. Patient taken to ED and wife with him.

## 2024-07-16 NOTE — ED TRIAGE NOTES
PT. BROUGHT TO ED BY WIFE, SENT FROM DR. CRAROLL'S OFFICE. WIFE STATES PT. HAD AORTIC VALVE SURGERY LAST WEEK, NOW C/O NAUSEA/VOMITING, WEAKNESS. WIFE STATES PT. SEEN IN ED ON SATURDAY FOR SAME, C/O PAIN AND SWELLING TO RIGHT LEG AND CHEST. PT. NOT ANSWERING QUESTIONS IN TRIAGE, JUST STATES THAT DR. CARROLL SAYS HE'S BLEEDING FROM SOMEWHERE.

## 2024-07-16 NOTE — CONSULTS
Vancomycin Dosing by Pharmacy- INITIAL    Giuseppe Davila is a 88 y.o. year old male who Pharmacy has been consulted for vancomycin dosing for cellulitis, skin and soft tissue. Based on the patient's indication and renal status this patient will be dosed based on a goal AUC of 400-600.     Renal function is currently stable.    Visit Vitals  /78   Pulse 60   Temp 36 °C (96.8 °F)   Resp 18        Lab Results   Component Value Date    CREATININE 0.99 2024    CREATININE 1.05 2024    CREATININE 0.85 07/10/2024    CREATININE 0.98 2024        Patient weight is as follows:   Vitals:    24 1029   Weight: 89.8 kg (198 lb)       Cultures:  No results found for the encounter in last 14 days.        No intake/output data recorded.  I/O during current shift:  I/O this shift:  In: 250 [IV Piggyback:250]  Out: -     Temp (24hrs), Av.8 °C (96.4 °F), Min:35.5 °C (95.9 °F), Max:36 °C (96.8 °F)         Assessment/Plan     Patient will not be given a loading dose.  Will initiate vancomycin maintenance,  1500 mg every 24 hours. Starting today at 1800.    This dosing regimen is predicted by [employer] to result in the following pharmacokinetic parameters:  Loading dose: N/A  Regimen: 1500 mg IV every 24 hours.  Start time: 15:56 on 2024  Exposure target: AUC24 (range)400-600 mg/L.hr   AUC24,ss: 501 mg/L.hr  Probability of AUC24 > 400: 75 %  Ctrough,ss: 14.9 mg/L  Probability of Ctrough,ss > 20: 23 %  Probability of nephrotoxicity (Lodise RUBEN ): 10 %      Follow-up level will be ordered on  at 0500 unless clinically indicated sooner.  Will continue to monitor renal function daily while on vancomycin and order serum creatinine at least every 48 hours if not already ordered.  Follow for continued vancomycin needs, clinical response, and signs/symptoms of toxicity.       Yu Brody, PharmD

## 2024-07-16 NOTE — H&P
Subjective/History     Giuseppe Davila is a 88 y.o. male with a history of hypertension, A-fib status post Watchman, HFpEF, prior TIA, CAD, aortic stenosis status post TAVR 7/9/2024 with Dr. Harris presented on 7/16/2024 with generalized weakness, malaise and right lower extremity edema.  He actually presented to the ED on 7/13 for similar symptoms and lower extremity ultrasound was performed which was negative for DVT and the patient was discharged with antibiotics for cellulitis.  He presents again today with worsening fatigue and dyspnea.  States that ever since his procedure he has been feeling unwell he has had constant right leg pain, redness and swelling that has not improved in the weeks since his procedure.  He also notes an episode of lightheadedness, sweating, and nausea while sitting on his porch reading over the weekend, with a similar presentation today at his follow-up cardiology appointment.  His cardiologist, Dr. Carmichael, saw him in the office and told him he needed to come to the ED for evaluation for his A-fib.  He endorses diaphoresis and lightheadedness.  Denies chest pain, palpitations, abdominal pain, constipation, diarrhea.      ED Course: Vitals largely stable.  EKG showing normal sinus rhythm at 80 bpm with left bundle branch block and no ischemic signs.  Labs significant for slightly elevated lactate at 2.3, electrolytes and CBC largely unremarkable.  BNP is actually decreased at 225 compared to 467 2 months ago.  Chest x-ray also unremarkable.  Patient was given as needed Zofran for nausea.    Surgical history: As below  Family history: As below  Risk/Social factors:     Past Medical History:   Diagnosis Date    Anorectal abscess     Anorectal abscess    Essential (primary) hypertension     Benign essential HTN    Old myocardial infarction     History of myocardial infarction    Other hyperlipidemia     Other hyperlipidemia    Personal history of malignant neoplasm, unspecified      History of malignant neoplasm    Personal history of other diseases of the circulatory system     History of coronary artery disease    Personal history of other diseases of the circulatory system     History of angina pectoris    Personal history of other diseases of the nervous system and sense organs 05/02/2019    History of polyneuropathy    Personal history of other specified conditions     History of chest pain    Personal history of other specified conditions     History of epistaxis    Personal history of other specified conditions     History of bradycardia    Personal history of transient ischemic attack (TIA), and cerebral infarction without residual deficits     History of stroke    Personal history of transient ischemic attack (TIA), and cerebral infarction without residual deficits     History of stroke    Syncope and collapse     Near syncope    Thoracic aortic aneurysm, without rupture, unspecified (CMS-[facility])     Thoracic aortic aneurysm    Unspecified atrial fibrillation (Multi) 03/10/2020    Atrial fibrillation with slow ventricular response    Ventricular premature depolarization     Frequent PVCs       Past Surgical History:   Procedure Laterality Date    ANOMALOUS PULMONARY VENOUS RETURN REPAIR, 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    Procedure: TAVR-OR;  Surgeon: Jaquan Larkin MD;  Location: [place] [address] Cardiac Cath Lab;  Service: Cardiac Surgery;  Laterality: N/A;    CARDIAC CATHETERIZATION N/A 5/24/2024    Procedure: Left And Right Heart Cath, With LV;  Surgeon: Stepan Jones MD PhD;  Location: [facility] Cardiac Cath Lab;  Service: Cardiovascular;  Laterality: N/A;  R/LHC poss PCI    CARDIAC CATHETERIZATION N/A 7/9/2024    Procedure: TAVR (Transcatheter AV Replacement);  Surgeon: Jackson Rutherford MD;  Location: [place] [address] Cardiac Cath Lab;  Service: Cardiovascular;  Laterality: N/A;  Schedule at 10am, day, Lex 29    CARDIAC CATHETERIZATION N/A 7/9/2024    Procedure: TVP for TAVR;  Surgeon:  Jackson Rutherford MD;  Location: [place] [address] Cardiac Cath Lab;  Service: Cardiovascular;  Laterality: N/A;    CHOLECYSTECTOMY  02/05/2015    Cholecystectomy    COLONOSCOPY  02/05/2015    Colonoscopy (Fiberoptic)    CORONARY ANGIOPLASTY WITH STENT PLACEMENT  05/21/2018    Cath Placement Of Stent 1    CT ABDOMEN PELVIS ANGIOGRAM W AND/OR WO IV CONTRAST  3/17/2023    CT ABDOMEN PELVIS ANGIOGRAM W AND/OR WO IV CONTRAST PAR CT    KIDNEY SURGERY  02/05/2015    Kidney Surgery    OTHER SURGICAL HISTORY  12/13/2018    Prostatectomy    OTHER SURGICAL HISTORY  05/21/2018    Recent Surgery    TONSILLECTOMY  02/05/2015    Tonsillectomy       Family history:family history includes Alzheimer's disease in his father's brother; Cancer in his father; Parkinsonism in his father's brother; Transient ischemic attack in his mother; cardiac disorder in his mother and sister.    Objective     Physical Exam  Vitals reviewed.   Constitutional:       Appearance: Normal appearance.   Cardiovascular:      Rate and Rhythm: Normal rate and regular rhythm.      Heart sounds: No murmur heard.  Pulmonary:      Effort: Pulmonary effort is normal.      Breath sounds: Normal breath sounds.   Abdominal:      General: Abdomen is flat.      Palpations: Abdomen is soft.   Musculoskeletal:      Right lower leg: No edema.      Left lower leg: No edema.   Skin:     Findings: Erythema (Right lower extremity) present.   Neurological:      General: No focal deficit present.      Mental Status: He is alert and oriented to person, place, and time.       Last Recorded Vitals  Blood pressure 160/82, pulse 60, temperature 35.5 °C (95.9 °F), temperature source Temporal, resp. rate (!) 24, height 1.829 m (6'), weight 89.8 kg (198 lb), SpO2 97%.  Intake/Output last 3 Shifts:  No intake/output data recorded.    Last CBC & BMP  Lab Results   Component Value Date    GLUCOSE 111 (H) 07/16/2024    CALCIUM 9.2 07/16/2024     07/16/2024    K 4.0 07/16/2024    CO2  20 (L) 07/16/2024     07/16/2024    BUN 11 07/16/2024    CREATININE 0.99 07/16/2024     Lab Results   Component Value Date    WBC 6.3 07/16/2024    HGB 13.3 (L) 07/16/2024    HCT 39.4 (L) 07/16/2024    MCV 93 07/16/2024     07/16/2024         Assessment / Plan     #Presyncope  #Aortic stenosis status post TAVR  -Etiology unclear, consider exacerbation of his known A-fib versus new arrhythmia versus orthostatic  -Pacemaker interrogation ordered, obtain orthostatics, watch on telemetry monitor.   -Continue Plavix, cardiology consulted for concern for cardiogenic syncope    #Concern for HFpEF exacerbation  -Clinically appears volume overloaded with bilateral lower extremity pitting edema, however his BNP is below baseline and chest x-ray was negative for any pulmonary edema  -TTE 7/9 with EF 60 to 65%, abnormal diastolic filling, severely dilated left atrium, no mention of aortic stenosis  -Continue statin, losartan, will hold on further diuresis for now given lack of symptoms and recent echo    #Right lower extremity cellulitis  -Presented to the ED on 7/13 and was discharged on doxycycline.  Patient reports minimal improvement  -Lower extremity ultrasound 7/13 negative for DVTs  -Will order ESR and CRP.  Patient failed oral antibiotics so he was started on vancomycin will de-escalate as able    Chronic Medical conditions    # Hypertension  Plan:  -Continue home losartan 25 mg    # A-fib  -Patient with Watchman device therefore not on anticoagulation  Plan:  -Cardiology consult as above, pacemaker interrogation    DVT: Heparin  Diet: Cardiac  Code: DNR-CCA, DNI  Consults: Cardiology       Yaya Fuentes MD  PGY-1, Internal Medicine    This is a preliminary note, please await attending attestation for final A/P

## 2024-07-17 ENCOUNTER — APPOINTMENT (OUTPATIENT)
Dept: CARDIOLOGY | Facility: HOSPITAL | Age: 89
DRG: 603 | End: 2024-07-17
Payer: MEDICARE

## 2024-07-17 LAB
ANION GAP SERPL CALC-SCNC: 11 MMOL/L (ref 10–20)
BUN SERPL-MCNC: 9 MG/DL (ref 6–23)
CALCIUM SERPL-MCNC: 9 MG/DL (ref 8.6–10.3)
CHLORIDE SERPL-SCNC: 105 MMOL/L (ref 98–107)
CO2 SERPL-SCNC: 28 MMOL/L (ref 21–32)
CREAT SERPL-MCNC: 0.96 MG/DL (ref 0.5–1.3)
EGFRCR SERPLBLD CKD-EPI 2021: 77 ML/MIN/1.73M*2
ERYTHROCYTE [DISTWIDTH] IN BLOOD BY AUTOMATED COUNT: 13.6 % (ref 11.5–14.5)
GLUCOSE SERPL-MCNC: 95 MG/DL (ref 74–99)
HCT VFR BLD AUTO: 36.1 % (ref 41–52)
HGB BLD-MCNC: 12.1 G/DL (ref 13.5–17.5)
HOLD SPECIMEN: NORMAL
HOLD SPECIMEN: NORMAL
MAGNESIUM SERPL-MCNC: 2 MG/DL (ref 1.6–2.4)
MCH RBC QN AUTO: 31.3 PG (ref 26–34)
MCHC RBC AUTO-ENTMCNC: 33.5 G/DL (ref 32–36)
MCV RBC AUTO: 93 FL (ref 80–100)
NRBC BLD-RTO: 0 /100 WBCS (ref 0–0)
PLATELET # BLD AUTO: 148 X10*3/UL (ref 150–450)
POTASSIUM SERPL-SCNC: 3.6 MMOL/L (ref 3.5–5.3)
RBC # BLD AUTO: 3.87 X10*6/UL (ref 4.5–5.9)
SODIUM SERPL-SCNC: 140 MMOL/L (ref 136–145)
VANCOMYCIN SERPL-MCNC: 10.7 UG/ML (ref 5–20)
WBC # BLD AUTO: 4.1 X10*3/UL (ref 4.4–11.3)

## 2024-07-17 PROCEDURE — 96372 THER/PROPH/DIAG INJ SC/IM: CPT

## 2024-07-17 PROCEDURE — 99223 1ST HOSP IP/OBS HIGH 75: CPT

## 2024-07-17 PROCEDURE — 80202 ASSAY OF VANCOMYCIN: CPT

## 2024-07-17 PROCEDURE — 83735 ASSAY OF MAGNESIUM: CPT

## 2024-07-17 PROCEDURE — G0378 HOSPITAL OBSERVATION PER HR: HCPCS

## 2024-07-17 PROCEDURE — 80048 BASIC METABOLIC PNL TOTAL CA: CPT

## 2024-07-17 PROCEDURE — 2500000001 HC RX 250 WO HCPCS SELF ADMINISTERED DRUGS (ALT 637 FOR MEDICARE OP)

## 2024-07-17 PROCEDURE — 36415 COLL VENOUS BLD VENIPUNCTURE: CPT

## 2024-07-17 PROCEDURE — 93010 ELECTROCARDIOGRAM REPORT: CPT | Performed by: STUDENT IN AN ORGANIZED HEALTH CARE EDUCATION/TRAINING PROGRAM

## 2024-07-17 PROCEDURE — 93005 ELECTROCARDIOGRAM TRACING: CPT

## 2024-07-17 PROCEDURE — 85027 COMPLETE CBC AUTOMATED: CPT

## 2024-07-17 PROCEDURE — 2500000002 HC RX 250 W HCPCS SELF ADMINISTERED DRUGS (ALT 637 FOR MEDICARE OP, ALT 636 FOR OP/ED)

## 2024-07-17 PROCEDURE — 97165 OT EVAL LOW COMPLEX 30 MIN: CPT | Mod: GO

## 2024-07-17 PROCEDURE — 2500000004 HC RX 250 GENERAL PHARMACY W/ HCPCS (ALT 636 FOR OP/ED)

## 2024-07-17 PROCEDURE — 97161 PT EVAL LOW COMPLEX 20 MIN: CPT | Mod: GP

## 2024-07-17 PROCEDURE — 2500000004 HC RX 250 GENERAL PHARMACY W/ HCPCS (ALT 636 FOR OP/ED): Mod: JZ

## 2024-07-17 RX ORDER — FUROSEMIDE 10 MG/ML
40 INJECTION INTRAMUSCULAR; INTRAVENOUS ONCE
Status: COMPLETED | OUTPATIENT
Start: 2024-07-17 | End: 2024-07-17

## 2024-07-17 RX ORDER — FUROSEMIDE 10 MG/ML
40 INJECTION INTRAMUSCULAR; INTRAVENOUS ONCE
Status: DISCONTINUED | OUTPATIENT
Start: 2024-07-17 | End: 2024-07-17

## 2024-07-17 ASSESSMENT — ACTIVITIES OF DAILY LIVING (ADL)
BATHING_ASSISTANCE: MODERATE
ADL_ASSISTANCE: INDEPENDENT
ADL_ASSISTANCE: INDEPENDENT
LACK_OF_TRANSPORTATION: NO
ADLS_ADDRESSED: YES

## 2024-07-17 ASSESSMENT — COGNITIVE AND FUNCTIONAL STATUS - GENERAL
MOBILITY SCORE: 20
STANDING UP FROM CHAIR USING ARMS: A LITTLE
MOVING TO AND FROM BED TO CHAIR: A LITTLE
HELP NEEDED FOR BATHING: A LITTLE
CLIMB 3 TO 5 STEPS WITH RAILING: A LITTLE
TOILETING: A LITTLE
STANDING UP FROM CHAIR USING ARMS: A LITTLE
CLIMB 3 TO 5 STEPS WITH RAILING: A LITTLE
DRESSING REGULAR LOWER BODY CLOTHING: A LITTLE
MOBILITY SCORE: 20
DAILY ACTIVITIY SCORE: 19
DRESSING REGULAR UPPER BODY CLOTHING: A LITTLE
DRESSING REGULAR LOWER BODY CLOTHING: A LITTLE
DRESSING REGULAR UPPER BODY CLOTHING: A LITTLE
PERSONAL GROOMING: A LITTLE
DAILY ACTIVITIY SCORE: 19
DRESSING REGULAR LOWER BODY CLOTHING: A LITTLE
DAILY ACTIVITIY SCORE: 20
MOVING TO AND FROM BED TO CHAIR: A LITTLE
WALKING IN HOSPITAL ROOM: A LITTLE
STANDING UP FROM CHAIR USING ARMS: A LITTLE
HELP NEEDED FOR BATHING: A LITTLE
TOILETING: A LITTLE
PERSONAL GROOMING: A LITTLE
MOVING TO AND FROM BED TO CHAIR: A LITTLE
MOBILITY SCORE: 20
WALKING IN HOSPITAL ROOM: A LITTLE
HELP NEEDED FOR BATHING: A LITTLE
PERSONAL GROOMING: A LITTLE
TOILETING: A LITTLE
CLIMB 3 TO 5 STEPS WITH RAILING: A LITTLE
WALKING IN HOSPITAL ROOM: A LITTLE

## 2024-07-17 ASSESSMENT — PAIN SCALES - GENERAL
PAINLEVEL_OUTOF10: 0 - NO PAIN

## 2024-07-17 NOTE — PROGRESS NOTES
Assessment / Plan   Giuseppe Davila is a 88 y.o. male with a past medical history of hypertension, A-fib status post Watchman, pacemaker placement, HFpEF, CAD, aortic stenosis status post TAVR 7/9/2024 on day 0 of admission presenting with Generalized weakness.    #Presyncope  #Aortic stenosis status post TAVR  -Etiology unclear, consider exacerbation of his known A-fib versus new arrhythmia versus orthostatic  -Pacemaker interrogation ordered, obtain orthostatics, watch on telemetry monitor.   -Continue Plavix, cardiology consulted for concern for cardiogenic syncope     #Right lower extremity cellulitis  -Presented to the ED on 7/13 and was discharged on doxycycline.  Patient reports minimal improvement  -ESR and CRP elevated  -Lower extremity ultrasound 7/13 negative for DVTs  -Continue vancomycin (started 7/16), de-escalate as able     Chronic Medical conditions     #Hypertension  Plan:  -Continue home losartan 25 mg     #HFpEF  -Clinically appears volume overloaded with bilateral lower extremity pitting edema, however his BNP is below baseline and chest x-ray was negative for any pulmonary edema  -TTE 7/9 with EF 60 to 65%, abnormal diastolic filling, severely dilated left atrium, no mention of aortic stenosis  -Continue statin, losartan, will hold on further diuresis for now given lack of symptoms and recent echo    # A-fib  -Patient with Watchman device therefore not on anticoagulation  Plan:  -Cardiology consult as above, pacemaker interrogation     DVT: Heparin  Diet: Cardiac  Code: DNR-CCA, DNI  Consults: Cardiology    Yaya Fuentes MD  PGY-1, Internal Medicine    This is a preliminary note, please await attending attestation for final recommendations    Subjective     Patient seen and examined. No acute overnight events.  Patient doing well this morning, reports some lightheadedness while sitting in bed today, although no more presyncopal events since hospitalization.  Reports improvement in his  right leg pain and erythema.  Agreeable to home health care when medically ready for discharge.  Denies palpitations, chest pain, shortness of breath, nausea, abdominal pain, diarrhea, constipation.    Objective   Physical Exam  Vitals and nursing note reviewed.   Constitutional:       General: He is not in acute distress.     Appearance: Normal appearance.   HENT:      Head: Normocephalic and atraumatic.   Cardiovascular:      Rate and Rhythm: Normal rate. Rhythm irregular.   Pulmonary:      Effort: Pulmonary effort is normal.      Breath sounds: Normal breath sounds.   Abdominal:      General: Abdomen is flat.      Palpations: Abdomen is soft.   Musculoskeletal:      Right lower leg: Edema present.      Left lower leg: Edema present.   Skin:     General: Skin is warm and dry.   Neurological:      General: No focal deficit present.      Mental Status: He is alert and oriented to person, place, and time.       Last Recorded Vitals  Blood pressure 137/80, pulse 60, temperature 36 °C (96.8 °F), temperature source Temporal, resp. rate 18, height 1.829 m (6'), weight 89.8 kg (198 lb), SpO2 99%.  Intake/Output last 3 Shifts:  I/O last 3 completed shifts:  In: 250 (2.8 mL/kg) [IV Piggyback:250]  Out: - (0 mL/kg)   Weight: 89.8 kg     Last CBC & BMP  Lab Results   Component Value Date    GLUCOSE 111 (H) 07/16/2024    CALCIUM 9.2 07/16/2024     07/16/2024    K 4.0 07/16/2024    CO2 20 (L) 07/16/2024     07/16/2024    BUN 11 07/16/2024    CREATININE 0.99 07/16/2024     Lab Results   Component Value Date    WBC 6.3 07/16/2024    HGB 13.3 (L) 07/16/2024    HCT 39.4 (L) 07/16/2024    MCV 93 07/16/2024     07/16/2024

## 2024-07-17 NOTE — DISCHARGE INSTRUCTIONS
HEART FAILURE DISCHARGE INSTRUCTIONS:  1. Weigh yourself daily and record on your weight log.  2. If you gain more than 2 or 3 pounds overnight, call your cardiologist.  3. Follow a low sodium diet. No more than 2000 mg in one day, or more than 650 mg per meal.  4. Limit total fluids to no more than 8 cups (or 2 liters) per day - this includes all fluids (water, coffee, juice, milk, tea, etc.)  5. Monitor your blood pressure daily and record on your weight log.  6. Call to schedule your follow-up appointments when you get home if they were not already scheduled for you.  7. Keep your follow-up appointments! Bring your weight log with you so the doctors can see your weight trend and blood pressure readings.  8. Be sure to  any new prescriptions and take them as directed. If unsure of the medications, be sure to call your cardiologist.  9. Stay as active as you can tolerate.   10. If you notice subtle change of symptoms (slight increase in swelling, slight shortness of breath, a new intolerance to laying flat, a new cough), be sure to call your cardiologist.    You will be discharged to a rehab facility so that you can improve your strength and dizziness prior to going home.  Please make sure to follow-up with her PCP within 1 week of discharge from the hospital.

## 2024-07-17 NOTE — PROGRESS NOTES
Occupational Therapy    Evaluation    Patient Name: Giuseppe Davila  MRN: [id number]  Today's Date: 7/18/2024  Time Calculation  Start Time: 0859  Stop Time: 0923  Time Calculation (min): 24 min  310/310-A    Assessment  IP OT Assessment  OT Assessment: pt showing impairement with functional mobility due to lt headed dizziness / fall risk.  Evaluation/Treatment Tolerance: Patient tolerated treatment well  End of Session Communication: Bedside nurse  End of Session Patient Position: Alarm on, Up in chair    Plan:  Treatment Interventions: Functional transfer training, ADL retraining, Endurance training, Patient/family training, Compensatory technique education  OT Frequency: 3 times per week  OT Discharge Recommendations: Low intensity level of continued care  OT - OK to Discharge: Yes    Subjective     Current Problem:  1. Generalized weakness        2. Acute decompensated heart failure (Multi)        3. Pacemaker  Cardiac device check - Inpatient    Cardiac device check - Inpatient          General:  General  Reason for Referral: OT to eval and tx for adls and safety assessment  Referred By: Yaya Fuentes MD  Family/Caregiver Present: No  Co-Treatment: PT  Co-Treatment Reason: Maximize patient safety and mobility  Prior to Session Communication: Bedside nurse  Patient Position Received: Bed, 2 rail up, Alarm off, not on at start of session  General Comment: 88 year old male admit with c/o of generalized weakness, malaise diaphoresis and lightheadedness as well as swelling in RLE.  History of recent TAVR 7/9/24.    Precautions:  Hearing/Visual Limitations: Wears glasses, hearing aide right ear.  Medical Precautions: Fall precautions, Cardiac precautions  Precautions Comment: Fall risk, symptomatic with movement, bilateral foot drop. pacemaker hearing aid.       Objective     Cognition:  Overall Cognitive Status: Within Functional Limits  Orientation Level: Oriented X4     Home Living:  Type of Home: House  Lives  With: Spouse  Home Adaptive Equipment: Walker rolling or standard  Home Layout: One level  Home Access: Stairs to enter without rails  Bathroom Shower/Tub: Walk-in shower  Bathroom Toilet: Standard  Bathroom Equipment: Grab bars in shower, Built-in shower seat  Home Living Comments: lives at home with spouse, dtr lives in [place].     Prior Function:  Level of [place]: Independent with ADLs and functional transfers, Needs assistance with homemaking  Receives Help From: Family  ADL Assistance: Independent  Homemaking Assistance: Needs assistance  Leisure: Works out in garage on old cars:retired   Hand Dominance: Right  Prior Function Comments: Patient reports indep wiith dressing, bathing, furniture walks indoors, uses ww in community. Does not drive per drop foot.  Wife manages all household tasks.    IADL History:  IADL Comments: pt does not drive, wife does all iadls.    ADL:  Eating Assistance: Independent  Grooming Assistance: Stand by  Bathing Assistance: Moderate  UE Dressing Assistance: Stand by  LE Dressing Assistance: Minimal  Toileting Assistance with Device: Minimal  Functional Assistance: Minimal (x2)    Activity Tolerance:  Endurance: Tolerates less than 10 min exercise, no significant change in vital signs  Activity Tolerance Comments: pt reporting dizziness with change in postion.    Bed Mobility/Transfers:   Bed Mobility  Bed Mobility: Yes (Modified indep.)  Transfers  Transfer: Yes (Min assist x 2 for sit to stand from EOB)    Ambulation/Gait Training:  Functional Mobility  Functional Mobility Performed:  (with wheeled walker and min x 2)    Sitting Balance: good. No complaint of pain . Ble neuropathy.        Standing Balance:  Static Standing Balance  Static Standing-Level of Assistance: Contact guard    Vision:  glassess, and hearing aid.       Sensation:  Light Touch: No apparent deficits    Strength:  Strength Comments: bue 4+/5 (sindy footdrop)    Perception:        Coordination:  Movements are Fluid and Coordinated: Yes  Heel to Shin: Intact  Finger to Target: Impaired     Hand Function:       Extremities: RUE   RUE : Within Functional Limits and LUE   LUE: Within Functional Limits    Outcome Measures: [facility] Daily Activity  Putting on and taking off regular lower body clothing: A little  Bathing (including washing, rinsing, drying): A little  Putting on and taking off regular upper body clothing: None  Toileting, which includes using toilet, bedpan or urinal: A little  Taking care of personal grooming such as brushing teeth: A little  Eating Meals: None  Daily Activity - Total Score: 20     EDUCATION:     Education Documentation  No documentation found.  Education Comments  No comments found.        Goals:   Encounter Problems       Encounter Problems (Active)       Bathing       STG - Patient will bathe body (Progressing)       Start:  07/17/24    Expected End:  07/31/24               Dressing Upper Extremities       STG - Patient will dress upper body with set up (Progressing)       Start:  07/17/24    Expected End:  07/31/24               Dressings Lower Extremities       STG - Patient will gonzalez/doff pants over feet with adaptive equipment as needed.  (Progressing)       Start:  07/17/24    Expected End:  07/31/24               Functional Balance       STG - Maintains dynamic standing balance without upper extremity support with supervision  (Progressing)       Start:  07/17/24    Expected End:  07/31/24       INTERVENTIONS:  1. Practice standing with minimal support.  2. Educate patient about standing tolerance.  3. Educate patient about independence with gait, transfers, and ADL's.  4. Educate patient about use of assistive device.  5. Educate patient about self-directed care.            Functional Mobility       STG - Patient will ambulate household distance with LRAD  (Progressing)       Start:  07/17/24    Expected End:  07/31/24               Grooming       STG -  Patient completes grooming with set up (Progressing)       Start:  07/17/24    Expected End:  07/31/24               OT Transfers       STG - Patient will perform toilet transfer with supervision (Progressing)       Start:  07/17/24    Expected End:  07/31/24

## 2024-07-17 NOTE — PROGRESS NOTES
Vancomycin Dosing by Pharmacy- FOLLOW UP    Giuseppe Davila is a 88 y.o. year old male who Pharmacy has been consulted for vancomycin dosing for cellulitis, skin and soft tissue. Based on the patient's indication and renal status this patient is being dosed based on a goal AUC of 400-600.     Renal function is currently stable.    Current vancomycin dose: 1500 mg given every 24 hours    Estimated vancomycin AUC on current dose: 526 mg/L.hr     Visit Vitals  /72 (BP Location: Right arm, Patient Position: Sitting)   Pulse 54   Temp 35.8 °C (96.4 °F) (Temporal)   Resp 14        Lab Results   Component Value Date    CREATININE 0.96 2024    CREATININE 0.99 2024    CREATININE 1.05 2024    CREATININE 0.85 07/10/2024        Patient weight is as follows:   Vitals:    24 1029   Weight: 89.8 kg (198 lb)       Cultures:  No results found for the encounter in last 14 days.       I/O last 3 completed shifts:  In: 250 (2.8 mL/kg) [IV Piggyback:250]  Out: - (0 mL/kg)   Weight: 89.8 kg   I/O during current shift:  No intake/output data recorded.    Temp (24hrs), Av.2 °C (97.1 °F), Min:35.8 °C (96.4 °F), Max:36.6 °C (97.9 °F)      Assessment/Plan    Within goal AUC range. Continue current vancomycin regimen.    This dosing regimen is predicted by [employer] to result in the following pharmacokinetic parameters:  AUC24,ss: 526 mg/L.hr  Probability of AUC24 > 400: 93 %  Ctrough,ss: 15.1 mg/L  Probability of Ctrough,ss > 20: 16 %  Probability of nephrotoxicity (Lodise RUBEN ): 10 %      The next level will be obtained on  at Mid draw. May be obtained sooner if clinically indicated.   Will continue to monitor renal function daily while on vancomycin and order serum creatinine at least every 48 hours if not already ordered.  Follow for continued vancomycin needs, clinical response, and signs/symptoms of toxicity.       Daniel J Weiland, PharmD

## 2024-07-17 NOTE — PROGRESS NOTES
07/17/24 1112   Discharge Planning   Living Arrangements Spouse/significant other   Support Systems Family members   Assistance Needed Independent   Type of Residence Private residence   Who is requesting discharge planning? Provider   Home or Post Acute Services In home services   Type of Home Care Services Home OT;Home PT   Expected Discharge Disposition HH Services   Does the patient need discharge transport arranged? No   Financial Resource Strain   How hard is it for you to pay for the very basics like food, housing, medical care, and heating? Not hard   Housing Stability   In the last 12 months, was there a time when you were not able to pay the mortgage or rent on time? N   In the past 12 months, how many times have you moved where you were living? 0   At any time in the past 12 months, were you homeless or living in a shelter (including now)? N   Transportation Needs   In the past 12 months, has lack of transportation kept you from medical appointments or from getting medications? no   In the past 12 months, has lack of transportation kept you from meetings, work, or from getting things needed for daily living? No   Patient Choice   Provider Choice list and CMS website (https://medicare.gov/care-compare#search) for post-acute Quality and Resource Measure Data were provided and reviewed with: Patient   Patient / Family choosing to utilize agency / facility established prior to hospitalization Yes     Care transitions assessment completed via bedside with patient. TCC introduced self and explained role. Demographics verified. Patient from home with Jessica GARCIA.  Independent PTA. Denies use of assistive devices, but has walker at home if needed. Denies SW needs at this time. PT/OT recommending HHC. Patient agreeable, HHC list provided, freedom of choice explained. Patient states understanding ,preference for HC. Provider updated and need for internal home care orders. Private duty home care list explained  and provided to patient if additional services needed at home. Dispo pending hospital course. Patient's family to transport home at time of discharge. TCC to continue to follow for discharge planning needs.      PCP: Dr. Devin Michele  Last seen: this admit   Pharmacy: [facility], [address]  Insurance: [employer]   Dispo: [facility], await internal [facility]    KRISTOFER VALENZUELA RN TCC

## 2024-07-17 NOTE — CARE PLAN
The patient's goals for the shift include  comfort, sleep and safety    The clinical goals for the shift include Pt will remain safe and free from injury throughout shift

## 2024-07-17 NOTE — CONSULTS
Consults    Reason For Consult  Cardiogenic presyncope    History Of Present Illness  Giuseppe Davila is a 88 y.o. male with a past medical history of HTN, HFpEF (60/65% on 7/24), A-fib with Watchman, pacemaker, prior stroke 2012, CAD, aortic stenosis  (TAVR 7/24), dyslipidemia, relapse of prostate cancer s/p radical prostatectomy, presented to the ED because of right lower extremity swelling (was diagnosed with cellulitis was given antibiotics on 7/13), and generalized weakness.  He denied any chest pain, diaphoresis, nausea or vomiting.  He says he is always been short of breath and is not above his baseline.  He says Dr. Carmichael his cardiologist advised him to go to the ED but he cannot recall why.  Today on questioning he seems disoriented cannot recall the month.    In the ED his blood pressure was 133/97, heart rate 76 BNP is 225, troponin 42, 37 an hour later.    Past Medical History  Normal unless mentioned above    Surgical History  TAVR for aortic stenosis 9/24     Family History  Family History   Problem Relation Name Age of Onset    Other (cardiac disorder) Mother      Transient ischemic attack Mother      Cancer Father      Other (cardiac disorder) Sister      Alzheimer's disease Father's Brother      Parkinsonism Father's Brother          Allergies  Patient has no known allergies.    Review of Systems  10 review of systems normal as mentioned above     Physical Exam  Constitutional: well developed, awake, alert, no acute distress  ENMT: mucous membranes moist,  conjunctivae clear  Head/Neck: normocephalic, atraumatic; supple, trachea midline  Respiratory/Thorax: patent airways, CTAB; no wheezes, rales, or rhonchi  Cardiovascular: RRR, no murmur  Gastrointestinal: soft, nondistended, non-tender, bowel sounds appreciated  Extremities: Right lower extremity pitting edema, erythema, no warmth  Neurological: AO x3, no focal deficits  Psychological: appropriate mood and behavior  Skin: warm and dry        Last  Recorded Vitals  /73 (BP Location: Left arm, Patient Position: Sitting)   Pulse 57   Temp 36 °C (96.8 °F) (Temporal)   Resp 18   Wt 89.8 kg (198 lb)   SpO2 97%        Assessment/Plan   Giuseppe Davila is a 88 y.o. male with a past medical history of HTN, HFpEF (60/65% on 7/24), A-fib with Watchman, prior stroke 2012, CAD, aortic stenosis  (TAVR 7/24), presented to the ED because of right lower extremity swelling (was diagnosed with cellulitis was given antibiotics on 7/13), and generalized weakness.    Acute conditions:  # Cardiac presyncope, rule out  #HFpEF (EF 60 to 65% 7/9)  -Patient recently had a TAVR of aortic valve 2 weeks ago.  He says after the procedure he developed right leg cellulitis.  He complains of weakness and fatigue.  Patient's symptoms seem to be more likely in the setting of recent surgery and infection.  -We will continue to monitor for any signs indicating a cardiac cause.  -Has bilateral pitting edema, slightly short of breath in the setting of his history of HFpEF and BNP of 225 we are giving him 1 dose of IV Lasix 40 mg.  We will monitor him and his clinical signs tomorrow.    #Atrial fibrillation with Watchman  #Coronary artery disease s/p PCI with 2 prior stents and pacemaker  #Aortic stenosis s/p TAVR 7/24  #HTN  #Dyslipidemia      Carol Perez MD  PGY1 internal medicine resident

## 2024-07-17 NOTE — NURSING NOTE
CHF Clinical Nurse Navigator Documentation    Congestive Heart Failure disease education was performed by the Clinical Nurse Navigator with a good understanding. Yes    CHF signs and symptoms discussed and when to call cardiologist?  Yes  Living With Heart Failure Education booklet?  Yes  Controlling Heart Failure at Home Education? Yes  CHF Education Teaching Tool? Yes  Home medication usage?  Yes  Nutrition Education? Yes, Low Sodium Diet  Fluid Restriction Education? Yes  Daily Weight Education? Yes, Daily Weight Log given and reviewed  Cardiovascular Rehab Referral ordered? No   Follow-up with Cardiologist after discharge education? Yes    Comments: Patient sees Dr. Carmichael for all of his cardiac needs. Discussed CHF signs and symptoms and when to call Dr. Carmichael. Discussed low sodium diet and foods to avoid including processed foods. Patient states his wife does all of the shopping and is very health conscious. Patient appreciated education and verbalized understanding of teaching provided.

## 2024-07-17 NOTE — PROGRESS NOTES
Physical Therapy    Physical Therapy    Physical Therapy Evaluation    Patient Name: Giuseppe Davila  MRN: [id number]  Today's Date: 7/17/2024   Time Calculation  Start Time: 0900  Stop Time: 0923  Time Calculation (min): 23 min  310/310-A    Assessment/Plan   PT Assessment  PT Assessment Results: Decreased strength, Decreased endurance, Impaired balance, Decreased mobility  Rehab Prognosis: Good  Evaluation/Treatment Tolerance: Patient limited by fatigue  Medical Staff Made Aware: Yes  Strengths: Ability to acquire knowledge, Living arrangement secure  Barriers to Participation: Comorbidities  End of Session Communication: Bedside nurse  Assessment Comment: Patient continues to c/o of lightheadedness and sweating with changes in position and exertioin.  End of Session Patient Position: Alarm on, Up in chair  IP OR SWING BED PT PLAN  Inpatient or Swing Bed: Inpatient  PT Plan  Treatment/Interventions: Bed mobility, Transfer training, Gait training, Therapeutic exercise, Therapeutic activity, Endurance training  PT Plan: Ongoing PT  PT Frequency: 2 times per week  PT Discharge Recommendations: Low intensity level of continued care  PT Recommended Transfer Status: Assist x1  PT - OK to Discharge: Yes (When cleared by medical staff)    Subjective     Current Problem:  1. Generalized weakness        2. Acute decompensated heart failure (Multi)        3. Pacemaker  Cardiac device check - Inpatient    Cardiac device check - Inpatient        Patient Active Problem List   Diagnosis    Ascending aortic aneurysm (CMS-HCC)    Atrial fibrillation with slow ventricular response (Multi)    Cerebral infarction (Multi)    Chest tightness    Episodic lightheadedness    Exertional angina (CMS-HCC)    History of claustrophobia    Hyperlipidemia    Hypertension    Intermittent claudication of both lower extremities due to atherosclerosis (CMS-HCC)    Leg edema    Moderate tricuspid regurgitation    Polyneuropathy    Presence of stent in  coronary artery    Right inguinal hernia    Rotator cuff tear, right    Severe mitral regurgitation    Weakness of both lower extremities    Celiac artery stenosis (CMS-HCC)    History of radiation therapy    Iron deficiency anemia due to chronic blood loss    Hematochezia    Pacemaker    Sick sinus syndrome (Multi)    Vertigo    Unsteady gait    Ramon's esophagus    Cerumen impaction    CVA (cerebrovascular accident) (Multi)    Epistaxis    Foot drop, bilateral    Globus syndrome    Lip lesion    History of cerebrovascular accident    Neuropathy    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)    Rectal ulceration    Seborrhea    Sialoadenitis    CAD (coronary artery disease)    Back pain    Atypical chest pain    Infected sebaceous cyst    Nausea and vomiting    Pain of right shoulder region    Patent foramen ovale (HHS-HCC)    Tear of rotator cuff    Vitamin D deficiency    Abnormal nuclear stress test    COVID    History of myocardial infarction    S/P TAVR (transcatheter aortic valve replacement)    Generalized weakness       General Visit Information:  General  Reason for Referral: PT Eval and treat: Generalized weakness  Referred By: Yaya Fuentes MD  Family/Caregiver Present: No  Co-Treatment: OT  Co-Treatment Reason: Maximize patient safety and mobility  Prior to Session Communication: Bedside nurse  Patient Position Received: Bed, 2 rail up, Alarm off, not on at start of session  General Comment: 88 year old male admit with c/o of generalized weakness, malaise diaphoresis and lightheadedness as well as swelling in RLE.  History of recent TAVR 7/9/24.    Home Living:  Home Living  Type of Home: House  Lives With: Spouse  Home Adaptive Equipment: Walker rolling or standard  Home Layout: One level  Home Access: Stairs to enter without rails  Entrance Stairs-Number of Steps: 1+1+1  Bathroom Shower/Tub: Walk-in shower  Bathroom Toilet: Standard  Bathroom Equipment: Grab bars in shower, Built-in shower  seat  Home Living Comments: Lives at home with wife, 1 floor set up.    Prior Level of Function:  Prior Function Per Pt/Caregiver Report  Level of [place]: Independent with ADLs and functional transfers, Needs assistance with homemaking  Receives Help From: Family  ADL Assistance: Independent  Homemaking Assistance: Needs assistance  Leisure: Works out in garage on old cars:retired   Hand Dominance: Right  Prior Function Comments: Patient reports indep wiith dressing, bathing, furniture walks indoors, uses ww in community. Does not drive per drop foot.  Wife manages all household tasks.    Precautions:  Precautions  Hearing/Visual Limitations: Wears glasses, hearing aide right ear.  Medical Precautions: Fall precautions  Precautions Comment: Fall risk, symptomatic with movement, bilateral foot drop.    Vital Signs:  Vital Signs  Heart Rate: 57  Heart Rate Source: Monitor  SpO2: 97 %  BP: 131/73  BP Location: Left arm  BP Method: Automatic  Patient Position: Sitting  Objective     Pain:  Pain Assessment  0-10 (Numeric) Pain Score: 0 - No pain    Cognition:  Cognition  Overall Cognitive Status: Within Functional Limits  Orientation Level: Oriented X4    General Assessments:  General Observation  General Observation: Patient pleasant and cooperative with treatment.  Reports symptoms continue.   Activity Tolerance  Endurance: Tolerates less than 10 min exercise, no significant change in vital signs  Activity Tolerance Comments: C/o of lighteadedness and diaphoresis with transfers  Sensation  Light Touch: No apparent deficits  Strength  Strength Comments: Bilateral drop foot     Coordination  Movements are Fluid and Coordinated: Yes  Heel to Shin: Intact  Finger to Target: Impaired  Postural Control  Posture Comment: Rounded shoulders, slight forward head posture.  Static Sitting Balance  Static Sitting-Comment/Number of Minutes: Good  Dynamic Sitting Balance  Dynamic Sitting-Comments: Good  Static  Standing Balance  Static Standing-Comment/Number of Minutes: Good with support of ww  Dynamic Standing Balance  Dynamic Standing-Comments: Fair with use of ww, steppage gait    Functional Assessments:  ADL  ADL's Addressed: Yes  ADL Comments: Patient reports indep with all ADL's  Bed Mobility  Bed Mobility: Yes (Modified indep.)  Transfers  Transfer: Yes (Min assist x 2 for sit to stand from EOB)  Ambulation/Gait Training  Ambulation/Gait Training Performed: Yes (Amb 15 ft with ww with slow steppage gait, with min assist x 1-2.  Patient symptomatic with mobility.)          Extremity/Trunk Assessments:  RUE   RUE : Within Functional Limits  LUE   LUE: Within Functional Limits  RLE   RLE : Exceptions to WFL (ROM WFL, bilat drop foot)  LLE   LLE : Exceptions to WFL (ROM WFL, drop foot)    Outcome Measures:     [facility] Basic Mobility  Turning from your back to your side while in a flat bed without using bedrails: None  Moving from lying on your back to sitting on the side of a flat bed without using bedrails: None  Moving to and from bed to chair (including a wheelchair): A little  Standing up from a chair using your arms (e.g. wheelchair or bedside chair): A little  To walk in hospital room: A little  Climbing 3-5 steps with railing: A little  Basic Mobility - Total Score: 20                                        Goals:  Encounter Problems       Encounter Problems (Active)       PT Problem       PT Goal 1 (Progressing)       Start:  07/17/24    Expected End:  07/24/24       STG - Pt will transition supine <> sitting with modified independence          PT Goal 2 (Progressing)       Start:  07/17/24    Expected End:  07/24/24       STG - Pt will transfer STS with modified independence          PT Goal 3 (Progressing)       Start:  07/17/24    Expected End:  07/24/24       STG - Pt will amb 250 ft' using ww with SBA               Education Documentation  Mobility Training, taught by Zoe Bryan, PT at 7/17/2024  9:49  DARYN  Learner: Patient  Readiness: Acceptance  Method: Explanation  Response: Verbalizes Understanding    Education Comments  No comments found.

## 2024-07-18 LAB
ATRIAL RATE: 61 BPM
ERYTHROCYTE [DISTWIDTH] IN BLOOD BY AUTOMATED COUNT: 13.2 % (ref 11.5–14.5)
GLUCOSE BLD MANUAL STRIP-MCNC: 109 MG/DL (ref 74–99)
HCT VFR BLD AUTO: 36.7 % (ref 41–52)
HGB BLD-MCNC: 12.5 G/DL (ref 13.5–17.5)
HOLD SPECIMEN: NORMAL
MCH RBC QN AUTO: 31.2 PG (ref 26–34)
MCHC RBC AUTO-ENTMCNC: 34.1 G/DL (ref 32–36)
MCV RBC AUTO: 92 FL (ref 80–100)
NRBC BLD-RTO: 0 /100 WBCS (ref 0–0)
P AXIS: 0 DEGREES
PLATELET # BLD AUTO: 172 X10*3/UL (ref 150–450)
PR INTERVAL: 122 MS
Q ONSET: 252 MS
QRS COUNT: 18 BEATS
QRS DURATION: 151 MS
QT INTERVAL: 242 MS
QTC CALCULATION(BAZETT): 396 MS
QTC FREDERICIA: 336 MS
R AXIS: -36 DEGREES
RBC # BLD AUTO: 4.01 X10*6/UL (ref 4.5–5.9)
T OFFSET: 373 MS
VENTRICULAR RATE: 161 BPM
WBC # BLD AUTO: 3.7 X10*3/UL (ref 4.4–11.3)

## 2024-07-18 PROCEDURE — 36415 COLL VENOUS BLD VENIPUNCTURE: CPT

## 2024-07-18 PROCEDURE — 1200000002 HC GENERAL ROOM WITH TELEMETRY DAILY

## 2024-07-18 PROCEDURE — 99232 SBSQ HOSP IP/OBS MODERATE 35: CPT

## 2024-07-18 PROCEDURE — 2500000004 HC RX 250 GENERAL PHARMACY W/ HCPCS (ALT 636 FOR OP/ED)

## 2024-07-18 PROCEDURE — 85027 COMPLETE CBC AUTOMATED: CPT

## 2024-07-18 PROCEDURE — 2500000001 HC RX 250 WO HCPCS SELF ADMINISTERED DRUGS (ALT 637 FOR MEDICARE OP)

## 2024-07-18 PROCEDURE — 2500000005 HC RX 250 GENERAL PHARMACY W/O HCPCS

## 2024-07-18 PROCEDURE — 82947 ASSAY GLUCOSE BLOOD QUANT: CPT

## 2024-07-18 RX ORDER — CEPHALEXIN 500 MG/1
500 CAPSULE ORAL EVERY 6 HOURS SCHEDULED
Status: DISCONTINUED | OUTPATIENT
Start: 2024-07-18 | End: 2024-07-22 | Stop reason: HOSPADM

## 2024-07-18 RX ORDER — ONDANSETRON HYDROCHLORIDE 2 MG/ML
4 INJECTION, SOLUTION INTRAVENOUS EVERY 8 HOURS PRN
Status: DISCONTINUED | OUTPATIENT
Start: 2024-07-18 | End: 2024-07-22 | Stop reason: HOSPADM

## 2024-07-18 RX ORDER — ONDANSETRON 4 MG/1
4 TABLET, FILM COATED ORAL EVERY 8 HOURS PRN
Status: DISCONTINUED | OUTPATIENT
Start: 2024-07-18 | End: 2024-07-22 | Stop reason: HOSPADM

## 2024-07-18 ASSESSMENT — PAIN SCALES - GENERAL: PAINLEVEL_OUTOF10: 0 - NO PAIN

## 2024-07-18 NOTE — PROGRESS NOTES
Physical Therapy                 Therapy Communication Note    Patient Name: Giuseppe Davila  MRN: [id number]  Today's Date: 7/18/2024     Discipline: Physical Therapy    Missed Visit Reason: Patient placed on medical hold    Missed Time: Attempt    Comment:  Per nursing case conference, patient delirious and confused this date.  Re-attempt PT treatment when patient clears.  Progress as tolerated.

## 2024-07-18 NOTE — CARE PLAN
Problem: Fall/Injury  Goal: Not fall by end of shift  Outcome: Progressing  Goal: Be free from injury by end of the shift  Outcome: Progressing  Goal: Verbalize understanding of personal risk factors for fall in the hospital  Outcome: Progressing  Goal: Verbalize understanding of risk factor reduction measures to prevent injury from fall in the home  Outcome: Progressing  Goal: Use assistive devices by end of the shift  Outcome: Progressing  Goal: Pace activities to prevent fatigue by end of the shift  Outcome: Progressing     Problem: Skin  Goal: Participates in plan/prevention/treatment measures  Outcome: Progressing  Goal: Prevent/manage excess moisture  Outcome: Progressing     Problem: Bathing  Goal: STG - Patient will bathe body  Outcome: Progressing     Problem: Dressings Lower Extremities  Goal: STG - Patient will gonzalez/doff pants over feet with adaptive equipment as needed.   Outcome: Progressing     Problem: Dressing Upper Extremities  Goal: STG - Patient will dress upper body with set up  Outcome: Progressing     Problem: Grooming  Goal: STG - Patient completes grooming with set up  Outcome: Progressing

## 2024-07-18 NOTE — PROGRESS NOTES
Assessment / Plan   Giuseppe Davila is a 88 y.o. male with a past medical history of hypertension, A-fib status post Watchman, pacemaker placement, HFpEF, CAD, aortic stenosis status post TAVR 7/9/2024 on day 0 of admission presenting with Generalized weakness.    #Presyncope  #Aortic stenosis status post TAVR  -Etiology unclear, consider exacerbation of his known A-fib versus new arrhythmia versus orthostatic  -Pacemaker interrogation ordered, obtain orthostatics, watch on telemetry monitor.   -Zofran as needed. Continue Plavix, cardiology consulted for concern for cardiogenic syncope and recommended 1 dose of IV Lasix 40 mg     #Right lower extremity cellulitis  -Presented to the ED on 7/13 and was discharged on doxycycline.  Patient reports minimal improvement  -ESR and CRP elevated  -Lower extremity ultrasound 7/13 negative for DVTs  -Switch vancomycin to Keflex 500 mg twice daily    #Suspected hospital delirium  -7/18 began complaining of vague discomfort, was unable to elaborate on any symptoms and only oriented to self. Suspect his hearing loss is contributing to his altered mental status as well, as his hearing aid ran out of battery  -No focal deficits noted on neurological exam, patient improved throughout the day  -No intervention needed at this time, can consider low-dose Haldol if his symptoms persist or worsen    Chronic Medical conditions     #Hypertension  Plan:  -Continue home losartan 25 mg     #HFpEF  -Clinically appears volume overloaded with bilateral lower extremity pitting edema, however his BNP is below baseline and chest x-ray was negative for any pulmonary edema  -TTE 7/9 with EF 60 to 65%, abnormal diastolic filling, severely dilated left atrium, no mention of aortic stenosis  -Continue statin, losartan, will hold on further diuresis for now given lack of symptoms and recent echo    # A-fib  -Patient with Watchman device therefore not on anticoagulation  Plan:  -Cardiology consult as  above, pacemaker interrogation     DVT: Heparin  Diet: Cardiac  Code: DNR-CCA, DNI  Consults: Cardiology    Yaya Fuentes MD  PGY-1, Internal Medicine    This is a preliminary note, please await attending attestation for final recommendations    Subjective     Patient seen and examined.  No acute events overnight, however patient continually stating that he does not feel well this morning but is unable to elaborate further.  He thinks he is not nauseous, and says he feels like he has to have a bowel movement.  Denies pain, unable to explain why he does not feel well.  He is able to tell me his name but is unsure of where he is and what year it is. No focal deficits noted, and patient is extremely hard of hearing but able to follow simple commands.    Objective   Physical Exam  Vitals and nursing note reviewed.   Constitutional:       General: He is not in acute distress.     Appearance: Normal appearance.   HENT:      Head: Normocephalic and atraumatic.   Cardiovascular:      Rate and Rhythm: Normal rate. Rhythm irregular.   Pulmonary:      Effort: Pulmonary effort is normal.      Breath sounds: Normal breath sounds.   Abdominal:      General: Abdomen is flat.      Palpations: Abdomen is soft.   Musculoskeletal:      Right lower leg: Edema present.      Left lower leg: Edema present.   Skin:     General: Skin is warm and dry.   Neurological:      General: No focal deficit present.      Mental Status: He is alert. He is disoriented.       Last Recorded Vitals  Blood pressure 110/71, pulse 102, temperature 36.2 °C (97.2 °F), temperature source Temporal, resp. rate 17, height 1.829 m (6'), weight 89.8 kg (198 lb), SpO2 96%.  Intake/Output last 3 Shifts:  No intake/output data recorded.    Last CBC & BMP  Lab Results   Component Value Date    GLUCOSE 95 07/17/2024    CALCIUM 9.0 07/17/2024     07/17/2024    K 3.6 07/17/2024    CO2 28 07/17/2024     07/17/2024    BUN 9 07/17/2024    CREATININE 0.96  07/17/2024     Lab Results   Component Value Date    WBC 4.1 (L) 07/17/2024    HGB 12.1 (L) 07/17/2024    HCT 36.1 (L) 07/17/2024    MCV 93 07/17/2024     (L) 07/17/2024

## 2024-07-18 NOTE — PROGRESS NOTES
Medication Adjustment    The following medication(s) was/were adjusted for Giuseppe Davila per protocol/policy due to indication for the medication .    Medication(s) adjusted:   Keflex 500 mg PO every 12 hours is changed to 500 mg PO every 6 hours for cellulitis, skin and soft tissue.  Patient's CrCl is 60.6 mL/min.    Winnie Piedra, PharmD

## 2024-07-18 NOTE — PROGRESS NOTES
Giuseppe Davila is a 88 y.o. male on day 2 of admission presenting with Generalized weakness.      Subjective   Patient says he is feeling severely nauseated.  He has not vomited so far.  He says he feels very unwell but cannot identify pain at any site.  He denies any chest pain, says he is always short of breath nothing new from baseline, headache, blurry vision or diaphoresis.    Objective     Last Recorded Vitals  /71 (BP Location: Right arm, Patient Position: Lying)   Pulse 102   Temp 36.2 °C (97.2 °F) (Temporal)   Resp 17   Wt 89.8 kg (198 lb)   SpO2 96%   Intake/Output last 3 Shifts:  No intake or output data in the 24 hours ending 07/18/24 0917    Admission Weight  Weight: 89.8 kg (198 lb) (07/16/24 1029)    Daily Weight  07/16/24 : 89.8 kg (198 lb)    Image Results      Physical Exam    Constitutional: well developed, awake, alert, no acute distress  ENMT: mucous membranes moist, EOMI, conjunctivae clear  Head/Neck: normocephalic, atraumatic; supple, trachea midline  Respiratory/Thorax: patent airways, CTAB; no wheezes, rales, or rhonchi  Cardiovascular: RRR, no murmur  Gastrointestinal: soft, nondistended, non tender, bowel sounds appreciated  Extremities: palpable peripheral pulses, no edema or cyanosis  Neurological: AO x3, no focal deficits  Psychological: appropriate mood and behavior  Skin: warm and dry     Assessment/Plan     Giuseppe Davila is a 88 y.o. male with a past medical history of HTN, HFpEF (60/65% on 7/24), A-fib with Watchman, prior stroke 2012, CAD, aortic stenosis  (TAVR 7/24), presented to the ED because of right lower extremity swelling (was diagnosed with cellulitis was given antibiotics on 7/13), and generalized weakness.     Acute conditions:  # Cardiac presyncope, rule out  #HFpEF (EF 60 to 65% 7/9)  -Patient recently had a TAVR of aortic valve 2 weeks ago.  He says after the procedure he developed right leg cellulitis.  He complains of weakness and fatigue.  Patient's  symptoms seem to be more likely in the setting of recent surgery and infection.  -We will continue to monitor for any signs indicating a cardiac cause.  -Patient was given IV Lasix 40 mg 1 dose yesterday July 17 since he had pitting bilateral edema, slightly short of breath in the setting of HFpEF and BNP of 225.  His symptoms have improved and Lasix has been discontinued.       #Atrial fibrillation with Watchman  #Coronary artery disease s/p PCI with 2 prior stents and pacemaker  #Aortic stenosis s/p TAVR 7/24  #HTN  #Dyslipidemia      Carol Perez MD  PGY1 internal medicine resident

## 2024-07-18 NOTE — PROGRESS NOTES
Vancomycin Dosing by Pharmacy- Cessation of Therapy    Consult to pharmacy for vancomycin dosing has been discontinued by the prescriber, pharmacy will sign off at this time.    Please call pharmacy if there are further questions or re-enter a consult if vancomycin is resumed.     Winnie Piedra, PharmD

## 2024-07-19 ENCOUNTER — APPOINTMENT (OUTPATIENT)
Dept: RADIOLOGY | Facility: HOSPITAL | Age: 89
DRG: 603 | End: 2024-07-19
Payer: MEDICARE

## 2024-07-19 LAB
ANION GAP SERPL CALC-SCNC: 13 MMOL/L (ref 10–20)
ATRIAL RATE: 126 BPM
BUN SERPL-MCNC: 12 MG/DL (ref 6–23)
CALCIUM SERPL-MCNC: 8.6 MG/DL (ref 8.6–10.3)
CHLORIDE SERPL-SCNC: 102 MMOL/L (ref 98–107)
CO2 SERPL-SCNC: 26 MMOL/L (ref 21–32)
CREAT SERPL-MCNC: 0.84 MG/DL (ref 0.5–1.3)
EGFRCR SERPLBLD CKD-EPI 2021: 85 ML/MIN/1.73M*2
ERYTHROCYTE [DISTWIDTH] IN BLOOD BY AUTOMATED COUNT: 12.9 % (ref 11.5–14.5)
GLUCOSE SERPL-MCNC: 93 MG/DL (ref 74–99)
HCT VFR BLD AUTO: 34.1 % (ref 41–52)
HGB BLD-MCNC: 11.7 G/DL (ref 13.5–17.5)
MCH RBC QN AUTO: 31.2 PG (ref 26–34)
MCHC RBC AUTO-ENTMCNC: 34.3 G/DL (ref 32–36)
MCV RBC AUTO: 91 FL (ref 80–100)
NRBC BLD-RTO: 0 /100 WBCS (ref 0–0)
P AXIS: 82 DEGREES
PLATELET # BLD AUTO: 154 X10*3/UL (ref 150–450)
POTASSIUM SERPL-SCNC: 3.1 MMOL/L (ref 3.5–5.3)
PR INTERVAL: 208 MS
Q ONSET: 251 MS
QRS COUNT: 10 BEATS
QRS DURATION: 136 MS
QT INTERVAL: 442 MS
QTC CALCULATION(BAZETT): 501 MS
QTC FREDERICIA: 480 MS
R AXIS: -50 DEGREES
RBC # BLD AUTO: 3.75 X10*6/UL (ref 4.5–5.9)
SODIUM SERPL-SCNC: 138 MMOL/L (ref 136–145)
T AXIS: 147 DEGREES
T OFFSET: 472 MS
VENTRICULAR RATE: 77 BPM
WBC # BLD AUTO: 3.7 X10*3/UL (ref 4.4–11.3)

## 2024-07-19 PROCEDURE — 1200000002 HC GENERAL ROOM WITH TELEMETRY DAILY

## 2024-07-19 PROCEDURE — 85027 COMPLETE CBC AUTOMATED: CPT

## 2024-07-19 PROCEDURE — 2500000002 HC RX 250 W HCPCS SELF ADMINISTERED DRUGS (ALT 637 FOR MEDICARE OP, ALT 636 FOR OP/ED)

## 2024-07-19 PROCEDURE — 70450 CT HEAD/BRAIN W/O DYE: CPT

## 2024-07-19 PROCEDURE — 99232 SBSQ HOSP IP/OBS MODERATE 35: CPT

## 2024-07-19 PROCEDURE — 70450 CT HEAD/BRAIN W/O DYE: CPT | Performed by: RADIOLOGY

## 2024-07-19 PROCEDURE — 80048 BASIC METABOLIC PNL TOTAL CA: CPT

## 2024-07-19 PROCEDURE — 97110 THERAPEUTIC EXERCISES: CPT | Mod: GP

## 2024-07-19 PROCEDURE — 2500000004 HC RX 250 GENERAL PHARMACY W/ HCPCS (ALT 636 FOR OP/ED)

## 2024-07-19 PROCEDURE — 2500000001 HC RX 250 WO HCPCS SELF ADMINISTERED DRUGS (ALT 637 FOR MEDICARE OP)

## 2024-07-19 PROCEDURE — 36415 COLL VENOUS BLD VENIPUNCTURE: CPT

## 2024-07-19 RX ORDER — POTASSIUM CHLORIDE 20 MEQ/1
40 TABLET, EXTENDED RELEASE ORAL ONCE
Status: COMPLETED | OUTPATIENT
Start: 2024-07-19 | End: 2024-07-19

## 2024-07-19 ASSESSMENT — COGNITIVE AND FUNCTIONAL STATUS - GENERAL
CLIMB 3 TO 5 STEPS WITH RAILING: TOTAL
MOVING FROM LYING ON BACK TO SITTING ON SIDE OF FLAT BED WITH BEDRAILS: A LITTLE
TURNING FROM BACK TO SIDE WHILE IN FLAT BAD: A LITTLE
WALKING IN HOSPITAL ROOM: A LOT
MOBILITY SCORE: 13
MOVING TO AND FROM BED TO CHAIR: A LOT
STANDING UP FROM CHAIR USING ARMS: A LOT

## 2024-07-19 ASSESSMENT — PAIN SCALES - GENERAL
PAINLEVEL_OUTOF10: 0 - NO PAIN
PAINLEVEL_OUTOF10: 1
PAINLEVEL_OUTOF10: 0 - NO PAIN
PAINLEVEL_OUTOF10: 3

## 2024-07-19 ASSESSMENT — PAIN - FUNCTIONAL ASSESSMENT
PAIN_FUNCTIONAL_ASSESSMENT: 0-10
PAIN_FUNCTIONAL_ASSESSMENT: 0-10

## 2024-07-19 ASSESSMENT — PAIN DESCRIPTION - LOCATION: LOCATION: HEAD

## 2024-07-19 NOTE — PROGRESS NOTES
Assessment / Plan   Giuseppe Davila is a 88 y.o. male with a past medical history of hypertension, A-fib status post Watchman, pacemaker placement, HFpEF, CAD, aortic stenosis status post TAVR 7/9/2024 on day 1 of admission presenting with Generalized weakness.    #Presyncope  #Aortic stenosis status post TAVR  -Etiology unclear, consider exacerbation of his known A-fib versus new arrhythmia versus orthostatic  -Pacemaker interrogation ordered, obtain orthostatics, watch on telemetry monitor.   -Zofran as needed. Continue Plavix, cardiology consulted for concern for cardiogenic syncope and recommended 1 dose of IV Lasix 40 mg     #Right lower extremity cellulitis  -Presented to the ED on 7/13 and was discharged on doxycycline.  Patient reports minimal improvement  -ESR and CRP elevated  -Lower extremity ultrasound 7/13 negative for DVTs  -Switch vancomycin to Keflex 500 mg twice daily    #Suspected hospital delirium  -7/18 began complaining of vague discomfort, was unable to elaborate on any symptoms and only oriented to self. Suspect his hearing loss is contributing to his altered mental status as well, as his hearing aid ran out of battery  -No focal deficits noted on neurological exam, patient improved throughout the day  -Head CT ordered to further evaluate his new memory loss    Chronic Medical conditions     #Hypertension  Plan:  -Continue home losartan 25 mg     #HFpEF  -Clinically appears volume overloaded with bilateral lower extremity pitting edema, however his BNP is below baseline and chest x-ray was negative for any pulmonary edema  -TTE 7/9 with EF 60 to 65%, abnormal diastolic filling, severely dilated left atrium, no mention of aortic stenosis  -Continue statin, losartan, will hold on further diuresis for now given lack of symptoms and recent echo    # A-fib  -Patient with Watchman device therefore not on anticoagulation  Plan:  -Cardiology consult as above, pacemaker interrogation     DVT:  Heparin  Diet: Cardiac  Code: DNR-CCA, DNI  Consults: Cardiology    Yaya Fuentes MD  PGY-1, Internal Medicine    This is a preliminary note, please await attending attestation for final recommendations    Subjective     Patient seen and examined.  No acute events overnight. Pt has no complaints this morning although remains somewhat disoriented. He does not remember that he was not feeling well yesterday and is unsure why he is in the hospital. R leg continues to improve.    Objective   Physical Exam  Vitals and nursing note reviewed.   Constitutional:       General: He is not in acute distress.     Appearance: Normal appearance.   HENT:      Head: Normocephalic and atraumatic.   Cardiovascular:      Rate and Rhythm: Normal rate. Rhythm irregular.   Pulmonary:      Effort: Pulmonary effort is normal.      Breath sounds: Normal breath sounds.   Abdominal:      General: Abdomen is flat.      Palpations: Abdomen is soft.   Musculoskeletal:      Right lower leg: Edema present.      Left lower leg: Edema present.   Skin:     General: Skin is warm and dry.   Neurological:      General: No focal deficit present.      Mental Status: He is alert. He is disoriented.       Last Recorded Vitals  Blood pressure 112/72, pulse 59, temperature 35.7 °C (96.3 °F), temperature source Temporal, resp. rate 18, height 1.829 m (6'), weight 89.8 kg (198 lb), SpO2 95%.  Intake/Output last 3 Shifts:  I/O last 3 completed shifts:  In: 50 (0.6 mL/kg) [IV Piggyback:50]  Out: - (0 mL/kg)   Weight: 89.8 kg     Last CBC & BMP  Lab Results   Component Value Date    GLUCOSE 95 07/17/2024    CALCIUM 9.0 07/17/2024     07/17/2024    K 3.6 07/17/2024    CO2 28 07/17/2024     07/17/2024    BUN 9 07/17/2024    CREATININE 0.96 07/17/2024     Lab Results   Component Value Date    WBC 3.7 (L) 07/19/2024    HGB 11.7 (L) 07/19/2024    HCT 34.1 (L) 07/19/2024    MCV 91 07/19/2024     07/19/2024

## 2024-07-19 NOTE — PROGRESS NOTES
Physical Therapy    Physical Therapy Treatment    Patient Name: Giuseppe Davila  MRN: [id number]  Today's Date: 7/19/2024  Time Calculation  Start Time: 1540  Stop Time: 1556  Time Calculation (min): 16 min    Assessment/Plan   PT Assessment  Evaluation/Treatment Tolerance: Patient limited by fatigue (and dizziness)  Medical Staff Made Aware: Yes (informed RN, Bernard)  End of Session Communication: Bedside nurse  End of Session Patient Position: Bed, 2 rail up, Alarm on     PT Plan  Treatment/Interventions: Bed mobility, Transfer training, Gait training, Therapeutic exercise, Therapeutic activity, Endurance training  PT Plan: Ongoing PT  PT Frequency: 2 times per week  PT Discharge Recommendations: Low intensity level of continued care  PT Recommended Transfer Status: Assist x1  PT - OK to Discharge: Yes (When cleared by medical staff)    General Visit Information:   PT  Visit  PT Received On: 07/19/24  General  Family/Caregiver Present: Yes  Caregiver Feedback: wife? present at bedside, encouraging pt. to do more to be able to go home; seemed worried about pt's c/o dizziness and if he is ready for discharge.  Prior to Session Communication: Bedside nurse  Patient Position Received: Bed, 2 rail up, Alarm off, not on at start of session  General Comment: Pt. cooperative for therapy except for mobility d/t c/o dizziness.    Subjective   Precautions:  Precautions  Medical Precautions: Fall precautions     Objective   Pain:  Pain Assessment  Pain Assessment: 0-10  0-10 (Numeric) Pain Score: 0 - No pain  Cognition:  Cognition  Overall Cognitive Status:  (appeared anxious and distraught over his condition)  Insight: Moderate  Coordination:     Postural Control:  Static Sitting Balance  Static Sitting-Balance Support: Bilateral upper extremity supported  Static Sitting-Level of Assistance: Close supervision  Static Sitting-Comment/Number of Minutes: 5     Activity Tolerance:  Activity Tolerance  Endurance: Decreased tolerance  "for upright activites  Treatments:  Therapeutic Exercise  Therapeutic Exercise Performed:  (Pt. performed sindy le therex x 10reps including heel slides, hip abd/add, laqs, aps.)    Bed Mobility  Bed Mobility:  (min assist supine<>sit eob w/ bedrail)     Transfers  Transfer:  (pt. describes mod dizziness sitting eob, holding his head down, stating \"i'm sweating.\"   Tolerated approx. 5mins before needing to lay down.)    Outcome Measures:  [facility] Basic Mobility  Turning from your back to your side while in a flat bed without using bedrails: A little  Moving from lying on your back to sitting on the side of a flat bed without using bedrails: A little  Moving to and from bed to chair (including a wheelchair): A lot  Standing up from a chair using your arms (e.g. wheelchair or bedside chair): A lot  To walk in hospital room: A lot  Climbing 3-5 steps with railing: Total  Basic Mobility - Total Score: 13    Education Documentation  Mobility Training, taught by Julita Allison PT at 7/19/2024  4:18 PM.  Learner: Patient  Readiness: Acceptance  Method: Explanation  Response: Verbalizes Understanding, Needs Reinforcement    Education Comments  No comments found.    OP EDUCATION:     Encounter Problems       Encounter Problems (Active)       PT Problem       PT Goal 1 (Progressing)       Start:  07/17/24    Expected End:  07/24/24       STG - Pt will transition supine <> sitting with modified independence          PT Goal 2 (Progressing)       Start:  07/17/24    Expected End:  07/24/24       STG - Pt will transfer STS with modified independence          PT Goal 3 (Progressing)       Start:  07/17/24    Expected End:  07/24/24       STG - Pt will amb 250 ft' using ww with SBA             Pain - Adult              "

## 2024-07-19 NOTE — CARE PLAN
Problem: Fall/Injury  Goal: Not fall by end of shift  Outcome: Progressing  Goal: Be free from injury by end of the shift  Outcome: Progressing  Goal: Verbalize understanding of personal risk factors for fall in the hospital  Outcome: Progressing  Goal: Verbalize understanding of risk factor reduction measures to prevent injury from fall in the home  Outcome: Progressing  Goal: Use assistive devices by end of the shift  Outcome: Progressing  Goal: Pace activities to prevent fatigue by end of the shift  Outcome: Progressing     Problem: Skin  Goal: Participates in plan/prevention/treatment measures  Outcome: Progressing  Goal: Prevent/manage excess moisture  Outcome: Progressing     Problem: Bathing  Goal: STG - Patient will bathe body  Outcome: Progressing     Problem: Dressings Lower Extremities  Goal: STG - Patient will gonzalez/doff pants over feet with adaptive equipment as needed.   Outcome: Progressing     Problem: Dressing Upper Extremities  Goal: STG - Patient will dress upper body with set up  Outcome: Progressing     Problem: Grooming  Goal: STG - Patient completes grooming with set up  Outcome: Progressing   The patient's goals for the shift include      The clinical goals for the shift include pt to be HDS by the end of shift

## 2024-07-19 NOTE — CARE PLAN
Problem: Fall/Injury  Goal: Not fall by end of shift  Outcome: Progressing  Goal: Be free from injury by end of the shift  Outcome: Progressing  Goal: Verbalize understanding of personal risk factors for fall in the hospital  Outcome: Progressing  Goal: Verbalize understanding of risk factor reduction measures to prevent injury from fall in the home  Outcome: Progressing  Goal: Use assistive devices by end of the shift  Outcome: Progressing  Goal: Pace activities to prevent fatigue by end of the shift  Outcome: Progressing     Problem: Skin  Goal: Participates in plan/prevention/treatment measures  Outcome: Progressing  Goal: Prevent/manage excess moisture  Outcome: Progressing     Problem: Bathing  Goal: STG - Patient will bathe body  Outcome: Progressing     Problem: Dressings Lower Extremities  Goal: STG - Patient will gonzalez/doff pants over feet with adaptive equipment as needed.   Outcome: Progressing     Problem: Dressing Upper Extremities  Goal: STG - Patient will dress upper body with set up  Outcome: Progressing     Problem: Grooming  Goal: STG - Patient completes grooming with set up  Outcome: Progressing     Problem: Pain - Adult  Goal: Verbalizes/displays adequate comfort level or baseline comfort level  Outcome: Progressing     Problem: Safety - Adult  Goal: Free from fall injury  Outcome: Progressing     Problem: Discharge Planning  Goal: Discharge to home or other facility with appropriate resources  Outcome: Progressing     Problem: Chronic Conditions and Co-morbidities  Goal: Patient's chronic conditions and co-morbidity symptoms are monitored and maintained or improved  Outcome: Progressing

## 2024-07-19 NOTE — PROGRESS NOTES
Giuseppe Davila is a 88 y.o. male on day 1 of admission presenting with Generalized weakness.      Subjective     Patient says he is feeling okay.  He denies any chest pain, says he is always short of breath nothing new from baseline, headache, blurry vision or diaphoresis.    Objective     Last Recorded Vitals  /72 (BP Location: Right arm, Patient Position: Lying)   Pulse 59   Temp 35.7 °C (96.3 °F) (Temporal)   Resp 18   Wt 89.8 kg (198 lb)   SpO2 95%   Intake/Output last 3 Shifts:    Intake/Output Summary (Last 24 hours) at 7/19/2024 0973  Last data filed at 7/18/2024 1910  Gross per 24 hour   Intake 50 ml   Output --   Net 50 ml       Admission Weight  Weight: 89.8 kg (198 lb) (07/16/24 1029)    Daily Weight  07/16/24 : 89.8 kg (198 lb)    Image Results      Physical Exam  Constitutional: well developed, awake, alert, no acute distress  ENMT: mucous membranes moist, conjunctivae clear  Head/Neck: normocephalic, atraumatic; supple, trachea midline  Respiratory/Thorax: patent airways, CTAB; no wheezes, rales, or rhonchi  Cardiovascular: RRR, no murmur  Gastrointestinal: soft, nondistended, non-tender, bowel sounds appreciated  Extremities: palpable peripheral pulses, no edema or cyanosis  Neurological: AO x3, no focal deficits  Psychological: appropriate mood and behavior  Skin: warm and dry    Assessment/Plan     Giuseppe Davila is a 88 y.o. male with a past medical history of HTN, HFpEF (60/65% on 7/24), A-fib with Watchman, prior stroke 2012, CAD, aortic stenosis  (TAVR 7/24), presented to the ED because of right lower extremity swelling (was diagnosed with cellulitis was given antibiotics on 7/13), and generalized weakness.     Acute conditions:  # Cardiac presyncope, rule out  #HFpEF (EF 60 to 65% 7/9)  -Patient recently had a TAVR of aortic valve 2 weeks ago.  He says after the procedure he developed right leg cellulitis.  He complains of weakness and fatigue.  Patient's symptoms seem to be more  likely in the setting of recent surgery and infection.  -Patient was given IV Lasix 40 mg 1 dose July 17 since he had pitting bilateral edema, slightly short of breath in the setting of HFpEF and BNP of 225.  His symptoms improved.  -We will continue to monitor for any signs indicating a cardiac cause.    #Atrial fibrillation with Watchman  #Coronary artery disease s/p PCI with 2 prior stents and pacemaker  #Aortic stenosis s/p TAVR 7/24  #HTN  #Dyslipidemia        Carol Perez MD  PGY1 internal medicine resident

## 2024-07-20 LAB
ANION GAP SERPL CALC-SCNC: 13 MMOL/L (ref 10–20)
BUN SERPL-MCNC: 17 MG/DL (ref 6–23)
CALCIUM SERPL-MCNC: 9.1 MG/DL (ref 8.6–10.3)
CHLORIDE SERPL-SCNC: 101 MMOL/L (ref 98–107)
CO2 SERPL-SCNC: 28 MMOL/L (ref 21–32)
CREAT SERPL-MCNC: 0.89 MG/DL (ref 0.5–1.3)
EGFRCR SERPLBLD CKD-EPI 2021: 83 ML/MIN/1.73M*2
GLUCOSE SERPL-MCNC: 101 MG/DL (ref 74–99)
HOLD SPECIMEN: NORMAL
HOLD SPECIMEN: NORMAL
POTASSIUM SERPL-SCNC: 3.6 MMOL/L (ref 3.5–5.3)
SODIUM SERPL-SCNC: 138 MMOL/L (ref 136–145)

## 2024-07-20 PROCEDURE — 80048 BASIC METABOLIC PNL TOTAL CA: CPT

## 2024-07-20 PROCEDURE — 2500000004 HC RX 250 GENERAL PHARMACY W/ HCPCS (ALT 636 FOR OP/ED)

## 2024-07-20 PROCEDURE — 2500000002 HC RX 250 W HCPCS SELF ADMINISTERED DRUGS (ALT 637 FOR MEDICARE OP, ALT 636 FOR OP/ED)

## 2024-07-20 PROCEDURE — 2500000001 HC RX 250 WO HCPCS SELF ADMINISTERED DRUGS (ALT 637 FOR MEDICARE OP)

## 2024-07-20 PROCEDURE — 1200000002 HC GENERAL ROOM WITH TELEMETRY DAILY

## 2024-07-20 PROCEDURE — 36415 COLL VENOUS BLD VENIPUNCTURE: CPT

## 2024-07-20 RX ORDER — ASPIRIN 81 MG/1
81 TABLET ORAL DAILY
Status: DISCONTINUED | OUTPATIENT
Start: 2024-07-20 | End: 2024-07-20

## 2024-07-20 ASSESSMENT — COGNITIVE AND FUNCTIONAL STATUS - GENERAL
DAILY ACTIVITIY SCORE: 18
EATING MEALS: A LITTLE
HELP NEEDED FOR BATHING: A LITTLE
PERSONAL GROOMING: A LITTLE
MOVING FROM LYING ON BACK TO SITTING ON SIDE OF FLAT BED WITH BEDRAILS: A LITTLE
MOBILITY SCORE: 14
WALKING IN HOSPITAL ROOM: A LOT
CLIMB 3 TO 5 STEPS WITH RAILING: A LOT
TURNING FROM BACK TO SIDE WHILE IN FLAT BAD: A LITTLE
STANDING UP FROM CHAIR USING ARMS: A LOT
TOILETING: A LITTLE
MOVING TO AND FROM BED TO CHAIR: A LOT
DRESSING REGULAR UPPER BODY CLOTHING: A LITTLE
DRESSING REGULAR LOWER BODY CLOTHING: A LITTLE

## 2024-07-20 ASSESSMENT — PAIN - FUNCTIONAL ASSESSMENT
PAIN_FUNCTIONAL_ASSESSMENT: 0-10
PAIN_FUNCTIONAL_ASSESSMENT: 0-10

## 2024-07-20 ASSESSMENT — PAIN SCALES - GENERAL
PAINLEVEL_OUTOF10: 0 - NO PAIN
PAINLEVEL_OUTOF10: 0 - NO PAIN
PAINLEVEL_OUTOF10: 2

## 2024-07-20 ASSESSMENT — PAIN DESCRIPTION - DESCRIPTORS: DESCRIPTORS: ACHING

## 2024-07-20 NOTE — PROGRESS NOTES
Assessment / Plan   Giuseppe Davila is a 88 y.o. male with a past medical history of hypertension, A-fib status post Watchman, pacemaker placement, HFpEF, CAD, aortic stenosis status post TAVR 7/9/2024 on day 2 of admission presenting with Generalized weakness.    #Presyncope  #Aortic stenosis status post TAVR  -Etiology unclear, consider exacerbation of his known A-fib versus new arrhythmia versus orthostatic  -Pacemaker interrogation ordered, obtain orthostatics, watch on telemetry monitor.   -Zofran as needed. Continue Plavix, cardiology consulted for concern for cardiogenic syncope and recommended 1 dose of IV Lasix 40 mg     #Right lower extremity cellulitis  -Presented to the ED on 7/13 and was discharged on doxycycline.  Patient reports minimal improvement  -ESR and CRP elevated  -Lower extremity ultrasound 7/13 negative for DVTs  -Switch vancomycin to Keflex 500 mg twice daily    #Suspected hospital delirium  #Acute memory loss  -7/18 began complaining of vague discomfort, was unable to elaborate on any symptoms and only oriented to self. Suspect his hearing loss is contributing to his altered mental status as well, as his hearing aid ran out of battery  -No focal deficits noted on neurological exam, patient improved throughout the day  -Head CT 7/19/2024 showing old lacunar infarct, no acute changes    Chronic Medical conditions     #Hypertension  Plan:  -Continue home losartan 25 mg     #HFpEF  -Clinically appears volume overloaded with bilateral lower extremity pitting edema, however his BNP is below baseline and chest x-ray was negative for any pulmonary edema  -TTE 7/9 with EF 60 to 65%, abnormal diastolic filling, severely dilated left atrium, no mention of aortic stenosis  -Continue statin, losartan, will hold on further diuresis for now given lack of symptoms and recent echo    # A-fib  -Patient with Watchman device therefore not on anticoagulation  Plan:  -Cardiology consult as above, pacemaker  interrogation     DVT: Heparin  Diet: Cardiac  Code: DNR-CCA, DNI  Consults: Cardiology    Yaya Fuentes MD  PGY-1, Internal Medicine    This is a preliminary note, please await attending attestation for final recommendations    Subjective     Patient seen and examined.  No acute events overnight.  Remains confused and does not remember why he is in the hospital or his appointment with his cardiologist prior to admission.  He does remember getting his head CT yesterday and is able to tell me his name and the year.  Family in the room concerned about needing help at home on discharge and partner does not feel safe with him being discharged home at this time.    Objective   Physical Exam  Vitals and nursing note reviewed.   Constitutional:       General: He is not in acute distress.     Appearance: Normal appearance.   HENT:      Head: Normocephalic and atraumatic.   Cardiovascular:      Rate and Rhythm: Normal rate. Rhythm irregular.   Pulmonary:      Effort: Pulmonary effort is normal.      Breath sounds: Normal breath sounds.   Abdominal:      General: Abdomen is flat.      Palpations: Abdomen is soft.   Musculoskeletal:      Right lower leg: Edema present.      Left lower leg: Edema present.   Skin:     General: Skin is warm and dry.   Neurological:      General: No focal deficit present.      Mental Status: He is alert. He is disoriented.       Last Recorded Vitals  Blood pressure 124/85, pulse 74, temperature 35.5 °C (95.9 °F), temperature source Temporal, resp. rate 16, height 1.829 m (6'), weight 89.8 kg (198 lb), SpO2 96%.  Intake/Output last 3 Shifts:  I/O last 3 completed shifts:  In: 50 (0.6 mL/kg) [IV Piggyback:50]  Out: - (0 mL/kg)   Weight: 89.8 kg     Last CBC & BMP  Lab Results   Component Value Date    GLUCOSE 101 (H) 07/20/2024    CALCIUM 9.1 07/20/2024     07/20/2024    K 3.6 07/20/2024    CO2 28 07/20/2024     07/20/2024    BUN 17 07/20/2024    CREATININE 0.89 07/20/2024     Lab  Results   Component Value Date    WBC 3.7 (L) 07/19/2024    HGB 11.7 (L) 07/19/2024    HCT 34.1 (L) 07/19/2024    MCV 91 07/19/2024     07/19/2024

## 2024-07-20 NOTE — PROGRESS NOTES
Physical Therapy                 Therapy Communication Note    Patient Name: Giuseppe Davila  MRN: [id number]  Today's Date: 7/20/2024     Discipline: Physical Therapy    Missed Visit Reason: Missed Visit Reason: Patient sleeping    Missed Time: Attempt    Comment: Aroused patient, states he is feeling very fatigued. Knows he needs to do therapy but cannot at the present time.   stated

## 2024-07-21 VITALS
HEIGHT: 72 IN | WEIGHT: 198 LBS | BODY MASS INDEX: 26.82 KG/M2 | OXYGEN SATURATION: 95 % | SYSTOLIC BLOOD PRESSURE: 122 MMHG | HEART RATE: 68 BPM | RESPIRATION RATE: 18 BRPM | TEMPERATURE: 97.3 F | DIASTOLIC BLOOD PRESSURE: 71 MMHG

## 2024-07-21 LAB
ANION GAP SERPL CALC-SCNC: 11 MMOL/L (ref 10–20)
BUN SERPL-MCNC: 15 MG/DL (ref 6–23)
CALCIUM SERPL-MCNC: 9.1 MG/DL (ref 8.6–10.3)
CHLORIDE SERPL-SCNC: 102 MMOL/L (ref 98–107)
CO2 SERPL-SCNC: 29 MMOL/L (ref 21–32)
CREAT SERPL-MCNC: 0.78 MG/DL (ref 0.5–1.3)
EGFRCR SERPLBLD CKD-EPI 2021: 87 ML/MIN/1.73M*2
GLUCOSE BLD MANUAL STRIP-MCNC: 104 MG/DL (ref 74–99)
GLUCOSE BLD MANUAL STRIP-MCNC: 120 MG/DL (ref 74–99)
GLUCOSE SERPL-MCNC: 92 MG/DL (ref 74–99)
HOLD SPECIMEN: NORMAL
POTASSIUM SERPL-SCNC: 3.4 MMOL/L (ref 3.5–5.3)
SODIUM SERPL-SCNC: 139 MMOL/L (ref 136–145)

## 2024-07-21 PROCEDURE — 36415 COLL VENOUS BLD VENIPUNCTURE: CPT

## 2024-07-21 PROCEDURE — 97530 THERAPEUTIC ACTIVITIES: CPT | Mod: GO

## 2024-07-21 PROCEDURE — 97530 THERAPEUTIC ACTIVITIES: CPT | Mod: GP

## 2024-07-21 PROCEDURE — 82947 ASSAY GLUCOSE BLOOD QUANT: CPT

## 2024-07-21 PROCEDURE — 80048 BASIC METABOLIC PNL TOTAL CA: CPT

## 2024-07-21 PROCEDURE — 99232 SBSQ HOSP IP/OBS MODERATE 35: CPT | Performed by: INTERNAL MEDICINE

## 2024-07-21 PROCEDURE — 2500000004 HC RX 250 GENERAL PHARMACY W/ HCPCS (ALT 636 FOR OP/ED)

## 2024-07-21 PROCEDURE — 2500000001 HC RX 250 WO HCPCS SELF ADMINISTERED DRUGS (ALT 637 FOR MEDICARE OP)

## 2024-07-21 PROCEDURE — 2500000005 HC RX 250 GENERAL PHARMACY W/O HCPCS

## 2024-07-21 PROCEDURE — 1200000002 HC GENERAL ROOM WITH TELEMETRY DAILY

## 2024-07-21 RX ORDER — DEXTROSE 50 % IN WATER (D50W) INTRAVENOUS SYRINGE
12.5
Status: DISCONTINUED | OUTPATIENT
Start: 2024-07-21 | End: 2024-07-22 | Stop reason: HOSPADM

## 2024-07-21 RX ORDER — DEXTROSE 50 % IN WATER (D50W) INTRAVENOUS SYRINGE
25
Status: DISCONTINUED | OUTPATIENT
Start: 2024-07-21 | End: 2024-07-22 | Stop reason: HOSPADM

## 2024-07-21 ASSESSMENT — COGNITIVE AND FUNCTIONAL STATUS - GENERAL
STANDING UP FROM CHAIR USING ARMS: A LOT
DAILY ACTIVITIY SCORE: 18
TURNING FROM BACK TO SIDE WHILE IN FLAT BAD: A LITTLE
DAILY ACTIVITIY SCORE: 15
CLIMB 3 TO 5 STEPS WITH RAILING: A LOT
MOVING TO AND FROM BED TO CHAIR: A LOT
HELP NEEDED FOR BATHING: A LOT
MOBILITY SCORE: 14
STANDING UP FROM CHAIR USING ARMS: A LOT
CLIMB 3 TO 5 STEPS WITH RAILING: A LOT
MOVING TO AND FROM BED TO CHAIR: A LOT
CLIMB 3 TO 5 STEPS WITH RAILING: A LOT
DRESSING REGULAR UPPER BODY CLOTHING: A LITTLE
WALKING IN HOSPITAL ROOM: A LOT
MOVING FROM LYING ON BACK TO SITTING ON SIDE OF FLAT BED WITH BEDRAILS: A LITTLE
WALKING IN HOSPITAL ROOM: A LOT
PERSONAL GROOMING: A LITTLE
EATING MEALS: A LITTLE
HELP NEEDED FOR BATHING: A LOT
STANDING UP FROM CHAIR USING ARMS: A LOT
PERSONAL GROOMING: A LITTLE
DRESSING REGULAR UPPER BODY CLOTHING: A LITTLE
TOILETING: A LOT
HELP NEEDED FOR BATHING: A LITTLE
TURNING FROM BACK TO SIDE WHILE IN FLAT BAD: A LITTLE
MOBILITY SCORE: 14
MOVING FROM LYING ON BACK TO SITTING ON SIDE OF FLAT BED WITH BEDRAILS: A LITTLE
DAILY ACTIVITIY SCORE: 15
DRESSING REGULAR UPPER BODY CLOTHING: A LITTLE
MOVING FROM LYING ON BACK TO SITTING ON SIDE OF FLAT BED WITH BEDRAILS: A LITTLE
MOVING TO AND FROM BED TO CHAIR: A LOT
PERSONAL GROOMING: A LITTLE
TOILETING: A LITTLE
DRESSING REGULAR LOWER BODY CLOTHING: A LOT
DRESSING REGULAR LOWER BODY CLOTHING: A LOT
EATING MEALS: A LITTLE
MOBILITY SCORE: 14
DRESSING REGULAR LOWER BODY CLOTHING: A LITTLE
WALKING IN HOSPITAL ROOM: A LOT
TURNING FROM BACK TO SIDE WHILE IN FLAT BAD: A LITTLE
EATING MEALS: A LITTLE
TOILETING: A LOT

## 2024-07-21 ASSESSMENT — PAIN - FUNCTIONAL ASSESSMENT
PAIN_FUNCTIONAL_ASSESSMENT: 0-10

## 2024-07-21 ASSESSMENT — PAIN SCALES - GENERAL
PAINLEVEL_OUTOF10: 0 - NO PAIN
PAINLEVEL_OUTOF10: 0 - NO PAIN
PAINLEVEL_OUTOF10: 5 - MODERATE PAIN
PAINLEVEL_OUTOF10: 1
PAINLEVEL_OUTOF10: 0 - NO PAIN

## 2024-07-21 ASSESSMENT — PAIN DESCRIPTION - LOCATION: LOCATION: NECK

## 2024-07-21 NOTE — PROGRESS NOTES
Assessment / Plan   Giuseppe Davila is a 88 y.o. male with a past medical history of hypertension, A-fib status post Watchman, pacemaker placement, HFpEF, CAD, aortic stenosis status post TAVR 7/9/2024 on day 3 of admission presenting with Generalized weakness.    #Presyncope  #Aortic stenosis status post TAVR  -Etiology unclear, consider exacerbation of his known A-fib versus new arrhythmia versus orthostatic  -Pacemaker interrogation ordered, obtain orthostatics, watch on telemetry monitor.   -Zofran as needed. Continue Plavix, cardiology consulted for concern for cardiogenic syncope and recommended 1 dose of IV Lasix 40 mg     #Right lower extremity cellulitis  -Presented to the ED on 7/13 and was discharged on doxycycline.  Patient reports minimal improvement  -ESR and CRP elevated  -Lower extremity ultrasound 7/13 negative for DVTs  -Switch vancomycin to Keflex 500 mg twice daily    #Suspected hospital delirium  #Acute memory loss  -7/18 began complaining of vague discomfort, was unable to elaborate on any symptoms and only oriented to self. Suspect his hearing loss is contributing to his altered mental status as well, as his hearing aid ran out of battery  -No focal deficits noted on neurological exam, patient improved throughout the day  -Head CT 7/19/2024 showing old lacunar infarct, no acute changes, plan for repeat CT 7/22 AM    Chronic Medical conditions     #Hypertension  Plan:  -Continue home losartan 25 mg     #HFpEF  -Clinically appears volume overloaded with bilateral lower extremity pitting edema, however his BNP is below baseline and chest x-ray was negative for any pulmonary edema  -TTE 7/9 with EF 60 to 65%, abnormal diastolic filling, severely dilated left atrium, no mention of aortic stenosis  -Continue statin, losartan, will hold on further diuresis for now given lack of symptoms and recent echo    # A-fib  -Patient with Watchman device therefore not on anticoagulation  Plan:  -Cardiology  consulted as above, pacemaker interrogation     DVT: Heparin  Diet: Cardiac  Code: DNR-CCA, DNI  Consults: Cardiology    Yaya Fuentes MD  PGY-1, Internal Medicine    This is a preliminary note, please await attending attestation for final recommendations    Subjective     Patient seen and examined.  No acute events overnight.  Patient alert and oriented to self, place, time this morning.  Endorsing some nausea but overall feeling well and wondering when he will be able to go home.  Spoke to partner and other family in the room, answered questions about plan for repeat head CT tomorrow and hopeful patient can work with physical therapy prior to discharge.  Objective   Physical Exam  Vitals and nursing note reviewed.   Constitutional:       General: He is not in acute distress.     Appearance: Normal appearance.   HENT:      Head: Normocephalic and atraumatic.   Cardiovascular:      Rate and Rhythm: Normal rate. Rhythm irregular.   Pulmonary:      Effort: Pulmonary effort is normal.      Breath sounds: Normal breath sounds.   Abdominal:      General: Abdomen is flat.      Palpations: Abdomen is soft.   Musculoskeletal:      Right lower leg: Edema present.      Left lower leg: Edema present.   Skin:     General: Skin is warm and dry.   Neurological:      General: No focal deficit present.      Mental Status: He is alert. He is disoriented.       Last Recorded Vitals  Blood pressure 111/67, pulse 64, temperature 36.6 °C (97.9 °F), resp. rate 17, height 1.829 m (6'), weight 89.8 kg (198 lb), SpO2 95%.  Intake/Output last 3 Shifts:  I/O last 3 completed shifts:  In: 946 (10.5 mL/kg) [P.O.:946]  Out: - (0 mL/kg)   Weight: 89.8 kg     Last CBC & BMP  Lab Results   Component Value Date    GLUCOSE 92 07/21/2024    CALCIUM 9.1 07/21/2024     07/21/2024    K 3.4 (L) 07/21/2024    CO2 29 07/21/2024     07/21/2024    BUN 15 07/21/2024    CREATININE 0.78 07/21/2024     Lab Results   Component Value Date    WBC 3.7  (L) 07/19/2024    HGB 11.7 (L) 07/19/2024    HCT 34.1 (L) 07/19/2024    MCV 91 07/19/2024     07/19/2024

## 2024-07-21 NOTE — PROGRESS NOTES
Subjective Data:  Patient remains somewhat confused-does not remember how he got here  Family members also note he is confused  No specific chest pain or shortness of breath or palpitations    Overnight Events:    None reported     Objective Data:  Last Recorded Vitals:  Vitals:    07/20/24 2003 07/21/24 0510 07/21/24 0644 07/21/24 1015   BP: 114/65 132/79 116/65 111/67   BP Location: Left arm Left arm     Patient Position: Lying Lying     Pulse: 67 77 68 64   Resp: 16 18  17   Temp: 35.8 °C (96.4 °F) 35.5 °C (95.9 °F)  36.6 °C (97.9 °F)   TempSrc: Temporal Temporal     SpO2: 94% 96% 96% 95%   Weight:       Height:           Last Labs:  CBC - 7/19/2024:  7:04 AM  3.7 11.7 154    34.1      CMP - 7/21/2024:  6:22 AM  9.1 7.2 40 --- 0.8   4.3 3.9 17 67      PTT - 7/16/2024: 10:52 AM  1.0   11.4 27     TROPHS   Date/Time Value Ref Range Status   07/16/2024 12:58 PM 37 0 - 20 ng/L Final   07/16/2024 10:52 AM 42 0 - 20 ng/L Final   04/30/2024 11:42 PM 21 0 - 20 ng/L Final     BNP   Date/Time Value Ref Range Status   07/16/2024 10:52  0 - 99 pg/mL Final   04/30/2024 11:42  0 - 99 pg/mL Final     VLDL   Date/Time Value Ref Range Status   09/11/2020 10:41 AM 16 0 - 40 mg/dL Final   09/12/2019 08:36 AM 13 0 - 40 mg/dL Final      Last I/O:  I/O last 3 completed shifts:  In: 946 (10.5 mL/kg) [P.O.:946]  Out: - (0 mL/kg)   Weight: 89.8 kg     Past Cardiology Tests (Last 3 Years):  EKG:  Telemetry with A-fib with ventricular pacing and occasional PVCs    Echo:  Transthoracic Echo (TTE) Limited 07/10/2024      Transthoracic Echo (TTE) Limited 07/09/2024      Transthoracic echo (TTE) complete 04/18/2024    Ejection Fractions:  EF   Date/Time Value Ref Range Status   07/10/2024 09:33 AM 63 %    07/09/2024 01:11 PM 60 %      Cath:  Cardiac Catheterization Procedure 07/09/2024      Cardiac Catheterization Procedure 05/24/2024    Stress Test:  Nuclear Stress Test 04/18/2024    Cardiac Imaging:  No results found for this or  any previous visit from the past 1095 days.      Inpatient Medications:  Scheduled medications   Medication Dose Route Frequency    cephalexin  500 mg oral q6h CLAIRE    cholestyramine light  4 g oral Every other day    clopidogrel  75 mg oral Daily    heparin (porcine)  5,000 Units subcutaneous q8h    losartan  25 mg oral Daily    rosuvastatin  20 mg oral Once per day on Monday Wednesday Friday Saturday    torsemide  20 mg oral Every Mon/Wed/Fri     PRN medications   Medication    acetaminophen    Or    acetaminophen    Or    acetaminophen    ondansetron    Or    ondansetron    promethazine     Continuous Medications   Medication Dose Last Rate       Physical Exam:  PHYSICAL EXAM:    Constitutional/General: Confused frail, elderly male,, nontoxic, and in NAD  Neck:  No increased JVP,no carotid bruits.  Respiratory:  Lungs clear to auscultation bilaterally, no wheezes, rales, or rhonchi, not in respiratory distress  Cardiovascular:  Regular rate, regular rhythm, no murmurs, gallops, or rubs, PMI nondisplaced, 2+ distal pulses  Chest:  No chest wall tenderness  GI:  Abdomen soft, nontender, nondistended, + BS, no organomegaly, no palpable masses, no rebound, guarding, or rigidity  Musculoskeletal:  Moves all extremities x4  Extremities: No peripheral edema  Integument: Skin warm and dry, no rashes  Neurologic:  No focal deficits  Psychiatric: Flat affect    Vital Signs, Nursing Notes, Allergies, and Medications reviewed by me.     Assessment/Plan   1.  Confusion  2.  History of recent TAVR  3.  Atrial fibrillation  4.  Permanent pacemaker in place  -88-year-old male status post recent TAVR for aortic stenosis presented with generalized weakness and confusion.  -Cardiac evaluation has been unremarkable i.e. patient is in a paced rhythm and pacemaker functioning appropriately per interrogation, no evidence for acute coronary syndrome per cardiac enzymes, TAVR functioning appropriately per exam and per 7/10/24 echo, blood  pressure has been relatively well-controlled, no CHF per chest x-ray.  -Stable for discharge from cardiac standpoint.  Will sign off and can follow-up with Dr. Carmichael his primary cardiologist  Peripheral IV 07/18/24 20 G Left;Anterior Forearm (Active)   Site Assessment Clean;Intact;Dry 07/21/24 0805   Dressing Status Clean;Dry 07/21/24 0805   Number of days: 3       Code Status:  DNR and No Intubation        Gordo Duque DO

## 2024-07-21 NOTE — CARE PLAN
The patient's goals for the shift include      The clinical goals for the shift include patient will remain safe and free from fall and injury throughout the shift    Over the shift, the patient did not make progress toward the following goals. Barriers to progression include confusion and weakness. Recommendations to address these barriers include frequent re-orientation and ambulation assistance.

## 2024-07-21 NOTE — PROGRESS NOTES
Occupational Therapy    OT Treatment    Patient Name: Giuseppe Davila  MRN: [id number]  Today's Date: 7/21/2024  Time Calculation  Start Time: 1223  Stop Time: 1250  Time Calculation (min): 27 min        Assessment:  End of Session Communication: Bedside nurse  End of Session Patient Position: Bed, 2 rail up, Alarm on     Plan:  Treatment Interventions: Functional transfer training, ADL retraining, Endurance training, Patient/family training, Compensatory technique education  OT Frequency: 3 times per week  OT Discharge Recommendations: Low intensity level of continued care  OT - OK to Discharge: Yes  Treatment Interventions: Functional transfer training, ADL retraining, Endurance training, Patient/family training, Compensatory technique education    Subjective   Previous Visit Info:  OT Last Visit  OT Received On: 07/21/24  General:  General  Family/Caregiver Present: Yes  Caregiver Feedback:  (Wife, daughter, GAMALIEL)  Co-Treatment: PT  Co-Treatment Reason: Maximize patient safety and mobility  Prior to Session Communication: Bedside nurse  Patient Position Received: Bed, 2 rail up, Alarm on  General Comment: Pt limited by dizziness throughout therapy session. Found to be orthostatic with changes in positioning. NSG notified.  Precautions:     Vital Signs:  Vital Signs  BP:  (116/66 supine, 98/58 EOB, 112/63 after 3 minutes seated, 96/56 standing)  Pain:  Pain Assessment  Pain Assessment: 0-10  0-10 (Numeric) Pain Score: 0 - No pain    Objective    Cognition:  Cognition  Overall Cognitive Status: Impaired  Orientation Level: Disoriented to time, Disoriented to situation  Coordination:     Activities of Daily Living: LE Dressing  LE Dressing:  (Total A required to don footwear with pt seated EOB.)  Functional Standing Tolerance:  Functional Standing Tolerance Comments: Unable to tolerate standing d/t pt presenting as orthostatic and c/o dizziness with changes in positioning  Bed Mobility/Transfers: Bed Mobility 1  Bed  Mobility 1: Supine to sitting, Sitting to supine  Level of Assistance 1: Moderate assistance, Close supervision  Bed Mobility Comments 1: Pt initially required SBA For transfer from SUP-EOB simulated to home however with increased dizziness pt required mod A for transferring in/out of bed. Cues required for safety and technique.    Transfers  Transfer:  (STS transfer to FWW mod x1 with side-stepping towards HOB. Pt closely monitored throughout)      Functional Mobility:  Functional Mobility  Functional Mobility Performed:  (Unable to complete d/t dizziness)         Outcome Measures:[facility] Daily Activity  Putting on and taking off regular lower body clothing: A lot  Bathing (including washing, rinsing, drying): A lot  Putting on and taking off regular upper body clothing: A little  Toileting, which includes using toilet, bedpan or urinal: A lot  Taking care of personal grooming such as brushing teeth: A little  Eating Meals: A little  Daily Activity - Total Score: 15        Education Documentation  No documentation found.  Education Comments  No comments found.        OP EDUCATION:       Goals:  Encounter Problems       Encounter Problems (Active)       Bathing       STG - Patient will bathe body (Progressing)       Start:  07/17/24    Expected End:  07/31/24               Dressing Upper Extremities       STG - Patient will dress upper body with set up (Progressing)       Start:  07/17/24    Expected End:  07/31/24               Dressings Lower Extremities       STG - Patient will gonzalez/doff pants over feet with adaptive equipment as needed.  (Progressing)       Start:  07/17/24    Expected End:  07/31/24               Functional Balance       STG - Maintains dynamic standing balance without upper extremity support with supervision  (Progressing)       Start:  07/17/24    Expected End:  07/31/24       INTERVENTIONS:  1. Practice standing with minimal support.  2. Educate patient about standing tolerance.  3. Educate  patient about independence with gait, transfers, and ADL's.  4. Educate patient about use of assistive device.  5. Educate patient about self-directed care.            Functional Mobility       STG - Patient will ambulate household distance with LRAD  (Progressing)       Start:  07/17/24    Expected End:  07/31/24               Grooming       STG - Patient completes grooming with set up (Progressing)       Start:  07/17/24    Expected End:  07/31/24               OT Transfers       STG - Patient will perform toilet transfer with supervision (Progressing)       Start:  07/17/24    Expected End:  07/31/24

## 2024-07-21 NOTE — PROGRESS NOTES
Physical Therapy    Physical Therapy Treatment    Patient Name: Giuseppe Davila  MRN: [id number]  Today's Date: 7/21/2024  Time Calculation  Start Time: 1224  Stop Time: 1251  Time Calculation (min): 27 min    Assessment/Plan   PT Assessment  End of Session Communication: Bedside nurse  End of Session Patient Position: Bed, 2 rail up, Alarm on     PT Plan  Treatment/Interventions: Bed mobility, Transfer training, Gait training, Therapeutic exercise, Therapeutic activity, Endurance training  PT Plan: Ongoing PT  PT Frequency: 2 times per week  PT Discharge Recommendations: Low intensity level of continued care  PT Recommended Transfer Status: Assist x1  PT - OK to Discharge: Yes (When cleared by medical staff)      General Visit Information:      General  Co-Treatment: OT  Co-Treatment Reason: Maximize patient safety and mobility  Prior to Session Communication: Bedside nurse  Patient Position Received: Alarm on, Bed, 3 rail up  General Comment: Pt slightly confused, cooperative with therapy except for mobility d/t c/o dizziness/orthostatic hypotension. (Plan for further imaging tomorrow to rule out acute stroke.)    Subjective   Precautions:     Vital Signs:  Vital Signs  BP:  (Supine 116/66, sitting 98/58 then after ~3 min 112/63, standing 96/56)    Objective   Pain:  Pain Assessment  Pain Assessment: 0-10  0-10 (Numeric) Pain Score: 0 - No pain  Cognition:  Cognition  Overall Cognitive Status: Impaired  Orientation Level: Disoriented to situation, Disoriented to time  Processing Speed: Delayed      Activity Tolerance:  Activity Tolerance  Endurance: Tolerates less than 10 min exercise with changes in vital signs, Decreased tolerance for upright activites  Treatments:  Bed Mobility  Bed Mobility:  (Supine<>sit transfer initially CGA but with subsequent trials pt became more fatigued and required Min/Mod A. Pt c/o increasing dizziness w/ positional changes.)    Ambulation/Gait Training  Ambulation/Gait Training  Performed:  (Mod A with FWW for few shuffling sidesteps toward HOB; cues for walker management. Pt demo flexed posture, downward gaze.)  Transfers  Transfer:  (Mod A for sit<>stand transfers at EOB to FWW and to maintain static standing x 2 min for BP measurement)    Outcome Measures:  [facility] Basic Mobility  Turning from your back to your side while in a flat bed without using bedrails: A little  Moving from lying on your back to sitting on the side of a flat bed without using bedrails: A little  Moving to and from bed to chair (including a wheelchair): A lot  Standing up from a chair using your arms (e.g. wheelchair or bedside chair): A lot  To walk in hospital room: A lot  Climbing 3-5 steps with railing: A lot  Basic Mobility - Total Score: 14    EDUCATION: Edu pt on benefit of slowly raising HOB throughout the day to acclimate body to positional changes.       Encounter Problems       Encounter Problems (Active)       PT Problem       PT Goal 1 (Progressing)       Start:  07/17/24    Expected End:  07/24/24       STG - Pt will transition supine <> sitting with modified independence          PT Goal 2 (Progressing)       Start:  07/17/24    Expected End:  07/24/24       STG - Pt will transfer STS with modified independence          PT Goal 3 (Progressing)       Start:  07/17/24    Expected End:  07/24/24       STG - Pt will amb 250 ft' using ww with SBA

## 2024-07-22 ENCOUNTER — APPOINTMENT (OUTPATIENT)
Dept: RADIOLOGY | Facility: HOSPITAL | Age: 89
DRG: 603 | End: 2024-07-22
Payer: MEDICARE

## 2024-07-22 VITALS
WEIGHT: 198 LBS | HEIGHT: 72 IN | RESPIRATION RATE: 17 BRPM | HEART RATE: 63 BPM | OXYGEN SATURATION: 96 % | TEMPERATURE: 96.8 F | DIASTOLIC BLOOD PRESSURE: 69 MMHG | BODY MASS INDEX: 26.82 KG/M2 | SYSTOLIC BLOOD PRESSURE: 123 MMHG

## 2024-07-22 LAB
ANION GAP SERPL CALC-SCNC: 12 MMOL/L (ref 10–20)
BUN SERPL-MCNC: 12 MG/DL (ref 6–23)
CALCIUM SERPL-MCNC: 8.8 MG/DL (ref 8.6–10.3)
CHLORIDE SERPL-SCNC: 103 MMOL/L (ref 98–107)
CO2 SERPL-SCNC: 27 MMOL/L (ref 21–32)
CREAT SERPL-MCNC: 0.94 MG/DL (ref 0.5–1.3)
EGFRCR SERPLBLD CKD-EPI 2021: 79 ML/MIN/1.73M*2
ERYTHROCYTE [DISTWIDTH] IN BLOOD BY AUTOMATED COUNT: 13.1 % (ref 11.5–14.5)
GLUCOSE SERPL-MCNC: 97 MG/DL (ref 74–99)
HCT VFR BLD AUTO: 37.1 % (ref 41–52)
HGB BLD-MCNC: 12.4 G/DL (ref 13.5–17.5)
MCH RBC QN AUTO: 31.4 PG (ref 26–34)
MCHC RBC AUTO-ENTMCNC: 33.4 G/DL (ref 32–36)
MCV RBC AUTO: 94 FL (ref 80–100)
NRBC BLD-RTO: 0 /100 WBCS (ref 0–0)
PLATELET # BLD AUTO: 137 X10*3/UL (ref 150–450)
POTASSIUM SERPL-SCNC: 3.6 MMOL/L (ref 3.5–5.3)
RBC # BLD AUTO: 3.95 X10*6/UL (ref 4.5–5.9)
SODIUM SERPL-SCNC: 138 MMOL/L (ref 136–145)
WBC # BLD AUTO: 3.6 X10*3/UL (ref 4.4–11.3)

## 2024-07-22 PROCEDURE — 2500000005 HC RX 250 GENERAL PHARMACY W/O HCPCS

## 2024-07-22 PROCEDURE — 2500000004 HC RX 250 GENERAL PHARMACY W/ HCPCS (ALT 636 FOR OP/ED)

## 2024-07-22 PROCEDURE — 70450 CT HEAD/BRAIN W/O DYE: CPT | Performed by: RADIOLOGY

## 2024-07-22 PROCEDURE — 80048 BASIC METABOLIC PNL TOTAL CA: CPT

## 2024-07-22 PROCEDURE — 85027 COMPLETE CBC AUTOMATED: CPT

## 2024-07-22 PROCEDURE — 2500000001 HC RX 250 WO HCPCS SELF ADMINISTERED DRUGS (ALT 637 FOR MEDICARE OP)

## 2024-07-22 PROCEDURE — 36415 COLL VENOUS BLD VENIPUNCTURE: CPT

## 2024-07-22 PROCEDURE — 2500000002 HC RX 250 W HCPCS SELF ADMINISTERED DRUGS (ALT 637 FOR MEDICARE OP, ALT 636 FOR OP/ED)

## 2024-07-22 PROCEDURE — 70450 CT HEAD/BRAIN W/O DYE: CPT

## 2024-07-22 RX ORDER — POTASSIUM CHLORIDE 20 MEQ/1
20 TABLET, EXTENDED RELEASE ORAL ONCE
Status: COMPLETED | OUTPATIENT
Start: 2024-07-22 | End: 2024-07-22

## 2024-07-22 ASSESSMENT — COGNITIVE AND FUNCTIONAL STATUS - GENERAL
DAILY ACTIVITIY SCORE: 15
TURNING FROM BACK TO SIDE WHILE IN FLAT BAD: A LITTLE
DRESSING REGULAR LOWER BODY CLOTHING: A LOT
CLIMB 3 TO 5 STEPS WITH RAILING: A LOT
HELP NEEDED FOR BATHING: A LOT
MOVING FROM LYING ON BACK TO SITTING ON SIDE OF FLAT BED WITH BEDRAILS: A LITTLE
DRESSING REGULAR UPPER BODY CLOTHING: A LITTLE
MOBILITY SCORE: 14
EATING MEALS: A LITTLE
WALKING IN HOSPITAL ROOM: A LOT
STANDING UP FROM CHAIR USING ARMS: A LOT
MOVING TO AND FROM BED TO CHAIR: A LOT
TOILETING: A LOT
PERSONAL GROOMING: A LITTLE

## 2024-07-22 ASSESSMENT — PAIN SCALES - GENERAL
PAINLEVEL_OUTOF10: 4
PAINLEVEL_OUTOF10: 0 - NO PAIN
PAINLEVEL_OUTOF10: 0 - NO PAIN
PAINLEVEL_OUTOF10: 3
PAINLEVEL_OUTOF10: 0 - NO PAIN

## 2024-07-22 ASSESSMENT — PAIN - FUNCTIONAL ASSESSMENT
PAIN_FUNCTIONAL_ASSESSMENT: 0-10

## 2024-07-22 ASSESSMENT — PAIN DESCRIPTION - ORIENTATION: ORIENTATION: RIGHT

## 2024-07-22 ASSESSMENT — PAIN DESCRIPTION - LOCATION
LOCATION: ABDOMEN
LOCATION: HEAD

## 2024-07-22 ASSESSMENT — PAIN DESCRIPTION - DESCRIPTORS: DESCRIPTORS: ACHING

## 2024-07-22 NOTE — PROGRESS NOTES
07/22/24 0934   Discharge Planning   Living Arrangements Spouse/significant other   Support Systems Family members   Assistance Needed Independent PTA   Type of Residence Private residence   Who is requesting discharge planning? Provider   Home or Post Acute Services Post acute facilities (Rehab/SNF/etc)   Type of Post Acute Facility Services Rehab;Skilled nursing   Expected Discharge Disposition SNF   Does the patient need discharge transport arranged? Yes   RoundTrip coordination needed? Yes     TCC Note: Met with pt at bedside to discuss updated discharge plan of SNF. Reviewed therapy [facility] scores of Pt- 14 and OT- 15 with a recommendation of Moderate. Provided pt with SNF list per [facility] [place] [facility]\"" directory, which includes facilities that are within  Post- Acute Quality network as well as meeting patient's medical needs and are in-network for patient's insurance while also in discharge geographic are patient/family prefers. List identifies each facilities CMS star rating. Patient/family to review SNF list and provide choices for SNF preference. Informed pt to choose 3 facilities and that I would return to obtain his choices and send referrals. Colleen Edwards, QUE, RN, TCC.    ADDENDUM: Met with pt again to obtain SNF choices. Pt stated that he only had one choice which is [facility]. Requested our DSC to please send referral for him. Will follow for acceptance. Colleen Edwards, QUE, RN, TCC.    ADDENDUM: Pt was accepted to [facility], Insurance precert obtained by our Precert team. Transport set up for 4:00 PM today. Pt/wife aware. RN/Unit Manager and Unit Roland all aware of transport time. Colleen Edwards, QUE, RN, TCC.

## 2024-07-22 NOTE — CARE PLAN
The patient's goals for the shift include      The clinical goals for the shift include patient will remain safe and free from falls throughout the shift    Over the shift, the patient did not make progress toward the following goals. Barriers to progression include weakness and confusion. Recommendations to address these barriers include frequent reorientation and safety checks.

## 2024-07-22 NOTE — DISCHARGE SUMMARY
Discharge Diagnosis  Presyncope  HFpEF  A-fib s/p pacemaker  Generalized weakness  Acute memory loss  Aortic stenosis s/p TAVR  Right lower extremity cellulitis  Issues Requiring Follow-Up  None    Discharge Meds     Your medication list        ASK your doctor about these medications        Instructions Last Dose Given Next Dose Due   ashwagandha root extract 500 mg capsule           b complex vitamins capsule           calcium carbonate 600 mg calcium (1,500 mg) tablet           cholecalciferol 25 MCG (1000 UT) capsule  Commonly known as: Vitamin D-3           cholestyramine 4 gram packet  Commonly known as: Questran           clopidogrel 75 mg tablet  Commonly known as: Plavix      Take 1 tablet (75 mg) by mouth once daily.       coenzyme Q-10 100 mg capsule           docusate sodium 100 mg capsule  Commonly known as: Colace      Take 1 capsule (100 mg) by mouth 2 times a day.       doxycycline 100 mg capsule  Commonly known as: Vibramycin      Take 1 capsule (100 mg) by mouth 2 times a day for 10 days. Take with at least 8 ounces (large glass) of water, do not lie down for 30 minutes after       hydrocortisone 2.5 % rectal cream  Commonly known as: Anusol-HC           losartan 25 mg tablet  Commonly known as: Cozaar      Take 1 tablet (25 mg) by mouth once daily.       MagOx 400 mg (241.3 mg magnesium) tablet  Generic drug: magnesium oxide           multivitamin tablet           ondansetron 4 mg tablet  Commonly known as: Zofran           potassium chloride CR 10 mEq ER tablet  Commonly known as: Klor-Con      Take 1 tablet (10 mEq) by mouth once daily. Do not crush, chew, or split.       rosuvastatin 20 mg tablet  Commonly known as: Crestor      Take 1 tablet (20 mg) by mouth 4 times a week.       torsemide 20 mg tablet  Commonly known as: Demadex      1 TAB Daily       turmeric-turmeric root extract 450-50 mg capsule           vitamin E succinate 268 mg (400 unit) tablet                    Test Results Pending  At Discharge  Pending Labs       No current pending labs.          Hospital Course   88-year-old male past medical history of hypertension, A-fib s/p Watchman and pacemaker, HFpEF, CAD, aortic stenosis s/p TAVR 7/9/2024 who presented on 7/16 from his cardiology follow-up appointment after an episode of lightheadedness, nausea, and reported A-fib in the office.  On admission vitals were stable EKG with no signs of ischemia, lactate was elevated 2.3 but promptly resolved with rehydration, chest x-ray was unremarkable.  Patient was seen by cardiology for assessment of his recently replaced aortic valve.  They did not recommend echocardiogram as this was done shortly after his procedure and there was no reported aortic stenosis.  Patient also presented with right lower extremity cellulitis and was started on vancomycin and Zosyn with rapid improvement in pain, swelling, and erythema.  This was de-escalated to Keflex and patient completed his course of antibiotics.  Hospital stay complicated by acute memory loss on hospital day 3 prompting repeat CT head to exclude acute stroke.  Head CTs on 7/19 and 7/22 were negative for acute stroke, and patient's memory improved over time.  He was evaluated by PT/OT and they recommended skilled nursing placement for rehab.  He will be discharged at this time to SNF and close follow-up with his PCP.    Pertinent Physical Exam At Time of Discharge  Physical Exam  Vitals reviewed.   Constitutional:       Appearance: Normal appearance.   Cardiovascular:      Rate and Rhythm: Normal rate and regular rhythm.   Pulmonary:      Effort: Pulmonary effort is normal.      Breath sounds: Normal breath sounds.   Abdominal:      General: Abdomen is flat.      Palpations: Abdomen is soft.      Tenderness: There is no abdominal tenderness.   Musculoskeletal:      Right lower leg: No edema.      Left lower leg: No edema.   Skin:     General: Skin is warm and dry.      Findings: No erythema or lesion.    Neurological:      General: No focal deficit present.      Mental Status: He is alert and oriented to person, place, and time.   Psychiatric:         Mood and Affect: Mood normal.       Outpatient Follow-Up  Future Appointments   Date Time Provider Department Center   8/9/2024 11:30 AM  NEENA [id number] CARD1 [id number] [facility]   7/11/2025 10:00 AM  NEENA [id number] CARD1 [id number] [facility]       Yaya Fuentes MD

## 2024-07-22 NOTE — CARE PLAN
The patient's goals for the shift include  safety    The clinical goals for the shift include patient will remain safe throughout the shift

## 2024-07-24 ENCOUNTER — LAB REQUISITION (OUTPATIENT)
Dept: LAB | Facility: HOSPITAL | Age: 89
End: 2024-07-24

## 2024-07-24 DIAGNOSIS — I10 ESSENTIAL (PRIMARY) HYPERTENSION: ICD-10-CM

## 2024-07-24 LAB
ANION GAP SERPL CALC-SCNC: 15 MMOL/L (ref 10–20)
BUN SERPL-MCNC: 20 MG/DL (ref 6–23)
CALCIUM SERPL-MCNC: 9.1 MG/DL (ref 8.6–10.3)
CHLORIDE SERPL-SCNC: 102 MMOL/L (ref 98–107)
CO2 SERPL-SCNC: 28 MMOL/L (ref 21–32)
CREAT SERPL-MCNC: 1.23 MG/DL (ref 0.5–1.3)
EGFRCR SERPLBLD CKD-EPI 2021: 57 ML/MIN/1.73M*2
ERYTHROCYTE [DISTWIDTH] IN BLOOD BY AUTOMATED COUNT: 13.2 % (ref 11.5–14.5)
GLUCOSE SERPL-MCNC: 94 MG/DL (ref 74–99)
HCT VFR BLD AUTO: 38.7 % (ref 41–52)
HGB BLD-MCNC: 13.1 G/DL (ref 13.5–17.5)
MCH RBC QN AUTO: 31.1 PG (ref 26–34)
MCHC RBC AUTO-ENTMCNC: 33.9 G/DL (ref 32–36)
MCV RBC AUTO: 92 FL (ref 80–100)
NRBC BLD-RTO: 0 /100 WBCS (ref 0–0)
PLATELET # BLD AUTO: 133 X10*3/UL (ref 150–450)
POTASSIUM SERPL-SCNC: 3.9 MMOL/L (ref 3.5–5.3)
RBC # BLD AUTO: 4.21 X10*6/UL (ref 4.5–5.9)
SODIUM SERPL-SCNC: 141 MMOL/L (ref 136–145)
WBC # BLD AUTO: 4.8 X10*3/UL (ref 4.4–11.3)

## 2024-07-24 PROCEDURE — 85027 COMPLETE CBC AUTOMATED: CPT | Mod: OUT | Performed by: INTERNAL MEDICINE

## 2024-07-24 PROCEDURE — 80048 BASIC METABOLIC PNL TOTAL CA: CPT | Mod: OUT | Performed by: INTERNAL MEDICINE

## 2024-07-29 ENCOUNTER — TELEPHONE (OUTPATIENT)
Dept: CARDIOLOGY | Facility: CLINIC | Age: 89
End: 2024-07-29
Payer: MEDICARE

## 2024-07-29 NOTE — TELEPHONE ENCOUNTER
Called Nicanor to discuss. Nicanor states she already spoke to PCP's office and they are managing pt's needs at facility and nothing further needed from our office.

## 2024-07-30 ENCOUNTER — TELEPHONE (OUTPATIENT)
Dept: CARDIOLOGY | Facility: CLINIC | Age: 89
End: 2024-07-30
Payer: MEDICARE

## 2024-07-30 NOTE — TELEPHONE ENCOUNTER
Dr. Carmichael put the patient in [facility]. He had a heart problem. They are telling him he may be in there for 30 days! He wants to know why he has to be there. Nobodys telling him why. [phone].

## 2024-07-30 NOTE — TELEPHONE ENCOUNTER
PT's wife came to the office and wants to ask Dr. Carmichael what happened with her . How serious it is, etc. They cannot ask Dr. Michele right now [place].  Please call wife Jessica @  [phone]

## 2024-07-30 NOTE — TELEPHONE ENCOUNTER
Called Nicanor, pt's daughter because I spoke with her yesterday. Informed Nicanor that Jessica called earlier and came to the office.     Nicanor informed me that Jessica is pt's ex-wife and that Nicanor has POA for pt. Halima states that she is in contact with the NP and  at rehab facility at this time. Nicanor states that pt is having issues with confusion but the NP is going to have a psychiatrist evaluate patient. Informed Nicanor that I will call Jessica and instruct her to speak with staff at rehab and follow up with family. Nicanor agreeable.    Called Jessica and left detailed vm to speak with staff at rehab facility regarding pt's care and Nicanor is in contact with the staff at the rehab facility.    Also called pt and left detailed vm to speak with his daughter, Nicanor who is speaking with the staff and social work at the rehab facility. Per vm, instructed pt to speak with Nicanor.

## 2024-08-02 ENCOUNTER — LAB REQUISITION (OUTPATIENT)
Dept: LAB | Facility: HOSPITAL | Age: 89
End: 2024-08-02

## 2024-08-02 DIAGNOSIS — I48.91 UNSPECIFIED ATRIAL FIBRILLATION (MULTI): ICD-10-CM

## 2024-08-02 LAB
ANION GAP SERPL CALC-SCNC: 15 MMOL/L (ref 10–20)
BUN SERPL-MCNC: 20 MG/DL (ref 6–23)
CALCIUM SERPL-MCNC: 9.5 MG/DL (ref 8.6–10.3)
CHLORIDE SERPL-SCNC: 101 MMOL/L (ref 98–107)
CO2 SERPL-SCNC: 27 MMOL/L (ref 21–32)
CREAT SERPL-MCNC: 1.15 MG/DL (ref 0.5–1.3)
EGFRCR SERPLBLD CKD-EPI 2021: 62 ML/MIN/1.73M*2
ERYTHROCYTE [DISTWIDTH] IN BLOOD BY AUTOMATED COUNT: 13 % (ref 11.5–14.5)
GLUCOSE SERPL-MCNC: 93 MG/DL (ref 74–99)
HCT VFR BLD AUTO: 43.1 % (ref 41–52)
HGB BLD-MCNC: 14.4 G/DL (ref 13.5–17.5)
MCH RBC QN AUTO: 31.4 PG (ref 26–34)
MCHC RBC AUTO-ENTMCNC: 33.4 G/DL (ref 32–36)
MCV RBC AUTO: 94 FL (ref 80–100)
NRBC BLD-RTO: 0 /100 WBCS (ref 0–0)
PLATELET # BLD AUTO: 143 X10*3/UL (ref 150–450)
POTASSIUM SERPL-SCNC: 3.5 MMOL/L (ref 3.5–5.3)
RBC # BLD AUTO: 4.59 X10*6/UL (ref 4.5–5.9)
SODIUM SERPL-SCNC: 139 MMOL/L (ref 136–145)
WBC # BLD AUTO: 4.7 X10*3/UL (ref 4.4–11.3)

## 2024-08-02 PROCEDURE — 85027 COMPLETE CBC AUTOMATED: CPT | Mod: OUT | Performed by: INTERNAL MEDICINE

## 2024-08-02 PROCEDURE — 80048 BASIC METABOLIC PNL TOTAL CA: CPT | Mod: OUT | Performed by: INTERNAL MEDICINE

## 2024-08-05 ENCOUNTER — APPOINTMENT (OUTPATIENT)
Dept: RADIOLOGY | Facility: HOSPITAL | Age: 89
End: 2024-08-05
Payer: MEDICARE

## 2024-08-05 ENCOUNTER — HOSPITAL ENCOUNTER (OUTPATIENT)
Facility: HOSPITAL | Age: 89
Setting detail: OBSERVATION
Discharge: HOME HEALTH | End: 2024-08-07
Attending: EMERGENCY MEDICINE | Admitting: STUDENT IN AN ORGANIZED HEALTH CARE EDUCATION/TRAINING PROGRAM
Payer: MEDICARE

## 2024-08-05 ENCOUNTER — APPOINTMENT (OUTPATIENT)
Dept: CARDIOLOGY | Facility: HOSPITAL | Age: 89
End: 2024-08-05
Payer: MEDICARE

## 2024-08-05 DIAGNOSIS — R10.13 EPIGASTRIC PAIN: ICD-10-CM

## 2024-08-05 DIAGNOSIS — I71.21 ANEURYSM OF ASCENDING AORTA WITHOUT RUPTURE (CMS-HCC): ICD-10-CM

## 2024-08-05 DIAGNOSIS — R11.2 NAUSEA AND VOMITING, UNSPECIFIED VOMITING TYPE: ICD-10-CM

## 2024-08-05 DIAGNOSIS — R42 DIZZINESS: Primary | ICD-10-CM

## 2024-08-05 DIAGNOSIS — K83.8 COMMON BILE DUCT DILATION: ICD-10-CM

## 2024-08-05 DIAGNOSIS — R53.1 GENERALIZED WEAKNESS: ICD-10-CM

## 2024-08-05 LAB
ALBUMIN SERPL BCP-MCNC: 3.8 G/DL (ref 3.4–5)
ALP SERPL-CCNC: 67 U/L (ref 33–136)
ALT SERPL W P-5'-P-CCNC: 11 U/L (ref 10–52)
ANION GAP SERPL CALC-SCNC: 14 MMOL/L (ref 10–20)
APTT PPP: 27 SECONDS (ref 27–38)
AST SERPL W P-5'-P-CCNC: 22 U/L (ref 9–39)
BASOPHILS # BLD AUTO: 0.02 X10*3/UL (ref 0–0.1)
BASOPHILS NFR BLD AUTO: 0.4 %
BILIRUB SERPL-MCNC: 1.1 MG/DL (ref 0–1.2)
BNP SERPL-MCNC: 135 PG/ML (ref 0–99)
BUN SERPL-MCNC: 20 MG/DL (ref 6–23)
CALCIUM SERPL-MCNC: 9.2 MG/DL (ref 8.6–10.3)
CARDIAC TROPONIN I PNL SERPL HS: 36 NG/L (ref 0–20)
CARDIAC TROPONIN I PNL SERPL HS: 40 NG/L (ref 0–20)
CHLORIDE SERPL-SCNC: 101 MMOL/L (ref 98–107)
CHOLEST SERPL-MCNC: 126 MG/DL (ref 0–199)
CHOLESTEROL/HDL RATIO: 2.7
CO2 SERPL-SCNC: 27 MMOL/L (ref 21–32)
CREAT SERPL-MCNC: 1.1 MG/DL (ref 0.5–1.3)
EGFRCR SERPLBLD CKD-EPI 2021: 65 ML/MIN/1.73M*2
EOSINOPHIL # BLD AUTO: 0.08 X10*3/UL (ref 0–0.4)
EOSINOPHIL NFR BLD AUTO: 1.6 %
ERYTHROCYTE [DISTWIDTH] IN BLOOD BY AUTOMATED COUNT: 12.6 % (ref 11.5–14.5)
GLUCOSE SERPL-MCNC: 102 MG/DL (ref 74–99)
HCT VFR BLD AUTO: 40.9 % (ref 41–52)
HDLC SERPL-MCNC: 46.7 MG/DL
HGB BLD-MCNC: 14 G/DL (ref 13.5–17.5)
IMM GRANULOCYTES # BLD AUTO: 0.02 X10*3/UL (ref 0–0.5)
IMM GRANULOCYTES NFR BLD AUTO: 0.4 % (ref 0–0.9)
INR PPP: 1 (ref 0.9–1.1)
LACTATE SERPL-SCNC: 2 MMOL/L (ref 0.4–2)
LDLC SERPL CALC-MCNC: 63 MG/DL
LIPASE SERPL-CCNC: 167 U/L (ref 9–82)
LYMPHOCYTES # BLD AUTO: 1.41 X10*3/UL (ref 0.8–3)
LYMPHOCYTES NFR BLD AUTO: 28.4 %
MAGNESIUM SERPL-MCNC: 2.05 MG/DL (ref 1.6–2.4)
MCH RBC QN AUTO: 31.5 PG (ref 26–34)
MCHC RBC AUTO-ENTMCNC: 34.2 G/DL (ref 32–36)
MCV RBC AUTO: 92 FL (ref 80–100)
MONOCYTES # BLD AUTO: 0.38 X10*3/UL (ref 0.05–0.8)
MONOCYTES NFR BLD AUTO: 7.6 %
NEUTROPHILS # BLD AUTO: 3.06 X10*3/UL (ref 1.6–5.5)
NEUTROPHILS NFR BLD AUTO: 61.6 %
NON HDL CHOLESTEROL: 79 MG/DL (ref 0–149)
NRBC BLD-RTO: 0 /100 WBCS (ref 0–0)
PLATELET # BLD AUTO: 133 X10*3/UL (ref 150–450)
POTASSIUM SERPL-SCNC: 3.6 MMOL/L (ref 3.5–5.3)
PROT SERPL-MCNC: 6.9 G/DL (ref 6.4–8.2)
PROTHROMBIN TIME: 11.4 SECONDS (ref 9.8–12.8)
RBC # BLD AUTO: 4.45 X10*6/UL (ref 4.5–5.9)
SODIUM SERPL-SCNC: 138 MMOL/L (ref 136–145)
TRIGL SERPL-MCNC: 82 MG/DL (ref 0–149)
VLDL: 16 MG/DL (ref 0–40)
WBC # BLD AUTO: 5 X10*3/UL (ref 4.4–11.3)

## 2024-08-05 PROCEDURE — 85025 COMPLETE CBC W/AUTO DIFF WBC: CPT

## 2024-08-05 PROCEDURE — 71260 CT THORAX DX C+: CPT

## 2024-08-05 PROCEDURE — 74177 CT ABD & PELVIS W/CONTRAST: CPT | Mod: FOREIGN READ | Performed by: RADIOLOGY

## 2024-08-05 PROCEDURE — 85610 PROTHROMBIN TIME: CPT

## 2024-08-05 PROCEDURE — 70450 CT HEAD/BRAIN W/O DYE: CPT | Performed by: RADIOLOGY

## 2024-08-05 PROCEDURE — 83605 ASSAY OF LACTIC ACID: CPT

## 2024-08-05 PROCEDURE — 71046 X-RAY EXAM CHEST 2 VIEWS: CPT

## 2024-08-05 PROCEDURE — G0378 HOSPITAL OBSERVATION PER HR: HCPCS

## 2024-08-05 PROCEDURE — 2500000004 HC RX 250 GENERAL PHARMACY W/ HCPCS (ALT 636 FOR OP/ED)

## 2024-08-05 PROCEDURE — 83735 ASSAY OF MAGNESIUM: CPT

## 2024-08-05 PROCEDURE — 36415 COLL VENOUS BLD VENIPUNCTURE: CPT

## 2024-08-05 PROCEDURE — 96374 THER/PROPH/DIAG INJ IV PUSH: CPT | Mod: 59

## 2024-08-05 PROCEDURE — 83880 ASSAY OF NATRIURETIC PEPTIDE: CPT

## 2024-08-05 PROCEDURE — 99222 1ST HOSP IP/OBS MODERATE 55: CPT

## 2024-08-05 PROCEDURE — 71046 X-RAY EXAM CHEST 2 VIEWS: CPT | Mod: FOREIGN READ | Performed by: RADIOLOGY

## 2024-08-05 PROCEDURE — 99285 EMERGENCY DEPT VISIT HI MDM: CPT | Mod: 25

## 2024-08-05 PROCEDURE — 70450 CT HEAD/BRAIN W/O DYE: CPT

## 2024-08-05 PROCEDURE — 71260 CT THORAX DX C+: CPT | Mod: FOREIGN READ | Performed by: RADIOLOGY

## 2024-08-05 PROCEDURE — 85730 THROMBOPLASTIN TIME PARTIAL: CPT

## 2024-08-05 PROCEDURE — 2550000001 HC RX 255 CONTRASTS: Performed by: EMERGENCY MEDICINE

## 2024-08-05 PROCEDURE — 84075 ASSAY ALKALINE PHOSPHATASE: CPT

## 2024-08-05 PROCEDURE — 83690 ASSAY OF LIPASE: CPT

## 2024-08-05 PROCEDURE — 96375 TX/PRO/DX INJ NEW DRUG ADDON: CPT | Mod: 59

## 2024-08-05 PROCEDURE — 93005 ELECTROCARDIOGRAM TRACING: CPT

## 2024-08-05 PROCEDURE — 83718 ASSAY OF LIPOPROTEIN: CPT

## 2024-08-05 PROCEDURE — 84484 ASSAY OF TROPONIN QUANT: CPT

## 2024-08-05 RX ORDER — POLYETHYLENE GLYCOL 3350 17 G/17G
17 POWDER, FOR SOLUTION ORAL DAILY
Status: DISCONTINUED | OUTPATIENT
Start: 2024-08-06 | End: 2024-08-07 | Stop reason: HOSPADM

## 2024-08-05 RX ORDER — ACETAMINOPHEN 650 MG/1
650 SUPPOSITORY RECTAL EVERY 4 HOURS PRN
Status: DISCONTINUED | OUTPATIENT
Start: 2024-08-05 | End: 2024-08-07 | Stop reason: HOSPADM

## 2024-08-05 RX ORDER — MORPHINE SULFATE 2 MG/ML
2 INJECTION, SOLUTION INTRAMUSCULAR; INTRAVENOUS EVERY 4 HOURS PRN
Status: DISCONTINUED | OUTPATIENT
Start: 2024-08-05 | End: 2024-08-07 | Stop reason: HOSPADM

## 2024-08-05 RX ORDER — MORPHINE SULFATE 2 MG/ML
2 INJECTION, SOLUTION INTRAMUSCULAR; INTRAVENOUS ONCE
Status: COMPLETED | OUTPATIENT
Start: 2024-08-05 | End: 2024-08-05

## 2024-08-05 RX ORDER — ROSUVASTATIN CALCIUM 10 MG/1
20 TABLET, COATED ORAL
Status: DISCONTINUED | OUTPATIENT
Start: 2024-08-06 | End: 2024-08-07 | Stop reason: HOSPADM

## 2024-08-05 RX ORDER — LOSARTAN POTASSIUM 25 MG/1
25 TABLET ORAL DAILY
Status: DISCONTINUED | OUTPATIENT
Start: 2024-08-06 | End: 2024-08-07 | Stop reason: HOSPADM

## 2024-08-05 RX ORDER — ONDANSETRON HYDROCHLORIDE 2 MG/ML
4 INJECTION, SOLUTION INTRAVENOUS EVERY 4 HOURS PRN
Status: DISCONTINUED | OUTPATIENT
Start: 2024-08-05 | End: 2024-08-07 | Stop reason: HOSPADM

## 2024-08-05 RX ORDER — ACETAMINOPHEN 160 MG/5ML
650 SOLUTION ORAL EVERY 4 HOURS PRN
Status: DISCONTINUED | OUTPATIENT
Start: 2024-08-05 | End: 2024-08-07 | Stop reason: HOSPADM

## 2024-08-05 RX ORDER — ENOXAPARIN SODIUM 100 MG/ML
40 INJECTION SUBCUTANEOUS EVERY 24 HOURS
Status: DISCONTINUED | OUTPATIENT
Start: 2024-08-05 | End: 2024-08-07 | Stop reason: HOSPADM

## 2024-08-05 RX ORDER — SODIUM CHLORIDE, SODIUM LACTATE, POTASSIUM CHLORIDE, CALCIUM CHLORIDE 600; 310; 30; 20 MG/100ML; MG/100ML; MG/100ML; MG/100ML
100 INJECTION, SOLUTION INTRAVENOUS CONTINUOUS
Status: ACTIVE | OUTPATIENT
Start: 2024-08-05 | End: 2024-08-06

## 2024-08-05 RX ORDER — ACETAMINOPHEN 325 MG/1
650 TABLET ORAL EVERY 4 HOURS PRN
Status: DISCONTINUED | OUTPATIENT
Start: 2024-08-05 | End: 2024-08-07 | Stop reason: HOSPADM

## 2024-08-05 RX ORDER — CLOPIDOGREL BISULFATE 75 MG/1
75 TABLET ORAL DAILY
Status: DISCONTINUED | OUTPATIENT
Start: 2024-08-06 | End: 2024-08-07 | Stop reason: HOSPADM

## 2024-08-05 ASSESSMENT — COLUMBIA-SUICIDE SEVERITY RATING SCALE - C-SSRS
2. HAVE YOU ACTUALLY HAD ANY THOUGHTS OF KILLING YOURSELF?: NO
1. IN THE PAST MONTH, HAVE YOU WISHED YOU WERE DEAD OR WISHED YOU COULD GO TO SLEEP AND NOT WAKE UP?: NO
6. HAVE YOU EVER DONE ANYTHING, STARTED TO DO ANYTHING, OR PREPARED TO DO ANYTHING TO END YOUR LIFE?: NO

## 2024-08-05 ASSESSMENT — PAIN SCALES - GENERAL: PAINLEVEL_OUTOF10: 5 - MODERATE PAIN

## 2024-08-05 ASSESSMENT — PAIN DESCRIPTION - LOCATION: LOCATION: ABDOMEN

## 2024-08-05 NOTE — ED TRIAGE NOTES
Pt c/o dizziness with nausea. Pt recently had valve replacement surgery. States hasn't been the same since. Feels like he's off. History of vertigo. Pt did take zofran PO at home and rec'd 4 mg IV per EMS.

## 2024-08-05 NOTE — ED PROVIDER NOTES
HPI   Chief Complaint   Patient presents with    Dizziness    Nausea       Giuseppe is a 87 y/o male with an extensive past medical history including hypertension, HFpEF, A-fib s/p pacemaker, aortic stenosis s/p TAVR presenting emergency department from home by ambulance with dizziness and nausea.  Patient was recently discharged from a SNF after being there for several weeks.  He just came home today.  Upon his arrival home, he started to have nausea and multiple episodes of nonbloody nonbilious emesis. Patient states that he is also feeling dizzy, especially with movement.  He is having a midsternal chest pressure, but states it feels like reflux.  He denies any shortness of breath.  No hemoptysis.  Patient is also endorsing a generalized abdominal pain.  Denies any history of intra-abdominal procedures.  Patient states that he has diarrhea at baseline.  No urinary symptoms.  No fever, chills, numbness, tingling, headache.              Patient History   Past Medical History:   Diagnosis Date    Anorectal abscess     Anorectal abscess    Essential (primary) hypertension     Benign essential HTN    Old myocardial infarction     History of myocardial infarction    Other hyperlipidemia     Other hyperlipidemia    Personal history of malignant neoplasm, unspecified     History of malignant neoplasm    Personal history of other diseases of the circulatory system     History of coronary artery disease    Personal history of other diseases of the circulatory system     History of angina pectoris    Personal history of other diseases of the nervous system and sense organs 05/02/2019    History of polyneuropathy    Personal history of other specified conditions     History of chest pain    Personal history of other specified conditions     History of epistaxis    Personal history of other specified conditions     History of bradycardia    Personal history of transient ischemic attack (TIA), and cerebral infarction without  residual deficits     History of stroke    Personal history of transient ischemic attack (TIA), and cerebral infarction without residual deficits     History of stroke    Syncope and collapse     Near syncope    Thoracic aortic aneurysm, without rupture, unspecified (CMS-HCC)     Thoracic aortic aneurysm    Unspecified atrial fibrillation (Multi) 03/10/2020    Atrial fibrillation with slow ventricular response    Ventricular premature depolarization     Frequent PVCs     Past Surgical History:   Procedure Laterality Date    ANOMALOUS PULMONARY VENOUS RETURN REPAIR, 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    Procedure: TAVR-OR;  Surgeon: Jaquan Larkin MD;  Location: [place] [address] [facility];  Service: Cardiac Surgery;  Laterality: N/A;    CARDIAC CATHETERIZATION N/A 5/24/2024    Procedure: Left And Right Heart Cath, With LV;  Surgeon: Stepan Jones MD PhD;  Location: [facility] Cardiac Cath Lab;  Service: Cardiovascular;  Laterality: N/A;  R/LHC poss PCI    CARDIAC CATHETERIZATION N/A 7/9/2024    Procedure: TAVR (Transcatheter AV Replacement);  Surgeon: Jackson Rutherford MD;  Location: [place] [address] Cardiac [facility] Lab;  Service: Cardiovascular;  Laterality: N/A;  Schedule at 10am, day, Lex 29    CARDIAC CATHETERIZATION N/A 7/9/2024    Procedure: TVP for TAVR;  Surgeon: Jackson Rutherford MD;  Location: [place] [address] Cardiac [facility] Lab;  Service: Cardiovascular;  Laterality: N/A;    CHOLECYSTECTOMY  02/05/2015    Cholecystectomy    COLONOSCOPY  02/05/2015    Colonoscopy (Fiberoptic)    CORONARY ANGIOPLASTY WITH STENT PLACEMENT  05/21/2018    Cath Placement Of Stent 1    CT ABDOMEN PELVIS ANGIOGRAM W AND/OR WO IV CONTRAST  3/17/2023    CT ABDOMEN PELVIS ANGIOGRAM W AND/OR WO IV CONTRAST PAR CT    KIDNEY SURGERY  02/05/2015    Kidney Surgery    OTHER SURGICAL HISTORY  12/13/2018    Prostatectomy    OTHER SURGICAL HISTORY  05/21/2018    Recent Surgery    TONSILLECTOMY  02/05/2015    Tonsillectomy     Family History    Problem Relation Name Age of Onset    Other (cardiac disorder) Mother      Transient ischemic attack Mother      Cancer Father      Other (cardiac disorder) Sister      Alzheimer's disease Father's Brother      Parkinsonism Father's Brother       Social History     Tobacco Use    Smoking status: Never    Smokeless tobacco: Never   Vaping Use    Vaping status: Never Used   Substance Use Topics    Alcohol use: Not Currently     Comment: rarely    Drug use: Not Currently       Physical Exam   ED Triage Vitals   Temp Pulse Resp BP   -- -- -- --      SpO2 Temp src Heart Rate Source Patient Position   -- -- -- --      BP Location FiO2 (%)     -- --       Physical Exam  Constitutional:       Comments: Patient is an elderly male that does appear to be feeling unwell, but not in acute distress.   HENT:      Mouth/Throat:      Mouth: Mucous membranes are dry.      Pharynx: Oropharynx is clear.   Eyes:      Extraocular Movements: Extraocular movements intact.   Cardiovascular:      Rate and Rhythm: Normal rate and regular rhythm.      Pulses: Normal pulses.      Heart sounds: Normal heart sounds.   Pulmonary:      Effort: Pulmonary effort is normal.      Comments: Patient is tachypneic upon arrival at 24 breaths/min.  He does not appear to have increased work of breathing or any signs of respiratory distress.  Patient is 95% on room air.  Abdominal:      General: Abdomen is flat. There is no distension.      Palpations: Abdomen is soft.      Tenderness: There is abdominal tenderness. There is no guarding or rebound.      Comments: Patient with tenderness palpation of the generalized abdomen, but more tender in the epigastric region.  No guarding or rebound.   Musculoskeletal:      Cervical back: Normal range of motion. No rigidity.   Neurological:      Mental Status: He is alert. Mental status is at baseline.           ED Course & MDM   ED Course as of 08/06/24 0610   Mon Aug 05, 2024   1926 EKG obtained interpreted by me.   Atrial fibrillation.  Occasional PVC.  Left axis.  Left bundle branch block.  Rate of 60.  No acute ischemia. []   2200 XR chest 2 views  IMPRESSION:  No acute cardiopulmonary disease.  Cardiomegaly and mild chronic  changes.   []   2200 CT head wo IV contrast  IMPRESSION:  1.  No acute intracranial hemorrhage or acute territorial infarct.  2.  Diffuse parenchymal volume loss. Chronic microvascular ischemic  changes. Remote infarct at the right basal ganglia.       []      ED Course User Index  [] Raquel Ramos PA-C  [] Devin Augustin MD         Diagnoses as of 08/06/24 0610   Dizziness   Epigastric pain   Nausea and vomiting, unspecified vomiting type   Aneurysm of ascending aorta without rupture (CMS-HCC)   Common bile duct dilation                       Patricio Coma Scale Score: 14                        Medical Decision Making  Giuseppe is a 89 y/o male with an extensive past medical history including hypertension, HFpEF, A-fib s/p pacemaker, aortic stenosis s/p TAVR presenting emergency department from home by ambulance with dizziness and nausea.     Medical Decision Making: She is a chronically ill-appearing, elderly male.  He does appear to be not feeling well, but no acute distress.  Vital signs reviewed and remarkable for tachypnea of 24.  No tachycardia, hypoxia, or fever. Workup included CBC, CMP, magnesium, APTT, PT/INR, lactate, lipase, troponin, BNP, UA, EKG, chest x-ray, CT chest and pelvis with IV contrast, CT head without IV contrast.  No leukocytosis on CBC.  No critical electrolyte finding or AMELIA on CMP.  Lactate is normal.  Lipase is mildly elevated at 167. I do of suspicion for early pancreatitis as patient is having pain in the epigastric region.  Troponin is elevated at 40.  Delta pending.  EKG shows A-fib without acute ischemia.  BNP is mildly elevated at 135.  Chest x-ray shows no acute cardiopulmonary process.  UA and CT imaging pending. He was given 500 mL normal saline bolus  and morphine. Patient was admitted to Dr. Salgado with CT imaging pending.      Differential diagnoses considered: NSTEMI, STEMI, angina, pneumonia, electrolyte disturbance, AMELIA, arrhythmia, pancreatitis, cholecystitis, cholelithiasis, cholangitis, pneumothorax, pleural effusion, acute cystitis, intracranial mass, intracranial hemorrhage, etc.     Medications given: Normal saline, morphine      Diagnosis: Pending     Plan: Pending          Procedure  Procedures     Raquel Ramos PA-C  08/06/24 0611

## 2024-08-06 ENCOUNTER — APPOINTMENT (OUTPATIENT)
Dept: RADIOLOGY | Facility: HOSPITAL | Age: 89
End: 2024-08-06
Payer: MEDICARE

## 2024-08-06 LAB
ALBUMIN SERPL BCP-MCNC: 3.3 G/DL (ref 3.4–5)
ALP SERPL-CCNC: 56 U/L (ref 33–136)
ALT SERPL W P-5'-P-CCNC: 12 U/L (ref 10–52)
ANION GAP SERPL CALC-SCNC: 13 MMOL/L (ref 10–20)
AST SERPL W P-5'-P-CCNC: 29 U/L (ref 9–39)
BASOPHILS # BLD AUTO: 0.02 X10*3/UL (ref 0–0.1)
BASOPHILS NFR BLD AUTO: 0.4 %
BILIRUB SERPL-MCNC: 1.1 MG/DL (ref 0–1.2)
BUN SERPL-MCNC: 17 MG/DL (ref 6–23)
CALCIUM SERPL-MCNC: 8.6 MG/DL (ref 8.6–10.3)
CHLORIDE SERPL-SCNC: 106 MMOL/L (ref 98–107)
CO2 SERPL-SCNC: 24 MMOL/L (ref 21–32)
CREAT SERPL-MCNC: 1.02 MG/DL (ref 0.5–1.3)
EGFRCR SERPLBLD CKD-EPI 2021: 72 ML/MIN/1.73M*2
EOSINOPHIL # BLD AUTO: 0.12 X10*3/UL (ref 0–0.4)
EOSINOPHIL NFR BLD AUTO: 2.5 %
ERYTHROCYTE [DISTWIDTH] IN BLOOD BY AUTOMATED COUNT: 12.7 % (ref 11.5–14.5)
GLUCOSE SERPL-MCNC: 93 MG/DL (ref 74–99)
HCT VFR BLD AUTO: 38.6 % (ref 41–52)
HGB BLD-MCNC: 13 G/DL (ref 13.5–17.5)
IMM GRANULOCYTES # BLD AUTO: 0.01 X10*3/UL (ref 0–0.5)
IMM GRANULOCYTES NFR BLD AUTO: 0.2 % (ref 0–0.9)
LIPASE SERPL-CCNC: 134 U/L (ref 9–82)
LYMPHOCYTES # BLD AUTO: 1.14 X10*3/UL (ref 0.8–3)
LYMPHOCYTES NFR BLD AUTO: 24.1 %
MAGNESIUM SERPL-MCNC: 1.98 MG/DL (ref 1.6–2.4)
MCH RBC QN AUTO: 31.3 PG (ref 26–34)
MCHC RBC AUTO-ENTMCNC: 33.7 G/DL (ref 32–36)
MCV RBC AUTO: 93 FL (ref 80–100)
MONOCYTES # BLD AUTO: 0.39 X10*3/UL (ref 0.05–0.8)
MONOCYTES NFR BLD AUTO: 8.2 %
NEUTROPHILS # BLD AUTO: 3.05 X10*3/UL (ref 1.6–5.5)
NEUTROPHILS NFR BLD AUTO: 64.6 %
NRBC BLD-RTO: 0 /100 WBCS (ref 0–0)
PLATELET # BLD AUTO: 107 X10*3/UL (ref 150–450)
POTASSIUM SERPL-SCNC: 3.5 MMOL/L (ref 3.5–5.3)
PROT SERPL-MCNC: 5.9 G/DL (ref 6.4–8.2)
RBC # BLD AUTO: 4.16 X10*6/UL (ref 4.5–5.9)
SODIUM SERPL-SCNC: 139 MMOL/L (ref 136–145)
WBC # BLD AUTO: 4.7 X10*3/UL (ref 4.4–11.3)

## 2024-08-06 PROCEDURE — 2500000001 HC RX 250 WO HCPCS SELF ADMINISTERED DRUGS (ALT 637 FOR MEDICARE OP)

## 2024-08-06 PROCEDURE — 76705 ECHO EXAM OF ABDOMEN: CPT

## 2024-08-06 PROCEDURE — 83735 ASSAY OF MAGNESIUM: CPT

## 2024-08-06 PROCEDURE — 2500000004 HC RX 250 GENERAL PHARMACY W/ HCPCS (ALT 636 FOR OP/ED)

## 2024-08-06 PROCEDURE — 84075 ASSAY ALKALINE PHOSPHATASE: CPT

## 2024-08-06 PROCEDURE — 97161 PT EVAL LOW COMPLEX 20 MIN: CPT | Mod: GP

## 2024-08-06 PROCEDURE — 76705 ECHO EXAM OF ABDOMEN: CPT | Performed by: RADIOLOGY

## 2024-08-06 PROCEDURE — 2500000002 HC RX 250 W HCPCS SELF ADMINISTERED DRUGS (ALT 637 FOR MEDICARE OP, ALT 636 FOR OP/ED)

## 2024-08-06 PROCEDURE — 96372 THER/PROPH/DIAG INJ SC/IM: CPT

## 2024-08-06 PROCEDURE — 83690 ASSAY OF LIPASE: CPT | Performed by: NURSE PRACTITIONER

## 2024-08-06 PROCEDURE — 97165 OT EVAL LOW COMPLEX 30 MIN: CPT | Mod: GO

## 2024-08-06 PROCEDURE — 96376 TX/PRO/DX INJ SAME DRUG ADON: CPT

## 2024-08-06 PROCEDURE — 99232 SBSQ HOSP IP/OBS MODERATE 35: CPT

## 2024-08-06 PROCEDURE — 36415 COLL VENOUS BLD VENIPUNCTURE: CPT

## 2024-08-06 PROCEDURE — 85025 COMPLETE CBC W/AUTO DIFF WBC: CPT

## 2024-08-06 PROCEDURE — 99222 1ST HOSP IP/OBS MODERATE 55: CPT | Performed by: NURSE PRACTITIONER

## 2024-08-06 PROCEDURE — G0378 HOSPITAL OBSERVATION PER HR: HCPCS

## 2024-08-06 RX ORDER — ACETAMINOPHEN 500 MG
5 TABLET ORAL NIGHTLY
Status: DISCONTINUED | OUTPATIENT
Start: 2024-08-06 | End: 2024-08-07 | Stop reason: HOSPADM

## 2024-08-06 SDOH — SOCIAL STABILITY: SOCIAL INSECURITY: ARE YOU OR HAVE YOU BEEN THREATENED OR ABUSED PHYSICALLY, EMOTIONALLY, OR SEXUALLY BY ANYONE?: NO

## 2024-08-06 SDOH — SOCIAL STABILITY: SOCIAL INSECURITY: HAVE YOU HAD THOUGHTS OF HARMING ANYONE ELSE?: NO

## 2024-08-06 SDOH — SOCIAL STABILITY: SOCIAL INSECURITY: DO YOU FEEL ANYONE HAS EXPLOITED OR TAKEN ADVANTAGE OF YOU FINANCIALLY OR OF YOUR PERSONAL PROPERTY?: NO

## 2024-08-06 SDOH — SOCIAL STABILITY: SOCIAL INSECURITY: ABUSE: ADULT

## 2024-08-06 SDOH — SOCIAL STABILITY: SOCIAL INSECURITY: HAVE YOU HAD ANY THOUGHTS OF HARMING ANYONE ELSE?: NO

## 2024-08-06 SDOH — SOCIAL STABILITY: SOCIAL INSECURITY: WERE YOU ABLE TO COMPLETE ALL THE BEHAVIORAL HEALTH SCREENINGS?: YES

## 2024-08-06 SDOH — SOCIAL STABILITY: SOCIAL INSECURITY: DO YOU FEEL UNSAFE GOING BACK TO THE PLACE WHERE YOU ARE LIVING?: NO

## 2024-08-06 SDOH — HEALTH STABILITY: MENTAL HEALTH: HOW OFTEN DO YOU HAVE 6 OR MORE DRINKS ON ONE OCCASION?: NEVER

## 2024-08-06 SDOH — SOCIAL STABILITY: SOCIAL INSECURITY: HAS ANYONE EVER THREATENED TO HURT YOUR FAMILY OR YOUR PETS?: NO

## 2024-08-06 SDOH — HEALTH STABILITY: MENTAL HEALTH: HOW OFTEN DO YOU HAVE A DRINK CONTAINING ALCOHOL?: NEVER

## 2024-08-06 SDOH — SOCIAL STABILITY: SOCIAL INSECURITY: DOES ANYONE TRY TO KEEP YOU FROM HAVING/CONTACTING OTHER FRIENDS OR DOING THINGS OUTSIDE YOUR HOME?: NO

## 2024-08-06 SDOH — HEALTH STABILITY: MENTAL HEALTH: HOW MANY STANDARD DRINKS CONTAINING ALCOHOL DO YOU HAVE ON A TYPICAL DAY?: PATIENT DOES NOT DRINK

## 2024-08-06 SDOH — SOCIAL STABILITY: SOCIAL INSECURITY: ARE THERE ANY APPARENT SIGNS OF INJURIES/BEHAVIORS THAT COULD BE RELATED TO ABUSE/NEGLECT?: NO

## 2024-08-06 ASSESSMENT — PAIN - FUNCTIONAL ASSESSMENT: PAIN_FUNCTIONAL_ASSESSMENT: 0-10

## 2024-08-06 ASSESSMENT — COGNITIVE AND FUNCTIONAL STATUS - GENERAL
MOBILITY SCORE: 16
MOVING TO AND FROM BED TO CHAIR: A LITTLE
STANDING UP FROM CHAIR USING ARMS: A LITTLE
MOVING TO AND FROM BED TO CHAIR: A LITTLE
WALKING IN HOSPITAL ROOM: A LOT
DRESSING REGULAR LOWER BODY CLOTHING: A LITTLE
CLIMB 3 TO 5 STEPS WITH RAILING: A LOT
TOILETING: A LITTLE
HELP NEEDED FOR BATHING: A LOT
WALKING IN HOSPITAL ROOM: A LOT
MOVING TO AND FROM BED TO CHAIR: A LITTLE
TOILETING: A LITTLE
TURNING FROM BACK TO SIDE WHILE IN FLAT BAD: A LITTLE
DRESSING REGULAR UPPER BODY CLOTHING: A LITTLE
MOBILITY SCORE: 16
MOVING FROM LYING ON BACK TO SITTING ON SIDE OF FLAT BED WITH BEDRAILS: A LITTLE
TOILETING: A LITTLE
STANDING UP FROM CHAIR USING ARMS: A LITTLE
DAILY ACTIVITIY SCORE: 21
DRESSING REGULAR LOWER BODY CLOTHING: TOTAL
STANDING UP FROM CHAIR USING ARMS: A LITTLE
HELP NEEDED FOR BATHING: A LITTLE
DAILY ACTIVITIY SCORE: 21
MOBILITY SCORE: 16
CLIMB 3 TO 5 STEPS WITH RAILING: A LOT
HELP NEEDED FOR BATHING: A LITTLE
DAILY ACTIVITIY SCORE: 17
MOVING FROM LYING ON BACK TO SITTING ON SIDE OF FLAT BED WITH BEDRAILS: A LITTLE
WALKING IN HOSPITAL ROOM: A LOT
MOVING FROM LYING ON BACK TO SITTING ON SIDE OF FLAT BED WITH BEDRAILS: A LITTLE
TURNING FROM BACK TO SIDE WHILE IN FLAT BAD: A LITTLE
CLIMB 3 TO 5 STEPS WITH RAILING: A LOT
PATIENT BASELINE BEDBOUND: NO
DRESSING REGULAR LOWER BODY CLOTHING: A LITTLE
TURNING FROM BACK TO SIDE WHILE IN FLAT BAD: A LITTLE

## 2024-08-06 ASSESSMENT — LIFESTYLE VARIABLES
SKIP TO QUESTIONS 9-10: 1
HOW OFTEN DO YOU HAVE 6 OR MORE DRINKS ON ONE OCCASION: NEVER
AUDIT-C TOTAL SCORE: 0
AUDIT-C TOTAL SCORE: 0
HOW MANY STANDARD DRINKS CONTAINING ALCOHOL DO YOU HAVE ON A TYPICAL DAY: PATIENT DOES NOT DRINK
AUDIT-C TOTAL SCORE: 0
HOW OFTEN DO YOU HAVE A DRINK CONTAINING ALCOHOL: NEVER
SKIP TO QUESTIONS 9-10: 1

## 2024-08-06 ASSESSMENT — PAIN SCALES - GENERAL
PAINLEVEL_OUTOF10: 0 - NO PAIN
PAINLEVEL_OUTOF10: 0 - NO PAIN

## 2024-08-06 ASSESSMENT — ACTIVITIES OF DAILY LIVING (ADL)
HEARING - LEFT EAR: HEARING AID
ADEQUATE_TO_COMPLETE_ADL: YES
LACK_OF_TRANSPORTATION: NO
TOILETING: NEEDS ASSISTANCE
JUDGMENT_ADEQUATE_SAFELY_COMPLETE_DAILY_ACTIVITIES: YES
FEEDING YOURSELF: INDEPENDENT
GROOMING: INDEPENDENT
DRESSING YOURSELF: NEEDS ASSISTANCE
PATIENT'S MEMORY ADEQUATE TO SAFELY COMPLETE DAILY ACTIVITIES?: YES
WALKS IN HOME: NEEDS ASSISTANCE
HEARING - RIGHT EAR: HEARING AID
ASSISTIVE_DEVICE: WALKER
BATHING: NEEDS ASSISTANCE

## 2024-08-06 ASSESSMENT — PATIENT HEALTH QUESTIONNAIRE - PHQ9
2. FEELING DOWN, DEPRESSED OR HOPELESS: NOT AT ALL
SUM OF ALL RESPONSES TO PHQ9 QUESTIONS 1 & 2: 0
1. LITTLE INTEREST OR PLEASURE IN DOING THINGS: NOT AT ALL

## 2024-08-06 NOTE — PROGRESS NOTES
Physical Therapy    Physical Therapy Evaluation    Patient Name: Giuseppe Davila  MRN: [id number]  Today's Date: 8/6/2024   Time Calculation  Start Time: 0940  Stop Time: 0950  Time Calculation (min): 10 min  908/908-B    Assessment/Plan   PT Assessment  PT Assessment Results: Decreased mobility  End of Session Communication: Bedside nurse  End of Session Patient Position: Bed, 2 rail up, Alarm on (All needs in reach)  IP OR SWING BED PT PLAN  Inpatient or Swing Bed: Inpatient  PT Plan  Treatment/Interventions: Bed mobility, Transfer training, Gait training  PT Plan: Ongoing PT  PT Frequency: 4 times per week  PT Discharge Recommendations: Low intensity level of continued care  PT - OK to Discharge: Yes    Subjective     Current Problem:  1. Dizziness        2. Epigastric pain        3. Nausea and vomiting, unspecified vomiting type        4. Aneurysm of ascending aorta without rupture (CMS-HCC)        5. Common bile duct dilation          Patient Active Problem List   Diagnosis    Ascending aortic aneurysm (CMS-HCC)    Atrial fibrillation with slow ventricular response (Multi)    Cerebral infarction (Multi)    Chest tightness    Episodic lightheadedness    Exertional angina (CMS-HCC)    History of claustrophobia    Hyperlipidemia    Hypertension    Intermittent claudication of both lower extremities due to atherosclerosis (CMS-HCC)    Leg edema    Moderate tricuspid regurgitation    Polyneuropathy    Presence of stent in coronary artery    Right inguinal hernia    Rotator cuff tear, right    Severe mitral regurgitation    Weakness of both lower extremities    Celiac artery stenosis (CMS-HCC)    History of radiation therapy    Iron deficiency anemia due to chronic blood loss    Hematochezia    Pacemaker    Sick sinus syndrome (Multi)    Vertigo    Unsteady gait    Ramon's esophagus    Cerumen impaction    CVA (cerebrovascular accident) (Multi)    Epistaxis    Foot drop, bilateral    Globus syndrome    Lip lesion     History of cerebrovascular accident    Neuropathy    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)    Rectal ulceration    Seborrhea    Sialoadenitis    CAD (coronary artery disease)    Back pain    Atypical chest pain    Infected sebaceous cyst    Nausea and vomiting    Pain of right shoulder region    Patent foramen ovale (HHS-HCC)    Tear of rotator cuff    Vitamin D deficiency    Abnormal nuclear stress test    COVID    History of myocardial infarction    S/P TAVR (transcatheter aortic valve replacement)    Generalized weakness    Dizziness     General Visit Information:  General  Reason for Referral: PT Eval and Treat  Referred By: Devin Augustin MD  Past Medical History Relevant to Rehab: 88 y.o. male with past medical history of  hypertension, A-fib status post Watchman, HFpEF, prior TIA, CAD, aortic stenosis status post TAVR 7/9/2024  who presented to [facility] ED on 8/5/24 with dizziness and nausea. Patient was recently discharged from a SNF after being there for several weeks and he had just come home today. He started having nausea and multiple episodes of nonbloody nonbilious emesis.  Prior to Session Communication: Bedside nurse  Patient Position Received: Bed, 2 rail up, Alarm on    Home Living:  Home Living  Home Living Comments: Pt lives with his wife in a 1 story house with 1 LORI. Bathroom has a walk in shower with a seat, grab bar, and HHSH, and a raised toilet seat.    Prior Level of Function:  Prior Function Per Pt/Caregiver Report  Level of [place]: Independent with ADLs and functional transfers (Pt intermittently uses a RW for amb, wife does all IADLs, (-) driving, wife drives)    Precautions:  Precautions  Precautions Comment: Fall precautions    Objective     Pain:  Pain Assessment  Pain Assessment:  (0/10)    Cognition:  Cognition  Overall Cognitive Status: Within Functional Limits    General Assessments:  Sensation  Light Touch:  (chronic B feet neuropathy)  Strength  Strength Comments: B  LE ROM and strength WFL  Dynamic Sitting Balance  Dynamic Sitting-Comments: Fair with SBA     Functional Assessments:  Bed Mobility  Bed Mobility:  (supine <> sitting: SBA, pt reported nausea and dizziness sitting EOB which was not subsiding. Pt was able to scoot towards HOB with SBA prior to returning to supine. Transfers were deferred at this time.)    Outcome Measures:  [facility] Basic Mobility  Turning from your back to your side while in a flat bed without using bedrails: A little  Moving from lying on your back to sitting on the side of a flat bed without using bedrails: A little  Moving to and from bed to chair (including a wheelchair): A little  Standing up from a chair using your arms (e.g. wheelchair or bedside chair): A little  To walk in hospital room: A lot  Climbing 3-5 steps with railing: A lot  Basic Mobility - Total Score: 16     Goals:  Encounter Problems       Encounter Problems (Active)       PT Problem       STG - Pt will transition supine <> sitting with SUP  (Progressing)       Start:  08/06/24    Expected End:  08/20/24            STG - Pt will transfer STS with SUP  (Progressing)       Start:  08/06/24    Expected End:  08/20/24            STG - Pt will amb 50' using RW with SUP  (Progressing)       Start:  08/06/24    Expected End:  08/20/24               Pain - Adult            Education Documentation  Precautions, taught by Monika Thompson PT at 8/6/2024  1:56 PM.  Learner: Patient  Readiness: Acceptance  Method: Explanation  Response: Verbalizes Understanding    Mobility Training, taught by Monika Thompson PT at 8/6/2024  1:56 PM.  Learner: Patient  Readiness: Acceptance  Method: Explanation  Response: Verbalizes Understanding    Education Comments  No comments found.

## 2024-08-06 NOTE — CARE PLAN
Problem: Fall/Injury  Goal: Not fall by end of shift  Outcome: Progressing  Goal: Be free from injury by end of the shift  Outcome: Progressing  Goal: Verbalize understanding of personal risk factors for fall in the hospital  Outcome: Progressing  Goal: Verbalize understanding of risk factor reduction measures to prevent injury from fall in the home  Outcome: Progressing  Goal: Use assistive devices by end of the shift  Outcome: Progressing  Goal: Pace activities to prevent fatigue by end of the shift  Outcome: Progressing     Problem: Pain - Adult  Goal: Verbalizes/displays adequate comfort level or baseline comfort level  Outcome: Progressing     Problem: Safety - Adult  Goal: Free from fall injury  Outcome: Progressing     Problem: Discharge Planning  Goal: Discharge to home or other facility with appropriate resources  Outcome: Progressing     Problem: Chronic Conditions and Co-morbidities  Goal: Patient's chronic conditions and co-morbidity symptoms are monitored and maintained or improved  Outcome: Progressing   The patient's goals for the shift include      The clinical goals for the shift include Safety

## 2024-08-06 NOTE — PROGRESS NOTES
Occupational Therapy    Evaluation    Patient Name: Giuseppe Davila  MRN: [id number]  Today's Date: 8/6/2024  Time Calculation  Start Time: 0939  Stop Time: 0957  Time Calculation (min): 18 min        Assessment:  End of Session Communication: Bedside nurse  End of Session Patient Position: Bed, 2 rail up, Alarm on (All needs in reach)     Plan:  Treatment Interventions: ADL retraining, Functional transfer training, UE strengthening/ROM, Endurance training, Compensatory technique education  OT Frequency: 3 times per week  OT Discharge Recommendations: Low intensity level of continued care  OT - OK to Discharge: Yes (to next level of care when cleared by medical team)  Treatment Interventions: ADL retraining, Functional transfer training, UE strengthening/ROM, Endurance training, Compensatory technique education    Subjective   Current Problem:  1. Dizziness        2. Epigastric pain        3. Nausea and vomiting, unspecified vomiting type        4. Aneurysm of ascending aorta without rupture (CMS-HCC)        5. Common bile duct dilation          General:  General  Reason for Referral: impaired adl; pt. admitted with dizziness and nausea, concern for acute pancreatitis, or possible gall stones in common bile duct  Referred By: Demetria  Past Medical History Relevant to Rehab: a fib, cad, htn, HFpEF, tia, aortic stenosis, kevin, watchman, TAVR 7/9/24, ppm  Prior to Session Communication: Bedside nurse  Patient Position Received: Bed, 2 rail up, Alarm on  General Comment: pt. agreeable to therapy intervention  Precautions:  Precautions Comment: falls, telemetry  Vital Signs:     Pain:  Pain Assessment  Pain Assessment:  (no pain complaints)    Objective   Cognition:  Overall Cognitive Status: Within Functional Limits           Home Living:  Home Living Comments: pt. lives with spouse, 1 floor home, 1 entry step,  stall shower with seat, gg, hhs, RTS  Prior Function:  Prior Function Comments: pt. independent with adl,  spouse does iadl's primarily including driving as pt. has neuropathy and unable to drive, occasional use of wh. walker, stall shower, chair, gb, hhs, st. toilet, riser for toilet (doesnt use), wh. walker  IADL History:     ADL:  ADL Comments: while seated eob, dependent assist provided to don/dof socks as pt. having nausea and unable to reach down/change head position to reach feet     Bed Mobility/Transfers: Bed Mobility  Bed Mobility:  (supine <> sit sba)    Transfers  Transfer:  (pt. unable to tolerate due to nausea)      Functional Mobility:  Functional Mobility  Functional Mobility Performed:  (pt. unable to stand due to nause, therefore was able to laterally scoot toward hob while seated with sba)     Strength:  Strength Comments: bue's strength at least 3/5    Outcome Measures:[facility] Daily Activity  Putting on and taking off regular lower body clothing: Total  Bathing (including washing, rinsing, drying): A lot  Putting on and taking off regular upper body clothing: A little  Toileting, which includes using toilet, bedpan or urinal: A little  Taking care of personal grooming such as brushing teeth: None  Eating Meals: None  Daily Activity - Total Score: 17        Education Documentation  Precautions, taught by Desire Sauceda OT at 8/6/2024  2:02 PM.  Learner: Patient  Readiness: Acceptance  Method: Explanation  Response: Verbalizes Understanding, Needs Reinforcement    ADL Training, taught by Desire Sauceda OT at 8/6/2024  2:02 PM.  Learner: Patient  Readiness: Acceptance  Method: Explanation  Response: Verbalizes Understanding, Needs Reinforcement      Goals:  Encounter Problems       Encounter Problems (Active)       OT Goals       Increase functional mobility and  functional transfers to supervision for bed/chair/toilet with dme prn   (Progressing)       Start:  08/06/24    Expected End:  08/13/24            increase bue ther ex/activity x 7-10 minutes and increase standing tolerance x 3-5 minutes with  supervision to promote greater activity tolerance for assist with adl.   (Progressing)       Start:  08/06/24    Expected End:  08/13/24            Increase lb dressing/bathing to supervision with dme prn  (Progressing)       Start:  08/06/24    Expected End:  08/13/24            Increase toileting to supervision with dme prn  (Progressing)       Start:  08/06/24    Expected End:  08/13/24            pt. to apply ec/ws techniques with minimal cues to all mobility/transfer/adl to decrease fatigue/promote efficient use of energy toward completion of functional tasks.  (Progressing)       Start:  08/06/24    Expected End:  08/13/24

## 2024-08-06 NOTE — H&P
Subjective   HPI  Giuseppe Davila is a 88 y.o. 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 Patient was recently discharged from a SNF after being there for several weeks and he had just come home today. He started having nausea and multiple episodes of nonbloody nonbilious emesis. He also endorses dizziness with movement, midsternal chest pressure that he reports feels similar to acid reflux. He denies any significant changes to his diet; however mentions that he was not eating much at the SNF, but ate more when he got back home. He denies any alcohol use or changes to his medications. He denies any abdominal trauma.     In the ED, vitals significant for RR 24, /78, HR 60, SpO2 98% on RA. Labs notable for , lipase 167, Trop 40 -> 36. CXR showed  No acute cardiopulmonary disease. Cardiomegaly and mild chronic changes. CTH showed no acute findings. CTAP with IV contrast showed significant dilatation of the common bile duct with mild intrahepatic biliary ductal dilatation. No definitive choledocholithiasis. Patient was given 2mg IVP morphine, 500cc NS bolus in the ED.    Patient admitted to General Medicine service for concern for developing acute pancreatitis.    PMH: As stated above  PSH: As stated above, cardiac cath, cholecystectomy, tonsillectomy, kidney surgery, prostatectomy  Social: No tobacco use, no alcohol use, no illicit drug use.  Family: Paternal Uncle - Alzheimer's disease, PD; Father - Cancer; Mother    Medications/Allergies: NKDA    ROS: 12 systems reviewed and negative except otherwise noted in HPI above.    Objective   Vitals:    08/05/24 2145   BP:    Pulse: 60   Resp: 17   Temp:    SpO2: 98%      Physical Exam  GEN: conversant, NAD  HEENT: PERRL, EOMI, MMM OP clear, TMs clear bilaterally  NECK: supple, no cervical LAD, no  carotid bruits  CV: S1, S2, regular, no murmur  PULM: CTAB  ABD: soft, NT, ND, BS+, epigastric pain with tenderness to palpation.  EXT: no LE edema  NEURO: no gross focal deficits  PSYCH: appropriate affect     Assessment/Plan   Giuseppe Davila is a 88 y.o. male with past medical history of  hypertension, A-fib status post Watchman, HFpEF, prior TIA, CAD, aortic stenosis status post TAVR 7/9/2024  who presented to [facility] ED on 8/5/24 with dizziness and nausea. Patient admitted to General Medicine service for concern for developing acute pancreatitis.    #Concern for developing acute pancreatitis  #Concern for choledolithiasis in setting of s/p cholecystectomy, intermediate risk  - Patient does not meet criteria for diagnosis of pancreatitis, no signs on imaging, lipase elevated but not 3x  - Will check lipid panel  - NPO, may need MRCP, unclear if patient can tolerate given pacemaker   - Consult GI for further work-up, possible ERCP  - Judicious fluids given hx of HFpEF,  ml/hr  - Pain control with Morphine 2mg    Chronic Conditions:  #HTN: Hold losartan 25mg daily  #HFpEF: Hold 20mg torsemide  #Afib s/p watchmen: paced  #CAD: Continue home plavix    DVT: Lovenox  Diet: NPO  IVF:  ml/hr  Antibiotics: None  Consults: GI  Disposition: Acute/Telemetry    CODE: DNR/DNI    This is a preliminary note, please await attending attestation for a finalized plan.    Ranjith Naranjo MD  Internal Medicine PGY3  Please message me with any questions

## 2024-08-06 NOTE — CONSULTS
"Reason For Consult  concern for developing pancreatitis in setting of choledocholithiasis s/p cholestectomy     History Of Present Illness  Giuseppe Davila is a 88 y.o. male with past medical history of  hypertension, A-fib status post Watchman, HFpEF, prior TIA, CAD, aortic stenosis status post TAVR 7/9/2024  who presented to [facility] ED on 8/5/24 with dizziness and nausea. Patient was recently discharged from a SNF after being there for several weeks following cardiac surgery 7/9/2024 and he had just come home yesterday. He started having nausea and multiple episodes of nonbloody nonbilious emesis. He also endorses dizziness with movement.    On examination pt states he still does have some on/off nausea however his mail complaint is still significant weakness and dizziness. He states he had 3-4 bouts of watery non-bloody stool on Sunday and Monday along with nausea but no vomiting.  No stool today.  Normally he he has 1 or 2 formed stools daily.  Denies any heartburn, dysphagia or upper or lower GI bleed.  Denies use of NSAIDs  Last dose of Plavix yesterday.  No previous history of pancreas or liver issues.  Cholecystectomy approximately 40 years ago    EGD \"long time ago\", unable to recall any details  Colonoscopy approximately 5 to 10 years ago at private office, believes it was negative as per patient      Laboratory data today shows H&H 13.0, no leukocytosis, platelets 107, CMP essentially negative except albumin 3.3.  Lactate 2.0.  Lipase yesterday 167, today's repeat 134    CT of chest, abdomen and pelvis from yesterday, 8/5/2024 with IV contrast showed postoperative changes with aortic valve replacement and Watchman device in the left atrial appendage, significant dilatation of the common bile duct 3.1 cm (previously 2.9 cm) with mild intrahepatic biliary ductal dilation, no definitive choledocholithiasis, diverticulosis without signs of diverticulitis, please see full official report for additional " findings      PMH: As stated above  PSH: As stated above, cardiac cath, cholecystectomy, tonsillectomy, kidney surgery, prostatectomy  Social: No tobacco use, no alcohol use, no illicit drug use.  Family: Paternal Uncle - Alzheimer's disease, PD; Father - Cancer; Mother    Medications/Allergies: NKDA        Past Medical History  He has a past medical history of Anorectal abscess, Essential (primary) hypertension, Old myocardial infarction, Other hyperlipidemia, Personal history of malignant neoplasm, unspecified, Personal history of other diseases of the circulatory system, Personal history of other diseases of the circulatory system, Personal history of other diseases of the nervous system and sense organs (05/02/2019), Personal history of other specified conditions, Personal history of other specified conditions, Personal history of other specified conditions, Personal history of transient ischemic attack (TIA), and cerebral infarction without residual deficits, Personal history of transient ischemic attack (TIA), and cerebral infarction without residual deficits, Syncope and collapse, Thoracic aortic aneurysm, without rupture, unspecified (CMS-HCC), Unspecified atrial fibrillation (Multi) (03/10/2020), and Ventricular premature depolarization.    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     Social History  He reports that he has never smoked. He has never used smokeless tobacco. He reports that he does not  "currently use alcohol. He reports that he does not currently use drugs.    Family History  Family History   Problem Relation Name Age of Onset    Other (cardiac disorder) Mother      Transient ischemic attack Mother      Cancer Father      Other (cardiac disorder) Sister      Alzheimer's disease Father's Brother      Parkinsonism Father's Brother          Allergies  Patient has no known allergies.    Review of Systems    A 10 point review of system is negative except for what is mentioned in the HPI     Physical Exam    The note was created using voice recognition transcription software. Despite proofreading, unintentional typographical errors may be present. Please contact the GI office with any questions or concerns.     Current Medications: reviewed    Vital Signs: Reviewed    Physical Exam:  General: no apparent distress, pleasant and cooperative  Skin:  Warm and dry, no jaundice  HEENT: No scleral icterus, no conjunctival pallor, normocephalic, atraumatic, mucous membranes moist  Neck:  atraumatic, trachea midline, no JVD  Chest:  decreased air entry to auscultation bilaterally. No wheezes, rales, or rhonchi  CV:  Regular rate and rhythm.  Positive S1/S2  Abdomen: no distension, +BS, soft, non-tender to palpation, no rebound tenderness, no guarding, no rigidity, no discernible ascites   Extremities: no lower extremity edema, Chronic pigmentary changes, no cyanosis  Neurological:  A&Ox3 , no asterixis  Psychiatric: cooperative     Investigations:  Labs, radiological imaging and cardiac work up were reviewed       Last Recorded Vitals  Blood pressure 94/50, pulse 61, temperature 36.2 °C (97.2 °F), temperature source Temporal, resp. rate 16, height 1.829 m (6' 0.01\"), weight 89.8 kg (197 lb 15.6 oz), SpO2 95%.    Relevant Results      Scheduled medications  clopidogrel, 75 mg, oral, Daily  enoxaparin, 40 mg, subcutaneous, q24h  [Held by provider] losartan, 25 mg, oral, Daily  polyethylene glycol, 17 g, oral, " Daily  rosuvastatin, 20 mg, oral, Once per day on Sunday Tuesday Thursday Saturday      Continuous medications     PRN medications  PRN medications: acetaminophen **OR** acetaminophen **OR** acetaminophen, morphine, ondansetron    Results for orders placed or performed during the hospital encounter of 08/05/24 (from the past 24 hour(s))   CBC and Auto Differential   Result Value Ref Range    WBC 5.0 4.4 - 11.3 x10*3/uL    nRBC 0.0 0.0 - 0.0 /100 WBCs    RBC 4.45 (L) 4.50 - 5.90 x10*6/uL    Hemoglobin 14.0 13.5 - 17.5 g/dL    Hematocrit 40.9 (L) 41.0 - 52.0 %    MCV 92 80 - 100 fL    MCH 31.5 26.0 - 34.0 pg    MCHC 34.2 32.0 - 36.0 g/dL    RDW 12.6 11.5 - 14.5 %    Platelets 133 (L) 150 - 450 x10*3/uL    Neutrophils % 61.6 40.0 - 80.0 %    Immature Granulocytes %, Automated 0.4 0.0 - 0.9 %    Lymphocytes % 28.4 13.0 - 44.0 %    Monocytes % 7.6 2.0 - 10.0 %    Eosinophils % 1.6 0.0 - 6.0 %    Basophils % 0.4 0.0 - 2.0 %    Neutrophils Absolute 3.06 1.60 - 5.50 x10*3/uL    Immature Granulocytes Absolute, Automated 0.02 0.00 - 0.50 x10*3/uL    Lymphocytes Absolute 1.41 0.80 - 3.00 x10*3/uL    Monocytes Absolute 0.38 0.05 - 0.80 x10*3/uL    Eosinophils Absolute 0.08 0.00 - 0.40 x10*3/uL    Basophils Absolute 0.02 0.00 - 0.10 x10*3/uL   Comprehensive metabolic panel   Result Value Ref Range    Glucose 102 (H) 74 - 99 mg/dL    Sodium 138 136 - 145 mmol/L    Potassium 3.6 3.5 - 5.3 mmol/L    Chloride 101 98 - 107 mmol/L    Bicarbonate 27 21 - 32 mmol/L    Anion Gap 14 10 - 20 mmol/L    Urea Nitrogen 20 6 - 23 mg/dL    Creatinine 1.10 0.50 - 1.30 mg/dL    eGFR 65 >60 mL/min/1.73m*2    Calcium 9.2 8.6 - 10.3 mg/dL    Albumin 3.8 3.4 - 5.0 g/dL    Alkaline Phosphatase 67 33 - 136 U/L    Total Protein 6.9 6.4 - 8.2 g/dL    AST 22 9 - 39 U/L    Bilirubin, Total 1.1 0.0 - 1.2 mg/dL    ALT 11 10 - 52 U/L   Magnesium   Result Value Ref Range    Magnesium 2.05 1.60 - 2.40 mg/dL   Lactate   Result Value Ref Range    Lactate 2.0 0.4 -  2.0 mmol/L   Lipase   Result Value Ref Range    Lipase 167 (H) 9 - 82 U/L   Troponin I, High Sensitivity   Result Value Ref Range    Troponin I, High Sensitivity 40 (H) 0 - 20 ng/L   Protime-INR   Result Value Ref Range    Protime 11.4 9.8 - 12.8 seconds    INR 1.0 0.9 - 1.1   aPTT   Result Value Ref Range    aPTT 27 27 - 38 seconds   B-Type Natriuretic Peptide   Result Value Ref Range     (H) 0 - 99 pg/mL   Troponin I, High Sensitivity   Result Value Ref Range    Troponin I, High Sensitivity 36 (H) 0 - 20 ng/L   Lipid panel   Result Value Ref Range    Cholesterol 126 0 - 199 mg/dL    HDL-Cholesterol 46.7 mg/dL    Cholesterol/HDL Ratio 2.7     LDL Calculated 63 <=99 mg/dL    VLDL 16 0 - 40 mg/dL    Triglycerides 82 0 - 149 mg/dL    Non HDL Cholesterol 79 0 - 149 mg/dL   CBC and Auto Differential   Result Value Ref Range    WBC 4.7 4.4 - 11.3 x10*3/uL    nRBC 0.0 0.0 - 0.0 /100 WBCs    RBC 4.16 (L) 4.50 - 5.90 x10*6/uL    Hemoglobin 13.0 (L) 13.5 - 17.5 g/dL    Hematocrit 38.6 (L) 41.0 - 52.0 %    MCV 93 80 - 100 fL    MCH 31.3 26.0 - 34.0 pg    MCHC 33.7 32.0 - 36.0 g/dL    RDW 12.7 11.5 - 14.5 %    Platelets 107 (L) 150 - 450 x10*3/uL    Neutrophils % 64.6 40.0 - 80.0 %    Immature Granulocytes %, Automated 0.2 0.0 - 0.9 %    Lymphocytes % 24.1 13.0 - 44.0 %    Monocytes % 8.2 2.0 - 10.0 %    Eosinophils % 2.5 0.0 - 6.0 %    Basophils % 0.4 0.0 - 2.0 %    Neutrophils Absolute 3.05 1.60 - 5.50 x10*3/uL    Immature Granulocytes Absolute, Automated 0.01 0.00 - 0.50 x10*3/uL    Lymphocytes Absolute 1.14 0.80 - 3.00 x10*3/uL    Monocytes Absolute 0.39 0.05 - 0.80 x10*3/uL    Eosinophils Absolute 0.12 0.00 - 0.40 x10*3/uL    Basophils Absolute 0.02 0.00 - 0.10 x10*3/uL   Comprehensive Metabolic Panel   Result Value Ref Range    Glucose 93 74 - 99 mg/dL    Sodium 139 136 - 145 mmol/L    Potassium 3.5 3.5 - 5.3 mmol/L    Chloride 106 98 - 107 mmol/L    Bicarbonate 24 21 - 32 mmol/L    Anion Gap 13 10 - 20 mmol/L     Urea Nitrogen 17 6 - 23 mg/dL    Creatinine 1.02 0.50 - 1.30 mg/dL    eGFR 72 >60 mL/min/1.73m*2    Calcium 8.6 8.6 - 10.3 mg/dL    Albumin 3.3 (L) 3.4 - 5.0 g/dL    Alkaline Phosphatase 56 33 - 136 U/L    Total Protein 5.9 (L) 6.4 - 8.2 g/dL    AST 29 9 - 39 U/L    Bilirubin, Total 1.1 0.0 - 1.2 mg/dL    ALT 12 10 - 52 U/L   Magnesium   Result Value Ref Range    Magnesium 1.98 1.60 - 2.40 mg/dL   Lipase   Result Value Ref Range    Lipase 134 (H) 9 - 82 U/L          Assessment/Plan     #Nausea, improving  #Dizziness  #Weakness    At present time patient does not meet criteria for diagnosis of acute pancreatitis given no abdominal pain, no radiologic evidence and only mild elevation in lipase.  No LFTs elevation as well  Thrombocytopenia noted.  Given simultaneous onset of nausea, poor appetite and diarrhea which is currently improving mild case of viral gastroenteritis cannot be completely ruled out  It appears that CBD dilation patient has is rather chronic with only small current increase.    Continue supportive care, IVF, nausea control as per primary medicine    Is most likely not a candidate for MRI given Watchman device    Continue daily abdominal exams, trend CBC, LFT  If no significant improvement within next 2 or 3 days will consider EGD to rule out gastritis  Will consider checking compatibility of watchman and MRI study, perhaps at main campus    Patient discussed with Dr. Rizo    I spent 60 minutes in the professional and overall care of this patient.      Jenifer Unger, APRN-CNP

## 2024-08-06 NOTE — ED NOTES
Report called to [facility] on 9th floor. Questions answered     Roseann Valdivai RN  08/06/24 6622

## 2024-08-06 NOTE — PROGRESS NOTES
Giuseppe Davila is a 88 y.o. male on day 1 of admission presenting with Dizziness.      Subjective   Patient says he feels very nauseous and have associated dizziness.  He denies any abdominal pain, shortness of breath, chest pain, headache, blurry vision or diaphoresis.    Objective     Last Recorded Vitals  BP 94/50 (Patient Position: Standing) Comment (Patient Position): Simultaneous filing. User may not have seen previous data.  Pulse 61   Temp 36.2 °C (97.2 °F) (Temporal)   Resp 16   Wt 89.8 kg (197 lb 15.6 oz)   SpO2 95%   Intake/Output last 3 Shifts:    Intake/Output Summary (Last 24 hours) at 8/6/2024 1126  Last data filed at 8/5/2024 2029  Gross per 24 hour   Intake 500 ml   Output --   Net 500 ml       Admission Weight  Weight: 89.8 kg (197 lb 15.6 oz) (08/06/24 0832)    Daily Weight  08/06/24 : 89.8 kg (197 lb 15.6 oz)      Physical Exam  Constitutional: well developed, awake, alert, no acute distress  ENMT: mucous membranes moist, conjunctivae clear  Head/Neck: normocephalic, atraumatic; supple, trachea midline  Respiratory/Thorax: patent airways, CTAB; no wheezes, rales, or rhonchi  Cardiovascular: RRR, no murmur  Gastrointestinal: soft, nondistended, non tender, bowel sounds appreciated  Extremities: palpable peripheral pulses, no edema or cyanosis  Neurological: AO x3, no focal deficits  Psychological: appropriate mood and behavior  Skin: warm and dry     Assessment/Plan   Giuseppe Davila is a 88-year-old male with a past medical history of hypertension, atrial fibrillation status post watchman, coronary artery disease status post 2 stents, HFpEF, prior TIA, aortic valve stenosis status post TAVR 7/9/2024 presented to the ED yesterday 8/5/2024 with dizziness and nausea.    #Dizziness  #Orthostatic hypotension  #Choledolithiasis, rule out (s/p cholecystectomy)  #Acute pancreatitis, rule out  -Patient does not meet criteria for diagnosis of pancreatitis, no signs on imaging, lipase elevated but not  x 3  -Patient's lipid panel is within normal limits.  -GI has been consulted, we have ordered right upper quadrant ultrasound, to rule out presence of any stones in the common/intrahepatic biliary duct.  Patient does not have elevated LFTs, ALP, bilirubin indicating low suspicion however since he has a history of cholecystectomy we will rule out presence of residual or new stones.  -RUQ ultrasound showed intra and extrahepatic biliary duct dilation with prominent dilation of common bile duct similar to prior CTs, likely chronic.  -Patient's symptoms of dizziness seem positional, however he is unable to provide accurate history.  We ordered orthostatic vitals which were positive, we will recommend patient to use compression stockings, contract his calf muscles after prolonged immobilization, maintaining adequate hydration.  -Due to given history of HFpEF, judicious fluids given  mL/h, pain control with morphine 2 mg.    Chronic medical conditions:  #Hypertension:  #HFpEF:  #A-fib status post watchman: Paced  #CAD: Continue home Plavix    DVT: Lovenox  Diet: N.p.o.  IVF:  mL/h  Antibiotics: None  Consults: GI  Disposition: Acute telemetry    Code: DNR/DNI    Carol Perez MD  PGY1 internal medicine

## 2024-08-07 ENCOUNTER — DOCUMENTATION (OUTPATIENT)
Dept: HOME HEALTH SERVICES | Facility: HOME HEALTH | Age: 89
End: 2024-08-07
Payer: MEDICARE

## 2024-08-07 ENCOUNTER — HOME HEALTH ADMISSION (OUTPATIENT)
Dept: HOME HEALTH SERVICES | Facility: HOME HEALTH | Age: 89
End: 2024-08-07
Payer: MEDICARE

## 2024-08-07 VITALS
HEART RATE: 55 BPM | RESPIRATION RATE: 16 BRPM | WEIGHT: 197.97 LBS | DIASTOLIC BLOOD PRESSURE: 76 MMHG | BODY MASS INDEX: 26.81 KG/M2 | HEIGHT: 72 IN | OXYGEN SATURATION: 96 % | TEMPERATURE: 96.8 F | SYSTOLIC BLOOD PRESSURE: 120 MMHG

## 2024-08-07 PROBLEM — I71.21 ASCENDING AORTIC ANEURYSM (CMS-HCC): Status: RESOLVED | Noted: 2023-10-17 | Resolved: 2024-08-07

## 2024-08-07 PROBLEM — R42 DIZZINESS: Status: RESOLVED | Noted: 2024-08-05 | Resolved: 2024-08-07

## 2024-08-07 LAB
ALBUMIN SERPL BCP-MCNC: 3.1 G/DL (ref 3.4–5)
ANION GAP SERPL CALC-SCNC: 13 MMOL/L (ref 10–20)
BUN SERPL-MCNC: 17 MG/DL (ref 6–23)
CALCIUM SERPL-MCNC: 9 MG/DL (ref 8.6–10.3)
CHLORIDE SERPL-SCNC: 106 MMOL/L (ref 98–107)
CO2 SERPL-SCNC: 26 MMOL/L (ref 21–32)
CREAT SERPL-MCNC: 1.15 MG/DL (ref 0.5–1.3)
EGFRCR SERPLBLD CKD-EPI 2021: 62 ML/MIN/1.73M*2
ERYTHROCYTE [DISTWIDTH] IN BLOOD BY AUTOMATED COUNT: 13 % (ref 11.5–14.5)
GLUCOSE SERPL-MCNC: 96 MG/DL (ref 74–99)
HCT VFR BLD AUTO: 37.6 % (ref 41–52)
HGB BLD-MCNC: 12.8 G/DL (ref 13.5–17.5)
MCH RBC QN AUTO: 31.5 PG (ref 26–34)
MCHC RBC AUTO-ENTMCNC: 34 G/DL (ref 32–36)
MCV RBC AUTO: 93 FL (ref 80–100)
NRBC BLD-RTO: 0 /100 WBCS (ref 0–0)
PHOSPHATE SERPL-MCNC: 4.4 MG/DL (ref 2.5–4.9)
PLATELET # BLD AUTO: 102 X10*3/UL (ref 150–450)
POTASSIUM SERPL-SCNC: 3.5 MMOL/L (ref 3.5–5.3)
RBC # BLD AUTO: 4.06 X10*6/UL (ref 4.5–5.9)
SODIUM SERPL-SCNC: 141 MMOL/L (ref 136–145)
WBC # BLD AUTO: 3.5 X10*3/UL (ref 4.4–11.3)

## 2024-08-07 PROCEDURE — 80069 RENAL FUNCTION PANEL: CPT

## 2024-08-07 PROCEDURE — 85027 COMPLETE CBC AUTOMATED: CPT

## 2024-08-07 PROCEDURE — G0378 HOSPITAL OBSERVATION PER HR: HCPCS

## 2024-08-07 PROCEDURE — 99238 HOSP IP/OBS DSCHRG MGMT 30/<: CPT

## 2024-08-07 PROCEDURE — 96376 TX/PRO/DX INJ SAME DRUG ADON: CPT

## 2024-08-07 PROCEDURE — 97535 SELF CARE MNGMENT TRAINING: CPT | Mod: GP,CQ

## 2024-08-07 PROCEDURE — 2500000004 HC RX 250 GENERAL PHARMACY W/ HCPCS (ALT 636 FOR OP/ED)

## 2024-08-07 PROCEDURE — 96372 THER/PROPH/DIAG INJ SC/IM: CPT

## 2024-08-07 PROCEDURE — 36415 COLL VENOUS BLD VENIPUNCTURE: CPT

## 2024-08-07 PROCEDURE — 2500000001 HC RX 250 WO HCPCS SELF ADMINISTERED DRUGS (ALT 637 FOR MEDICARE OP)

## 2024-08-07 ASSESSMENT — PAIN SCALES - GENERAL
PAINLEVEL_OUTOF10: 0 - NO PAIN
PAINLEVEL_OUTOF10: 0 - NO PAIN

## 2024-08-07 ASSESSMENT — COGNITIVE AND FUNCTIONAL STATUS - GENERAL
WALKING IN HOSPITAL ROOM: A LITTLE
MOVING TO AND FROM BED TO CHAIR: A LITTLE
STANDING UP FROM CHAIR USING ARMS: A LITTLE
MOBILITY SCORE: 17
MOVING FROM LYING ON BACK TO SITTING ON SIDE OF FLAT BED WITH BEDRAILS: A LITTLE
CLIMB 3 TO 5 STEPS WITH RAILING: A LOT
TURNING FROM BACK TO SIDE WHILE IN FLAT BAD: A LITTLE

## 2024-08-07 ASSESSMENT — PAIN - FUNCTIONAL ASSESSMENT: PAIN_FUNCTIONAL_ASSESSMENT: 0-10

## 2024-08-07 NOTE — CARE PLAN
The patient's goals for the shift include      The clinical goals for the shift include Safety/free of falls      Problem: Fall/Injury  Goal: Not fall by end of shift  Outcome: Progressing  Goal: Be free from injury by end of the shift  Outcome: Progressing  Goal: Verbalize understanding of personal risk factors for fall in the hospital  Outcome: Progressing  Goal: Verbalize understanding of risk factor reduction measures to prevent injury from fall in the home  Outcome: Progressing  Goal: Use assistive devices by end of the shift  Outcome: Progressing  Goal: Pace activities to prevent fatigue by end of the shift  Outcome: Progressing     Problem: Pain - Adult  Goal: Verbalizes/displays adequate comfort level or baseline comfort level  Outcome: Progressing     Problem: Safety - Adult  Goal: Free from fall injury  Outcome: Progressing     Problem: Discharge Planning  Goal: Discharge to home or other facility with appropriate resources  Outcome: Progressing     Problem: Chronic Conditions and Co-morbidities  Goal: Patient's chronic conditions and co-morbidity symptoms are monitored and maintained or improved  Outcome: Progressing

## 2024-08-07 NOTE — CARE PLAN
The patient's goals for the shift include      The clinical goals for the shift include Safety/free of falls

## 2024-08-07 NOTE — DISCHARGE SUMMARY
Discharge Diagnosis  #Dizziness  #Orthostatic hypotension  #Hypertension  #HFpEF  A-fib status post Watchman  # CAD status post 2 coronary stents  #Aortic valve stenosis status post TAVR      Issues Requiring Follow Up  Follow-up with your primary care provider for posthospitalization follow-up.    Discharge Meds     Your medication list        ASK your doctor about these medications        Instructions Last Dose Given Next Dose Due   ashwagandha root extract 500 mg capsule           b complex vitamins capsule           calcium carbonate 600 mg calcium (1,500 mg) tablet           cholecalciferol 25 MCG (1000 UT) capsule  Commonly known as: Vitamin D-3           cholestyramine 4 gram packet  Commonly known as: Questran           clopidogrel 75 mg tablet  Commonly known as: Plavix      Take 1 tablet (75 mg) by mouth once daily.       coenzyme Q-10 100 mg capsule           docusate sodium 100 mg capsule  Commonly known as: Colace      Take 1 capsule (100 mg) by mouth 2 times a day.       hydrocortisone 2.5 % rectal cream  Commonly known as: Anusol-HC           losartan 25 mg tablet  Commonly known as: Cozaar      Take 1 tablet (25 mg) by mouth once daily.       MagOx 400 mg (241.3 mg magnesium) tablet  Generic drug: magnesium oxide           multivitamin tablet           ondansetron 4 mg tablet  Commonly known as: Zofran           potassium chloride CR 10 mEq ER tablet  Commonly known as: Klor-Con      Take 1 tablet (10 mEq) by mouth once daily. Do not crush, chew, or split.       rosuvastatin 20 mg tablet  Commonly known as: Crestor      Take 1 tablet (20 mg) by mouth 4 times a week.       torsemide 20 mg tablet  Commonly known as: Demadex      1 TAB Daily       turmeric-turmeric root extract 450-50 mg capsule           vitamin E succinate 268 mg (400 unit) tablet                    Test Results Pending At Discharge  Pending Labs       No current pending labs.            Hospital Course  Giuseppe Davila is a  88-year-old male with a past medical history of hypertension, atrial fibrillation status post watchman, CAD status post 2 stents, HFpEF, prior TIA, aortic valve stenosis status post TAVR 7/9/2024 presented to the ED on 8/5/2024 with complaints of dizziness and nausea.  In the ED his vitals were significant , lipase 167, troponin 40 downtrended to 36.  CT abdomen with IV contrast showed dilation of the common bile duct with mild intrahepatic biliary ductal dilation, which was similar to prior CT scans.  Patient was given 2 mg IVP morphine, 500 cc NS bolus in the ED.  He said he had some abdominal pain which sided after receiving pain medications.  We ruled out pancreatitis and choledocholithiasis (s/p cholecystectomy).  Patient had right upper quadrant ultrasound which did not show any stones.  Patient was not in any acute distress the following day.  He did have dizziness we ordered orthostatic vitals and he tested positive for orthostatic hypotension.  We educated patient about compression stockings adequate fluid intake.  Patient has been discharged with home health care he has an AMPAC score of 17 from OT and 16 from PT and denies skilled nursing facility.    pertinent Physical Exam At Time of Discharge  Physical Exam  Constitutional: well developed, awake, alert, no acute distress  ENMT: mucous membranes moist, conjunctivae clear  Head/Neck: normocephalic, atraumatic; supple, trachea midline  Respiratory/Thorax: patent airways, CTAB; no wheezes, rales, or rhonchi  Cardiovascular: RRR, no murmur  Gastrointestinal: soft, nondistended, non tender, bowel sounds appreciated  Extremities: palpable peripheral pulses, no edema or cyanosis  Neurological: AO x3, no focal deficits  Psychological: appropriate mood and behavior  Skin: warm and dry    Outpatient Follow Up  Future Appointments   Date Time Provider Department Center   8/27/2024 10:15 AM Donnie Carmichael MD [id number] [place]   7/11/2025 10:00 AM  NEENA  [id number] [id number] [facility]         Carol Perez MD  PGY1 internal medicine

## 2024-08-07 NOTE — HH CARE COORDINATION
Home Care received a Referral for Nursing, Physical Therapy, Occupational Therapy, and Home Health Aide. We have processed the referral for a Start of Care on 24-48 HOURS .     If you have any questions or concerns, please feel free to contact us at [phone]. Follow the prompts, enter your five digit zip code, and you will be directed to your care team on WEST 3.

## 2024-08-07 NOTE — CARE PLAN
The patient's goals for the shift include      The clinical goals for the shift include safety and tolerate diet      Problem: Fall/Injury  Goal: Not fall by end of shift  8/7/2024 1206 by Ailin Holland RN  Outcome: Progressing  8/7/2024 1159 by Ailin Holland RN  Outcome: Progressing  Goal: Be free from injury by end of the shift  8/7/2024 1206 by Ailin Holland RN  Outcome: Progressing  8/7/2024 1159 by Ailin Holland RN  Outcome: Progressing  Goal: Verbalize understanding of personal risk factors for fall in the hospital  8/7/2024 1206 by Ailin Holland RN  Outcome: Progressing  8/7/2024 1159 by Ailin Holland RN  Outcome: Progressing  Goal: Verbalize understanding of risk factor reduction measures to prevent injury from fall in the home  8/7/2024 1206 by Ailin Holland RN  Outcome: Progressing  8/7/2024 1159 by Ailin Holland RN  Outcome: Progressing  Goal: Use assistive devices by end of the shift  8/7/2024 1206 by Ailin Holland RN  Outcome: Progressing  8/7/2024 1159 by Ailin Holland RN  Outcome: Progressing  Goal: Pace activities to prevent fatigue by end of the shift  8/7/2024 1206 by Ailin Holland RN  Outcome: Progressing  8/7/2024 1159 by Ailin Holland RN  Outcome: Progressing     Problem: Pain - Adult  Goal: Verbalizes/displays adequate comfort level or baseline comfort level  8/7/2024 1206 by Ailin Holland RN  Outcome: Progressing  8/7/2024 1159 by Ailin Holland RN  Outcome: Progressing     Problem: Safety - Adult  Goal: Free from fall injury  8/7/2024 1206 by Ailin Holland RN  Outcome: Progressing  8/7/2024 1159 by Ailin Holland RN  Outcome: Progressing     Problem: Discharge Planning  Goal: Discharge to home or other facility with appropriate resources  8/7/2024 1206 by Ailin Holland RN  Outcome: Progressing  8/7/2024 1159 by Ailin Holland RN  Outcome: Progressing     Problem: Chronic Conditions and Co-morbidities  Goal:  Patient's chronic conditions and co-morbidity symptoms are monitored and maintained or improved  8/7/2024 1206 by Ailin Holland RN  Outcome: Progressing  8/7/2024 1159 by Ailin Holland RN  Outcome: Progressing

## 2024-08-07 NOTE — DISCHARGE INSTRUCTIONS
1.  For orthostatic hypotension, we recommend you wear compression stockings, have optimal intake of fluids, and contract calf muscles after prolonged immobilization.  2.  Please follow-up with your primary care provider for posthospitalization follow-up.

## 2024-08-07 NOTE — PROGRESS NOTES
Met with pt, pt admit for N/V.  Introduced myself and role.  Pt tells me that he was recently dc'd from [facility], [facility].  Pt wants to go  home with preference of [facility] under Dr Michele.  Home address and insurance confirmed.    Demetria Sadler RN TCC

## 2024-08-07 NOTE — PROGRESS NOTES
Physical Therapy    Physical Therapy Treatment    Patient Name: Giuseppe Davila  MRN: [id number]  Today's Date: 8/7/2024  Time Calculation  Start Time: 1057  Stop Time: 1120  Time Calculation (min): 23 min  908-B    Assessment/Plan   PT Assessment  End of Session Communication: Bedside nurse  End of Session Patient Position: Up in chair, Alarm on     PT Plan  Treatment/Interventions: Bed mobility, Transfer training, Gait training  PT Plan: Ongoing PT  PT Frequency: 4 times per week  PT Discharge Recommendations: Low intensity level of continued care  PT - OK to Discharge: Yes      General Visit Information:   PT  Visit  PT Received On: 08/07/24  General  Co-Treatment: co-tx with OT to ensure safe therapeutic tx to facilitate maximum participation with skilled intervention  Prior to Session Communication: Bedside nurse  Patient Position Received: Bed, 2 rail up, Alarm off, not on at start of session  General Comment:  (pt agreeable to participate in therapy session with encouragement provided.  tx limited by nausea; communicated with RN)    Subjective   Precautions:  Precautions  Medical Precautions: Fall precautions       Objective   Pain:  Pain Assessment  Pain Assessment: 0-10  0-10 (Numeric) Pain Score: 0 - No pain     Treatments:  Bed Mobility  Bed Mobility:  (sup > sit with SBA)    Ambulation/Gait Training  Ambulation/Gait Training Performed:  (pt able to perform static stance as well as ambulate several feet bed > chair with FWW and min A x 1-2.  limited by c/o nausea.)    Transfers  Transfer:  (sit <> stand with FWW and min A x 1-2.  cuing for safe hand placement and sequencing.)    Outcome Measures:  [facility] Basic Mobility  Turning from your back to your side while in a flat bed without using bedrails: A little  Moving from lying on your back to sitting on the side of a flat bed without using bedrails: A little  Moving to and from bed to chair (including a wheelchair): A little  Standing up from a chair using  your arms (e.g. wheelchair or bedside chair): A little  To walk in hospital room: A little  Climbing 3-5 steps with railing: A lot  Basic Mobility - Total Score: 17    Education Documentation  Precautions, taught by Mary Beth Doll PTA at 8/7/2024 12:42 PM.  Learner: Patient  Readiness: Acceptance  Method: Explanation  Response: Verbalizes Understanding    Mobility Training, taught by Mary Beth Doll PTA at 8/7/2024 12:42 PM.  Learner: Patient  Readiness: Acceptance  Method: Explanation  Response: Verbalizes Understanding    Education Comments  No comments found.        EDUCATION:       Encounter Problems       Encounter Problems (Active)       PT Problem       STG - Pt will transition supine <> sitting with SUP  (Progressing)       Start:  08/06/24    Expected End:  08/20/24            STG - Pt will transfer STS with SUP  (Progressing)       Start:  08/06/24    Expected End:  08/20/24            STG - Pt will amb 50' using RW with SUP  (Progressing)       Start:  08/06/24    Expected End:  08/20/24

## 2024-08-09 ENCOUNTER — APPOINTMENT (OUTPATIENT)
Dept: CARDIOLOGY | Facility: HOSPITAL | Age: 89
End: 2024-08-09
Payer: MEDICARE

## 2024-08-09 LAB
ATRIAL RATE: 0 BPM
P AXIS: 0 DEGREES
PR INTERVAL: 179 MS
Q ONSET: 254 MS
QRS COUNT: 11 BEATS
QRS DURATION: 160 MS
QT INTERVAL: 445 MS
QTC CALCULATION(BAZETT): 441 MS
QTC FREDERICIA: 442 MS
R AXIS: -14 DEGREES
T AXIS: 159 DEGREES
T OFFSET: 477 MS
VENTRICULAR RATE: 59 BPM

## 2024-08-10 ENCOUNTER — HOME CARE VISIT (OUTPATIENT)
Dept: HOME HEALTH SERVICES | Facility: HOME HEALTH | Age: 89
End: 2024-08-10

## 2024-08-12 ENCOUNTER — HOME CARE VISIT (OUTPATIENT)
Dept: HOME HEALTH SERVICES | Facility: HOME HEALTH | Age: 89
End: 2024-08-12
Payer: MEDICARE

## 2024-08-12 VITALS
SYSTOLIC BLOOD PRESSURE: 114 MMHG | DIASTOLIC BLOOD PRESSURE: 62 MMHG | TEMPERATURE: 98.1 F | RESPIRATION RATE: 18 BRPM | HEART RATE: 80 BPM | OXYGEN SATURATION: 98 %

## 2024-08-12 PROCEDURE — G0299 HHS/HOSPICE OF RN EA 15 MIN: HCPCS | Mod: HHH

## 2024-08-12 PROCEDURE — G0152 HHCP-SERV OF OT,EA 15 MIN: HCPCS | Mod: HHH

## 2024-08-12 PROCEDURE — 169592 NO-PAY CLAIM PROCEDURE

## 2024-08-12 SDOH — ECONOMIC STABILITY: HOUSING INSECURITY: HOME SAFETY: GAVE PATIENT THE NUMBER FOR THE N. ROYALTON FD, INSTRUCTED TO CALL TO SEE IF THEY WILL INSTALL.

## 2024-08-12 ASSESSMENT — ENCOUNTER SYMPTOMS
HIGHEST PAIN SEVERITY IN PAST 24 HOURS: 4/10
SHORTNESS OF BREATH: 1
DYSPNEA ON EXERTION: 1
PAIN: CHRONIC ARTHRITIS PAIN
FATIGUES EASILY: 1
APPETITE LEVEL: FAIR
PERSON REPORTING PAIN: PATIENT
PERSON REPORTING PAIN: PATIENT
MUSCLE WEAKNESS: 1
HIGHEST PAIN SEVERITY IN PAST 24 HOURS: 0/10
DYSPNEA ACTIVITY LEVEL: AFTER AMBULATING 10 - 20 FT
PAIN: 1
LOWEST PAIN SEVERITY IN PAST 24 HOURS: 0/10
FATIGUE: 1
CHANGE IN APPETITE: INCREASED
LAST BOWEL MOVEMENT: 67063
LOWER EXTREMITY EDEMA: 1
DENIES PAIN: 1

## 2024-08-12 ASSESSMENT — ACTIVITIES OF DAILY LIVING (ADL)
PREPARING MEALS: DEPENDENT
GROOMING ASSESSED: 1
TOILETING: 1
LAUNDRY: DEPENDENT
TOILETING: INDEPENDENT
PHYSICAL TRANSFERS ASSESSED: 1
CURRENT_FUNCTION: INDEPENDENT
GROOMING_CURRENT_FUNCTION: INDEPENDENT
OASIS_M1830: 05
DRESSING_LB_CURRENT_FUNCTION: INDEPENDENT
FEEDING: INDEPENDENT
BATHING ASSESSED: 1
FEEDING ASSESSED: 1
ENTERING_EXITING_HOME: MODERATE ASSIST
DRESSING_UB_CURRENT_FUNCTION: INDEPENDENT
BATHING_CURRENT_FUNCTION: MINIMUM ASSIST
LAUNDRY ASSESSED: 1

## 2024-08-12 ASSESSMENT — LIFESTYLE VARIABLES: SMOKING_STATUS: 0

## 2024-08-12 NOTE — Clinical Note
OT evaluation completed 8.12.24. Plan to see 2w2 to address home safety, showering and UE exercises.

## 2024-08-12 NOTE — HOME HEALTH
Patient seen with spouse for OT evaluation visit. Patient was recently hospitalized with dizziness and nausea/orthopedic hypotension.     Lives with spouse in a ranch home with two steps to enter the front door. 1st floor has one step up from the foyer into the living room. He has a full bathroom with a walk-in tub.    Medical history of HTN, CHF, Afib, CAD, TAVR 7.9.24, cholecystectomy, neuropathy in his feet.    Patient has a wheeled walker, [facility], built in seat in the shower.     Prior to his hospitalization for a TAVR on 7.9.24, was independent with ADLs, didn't use an ambulatory device. He didn't drive due to the neuropathy in his feet. Spouse performs IADLs.     Barthel index-90 out of 100.     UE AROM WNL, UE strength 4M out of 5. Patient in agreement with OT POC. Plan to see 2w2 to address UE exercises, home safety and showering safety.

## 2024-08-13 ENCOUNTER — HOME CARE VISIT (OUTPATIENT)
Dept: HOME HEALTH SERVICES | Facility: HOME HEALTH | Age: 89
End: 2024-08-13
Payer: MEDICARE

## 2024-08-13 VITALS — RESPIRATION RATE: 20 BRPM

## 2024-08-13 PROCEDURE — G0151 HHCP-SERV OF PT,EA 15 MIN: HCPCS | Mod: HHH

## 2024-08-13 SDOH — HEALTH STABILITY: PHYSICAL HEALTH: PHYSICAL EXERCISE: SUPINE

## 2024-08-13 SDOH — HEALTH STABILITY: PHYSICAL HEALTH: PHYSICAL EXERCISE: 10

## 2024-08-13 SDOH — HEALTH STABILITY: PHYSICAL HEALTH: EXERCISE ACTIVITY: QUAD AND GLUT SETS, HIP FLEX, ABD, BRIDGING

## 2024-08-13 SDOH — HEALTH STABILITY: PHYSICAL HEALTH: EXERCISE ACTIVITIES SETS: 1

## 2024-08-13 SDOH — HEALTH STABILITY: PHYSICAL HEALTH: EXERCISE TYPE: WHEP

## 2024-08-13 ASSESSMENT — ENCOUNTER SYMPTOMS
OCCASIONAL FEELINGS OF UNSTEADINESS: 1
PERSON REPORTING PAIN: PATIENT
DENIES PAIN: 1

## 2024-08-13 ASSESSMENT — ACTIVITIES OF DAILY LIVING (ADL)
AMBULATION ASSISTANCE ON FLAT SURFACES: 1
AMBULATION_DISTANCE/DURATION_TOLERATED: 30 FEET X2

## 2024-08-14 ENCOUNTER — HOME CARE VISIT (OUTPATIENT)
Dept: HOME HEALTH SERVICES | Facility: HOME HEALTH | Age: 89
End: 2024-08-14
Payer: MEDICARE

## 2024-08-14 PROCEDURE — G0152 HHCP-SERV OF OT,EA 15 MIN: HCPCS | Mod: HHH

## 2024-08-14 SDOH — ECONOMIC STABILITY: HOUSING INSECURITY: HOME SAFETY: REMINDED PATIENT TO CONTACT THE FIRE DEPARTMENT FOR SMOKE DETECTORS.

## 2024-08-14 ASSESSMENT — ENCOUNTER SYMPTOMS
PAIN: 1
PAIN LOCATION - PAIN SEVERITY: 6/10
SUBJECTIVE PAIN PROGRESSION: UNCHANGED
PAIN LOCATION: RIGHT SHOULDER
PERSON REPORTING PAIN: PATIENT

## 2024-08-14 NOTE — HOME HEALTH
Patient seen with spouse for OT visit.    Plan for next visit- Review and perform UE exercises, review home safety, ADL training showering

## 2024-08-19 ENCOUNTER — HOME CARE VISIT (OUTPATIENT)
Dept: HOME HEALTH SERVICES | Facility: HOME HEALTH | Age: 89
End: 2024-08-19
Payer: MEDICARE

## 2024-08-19 PROCEDURE — G0152 HHCP-SERV OF OT,EA 15 MIN: HCPCS | Mod: HHH

## 2024-08-19 SDOH — ECONOMIC STABILITY: HOUSING INSECURITY
HOME SAFETY: REMINDED OF NEED TO GET SMOKE DETECTOR INSTALLED. HE STATES HE CALLED NRFD, DOESN'T THINK THEY HAVE A PROGRAM FOR SENIORS. HE WILL ASK HIS SIL OR GRANDSON TO INSTALL A NEW ONE.

## 2024-08-19 ASSESSMENT — ENCOUNTER SYMPTOMS
PERSON REPORTING PAIN: PATIENT
PAIN LOCATION: RIGHT SHOULDER
HIGHEST PAIN SEVERITY IN PAST 24 HOURS: 5/10
LOWEST PAIN SEVERITY IN PAST 24 HOURS: 0/10
PAIN: 1
PAIN LOCATION - PAIN SEVERITY: 0/10

## 2024-08-19 NOTE — HOME HEALTH
Patient seen with spouse for OT visit.    Plan for next visit-planned DC visit, review home safety, review and perform UE exercises, issue new stronger T-band.

## 2024-08-20 ENCOUNTER — HOME CARE VISIT (OUTPATIENT)
Dept: HOME HEALTH SERVICES | Facility: HOME HEALTH | Age: 89
End: 2024-08-20
Payer: MEDICARE

## 2024-08-20 VITALS
SYSTOLIC BLOOD PRESSURE: 110 MMHG | TEMPERATURE: 97.7 F | OXYGEN SATURATION: 99 % | HEART RATE: 64 BPM | RESPIRATION RATE: 20 BRPM | DIASTOLIC BLOOD PRESSURE: 60 MMHG

## 2024-08-20 PROCEDURE — G0157 HHC PT ASSISTANT EA 15: HCPCS | Mod: CQ,HHH

## 2024-08-20 PROCEDURE — G0300 HHS/HOSPICE OF LPN EA 15 MIN: HCPCS | Mod: HHH

## 2024-08-20 ASSESSMENT — ENCOUNTER SYMPTOMS
SHORTNESS OF BREATH: 1
APPETITE LEVEL: FAIR
FATIGUES EASILY: 1
LOWEST PAIN SEVERITY IN PAST 24 HOURS: 0/10
HIGHEST PAIN SEVERITY IN PAST 24 HOURS: 0/10
DENIES PAIN: 1
PERSON REPORTING PAIN: PATIENT
DENIES PAIN: 1
LAST BOWEL MOVEMENT: 67072
LOWER EXTREMITY EDEMA: 1
DYSPNEA ACTIVITY LEVEL: AFTER AMBULATING 10 - 20 FT
FATIGUE: 1
CHANGE IN APPETITE: INCREASED
DYSPNEA ON EXERTION: 1
PERSON REPORTING PAIN: PATIENT
PAIN SEVERITY GOAL: 0/10
BOWEL PATTERN NORMAL: 1
MUSCLE WEAKNESS: 1
SUBJECTIVE PAIN PROGRESSION: UNCHANGED

## 2024-08-20 ASSESSMENT — ACTIVITIES OF DAILY LIVING (ADL): MONEY MANAGEMENT (EXPENSES/BILLS): NEEDS ASSISTANCE

## 2024-08-20 NOTE — HOME HEALTH
Patient was seen today for routine SN visit. All VS WNL at this time. Spouse states she is frequently checking, blood pressure and patient has had several episodes of high blood pressure along with chest pain since having surgery in early July. Patient states he is also having episodes of concurrent nausea and dizziness. Patient is scheduled to be seen by his cardiologist, Dr. Carmichael, on 8/27.   B/L ankle edema +2. Lung sounds CTA. Respirations even and unlabored. No changes in medication at this time.

## 2024-08-22 ENCOUNTER — HOME CARE VISIT (OUTPATIENT)
Dept: HOME HEALTH SERVICES | Facility: HOME HEALTH | Age: 89
End: 2024-08-22
Payer: MEDICARE

## 2024-08-22 DIAGNOSIS — Z12.11 COLON CANCER SCREENING: Primary | ICD-10-CM

## 2024-08-22 PROCEDURE — G0157 HHC PT ASSISTANT EA 15: HCPCS | Mod: CQ,HHH

## 2024-08-22 PROCEDURE — G0152 HHCP-SERV OF OT,EA 15 MIN: HCPCS | Mod: HHH

## 2024-08-22 RX ORDER — ONDANSETRON 4 MG/1
4 TABLET, FILM COATED ORAL EVERY 8 HOURS PRN
Qty: 20 TABLET | Refills: 1 | OUTPATIENT
Start: 2024-08-22

## 2024-08-22 ASSESSMENT — ENCOUNTER SYMPTOMS
PAIN LOCATION - PAIN SEVERITY: 0/10
PERSON REPORTING PAIN: PATIENT
PERSON REPORTING PAIN: PATIENT
DENIES PAIN: 1
PAIN: 1

## 2024-08-22 NOTE — HOME HEALTH
Patient seen with spouse for OT DC visit. No further OT visits indicated, patient in agreement. OT DC at this date, goals met. SN and PT services are still active. He states he fell since last OT visit, on 8.20.24 (he already told PTA earlier about his fall). No injuries-he is only sore in his left rib area. He states he took his BP earlier today (he has been monitoring daily), he is going to get an appointment with his cardiologist soon.     Barthel index-95 out of 100.    DISCHARGE SUMMARY:    DISCIPLINE: Occupational Therapy  DATE OF DISCIPLINE DISCHARGE: 8.22.24  REASON FOR DISCHARGE: GOALS MET  COORDINATION NOTE: COMPLETED  EVALUATION OF GOALS: ALL GOALS MET  SUMMARY OF CARE PROVIDED: INSTRUCTED ON SAFETY TECHNIQUES AND EQUIPMENT USE FOR SHOWERING. INTRODUCED UE EXERCISES AND IMPLEMENTED UE HEP.  DISCHARGE INSTRUCTIONS GIVEN: CONTINUE TO FOLLOW SAFETY INSTRUCTIONS, CONTINUE TO PERFORM HEP.   SERVICES REMAINING: SN AND PT  NOMNC OBTAINED: FILIPE

## 2024-08-26 ENCOUNTER — HOME CARE VISIT (OUTPATIENT)
Dept: HOME HEALTH SERVICES | Facility: HOME HEALTH | Age: 89
End: 2024-08-26
Payer: MEDICARE

## 2024-08-26 PROCEDURE — G0157 HHC PT ASSISTANT EA 15: HCPCS | Mod: CQ,HHH

## 2024-08-26 ASSESSMENT — ENCOUNTER SYMPTOMS
PERSON REPORTING PAIN: PATIENT
DENIES PAIN: 1

## 2024-08-27 ENCOUNTER — APPOINTMENT (OUTPATIENT)
Dept: CARDIOLOGY | Facility: CLINIC | Age: 89
End: 2024-08-27
Payer: MEDICARE

## 2024-08-27 VITALS
WEIGHT: 198 LBS | BODY MASS INDEX: 26.82 KG/M2 | DIASTOLIC BLOOD PRESSURE: 74 MMHG | HEIGHT: 72 IN | HEART RATE: 70 BPM | OXYGEN SATURATION: 95 % | SYSTOLIC BLOOD PRESSURE: 128 MMHG

## 2024-08-27 DIAGNOSIS — I50.32 CHRONIC DIASTOLIC (CONGESTIVE) HEART FAILURE (MULTI): Primary | ICD-10-CM

## 2024-08-27 DIAGNOSIS — Z95.2 S/P TAVR (TRANSCATHETER AORTIC VALVE REPLACEMENT): ICD-10-CM

## 2024-08-27 DIAGNOSIS — I10 PRIMARY HYPERTENSION: ICD-10-CM

## 2024-08-27 PROCEDURE — 1159F MED LIST DOCD IN RCRD: CPT | Performed by: INTERNAL MEDICINE

## 2024-08-27 PROCEDURE — 3078F DIAST BP <80 MM HG: CPT | Performed by: INTERNAL MEDICINE

## 2024-08-27 PROCEDURE — 3074F SYST BP LT 130 MM HG: CPT | Performed by: INTERNAL MEDICINE

## 2024-08-27 PROCEDURE — 99214 OFFICE O/P EST MOD 30 MIN: CPT | Performed by: INTERNAL MEDICINE

## 2024-08-27 PROCEDURE — 1157F ADVNC CARE PLAN IN RCRD: CPT | Performed by: INTERNAL MEDICINE

## 2024-08-27 PROCEDURE — 1036F TOBACCO NON-USER: CPT | Performed by: INTERNAL MEDICINE

## 2024-08-27 RX ORDER — TORSEMIDE 20 MG/1
TABLET ORAL
Qty: 90 TABLET | Refills: 3 | Status: SHIPPED | OUTPATIENT
Start: 2024-08-27

## 2024-08-27 RX ORDER — LOSARTAN POTASSIUM 50 MG/1
50 TABLET ORAL DAILY
Qty: 90 TABLET | Refills: 3 | Status: SHIPPED | OUTPATIENT
Start: 2024-08-27 | End: 2025-08-27

## 2024-08-27 NOTE — PROGRESS NOTES
Subjective   Giuseppe Davila is a 88 y.o. male.    Chief Complaint:  Follow-up transaortic valve replacement.  Memory loss.  Shortness of breath.    HPI    Since his last visit he underwent transaortic valve replacement.  Postprocedure he has had a number of issues and a number of problems.  His major issue is that of memory loss.  He does not recall any details of the procedure.  He was in an extended care facility and does not recall much of his time in the extended care facility.  More recently his memory is beginning to improve.  He does have some short-term memory loss.    Cardiac catheterization on 5/24/2024 demonstrated mild disease in the left anterior descending and circumflex coronary artery.  There was a patent stent in the right coronary artery.  Right-sided pressures were normal.    The patient underwent a Watchman procedure on April 21, 2023 at [facility]. Prior to the procedure he had a CT of the chest which showed a 4.7 cm ascending aortic aneurysm. On her last echo he measured 4.9 cm which was quite similar.     Cardiac problems include a history of permanent atrial fibrillation, pacemaker, aortic stenosis, diastolic heart failure, hypertension, tricuspid regurgitation, and mitral regurgitation.     In October 2022, he presented to us with symptoms of near syncope. He was seen and evaluated by the electrophysiology service. He was deemed to be a candidate for a pacemaker. Because of his atrial fibrillation, we elected to go with a Micra device. He tolerated the procedure well.      Cardiac catheterization was performed on July 26, 2022. This demonstrated nonobstructive coronary artery disease. Right heart cath demonstrated normal pulmonary artery pressures and normal left ventricular end-diastolic pressure. Cardiac output was normal.     A transesophageal echocardiographic study demonstrated severe mitral regurgitation.     An echocardiographic study done on July 26, 2022 demonstrated  normal left ventricular systolic function. There was moderate to severe mitral regurgitation and moderate severe tricuspid regurgitation with mild aortic valve regurgitation. The ascending aorta was moderately dilated.     His last Holter monitor done in May 2022 demonstrated an average heart rate of 53. He is in chronic atrial fibrillation. He is low heart rate was approximately 34. He had pauses up to 3 seconds.     He presented on 6/15/18 with symptoms of a stroke. Became very confused and disoriented. He presented to the hospital where he was noted have evidence of an acute stroke. He received TPA.     Allergies  Medication    · No Known Drug Allergies   Recorded By: Fabiola Rivas; 2/5/2015 9:58:44 AM     Family History  Mother    · Family history of cardiac disorder (V17.49) (Z82.49)   · Family history of TIA (transient ischemic attack)  Father    · Family history of malignant neoplasm (V16.9) (Z80.9)  Sister    · Family history of cardiac disorder (V17.49) (Z82.49)  Paternal Uncle    · Family history of Alzheimer's disease (V17.2) (Z82.0)   · FH: Parkinson's disease (V17.2) (Z82.0)     Social History  Problems    · Alcohol use (V49.89) (Z78.9)   · Never a smoker   · No alcohol use   · No drug use        Review of Systems   Constitutional: Positive for malaise/fatigue.   Cardiovascular:  Positive for chest pain and dyspnea on exertion.   Musculoskeletal:  Positive for arthritis and joint pain.   Neurological:  Positive for vertigo.  Positive for memory loss.      Current Outpatient Medications   Medication Sig Dispense Refill    ashwagandha root extract 500 mg capsule Take 1 capsule by mouth once daily.      b complex vitamins capsule Take 1 capsule by mouth once daily.      calcium carbonate 600 mg calcium (1,500 mg) tablet Take 1 tablet (1,500 mg) by mouth once daily.      cholestyramine (Questran) 4 gram packet Take 0.5 packets by mouth every other day.      clopidogrel (Plavix) 75 mg tablet Take 1  tablet (75 mg) by mouth once daily. 90 tablet 3    coenzyme Q-10 100 mg capsule Take 1 capsule (100 mg) by mouth once daily at bedtime.      docusate sodium (Colace) 100 mg capsule Take 1 capsule (100 mg) by mouth 2 times a day. 60 capsule 2    magnesium oxide (MagOx) 400 mg (241.3 mg magnesium) tablet Take 1 tablet (400 mg) by mouth once daily.      multivitamin tablet Take 1 tablet by mouth once daily.      ondansetron (Zofran) 4 mg tablet Take 1 tablet (4 mg) by mouth every 8 hours if needed for nausea.      rosuvastatin (Crestor) 20 mg tablet Take 1 tablet (20 mg) by mouth 4 times a week. 50 tablet 3    turmeric-turmeric root extract 450-50 mg capsule Take 1 capsule by mouth once daily.      vitamin E acid succinate (vitamin E succinate) 268 mg (400 unit) tablet Take 1 capsule by mouth once daily.      cholecalciferol (Vitamin D-3) 25 MCG (1000 UT) capsule Take 1 capsule (25 mcg) by mouth once daily.      losartan (Cozaar) 50 mg tablet Take 1 tablet (50 mg) by mouth once daily. 90 tablet 3    torsemide (Demadex) 20 mg tablet 1 TAB Daily 90 tablet 3     No current facility-administered medications for this visit.        Visit Vitals  /74 (BP Location: Left arm, Patient Position: Sitting, BP Cuff Size: Adult)   Pulse 70   Ht 1.829 m (6')   Wt 89.8 kg (198 lb)   SpO2 95%   BMI 26.85 kg/m²   Smoking Status Never   BSA 2.14 m²        Objective     Constitutional:       Appearance: Not in distress.   Neck:      Vascular: JVD normal.   Pulmonary:      Breath sounds: Normal breath sounds.   Cardiovascular:      Normal rate. Regularly irregular rhythm. S1 with normal intensity. S2 with normal intensity.       Murmurs: There is a grade 2/6 systolic murmur.      No gallop.    Pulses:     Intact distal pulses.   Edema:     Peripheral edema present.     Pretibial: bilateral 3+ edema of the pretibial area.     Ankle: bilateral 3+ edema of the ankle.  Abdominal:      General: Bowel sounds are normal.   Neurological:       Mental Status: Alert and oriented to person, place and time.         Lab Review:   Lab Results   Component Value Date     08/07/2024    K 3.5 08/07/2024     08/07/2024    CO2 26 08/07/2024    BUN 17 08/07/2024    CREATININE 1.15 08/07/2024    GLUCOSE 96 08/07/2024    CALCIUM 9.0 08/07/2024     Lab Results   Component Value Date    WBC 3.5 (L) 08/07/2024    HGB 12.8 (L) 08/07/2024    HCT 37.6 (L) 08/07/2024    MCV 93 08/07/2024     (L) 08/07/2024       Assessment:    1.  Status post transaortic valve replacement.  Suspect that he may have had a small stroke with the procedure.  Slow improvement.  He has had multiple CT scans of the brain which have not demonstrated any major stroke.  Has had no bleeding in the brain.  Discussed findings with patient and wife.  He is getting some rehab at home.  Continue rehab and continue dual antiplatelet therapy.    2.  Diastolic congestive heart failure.  Latest echo study shows normal left ventricular systolic function.  Normally functioning bioprosthetic valve.    3.  Atrial fibrillation.  Status post Watchman device.  On dual antiplatelet therapy.    4.  Ascending aortic aneurysm.  On his last study, his aorta was measured at 4.6 cm.    5.  Memory loss.  Suspect stroke.  No point in doing any further workup as I do not think it would change his therapy.  He is slowly improving.  Other possibility is metabolic encephalopathy.  However suspect stroke as a result of his procedure.  Should be noted that this is not documented on his CT of the brain    6.  Peripheral edema.  2+ peripheral edema.  Will increase losartan to 50 mg daily and start torsemide 10 mg daily.  Blood work in 2 weeks.

## 2024-08-27 NOTE — PATIENT INSTRUCTIONS
Restart torsemide 10 mg daily    Increase the losartan to 50 mg daily    Restart torsemide 10 mg daily    Blood test in two weeks    We will see you back in 6 weeks

## 2024-08-28 ENCOUNTER — HOME CARE VISIT (OUTPATIENT)
Dept: HOME HEALTH SERVICES | Facility: HOME HEALTH | Age: 89
End: 2024-08-28
Payer: MEDICARE

## 2024-08-29 DIAGNOSIS — R11.0 NAUSEA: ICD-10-CM

## 2024-08-30 RX ORDER — ONDANSETRON 4 MG/1
4 TABLET, FILM COATED ORAL EVERY 8 HOURS PRN
Qty: 30 TABLET | Refills: 1 | Status: SHIPPED | OUTPATIENT
Start: 2024-08-30

## 2024-08-31 ENCOUNTER — HOME CARE VISIT (OUTPATIENT)
Dept: HOME HEALTH SERVICES | Facility: HOME HEALTH | Age: 89
End: 2024-08-31
Payer: MEDICARE

## 2024-08-31 VITALS
DIASTOLIC BLOOD PRESSURE: 64 MMHG | HEART RATE: 64 BPM | TEMPERATURE: 97.9 F | OXYGEN SATURATION: 100 % | SYSTOLIC BLOOD PRESSURE: 112 MMHG | RESPIRATION RATE: 18 BRPM

## 2024-08-31 PROCEDURE — G0300 HHS/HOSPICE OF LPN EA 15 MIN: HCPCS | Mod: HHH

## 2024-08-31 ASSESSMENT — ENCOUNTER SYMPTOMS
PAIN SEVERITY GOAL: 0/10
FATIGUES EASILY: 1
APPETITE LEVEL: FAIR
CHANGE IN APPETITE: UNCHANGED
MUSCLE WEAKNESS: 1
HIGHEST PAIN SEVERITY IN PAST 24 HOURS: 0/10
PERSON REPORTING PAIN: PATIENT
BOWEL PATTERN NORMAL: 1
FATIGUE: 1
LOWEST PAIN SEVERITY IN PAST 24 HOURS: 0/10
LAST BOWEL MOVEMENT: 67082
DENIES PAIN: 1

## 2024-08-31 ASSESSMENT — ACTIVITIES OF DAILY LIVING (ADL): MONEY MANAGEMENT (EXPENSES/BILLS): INDEPENDENT

## 2024-08-31 NOTE — HOME HEALTH
Patient seen today for routine SN visit. Denies any pain or discomfort. Patient states he does have infrequent episodes of dizziness when standing from a sitting position, he has been taking a few minutes to ambulate or stand to allow dizziness to pass. All VS WNL. Patient was seen this week by his cardiologist, Dr. Carmichael, who has scheduled him for a follow-up visit for late September. Patient is scheduled for a fullbody PET scan on September 17th, arranged by his oncologist Dr. Woo. Patient ambulates throughout home with aid of wheeled walker. No changes in condition or meditation reported.

## 2024-09-04 ENCOUNTER — HOME CARE VISIT (OUTPATIENT)
Dept: HOME HEALTH SERVICES | Facility: HOME HEALTH | Age: 89
End: 2024-09-04
Payer: MEDICARE

## 2024-09-05 ENCOUNTER — HOME CARE VISIT (OUTPATIENT)
Dept: HOME HEALTH SERVICES | Facility: HOME HEALTH | Age: 89
End: 2024-09-05
Payer: MEDICARE

## 2024-09-05 VITALS — RESPIRATION RATE: 16 BRPM

## 2024-09-05 PROCEDURE — G0151 HHCP-SERV OF PT,EA 15 MIN: HCPCS | Mod: HHH

## 2024-09-05 SDOH — HEALTH STABILITY: PHYSICAL HEALTH: EXERCISE ACTIVITIES SETS: 1

## 2024-09-05 SDOH — HEALTH STABILITY: PHYSICAL HEALTH: PHYSICAL EXERCISE: 10

## 2024-09-05 SDOH — HEALTH STABILITY: PHYSICAL HEALTH: EXERCISE TYPE: STRENGTHENING

## 2024-09-05 SDOH — HEALTH STABILITY: PHYSICAL HEALTH: EXERCISE ACTIVITY: SITTING LAQ, HIP FLEX

## 2024-09-05 SDOH — HEALTH STABILITY: PHYSICAL HEALTH: PHYSICAL EXERCISE: SITTING

## 2024-09-05 ASSESSMENT — ACTIVITIES OF DAILY LIVING (ADL): AMBULATION ASSISTANCE ON FLAT SURFACES: 1

## 2024-09-05 ASSESSMENT — ENCOUNTER SYMPTOMS
OCCASIONAL FEELINGS OF UNSTEADINESS: 1
PERSON REPORTING PAIN: PATIENT
DENIES PAIN: 1

## 2024-09-10 ENCOUNTER — OFFICE VISIT (OUTPATIENT)
Dept: CARDIOLOGY | Facility: CLINIC | Age: 89
End: 2024-09-10
Payer: MEDICARE

## 2024-09-10 VITALS
DIASTOLIC BLOOD PRESSURE: 68 MMHG | WEIGHT: 200 LBS | BODY MASS INDEX: 28 KG/M2 | HEART RATE: 65 BPM | HEIGHT: 71 IN | SYSTOLIC BLOOD PRESSURE: 110 MMHG

## 2024-09-10 DIAGNOSIS — I48.91 ATRIAL FIBRILLATION WITH SLOW VENTRICULAR RESPONSE (MULTI): ICD-10-CM

## 2024-09-10 DIAGNOSIS — I25.10 CORONARY ARTERY DISEASE INVOLVING NATIVE CORONARY ARTERY OF NATIVE HEART WITHOUT ANGINA PECTORIS: ICD-10-CM

## 2024-09-10 DIAGNOSIS — R42 VERTIGO: ICD-10-CM

## 2024-09-10 DIAGNOSIS — I25.2 HISTORY OF MYOCARDIAL INFARCTION: ICD-10-CM

## 2024-09-10 DIAGNOSIS — Q21.12 PATENT FORAMEN OVALE (HHS-HCC): ICD-10-CM

## 2024-09-10 DIAGNOSIS — I10 PRIMARY HYPERTENSION: ICD-10-CM

## 2024-09-10 DIAGNOSIS — Z95.0 PACEMAKER: ICD-10-CM

## 2024-09-10 DIAGNOSIS — Z95.2 S/P TAVR (TRANSCATHETER AORTIC VALVE REPLACEMENT): ICD-10-CM

## 2024-09-10 DIAGNOSIS — E78.49 OTHER HYPERLIPIDEMIA: ICD-10-CM

## 2024-09-10 DIAGNOSIS — I20.89 EXERTIONAL ANGINA (CMS-HCC): ICD-10-CM

## 2024-09-10 DIAGNOSIS — I70.213 INTERMITTENT CLAUDICATION OF BOTH LOWER EXTREMITIES DUE TO ATHEROSCLEROSIS (CMS-HCC): ICD-10-CM

## 2024-09-10 DIAGNOSIS — I77.1 CELIAC ARTERY STENOSIS (CMS-HCC): ICD-10-CM

## 2024-09-10 DIAGNOSIS — I50.32 CHRONIC DIASTOLIC (CONGESTIVE) HEART FAILURE (MULTI): ICD-10-CM

## 2024-09-10 DIAGNOSIS — Z95.5 PRESENCE OF STENT IN CORONARY ARTERY: ICD-10-CM

## 2024-09-10 DIAGNOSIS — I34.0 SEVERE MITRAL REGURGITATION: Primary | ICD-10-CM

## 2024-09-10 DIAGNOSIS — I07.1 MODERATE TRICUSPID REGURGITATION: ICD-10-CM

## 2024-09-10 PROCEDURE — 99214 OFFICE O/P EST MOD 30 MIN: CPT | Performed by: INTERNAL MEDICINE

## 2024-09-10 PROCEDURE — 3074F SYST BP LT 130 MM HG: CPT | Performed by: INTERNAL MEDICINE

## 2024-09-10 PROCEDURE — 1159F MED LIST DOCD IN RCRD: CPT | Performed by: INTERNAL MEDICINE

## 2024-09-10 PROCEDURE — 1157F ADVNC CARE PLAN IN RCRD: CPT | Performed by: INTERNAL MEDICINE

## 2024-09-10 PROCEDURE — 3078F DIAST BP <80 MM HG: CPT | Performed by: INTERNAL MEDICINE

## 2024-09-10 RX ORDER — MECLIZINE HYDROCHLORIDE 25 MG/1
25 TABLET ORAL 3 TIMES DAILY PRN
Qty: 30 TABLET | Refills: 0 | Status: SHIPPED | OUTPATIENT
Start: 2024-09-10 | End: 2024-09-20

## 2024-09-10 NOTE — PROGRESS NOTES
Subjective   Giuseppe Davila is a 88 y.o. male.    Chief Complaint:  Shortness of breath dizziness lightheadedness room spinning    HPI    He is here because he is extremely dizzy short of breath and his chief complaint is that the room is spinning when he moves his head the room starts to spin.  He does not matter whether he is sitting or standing.  Seeing seem to be worse when he is standing.    Cardiac catheterization on 5/24/2024 demonstrated mild disease in the left anterior descending and circumflex coronary artery.  There was a patent stent in the right coronary artery.  Right-sided pressures were normal.     The patient underwent a Watchman procedure on April 21, 2023 at [facility]. Prior to the procedure he had a CT of the chest which showed a 4.7 cm ascending aortic aneurysm. On her last echo he measured 4.9 cm which was quite similar.     Cardiac problems include a history of permanent atrial fibrillation, pacemaker, aortic stenosis, diastolic heart failure, hypertension, tricuspid regurgitation, and mitral regurgitation.     In October 2022, he presented to us with symptoms of near syncope. He was seen and evaluated by the electrophysiology service. He was deemed to be a candidate for a pacemaker. Because of his atrial fibrillation, we elected to go with a Micra device. He tolerated the procedure well.      Cardiac catheterization was performed on July 26, 2022. This demonstrated nonobstructive coronary artery disease. Right heart cath demonstrated normal pulmonary artery pressures and normal left ventricular end-diastolic pressure. Cardiac output was normal.     A transesophageal echocardiographic study demonstrated severe mitral regurgitation.     An echocardiographic study done on July 26, 2022 demonstrated normal left ventricular systolic function. There was moderate to severe mitral regurgitation and moderate severe tricuspid regurgitation with mild aortic valve regurgitation. The  ascending aorta was moderately dilated.     His last Holter monitor done in May 2022 demonstrated an average heart rate of 53. He is in chronic atrial fibrillation. He is low heart rate was approximately 34. He had pauses up to 3 seconds.     He presented on 6/15/18 with symptoms of a stroke. Became very confused and disoriented. He presented to the hospital where he was noted have evidence of an acute stroke. He received TPA.     Allergies  Medication    · No Known Drug Allergies   Recorded By: Fabiola Rivas; 2/5/2015 9:58:44 AM     Family History  Mother    · Family history of cardiac disorder (V17.49) (Z82.49)   · Family history of TIA (transient ischemic attack)  Father    · Family history of malignant neoplasm (V16.9) (Z80.9)  Sister    · Family history of cardiac disorder (V17.49) (Z82.49)  Paternal Uncle    · Family history of Alzheimer's disease (V17.2) (Z82.0)   · FH: Parkinson's disease (V17.2) (Z82.0)     Social History  Problems    · Alcohol use (V49.89) (Z78.9)   · Never a smoker   · No alcohol use   · No drug use        Review of Systems   Constitutional: Positive for malaise/fatigue.   Cardiovascular:  Positive for chest pain and dyspnea on exertion.   Musculoskeletal:  Positive for arthritis and joint pain.   Neurological:  Positive for vertigo.  Positive for memory loss.      Current Outpatient Medications   Medication Sig Dispense Refill    ashwagandha root extract 500 mg capsule Take 1 capsule by mouth once daily.      b complex vitamins capsule Take 1 capsule by mouth once daily.      calcium carbonate 600 mg calcium (1,500 mg) tablet Take 1 tablet (1,500 mg) by mouth once daily.      cholecalciferol (Vitamin D-3) 25 MCG (1000 UT) capsule Take 1 capsule (25 mcg) by mouth once daily.      cholestyramine (Questran) 4 gram packet Take 0.5 packets by mouth every other day.      clopidogrel (Plavix) 75 mg tablet Take 1 tablet (75 mg) by mouth once daily. 90 tablet 3    coenzyme Q-10 100 mg  "capsule Take 1 capsule (100 mg) by mouth once daily at bedtime.      docusate sodium (Colace) 100 mg capsule Take 1 capsule (100 mg) by mouth 2 times a day. 60 capsule 2    losartan (Cozaar) 50 mg tablet Take 1 tablet (50 mg) by mouth once daily. 90 tablet 3    magnesium oxide (MagOx) 400 mg (241.3 mg magnesium) tablet Take 1 tablet (400 mg) by mouth once daily.      multivitamin tablet Take 1 tablet by mouth once daily.      ondansetron (Zofran) 4 mg tablet TAKE 1 TABLET (4 MG) BY MOUTH EVERY 8 HOURS IF NEEDED FOR NAUSEA. 30 tablet 1    rosuvastatin (Crestor) 20 mg tablet Take 1 tablet (20 mg) by mouth 4 times a week. 50 tablet 3    torsemide (Demadex) 20 mg tablet 1 TAB Daily 90 tablet 3    turmeric-turmeric root extract 450-50 mg capsule Take 1 capsule by mouth once daily.      vitamin E acid succinate (vitamin E succinate) 268 mg (400 unit) tablet Take 1 capsule by mouth once daily.      meclizine (Antivert) 25 mg tablet Take 1 tablet (25 mg) by mouth 3 times a day as needed for dizziness for up to 10 days. 30 tablet 0     No current facility-administered medications for this visit.        Visit Vitals  /68 (BP Location: Left arm, Patient Position: Sitting, BP Cuff Size: Adult)   Pulse 65   Ht 1.803 m (5' 11\")   Wt 90.7 kg (200 lb)   BMI 27.89 kg/m²   Smoking Status Never   BSA 2.13 m²        Objective     Constitutional:       Appearance: Not in distress.   Neck:      Vascular: JVD normal.   Pulmonary:      Breath sounds: Normal breath sounds.   Cardiovascular:      Normal rate. Regular rhythm. S1 with normal intensity. S2 with normal intensity.       Murmurs: There is no murmur.      No gallop.    Pulses:     Intact distal pulses.   Edema:     Peripheral edema absent.   Abdominal:      General: Bowel sounds are normal.   Neurological:      Mental Status: Alert and oriented to person, place and time.         Lab Review:   Lab Results   Component Value Date     08/07/2024    K 3.5 08/07/2024    CL " 106 08/07/2024    CO2 26 08/07/2024    BUN 17 08/07/2024    CREATININE 1.15 08/07/2024    GLUCOSE 96 08/07/2024    CALCIUM 9.0 08/07/2024     Lab Results   Component Value Date    CHOL 126 08/05/2024    TRIG 82 08/05/2024    HDL 46.7 08/05/2024       Assessment:    1.  Dizziness.  He has symptoms of vertigo.  Also he has symptoms of orthostatic hypotension.  We are going to cut back on his diuretics.  Will also start meclizine.  We did tell him that it is certainly possible that the meclizine may not work.  If he gets no improvement with the meclizine we are going to have him go to physical therapy for his vertigo.    2.  Diastolic congestive heart failure.  Last echo study showed normalization of left ventricular function.    3.  Status post transaortic valve replacement.  The aortic valve is functioning normally.    4.  Atrial fibrillation.  Status post Watchman device.    5.  Memory changes.  Suspect small stroke.    6.  Peripheral edema.  This is resolved.  We are going to reduce his torsemide.

## 2024-09-10 NOTE — PATIENT INSTRUCTIONS
Reduce the torsemide to 10 mg daily    You have symptoms of vertigo.    We are going to start meclizine 25 mg three times daily.    If the medication does not work, call us and we will start physical therapy.

## 2024-09-11 ENCOUNTER — HOME CARE VISIT (OUTPATIENT)
Dept: HOME HEALTH SERVICES | Facility: HOME HEALTH | Age: 89
End: 2024-09-11
Payer: MEDICARE

## 2024-09-11 VITALS
HEART RATE: 64 BPM | OXYGEN SATURATION: 98 % | TEMPERATURE: 97.5 F | DIASTOLIC BLOOD PRESSURE: 64 MMHG | SYSTOLIC BLOOD PRESSURE: 112 MMHG | RESPIRATION RATE: 16 BRPM

## 2024-09-11 PROCEDURE — G0300 HHS/HOSPICE OF LPN EA 15 MIN: HCPCS | Mod: HHH

## 2024-09-11 ASSESSMENT — ENCOUNTER SYMPTOMS
DYSPNEA ON EXERTION: 1
DENIES PAIN: 1
MUSCLE WEAKNESS: 1
PAIN SEVERITY GOAL: 0/10
SUBJECTIVE PAIN PROGRESSION: UNCHANGED
FATIGUES EASILY: 1
APPETITE LEVEL: FAIR
BOWEL PATTERN NORMAL: 1
PERSON REPORTING PAIN: PATIENT
LOWER EXTREMITY EDEMA: 1
FATIGUE: 1
LOWEST PAIN SEVERITY IN PAST 24 HOURS: 0/10
CHANGE IN APPETITE: UNCHANGED
HIGHEST PAIN SEVERITY IN PAST 24 HOURS: 3/10
LAST BOWEL MOVEMENT: 67094

## 2024-09-11 ASSESSMENT — ACTIVITIES OF DAILY LIVING (ADL): MONEY MANAGEMENT (EXPENSES/BILLS): NEEDS ASSISTANCE

## 2024-09-11 NOTE — HOME HEALTH
Patient was seen today for routine SN visit. He was seen by cardiologist, Dr. Carmichael, earlier this week, due to episodes of dizziness and “room spinning” when he stands up.  Dr. Carmichael is starting patient on Meclizine three times daily for dizziness, although patient states he is aware this may not be successful. Reviewed with patient and assessed today for waiting a minute or two after standing to attempt to ambulate, patient verbalized understanding. Patient denies any pain or discomfort at this time. Appetite remains good and patient denies any issues moving bowels or voiding. PSA count is up, oncologist has scheduled a PET scan for 9/17/24. No further changes in condition or medication to report.

## 2024-09-18 ENCOUNTER — HOME CARE VISIT (OUTPATIENT)
Dept: HOME HEALTH SERVICES | Facility: HOME HEALTH | Age: 89
End: 2024-09-18
Payer: MEDICARE

## 2024-09-18 VITALS
TEMPERATURE: 97.7 F | DIASTOLIC BLOOD PRESSURE: 70 MMHG | RESPIRATION RATE: 16 BRPM | SYSTOLIC BLOOD PRESSURE: 118 MMHG | HEART RATE: 64 BPM | OXYGEN SATURATION: 97 %

## 2024-09-18 PROCEDURE — G0300 HHS/HOSPICE OF LPN EA 15 MIN: HCPCS | Mod: HHH

## 2024-09-18 ASSESSMENT — ENCOUNTER SYMPTOMS
LAST BOWEL MOVEMENT: 67101
BOWEL PATTERN NORMAL: 1
SHORTNESS OF BREATH: 1
MUSCLE WEAKNESS: 1
LOWEST PAIN SEVERITY IN PAST 24 HOURS: 0/10
HIGHEST PAIN SEVERITY IN PAST 24 HOURS: 3/10
CHANGE IN APPETITE: UNCHANGED
DYSPNEA ON EXERTION: 1
DYSPNEA ACTIVITY LEVEL: AFTER AMBULATING MORE THAN 20 FT
DENIES PAIN: 1
PERSON REPORTING PAIN: PATIENT
PAIN SEVERITY GOAL: 0/10
SUBJECTIVE PAIN PROGRESSION: UNCHANGED
FATIGUE: 1
FATIGUES EASILY: 1
APPETITE LEVEL: GOOD

## 2024-09-18 ASSESSMENT — ACTIVITIES OF DAILY LIVING (ADL): MONEY MANAGEMENT (EXPENSES/BILLS): NEEDS ASSISTANCE

## 2024-09-18 NOTE — HOME HEALTH
Patient seen today for routine SN visit. Patient is observed to have slightly more energy at today’s visit. He continues to have intermittent episodes of dizziness, however, he states they are not as frequent. All VS WNL.

## 2024-09-27 ENCOUNTER — HOME CARE VISIT (OUTPATIENT)
Dept: HOME HEALTH SERVICES | Facility: HOME HEALTH | Age: 89
End: 2024-09-27
Payer: MEDICARE

## 2024-09-29 ASSESSMENT — ACTIVITIES OF DAILY LIVING (ADL)
OASIS_M1830: 00
HOME_HEALTH_OASIS: 00

## 2024-09-30 ENCOUNTER — APPOINTMENT (OUTPATIENT)
Dept: RADIOLOGY | Facility: HOSPITAL | Age: 89
End: 2024-09-30
Payer: MEDICARE

## 2024-09-30 ENCOUNTER — APPOINTMENT (OUTPATIENT)
Dept: CARDIOLOGY | Facility: HOSPITAL | Age: 89
End: 2024-09-30
Payer: MEDICARE

## 2024-09-30 ENCOUNTER — HOSPITAL ENCOUNTER (INPATIENT)
Facility: HOSPITAL | Age: 89
LOS: 3 days | Discharge: HOME HEALTH | End: 2024-10-04
Attending: INTERNAL MEDICINE | Admitting: INTERNAL MEDICINE
Payer: MEDICARE

## 2024-09-30 DIAGNOSIS — I49.5 SICK SINUS SYNDROME (MULTI): ICD-10-CM

## 2024-09-30 DIAGNOSIS — R53.1 GENERALIZED WEAKNESS: ICD-10-CM

## 2024-09-30 DIAGNOSIS — R19.7 DIARRHEA, UNSPECIFIED TYPE: ICD-10-CM

## 2024-09-30 DIAGNOSIS — E78.49 OTHER HYPERLIPIDEMIA: ICD-10-CM

## 2024-09-30 DIAGNOSIS — R33.9 URINARY RETENTION: ICD-10-CM

## 2024-09-30 DIAGNOSIS — I50.32 CHRONIC DIASTOLIC (CONGESTIVE) HEART FAILURE: ICD-10-CM

## 2024-09-30 DIAGNOSIS — R42 DIZZINESS: Primary | ICD-10-CM

## 2024-09-30 DIAGNOSIS — I10 PRIMARY HYPERTENSION: ICD-10-CM

## 2024-09-30 LAB
ALBUMIN SERPL BCP-MCNC: 3.5 G/DL (ref 3.4–5)
ALP SERPL-CCNC: 52 U/L (ref 33–136)
ALT SERPL W P-5'-P-CCNC: 10 U/L (ref 10–52)
ANION GAP SERPL CALC-SCNC: 10 MMOL/L (ref 10–20)
APPEARANCE UR: CLEAR
AST SERPL W P-5'-P-CCNC: 22 U/L (ref 9–39)
BASOPHILS # BLD AUTO: 0.03 X10*3/UL (ref 0–0.1)
BASOPHILS NFR BLD AUTO: 0.8 %
BILIRUB SERPL-MCNC: 1 MG/DL (ref 0–1.2)
BILIRUB UR STRIP.AUTO-MCNC: NEGATIVE MG/DL
BUN SERPL-MCNC: 11 MG/DL (ref 6–23)
CALCIUM SERPL-MCNC: 8.8 MG/DL (ref 8.6–10.3)
CARDIAC TROPONIN I PNL SERPL HS: 18 NG/L (ref 0–20)
CHLORIDE SERPL-SCNC: 106 MMOL/L (ref 98–107)
CO2 SERPL-SCNC: 27 MMOL/L (ref 21–32)
COLOR UR: COLORLESS
CREAT SERPL-MCNC: 1.1 MG/DL (ref 0.5–1.3)
EGFRCR SERPLBLD CKD-EPI 2021: 65 ML/MIN/1.73M*2
EOSINOPHIL # BLD AUTO: 0.15 X10*3/UL (ref 0–0.4)
EOSINOPHIL NFR BLD AUTO: 3.8 %
ERYTHROCYTE [DISTWIDTH] IN BLOOD BY AUTOMATED COUNT: 13.8 % (ref 11.5–14.5)
GLUCOSE BLD MANUAL STRIP-MCNC: 110 MG/DL (ref 74–99)
GLUCOSE SERPL-MCNC: 107 MG/DL (ref 74–99)
GLUCOSE UR STRIP.AUTO-MCNC: NORMAL MG/DL
HCT VFR BLD AUTO: 38.4 % (ref 41–52)
HGB BLD-MCNC: 12.9 G/DL (ref 13.5–17.5)
IMM GRANULOCYTES # BLD AUTO: 0.01 X10*3/UL (ref 0–0.5)
IMM GRANULOCYTES NFR BLD AUTO: 0.3 % (ref 0–0.9)
KETONES UR STRIP.AUTO-MCNC: NEGATIVE MG/DL
LACTATE SERPL-SCNC: 1.7 MMOL/L (ref 0.4–2)
LEUKOCYTE ESTERASE UR QL STRIP.AUTO: NEGATIVE
LYMPHOCYTES # BLD AUTO: 0.88 X10*3/UL (ref 0.8–3)
LYMPHOCYTES NFR BLD AUTO: 22.4 %
MAGNESIUM SERPL-MCNC: 1.95 MG/DL (ref 1.6–2.4)
MCH RBC QN AUTO: 30.9 PG (ref 26–34)
MCHC RBC AUTO-ENTMCNC: 33.6 G/DL (ref 32–36)
MCV RBC AUTO: 92 FL (ref 80–100)
MONOCYTES # BLD AUTO: 0.32 X10*3/UL (ref 0.05–0.8)
MONOCYTES NFR BLD AUTO: 8.2 %
NEUTROPHILS # BLD AUTO: 2.53 X10*3/UL (ref 1.6–5.5)
NEUTROPHILS NFR BLD AUTO: 64.5 %
NITRITE UR QL STRIP.AUTO: NEGATIVE
NRBC BLD-RTO: 0 /100 WBCS (ref 0–0)
PH UR STRIP.AUTO: 8 [PH]
PLATELET # BLD AUTO: 141 X10*3/UL (ref 150–450)
POTASSIUM SERPL-SCNC: 3.4 MMOL/L (ref 3.5–5.3)
PROT SERPL-MCNC: 6 G/DL (ref 6.4–8.2)
PROT UR STRIP.AUTO-MCNC: NEGATIVE MG/DL
RBC # BLD AUTO: 4.18 X10*6/UL (ref 4.5–5.9)
RBC # UR STRIP.AUTO: NEGATIVE /UL
SARS-COV-2 RNA RESP QL NAA+PROBE: NOT DETECTED
SODIUM SERPL-SCNC: 140 MMOL/L (ref 136–145)
SP GR UR STRIP.AUTO: 1.01
UROBILINOGEN UR STRIP.AUTO-MCNC: NORMAL MG/DL
WBC # BLD AUTO: 3.9 X10*3/UL (ref 4.4–11.3)

## 2024-09-30 PROCEDURE — 82947 ASSAY GLUCOSE BLOOD QUANT: CPT

## 2024-09-30 PROCEDURE — G0378 HOSPITAL OBSERVATION PER HR: HCPCS

## 2024-09-30 PROCEDURE — 99285 EMERGENCY DEPT VISIT HI MDM: CPT

## 2024-09-30 PROCEDURE — 80053 COMPREHEN METABOLIC PANEL: CPT | Performed by: PHYSICIAN ASSISTANT

## 2024-09-30 PROCEDURE — 70450 CT HEAD/BRAIN W/O DYE: CPT | Performed by: STUDENT IN AN ORGANIZED HEALTH CARE EDUCATION/TRAINING PROGRAM

## 2024-09-30 PROCEDURE — 51798 US URINE CAPACITY MEASURE: CPT

## 2024-09-30 PROCEDURE — 85025 COMPLETE CBC W/AUTO DIFF WBC: CPT | Performed by: PHYSICIAN ASSISTANT

## 2024-09-30 PROCEDURE — 93005 ELECTROCARDIOGRAM TRACING: CPT

## 2024-09-30 PROCEDURE — 96361 HYDRATE IV INFUSION ADD-ON: CPT | Mod: 59

## 2024-09-30 PROCEDURE — 74177 CT ABD & PELVIS W/CONTRAST: CPT

## 2024-09-30 PROCEDURE — 84520 ASSAY OF UREA NITROGEN: CPT | Performed by: PHYSICIAN ASSISTANT

## 2024-09-30 PROCEDURE — 84075 ASSAY ALKALINE PHOSPHATASE: CPT | Performed by: PHYSICIAN ASSISTANT

## 2024-09-30 PROCEDURE — 2550000001 HC RX 255 CONTRASTS: Performed by: PHYSICIAN ASSISTANT

## 2024-09-30 PROCEDURE — 71045 X-RAY EXAM CHEST 1 VIEW: CPT | Performed by: RADIOLOGY

## 2024-09-30 PROCEDURE — 74177 CT ABD & PELVIS W/CONTRAST: CPT | Performed by: STUDENT IN AN ORGANIZED HEALTH CARE EDUCATION/TRAINING PROGRAM

## 2024-09-30 PROCEDURE — 84484 ASSAY OF TROPONIN QUANT: CPT | Performed by: PHYSICIAN ASSISTANT

## 2024-09-30 PROCEDURE — 51702 INSERT TEMP BLADDER CATH: CPT

## 2024-09-30 PROCEDURE — 87635 SARS-COV-2 COVID-19 AMP PRB: CPT | Performed by: PHYSICIAN ASSISTANT

## 2024-09-30 PROCEDURE — 2500000004 HC RX 250 GENERAL PHARMACY W/ HCPCS (ALT 636 FOR OP/ED): Performed by: PHYSICIAN ASSISTANT

## 2024-09-30 PROCEDURE — 83605 ASSAY OF LACTIC ACID: CPT | Performed by: PHYSICIAN ASSISTANT

## 2024-09-30 PROCEDURE — 36415 COLL VENOUS BLD VENIPUNCTURE: CPT | Performed by: PHYSICIAN ASSISTANT

## 2024-09-30 PROCEDURE — 81003 URINALYSIS AUTO W/O SCOPE: CPT | Performed by: PHYSICIAN ASSISTANT

## 2024-09-30 PROCEDURE — 83735 ASSAY OF MAGNESIUM: CPT | Performed by: PHYSICIAN ASSISTANT

## 2024-09-30 PROCEDURE — 94760 N-INVAS EAR/PLS OXIMETRY 1: CPT

## 2024-09-30 PROCEDURE — 2500000004 HC RX 250 GENERAL PHARMACY W/ HCPCS (ALT 636 FOR OP/ED)

## 2024-09-30 PROCEDURE — 2500000001 HC RX 250 WO HCPCS SELF ADMINISTERED DRUGS (ALT 637 FOR MEDICARE OP)

## 2024-09-30 PROCEDURE — 71045 X-RAY EXAM CHEST 1 VIEW: CPT

## 2024-09-30 PROCEDURE — 96374 THER/PROPH/DIAG INJ IV PUSH: CPT | Mod: 59

## 2024-09-30 PROCEDURE — 70450 CT HEAD/BRAIN W/O DYE: CPT

## 2024-09-30 RX ORDER — CHOLESTYRAMINE 4 G/9G
1 POWDER, FOR SUSPENSION ORAL DAILY PRN
Status: ON HOLD | COMMUNITY
End: 2024-10-04

## 2024-09-30 RX ORDER — CLOPIDOGREL BISULFATE 75 MG/1
75 TABLET ORAL DAILY
Status: DISCONTINUED | OUTPATIENT
Start: 2024-09-30 | End: 2024-10-04 | Stop reason: HOSPADM

## 2024-09-30 RX ORDER — HEPARIN SODIUM 5000 [USP'U]/ML
5000 INJECTION, SOLUTION INTRAVENOUS; SUBCUTANEOUS EVERY 8 HOURS SCHEDULED
Status: DISCONTINUED | OUTPATIENT
Start: 2024-09-30 | End: 2024-10-04 | Stop reason: HOSPADM

## 2024-09-30 RX ORDER — MECLIZINE HYDROCHLORIDE 25 MG/1
25 TABLET ORAL ONCE
Status: DISCONTINUED | OUTPATIENT
Start: 2024-09-30 | End: 2024-09-30

## 2024-09-30 RX ORDER — ONDANSETRON HYDROCHLORIDE 2 MG/ML
4 INJECTION, SOLUTION INTRAVENOUS ONCE
Status: COMPLETED | OUTPATIENT
Start: 2024-09-30 | End: 2024-09-30

## 2024-09-30 RX ORDER — PANTOPRAZOLE SODIUM 20 MG/1
20 TABLET, DELAYED RELEASE ORAL
Status: DISCONTINUED | OUTPATIENT
Start: 2024-10-01 | End: 2024-10-04 | Stop reason: HOSPADM

## 2024-09-30 RX ORDER — ACETAMINOPHEN 325 MG/1
975 TABLET ORAL
Status: DISCONTINUED | OUTPATIENT
Start: 2024-09-30 | End: 2024-10-04 | Stop reason: HOSPADM

## 2024-09-30 RX ORDER — OLANZAPINE 10 MG/2ML
5 INJECTION, POWDER, FOR SOLUTION INTRAMUSCULAR ONCE
Status: DISCONTINUED | OUTPATIENT
Start: 2024-09-30 | End: 2024-10-03

## 2024-09-30 RX ORDER — ONDANSETRON HYDROCHLORIDE 2 MG/ML
4 INJECTION, SOLUTION INTRAVENOUS EVERY 8 HOURS PRN
Status: DISCONTINUED | OUTPATIENT
Start: 2024-09-30 | End: 2024-10-04 | Stop reason: HOSPADM

## 2024-09-30 RX ORDER — ACETAMINOPHEN 500 MG
10 TABLET ORAL NIGHTLY
Status: DISCONTINUED | OUTPATIENT
Start: 2024-09-30 | End: 2024-10-04 | Stop reason: HOSPADM

## 2024-09-30 RX ORDER — LOSARTAN POTASSIUM 50 MG/1
50 TABLET ORAL DAILY
Status: DISCONTINUED | OUTPATIENT
Start: 2024-10-01 | End: 2024-10-04 | Stop reason: HOSPADM

## 2024-09-30 RX ORDER — PANTOPRAZOLE SODIUM 40 MG/10ML
20 INJECTION, POWDER, LYOPHILIZED, FOR SOLUTION INTRAVENOUS
Status: DISCONTINUED | OUTPATIENT
Start: 2024-10-01 | End: 2024-10-04 | Stop reason: HOSPADM

## 2024-09-30 RX ORDER — LOSARTAN POTASSIUM 50 MG/1
50 TABLET ORAL DAILY
Status: DISCONTINUED | OUTPATIENT
Start: 2024-09-30 | End: 2024-09-30

## 2024-09-30 RX ORDER — ROSUVASTATIN CALCIUM 10 MG/1
20 TABLET, COATED ORAL
Status: DISCONTINUED | OUTPATIENT
Start: 2024-10-01 | End: 2024-10-04 | Stop reason: HOSPADM

## 2024-09-30 RX ORDER — SODIUM CHLORIDE, SODIUM LACTATE, POTASSIUM CHLORIDE, CALCIUM CHLORIDE 600; 310; 30; 20 MG/100ML; MG/100ML; MG/100ML; MG/100ML
130 INJECTION, SOLUTION INTRAVENOUS CONTINUOUS
Status: ACTIVE | OUTPATIENT
Start: 2024-09-30 | End: 2024-10-01

## 2024-09-30 RX ORDER — SIMETHICONE 80 MG
120 TABLET,CHEWABLE ORAL 4 TIMES DAILY PRN
Status: DISCONTINUED | OUTPATIENT
Start: 2024-09-30 | End: 2024-10-04 | Stop reason: HOSPADM

## 2024-09-30 RX ORDER — TORSEMIDE 10 MG/1
10 TABLET ORAL EVERY OTHER DAY
Status: DISCONTINUED | OUTPATIENT
Start: 2024-10-01 | End: 2024-10-04 | Stop reason: HOSPADM

## 2024-09-30 SDOH — ECONOMIC STABILITY: HOUSING INSECURITY: AT ANY TIME IN THE PAST 12 MONTHS, WERE YOU HOMELESS OR LIVING IN A SHELTER (INCLUDING NOW)?: NO

## 2024-09-30 SDOH — ECONOMIC STABILITY: INCOME INSECURITY: IN THE LAST 12 MONTHS, WAS THERE A TIME WHEN YOU WERE NOT ABLE TO PAY THE MORTGAGE OR RENT ON TIME?: NO

## 2024-09-30 SDOH — SOCIAL STABILITY: SOCIAL INSECURITY
WITHIN THE LAST YEAR, HAVE YOU BEEN KICKED, HIT, SLAPPED, OR OTHERWISE PHYSICALLY HURT BY YOUR PARTNER OR EX-PARTNER?: NO

## 2024-09-30 SDOH — SOCIAL STABILITY: SOCIAL INSECURITY: WITHIN THE LAST YEAR, HAVE YOU BEEN HUMILIATED OR EMOTIONALLY ABUSED IN OTHER WAYS BY YOUR PARTNER OR EX-PARTNER?: NO

## 2024-09-30 SDOH — SOCIAL STABILITY: SOCIAL INSECURITY: WITHIN THE LAST YEAR, HAVE YOU BEEN AFRAID OF YOUR PARTNER OR EX-PARTNER?: NO

## 2024-09-30 SDOH — ECONOMIC STABILITY: FOOD INSECURITY: WITHIN THE PAST 12 MONTHS, YOU WORRIED THAT YOUR FOOD WOULD RUN OUT BEFORE YOU GOT MONEY TO BUY MORE.: NEVER TRUE

## 2024-09-30 SDOH — SOCIAL STABILITY: SOCIAL INSECURITY
WITHIN THE LAST YEAR, HAVE TO BEEN RAPED OR FORCED TO HAVE ANY KIND OF SEXUAL ACTIVITY BY YOUR PARTNER OR EX-PARTNER?: NO

## 2024-09-30 SDOH — ECONOMIC STABILITY: INCOME INSECURITY: IN THE PAST 12 MONTHS, HAS THE ELECTRIC, GAS, OIL, OR WATER COMPANY THREATENED TO SHUT OFF SERVICE IN YOUR HOME?: NO

## 2024-09-30 SDOH — SOCIAL STABILITY: SOCIAL INSECURITY: DO YOU FEEL ANYONE HAS EXPLOITED OR TAKEN ADVANTAGE OF YOU FINANCIALLY OR OF YOUR PERSONAL PROPERTY?: NO

## 2024-09-30 SDOH — SOCIAL STABILITY: SOCIAL INSECURITY: ARE YOU OR HAVE YOU BEEN THREATENED OR ABUSED PHYSICALLY, EMOTIONALLY, OR SEXUALLY BY ANYONE?: NO

## 2024-09-30 SDOH — SOCIAL STABILITY: SOCIAL INSECURITY: DO YOU FEEL UNSAFE GOING BACK TO THE PLACE WHERE YOU ARE LIVING?: NO

## 2024-09-30 SDOH — SOCIAL STABILITY: SOCIAL INSECURITY: ARE THERE ANY APPARENT SIGNS OF INJURIES/BEHAVIORS THAT COULD BE RELATED TO ABUSE/NEGLECT?: NO

## 2024-09-30 SDOH — SOCIAL STABILITY: SOCIAL INSECURITY: WERE YOU ABLE TO COMPLETE ALL THE BEHAVIORAL HEALTH SCREENINGS?: YES

## 2024-09-30 SDOH — ECONOMIC STABILITY: HOUSING INSECURITY: IN THE PAST 12 MONTHS, HOW MANY TIMES HAVE YOU MOVED WHERE YOU WERE LIVING?: 1

## 2024-09-30 SDOH — ECONOMIC STABILITY: FOOD INSECURITY: WITHIN THE PAST 12 MONTHS, THE FOOD YOU BOUGHT JUST DIDN'T LAST AND YOU DIDN'T HAVE MONEY TO GET MORE.: NEVER TRUE

## 2024-09-30 SDOH — SOCIAL STABILITY: SOCIAL INSECURITY: HAVE YOU HAD THOUGHTS OF HARMING ANYONE ELSE?: NO

## 2024-09-30 SDOH — SOCIAL STABILITY: SOCIAL INSECURITY: ABUSE: ADULT

## 2024-09-30 SDOH — ECONOMIC STABILITY: INCOME INSECURITY: HOW HARD IS IT FOR YOU TO PAY FOR THE VERY BASICS LIKE FOOD, HOUSING, MEDICAL CARE, AND HEATING?: NOT VERY HARD

## 2024-09-30 SDOH — ECONOMIC STABILITY: TRANSPORTATION INSECURITY
IN THE PAST 12 MONTHS, HAS THE LACK OF TRANSPORTATION KEPT YOU FROM MEDICAL APPOINTMENTS OR FROM GETTING MEDICATIONS?: NO

## 2024-09-30 SDOH — SOCIAL STABILITY: SOCIAL INSECURITY: HAS ANYONE EVER THREATENED TO HURT YOUR FAMILY OR YOUR PETS?: NO

## 2024-09-30 SDOH — SOCIAL STABILITY: SOCIAL INSECURITY: HAVE YOU HAD ANY THOUGHTS OF HARMING ANYONE ELSE?: NO

## 2024-09-30 SDOH — SOCIAL STABILITY: SOCIAL INSECURITY: DOES ANYONE TRY TO KEEP YOU FROM HAVING/CONTACTING OTHER FRIENDS OR DOING THINGS OUTSIDE YOUR HOME?: NO

## 2024-09-30 ASSESSMENT — COGNITIVE AND FUNCTIONAL STATUS - GENERAL
MOVING TO AND FROM BED TO CHAIR: A LITTLE
STANDING UP FROM CHAIR USING ARMS: A LITTLE
PATIENT BASELINE BEDBOUND: NO
CLIMB 3 TO 5 STEPS WITH RAILING: A LOT
DRESSING REGULAR UPPER BODY CLOTHING: A LOT
MOVING FROM LYING ON BACK TO SITTING ON SIDE OF FLAT BED WITH BEDRAILS: A LITTLE
MOVING TO AND FROM BED TO CHAIR: A LITTLE
DAILY ACTIVITIY SCORE: 17
MOBILITY SCORE: 19
TURNING FROM BACK TO SIDE WHILE IN FLAT BAD: A LITTLE
MOBILITY SCORE: 16
DRESSING REGULAR LOWER BODY CLOTHING: A LITTLE
PATIENT BASELINE BEDBOUND: NO
HELP NEEDED FOR BATHING: A LITTLE
PERSONAL GROOMING: A LITTLE
TOILETING: A LITTLE
WALKING IN HOSPITAL ROOM: A LITTLE
EATING MEALS: A LOT
TURNING FROM BACK TO SIDE WHILE IN FLAT BAD: A LITTLE
DRESSING REGULAR LOWER BODY CLOTHING: A LITTLE
DAILY ACTIVITIY SCORE: 20
WALKING IN HOSPITAL ROOM: A LOT
STANDING UP FROM CHAIR USING ARMS: A LITTLE
DRESSING REGULAR UPPER BODY CLOTHING: A LITTLE
MOVING FROM LYING ON BACK TO SITTING ON SIDE OF FLAT BED WITH BEDRAILS: A LITTLE
TOILETING: A LITTLE

## 2024-09-30 ASSESSMENT — ACTIVITIES OF DAILY LIVING (ADL)
JUDGMENT_ADEQUATE_SAFELY_COMPLETE_DAILY_ACTIVITIES: YES
TOILETING: NEEDS ASSISTANCE
ASSISTIVE_DEVICE: WALKER
ADEQUATE_TO_COMPLETE_ADL: YES
GROOMING: INDEPENDENT
WALKS IN HOME: NEEDS ASSISTANCE
FEEDING YOURSELF: INDEPENDENT
BATHING: NEEDS ASSISTANCE
DRESSING YOURSELF: NEEDS ASSISTANCE
PATIENT'S MEMORY ADEQUATE TO SAFELY COMPLETE DAILY ACTIVITIES?: YES
HEARING - RIGHT EAR: FUNCTIONAL
HEARING - LEFT EAR: FUNCTIONAL
LACK_OF_TRANSPORTATION: NO

## 2024-09-30 ASSESSMENT — PAIN - FUNCTIONAL ASSESSMENT
PAIN_FUNCTIONAL_ASSESSMENT: 0-10

## 2024-09-30 ASSESSMENT — LIFESTYLE VARIABLES
SKIP TO QUESTIONS 9-10: 1
PRESCIPTION_ABUSE_PAST_12_MONTHS: NO
HOW OFTEN DO YOU HAVE 6 OR MORE DRINKS ON ONE OCCASION: NEVER
AUDIT-C TOTAL SCORE: 0
HOW MANY STANDARD DRINKS CONTAINING ALCOHOL DO YOU HAVE ON A TYPICAL DAY: PATIENT DOES NOT DRINK
HOW OFTEN DO YOU HAVE A DRINK CONTAINING ALCOHOL: NEVER
AUDIT-C TOTAL SCORE: 0
SUBSTANCE_ABUSE_PAST_12_MONTHS: NO

## 2024-09-30 ASSESSMENT — PAIN SCALES - GENERAL
PAINLEVEL_OUTOF10: 8
PAINLEVEL_OUTOF10: 3
PAINLEVEL_OUTOF10: 0 - NO PAIN
PAINLEVEL_OUTOF10: 0 - NO PAIN

## 2024-09-30 ASSESSMENT — COLUMBIA-SUICIDE SEVERITY RATING SCALE - C-SSRS
1. IN THE PAST MONTH, HAVE YOU WISHED YOU WERE DEAD OR WISHED YOU COULD GO TO SLEEP AND NOT WAKE UP?: NO
6. HAVE YOU EVER DONE ANYTHING, STARTED TO DO ANYTHING, OR PREPARED TO DO ANYTHING TO END YOUR LIFE?: NO
2. HAVE YOU ACTUALLY HAD ANY THOUGHTS OF KILLING YOURSELF?: NO

## 2024-09-30 NOTE — ED PROVIDER NOTES
Limitations to History: Clinical condition  External Records Reviewed  Independent Historians: self  Social determinants affecting care: none    HPI  Giuseppe Davila is a 88 y.o. male multiple comorbidities who presents emergency department for assessment of multiple complaints.  He reports that he is been having nausea, vomiting, and diarrhea.  He reports slight abdominal pain.  He reports that he is not keeping anything down.  He reports that he also feels dizzy when he moves his head.  He reports that this has been intermittently ongoing for several months.  Most recent episode started 2 or 3 days ago.  He denies any changes to urination.  He reports that he just generally does not feel well.  He currently has blurred vision due to not having his glasses but he has not noticed any vision loss or double vision.  He has no further complaints.  He is a poor historian.      [facility]  Past Medical History:   Diagnosis Date    Anorectal abscess     Anorectal abscess    Essential (primary) hypertension     Benign essential HTN    Old myocardial infarction     History of myocardial infarction    Other hyperlipidemia     Other hyperlipidemia    Personal history of malignant neoplasm, unspecified     History of malignant neoplasm    Personal history of other diseases of the circulatory system     History of coronary artery disease    Personal history of other diseases of the circulatory system     History of angina pectoris    Personal history of other diseases of the nervous system and sense organs 05/02/2019    History of polyneuropathy    Personal history of other specified conditions     History of chest pain    Personal history of other specified conditions     History of epistaxis    Personal history of other specified conditions     History of bradycardia    Personal history of transient ischemic attack (TIA), and cerebral infarction without residual deficits     History of stroke    Personal history of transient  ischemic attack (TIA), and cerebral infarction without residual deficits     History of stroke    Syncope and collapse     Near syncope    Thoracic aortic aneurysm, without rupture, unspecified (CMS-HCC)     Thoracic aortic aneurysm    Unspecified atrial fibrillation (Multi) 03/10/2020    Atrial fibrillation with slow ventricular response    Ventricular premature depolarization     Frequent PVCs    reviewed by myself.    Meds  Current Outpatient Medications   Medication Instructions    ashwagandha root extract 500 mg capsule 1 capsule, oral, Daily    b complex vitamins capsule 1 capsule, oral, Daily    calcium carbonate 600 mg calcium (1,500 mg) tablet 1 tablet, oral, Daily    cholecalciferol (VITAMIN D-3) 25 mcg, oral, Daily    cholestyramine (Questran) 4 gram packet 1 packet, oral, Daily PRN    clopidogrel (PLAVIX) 75 mg, oral, Daily    coenzyme Q-10 100 mg, oral, Nightly    docusate sodium (COLACE) 100 mg, oral, 2 times daily    losartan (COZAAR) 50 mg, oral, Daily    magnesium oxide (MagOx) 400 mg (241.3 mg magnesium) tablet 1 tablet, oral, Daily    multivitamin tablet 1 tablet, oral, Daily RT    ondansetron (ZOFRAN) 4 mg, oral, Every 8 hours PRN    rosuvastatin (CRESTOR) 20 mg, oral, 4 times weekly    torsemide (Demadex) 20 mg tablet 1 TAB Daily    turmeric-turmeric root extract 450-50 mg capsule 1 capsule, oral, Daily    vitamin E acid succinate (vitamin E succinate) 268 mg (400 unit) tablet 1 capsule, oral, Daily       Allergies  No Known Allergies reviewed by myself.    SHx  Social History     Tobacco Use    Smoking status: Never    Smokeless tobacco: Never   Vaping Use    Vaping status: Never Used   Substance Use Topics    Alcohol use: Not Currently     Comment: rarely    Drug use: Not Currently    reviewed by myself.      ------------------------------------------------------------------------------------------------------------------------------------------    /80   Pulse 92   Temp 36 °C (96.8 °F)  "  Resp 18   Ht 1.803 m (5' 11\")   Wt 90.7 kg (200 lb)   SpO2 98%   BMI 27.89 kg/m²     Physical Exam  Vitals and nursing note reviewed.   Constitutional:       General: He is not in acute distress.     Appearance: Normal appearance. He is normal weight. He is not ill-appearing or toxic-appearing.   HENT:      Head: Normocephalic.      Nose: Nose normal.      Mouth/Throat:      Lips: Pink.      Mouth: Mucous membranes are dry.      Pharynx: Oropharynx is clear.   Eyes:      Extraocular Movements: Extraocular movements intact.      Right eye: No nystagmus.      Left eye: No nystagmus.      Conjunctiva/sclera: Conjunctivae normal.   Cardiovascular:      Rate and Rhythm: Normal rate and regular rhythm.   Pulmonary:      Effort: Pulmonary effort is normal.      Breath sounds: Normal breath sounds.   Abdominal:      General: Abdomen is flat.      Palpations: Abdomen is soft.      Tenderness: There is abdominal tenderness in the left lower quadrant. There is no guarding or rebound.   Musculoskeletal:         General: Normal range of motion.      Cervical back: Neck supple.      Right lower le+ Edema present.      Left lower le+ Edema present.   Skin:     General: Skin is warm and dry.   Neurological:      General: No focal deficit present.      Mental Status: He is alert and oriented to person, place, and time.      Cranial Nerves: Cranial nerves 2-12 are intact.      Sensory: Sensation is intact.      Motor: Motor function is intact.      Coordination: Coordination is intact.      Comments: NIH 0   Psychiatric:         Attention and Perception: Attention normal.         Mood and Affect: Mood normal.          ------------------------------------------------------------------------------------------------------------------------------------------  Labs  Labs Reviewed   CBC WITH AUTO DIFFERENTIAL - Abnormal       Result Value    WBC 3.9 (*)     nRBC 0.0      RBC 4.18 (*)     Hemoglobin 12.9 (*)     Hematocrit " 38.4 (*)     MCV 92      MCH 30.9      MCHC 33.6      RDW 13.8      Platelets 141 (*)     Neutrophils % 64.5      Immature Granulocytes %, Automated 0.3      Lymphocytes % 22.4      Monocytes % 8.2      Eosinophils % 3.8      Basophils % 0.8      Neutrophils Absolute 2.53      Immature Granulocytes Absolute, Automated 0.01      Lymphocytes Absolute 0.88      Monocytes Absolute 0.32      Eosinophils Absolute 0.15      Basophils Absolute 0.03     COMPREHENSIVE METABOLIC PANEL - Abnormal    Glucose 107 (*)     Sodium 140      Potassium 3.4 (*)     Chloride 106      Bicarbonate 27      Anion Gap 10      Urea Nitrogen 11      Creatinine 1.10      eGFR 65      Calcium 8.8      Albumin 3.5      Alkaline Phosphatase 52      Total Protein 6.0 (*)     AST 22      Bilirubin, Total 1.0      ALT 10     MAGNESIUM - Normal    Magnesium 1.95     LACTATE - Normal    Lactate 1.7      Narrative:     Venipuncture immediately after or during the administration of Metamizole may lead to falsely low results. Testing should be performed immediately prior to Metamizole dosing.   TROPONIN I, HIGH SENSITIVITY - Normal    Troponin I, High Sensitivity 18      Narrative:     Less than 99th percentile of normal range cutoff-  Female and children under 18 years old <14 ng/L; Male <21 ng/L: Negative  Repeat testing should be performed if clinically indicated.     Female and children under 18 years old 14-50 ng/L; Male 21-50 ng/L:  Consistent with possible cardiac damage and possible increased clinical   risk. Serial measurements may help to assess extent of myocardial damage.     >50 ng/L: Consistent with cardiac damage, increased clinical risk and  myocardial infarction. Serial measurements may help assess extent of   myocardial damage.      NOTE: Children less than 1 year old may have higher baseline troponin   levels and results should be interpreted in conjunction with the overall   clinical context.     NOTE: Troponin I testing is performed  using a different   testing methodology at [facility] than at other   [facility]. Direct result comparisons should only   be made within the same method.   SARS-COV-2 PCR - Normal    Coronavirus 2019, PCR Not Detected      Narrative:     This assay has received FDA Emergency Use Authorization (EUA) and is only authorized for the duration of time that circumstances exist to justify the authorization of the emergency use of in vitro diagnostic tests for the detection of SARS-CoV-2 virus and/or diagnosis of COVID-19 infection under section 564(b)(1) of the Act, 21 U.S.C. 360bbb-3(b)(1). This assay is an in vitro diagnostic nucleic acid amplification test for the qualitative detection of SARS-CoV-2 from nasopharyngeal specimens and has been validated for use at [facility]. Negative results do not preclude COVID-19 infections and should not be used as the sole basis for diagnosis, treatment, or other management decisions.     URINALYSIS WITH REFLEX CULTURE AND MICROSCOPIC    Narrative:     The following orders were created for panel order Urinalysis with Reflex Culture and Microscopic.  Procedure                               Abnormality         Status                     ---------                               -----------         ------                     Urinalysis with Reflex C...[[id number]]                                                 Extra Urine Gray Tube[[id number]]                                                         Please view results for these tests on the individual orders.   URINALYSIS WITH REFLEX CULTURE AND MICROSCOPIC   EXTRA URINE GRAY TUBE        Imaging  CT head wo IV contrast   Final Result   No CT evidence of acute intracranial abnormality.        MACRO   None        Signed by: Kathy Chavez 9/30/2024 1:01 PM   Dictation workstation:   [id number]      CT abdomen pelvis w IV contrast   Final Result   Nonspecific colitis without bowel obstruction or  pneumatosis.        MACRO:   None        Signed by: Kathy Chavez 9/30/2024 1:13 PM   Dictation workstation:   [id number]      XR chest 1 view   Final Result   1.  No evidence of acute cardiopulmonary process.             Signed by: Irving Breaux 9/30/2024 12:16 PM   Dictation workstation:   [id number]           ED Course  Diagnoses as of 09/30/24 1447   Dizziness   Diarrhea, unspecified type   Urinary retention        Medical Decision Making: He appears elderly.  He did not appear ill or toxic.  Vital signs reviewed and stable.  He was placed on a continuous cardiac and pulse ox monitor.    Differential diagnoses considered: Dehydration, AMELIA, intracranial mass, peripheral vertigo, gastroenteritis, colitis, diverticulitis, others     Medications given: IV fluids, IV Zofran, oral meclizine    EKG interpreted by myself and ED attending: Ventricularly paced rhythm.  Ventricular rate 78 beats per minutes.  No acute changes from previous EKGs.    I reviewed the labs from today.  Leukopenia 3.9.  Hemoglobin 12.9 with hematocrit at 38.4.  Platelets 141.  Potassium 3.4.  BUN and creatinine normal.  Glucose 107.  Magnesium normal.  Troponin negative.  Lactic negative.  COVID testing negative.  Chest x-ray showing no acute cardiopulmonary process.  CT of the head showing no acute intracranial hemorrhage or mass.  CT of the abdomen pelvis showing nonspecific colitis.  I consulted his PCP.  I spoke with Dr. Michele who will admit.  Patient became agitated and was given IM Zyprexa.  He also was having a hard time urinating while in the ER.  Nursing staff BladderScan the patient and was found to be greater than 1000 mL urine.  Ferrari catheter order was placed.  Case discussed and evaluated with ED attending who is agreeable to patient plan of care.    Diagnosis: Colitis, urinary retention, dizziness  Plan: admit     Francis Boyd PA-C  09/30/24 1448

## 2024-09-30 NOTE — CARE PLAN
Problem: Pain - Adult  Goal: Verbalizes/displays adequate comfort level or baseline comfort level  Outcome: Progressing     Problem: Safety - Adult  Goal: Free from fall injury  Outcome: Progressing     Problem: Discharge Planning  Goal: Discharge to home or other facility with appropriate resources  Outcome: Progressing     Problem: Chronic Conditions and Co-morbidities  Goal: Patient's chronic conditions and co-morbidity symptoms are monitored and maintained or improved  Outcome: Progressing     Problem: Skin  Goal: Participates in plan/prevention/treatment measures  Outcome: Progressing  Flowsheets (Taken 9/30/2024 1630)  Participates in plan/prevention/treatment measures: Elevate heels  Goal: Prevent/manage excess moisture  Outcome: Progressing  Flowsheets (Taken 9/30/2024 1630)  Prevent/manage excess moisture: Cleanse incontinence/protect with barrier cream  Goal: Prevent/minimize sheer/friction injuries  Outcome: Progressing  Flowsheets (Taken 9/30/2024 1630)  Prevent/minimize sheer/friction injuries: Turn/reposition every 2 hours/use positioning/transfer devices  Goal: Promote/optimize nutrition  Outcome: Progressing  Flowsheets (Taken 9/30/2024 1630)  Promote/optimize nutrition: Monitor/record intake including meals     Problem: Pain  Goal: Takes deep breaths with improved pain control throughout the shift  Outcome: Progressing  Goal: Performs ADL's with improved pain control throughout shift  Outcome: Progressing  Goal: Participates in PT with improved pain control throughout the shift  Outcome: Progressing  Goal: Free from acute confusion related to pain meds throughout the shift  Outcome: Progressing   The patient's goals for the shift include      The clinical goals for the shift include

## 2024-09-30 NOTE — H&P
MEDICINE H&P    Subjective   Chief Complaint   Patient presents with    Nausea    Dizziness     Giuseppe Davila is a 88-year-old male with a past medical history of hypertension, atrial fibrillation s/p watchman, CAD s/p 2 stents, HFpEF, prior CVA 2018, aortic valve stenosis s/p TAVR 7/9/2024, prostate CA s/p radical prostatectomy with right seminal vesicle lesion and left fifth anterior rib osseous lesion, diverticulosis, past diverticulitis(?) per Care Everywhere presented to the ED on 8/5/2024 with complaints of dizziness, nausea, vomiting, diarrhea, abdominal pain, malaise of several months duration.     Last admission at [facility] recently for recurrent dizziness, nausea, nonbloody nonbilious emesis 8/5/2024 - 8/7/2024; orthostatic positive, CT abdomen with IV contrast at that time showed dilation of the common bile duct, RUQ US negative for stones.  He admitted to poor p.o. intake at [facility].  GI was consulted, was diagnosed with viral gastroenteritis with consideration for EGD if symptoms were not improving.  He was discharged home with [facility] after his symptoms improved.  He was educated to wear compression stockings and to increase his PO water intake.     EGD was done in 2021 for epigastric abdominal pain with findings of erosions, moderate inflammation, edema in the gastric body.  Sigmoidoscopy was done in 2023 for hematochezia with findings of diverticulosis, ulcer in distal rectum with oozing, Hemoclip, HemoSplit, injected, no malignancy on biopsy.     In the ED, HDS with rest of VS WNL, hypokalemic 3.4, thrombocytopenic 141, Cr at 1.1 (baseline ~ 0.9-1.1), lactate 1.7, troponin 18, negative COVID, bland UA, no acute intracranial abnormality per CT head, nonspecific colitis on CT AP, unremarkable CXR.  Had difficulty urinating in ED.  Patient became agitated and was given IM Zyprexa.  Bladder scan showed >1 L urine, Ferrari catheter subsequently placed.  Patient was admitted for colitis.    Diagnoses as of  09/30/24 1705   Dizziness   Diarrhea, unspecified type   Urinary retention     ED Medication Administration from 09/30/2024 1022 to 09/30/2024 1535         Date/Time Order Dose Route Action Action by     09/30/2024 1143 EDT ondansetron (Zofran) injection 4 mg 4 mg intravenous Given Ana, KELECHI     09/30/2024 1143 EDT sodium chloride 0.9 % bolus 500 mL 500 mL intravenous New Bag Perez, H     09/30/2024 1237 EDT iohexol (OMNIPaque) 350 mg iodine/mL solution 75 mL 75 mL intravenous Given SUSANA Chappell     09/30/2024 1318 EDT meclizine (Antivert) tablet 25 mg 25 mg oral Not Given Janet, J     09/30/2024 1432 EDT sodium chloride 0.9 % bolus 500 mL 0 mL intravenous Stopped Truro, J     09/30/2024 1512 EDT OLANZapine (ZyPREXA) injection 5 mg 5 mg intramuscular Not Given Truro, J          A 10 point ROS was performed with the patient denying any complaint at this time aside from those listed in the HPI above.     Current Outpatient Medications   Medication Instructions    ashwagandha root extract 500 mg capsule 1 capsule, oral, Daily    b complex vitamins capsule 1 capsule, oral, Daily    calcium carbonate 600 mg calcium (1,500 mg) tablet 1 tablet, oral, Daily    cholecalciferol (VITAMIN D-3) 25 mcg, oral, Daily    cholestyramine (Questran) 4 gram packet 1 packet, oral, Daily PRN    clopidogrel (PLAVIX) 75 mg, oral, Daily    coenzyme Q-10 100 mg, oral, Nightly    docusate sodium (COLACE) 100 mg, oral, 2 times daily    losartan (COZAAR) 50 mg, oral, Daily    magnesium oxide (MagOx) 400 mg (241.3 mg magnesium) tablet 1 tablet, oral, Daily    multivitamin tablet 1 tablet, oral, Daily RT    ondansetron (ZOFRAN) 4 mg, oral, Every 8 hours PRN    rosuvastatin (CRESTOR) 20 mg, oral, 4 times weekly    torsemide (Demadex) 20 mg tablet 1 TAB Daily    turmeric-turmeric root extract 450-50 mg capsule 1 capsule, oral, Daily    vitamin E acid succinate (vitamin E succinate) 268 mg (400 unit) tablet 1 capsule, oral, Daily      Past  Medical History:   Diagnosis Date    Anorectal abscess     Anorectal abscess    Essential (primary) hypertension     Benign essential HTN    Old myocardial infarction     History of myocardial infarction    Other hyperlipidemia     Other hyperlipidemia    Personal history of malignant neoplasm, unspecified     History of malignant neoplasm    Personal history of other diseases of the circulatory system     History of coronary artery disease    Personal history of other diseases of the circulatory system     History of angina pectoris    Personal history of other diseases of the nervous system and sense organs 05/02/2019    History of polyneuropathy    Personal history of other specified conditions     History of chest pain    Personal history of other specified conditions     History of epistaxis    Personal history of other specified conditions     History of bradycardia    Personal history of transient ischemic attack (TIA), and cerebral infarction without residual deficits     History of stroke    Personal history of transient ischemic attack (TIA), and cerebral infarction without residual deficits     History of stroke    Syncope and collapse     Near syncope    Thoracic aortic aneurysm, without rupture, unspecified (CMS-HCC)     Thoracic aortic aneurysm    Unspecified atrial fibrillation (Multi) 03/10/2020    Atrial fibrillation with slow ventricular response    Ventricular premature depolarization     Frequent PVCs      Past Surgical History:   Procedure Laterality Date    ANOMALOUS PULMONARY VENOUS RETURN REPAIR, 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    Procedure: TAVR-OR;  Surgeon: Jaquan Larkin MD;  Location: [place] [address] Cardiac Cath Lab;  Service: Cardiac Surgery;  Laterality: N/A;    CARDIAC CATHETERIZATION N/A 5/24/2024    Procedure: Left And Right Heart Cath, With LV;  Surgeon: Stepan Jones MD PhD;  Location: [facility] Cardiac Cath Lab;  Service: Cardiovascular;  Laterality: N/A;  R/LHC poss PCI    CARDIAC  CATHETERIZATION N/A 7/9/2024    Procedure: TAVR (Transcatheter AV Replacement);  Surgeon: [place] Rutherford MD;  Location: [place] [address] Cardiac Cath Lab;  Service: Cardiovascular;  Laterality: N/A;  Schedule at 10am, day, Lex 29    CARDIAC CATHETERIZATION N/A 7/9/2024    Procedure: TVP for TAVR;  Surgeon: [place] Rutherford MD;  Location: [place] [address] Cardiac Cath Lab;  Service: Cardiovascular;  Laterality: N/A;    CHOLECYSTECTOMY  02/05/2015    Cholecystectomy    COLONOSCOPY  02/05/2015    Colonoscopy (Fiberoptic)    CORONARY ANGIOPLASTY WITH STENT PLACEMENT  05/21/2018    Cath Placement Of Stent 1    CT ABDOMEN PELVIS ANGIOGRAM W AND/OR WO IV CONTRAST  3/17/2023    CT ABDOMEN PELVIS ANGIOGRAM W AND/OR WO IV CONTRAST PAR CT    KIDNEY SURGERY  02/05/2015    Kidney Surgery    OTHER SURGICAL HISTORY  12/13/2018    Prostatectomy    OTHER SURGICAL HISTORY  05/21/2018    Recent Surgery    TONSILLECTOMY  02/05/2015    Tonsillectomy     Family History   Problem Relation Name Age of Onset    Other (cardiac disorder) Mother      Transient ischemic attack Mother      Cancer Father      Other (cardiac disorder) Sister      Alzheimer's disease Father's Brother      Parkinsonism Father's Brother       Social History     Socioeconomic History    Marital status:    Tobacco Use    Smoking status: Never    Smokeless tobacco: Never   Vaping Use    Vaping status: Never Used   Substance and Sexual Activity    Alcohol use: Not Currently     Comment: rarely    Drug use: Not Currently    Sexual activity: Defer     Social Determinants of Health     Financial Resource Strain: Low Risk  (9/30/2024)    Overall Financial Resource Strain (CARDIA)     Difficulty of Paying Living Expenses: Not very hard   Food Insecurity: No Food Insecurity (9/30/2024)    Hunger Vital Sign     Worried About Running Out of Food in the Last Year: Never true     Ran Out of Food in the Last Year: Never true   Transportation Needs: No  Transportation Needs (9/30/2024)    PRAPARE - Transportation     Lack of Transportation (Medical): No     Lack of Transportation (Non-Medical): No   Social Connections: Feeling Socially Integrated (9/27/2024)    OASIS : Social Isolation     Frequency of experiencing loneliness or isolation: Never   Intimate Partner Violence: Not At Risk (9/30/2024)    Humiliation, Afraid, Rape, and Kick questionnaire     Fear of Current or Ex-Partner: No     Emotionally Abused: No     Physically Abused: No     Sexually Abused: No   Housing Stability: Low Risk  (9/30/2024)    Housing Stability Vital Sign     Unable to Pay for Housing in the Last Year: No     Number of Times Moved in the Last Year: 1     Homeless in the Last Year: No     No Known Allergies    Code Status: DNR and No Intubation    Objective   Vitals:    09/30/24 1300 09/30/24 1430 09/30/24 1500 09/30/24 1545   BP: 130/70 157/80 109/72 156/90   BP Location:    Right arm   Patient Position:    Lying   Pulse: 62 92 78 62   Resp: 18 18 18 20   Temp:    36 °C (96.8 °F)   TempSrc:    Temporal   SpO2: 95% 98% 95% 100%   Weight:       Height:          Labs, radiological imaging and cardiac work up were personally reviewed    Ventilator/Oxygen Supply:     Oxygen Therapy/Pulse Ox  Medical Gas Therapy: None (Room air)  SpO2: 100 %  Patient Activity During SpO2 Measurement: At rest  Temp: 36 °C (96.8 °F)    Labs:   Results from last 7 days   Lab Units 09/30/24  1102   SODIUM mmol/L 140   POTASSIUM mmol/L 3.4*   CHLORIDE mmol/L 106   CO2 mmol/L 27   BUN mg/dL 11   CREATININE mg/dL 1.10   GLUCOSE mg/dL 107*   CALCIUM mg/dL 8.8     Results from last 7 days   Lab Units 09/30/24  1102   WBC AUTO x10*3/uL 3.9*   HEMOGLOBIN g/dL 12.9*   HEMATOCRIT % 38.4*   PLATELETS AUTO x10*3/uL 141*     Imaging:  CT head wo IV contrast   Final Result   No CT evidence of acute intracranial abnormality.        MACRO   None        Signed by: Kathy Chavez 9/30/2024 1:01 PM   Dictation workstation:    [id number]      CT abdomen pelvis w IV contrast   Final Result   Nonspecific colitis without bowel obstruction or pneumatosis.        MACRO:   None        Signed by: Kathy Scott 9/30/2024 1:13 PM   Dictation workstation:   [id number]      XR chest 1 view   Final Result   1.  No evidence of acute cardiopulmonary process.             Signed by: Irving Breaux 9/30/2024 12:16 PM   Dictation workstation:   [id number]        Transthoracic Echo (TTE) Limited  Study Date: 7/10/2024             CONCLUSIONS:  1. The left ventricular systolic function is normal, with a visually estimated ejection fraction of 60-65%.  2. Spectral Doppler shows an abnormal pattern of left ventricular diastolic filling.  3. Abnormal septal motion consistent with left bundle branch block.  4. There is normal right ventricular global systolic function.  5. The left atrium is severely dilated.  6. RVSP within normal limits.  7. There is a transcatheter aortic valve replacement.    Physical Exam:   Physical Exam  Constitutional:       General: He is not in acute distress.  HENT:      Head: Normocephalic and atraumatic.      Mouth/Throat:      Mouth: Mucous membranes are dry.      Pharynx: Oropharynx is clear.   Eyes:      Extraocular Movements: Extraocular movements intact.      Pupils: Pupils are equal, round, and reactive to light.   Cardiovascular:      Rate and Rhythm: Normal rate and regular rhythm.      Pulses: Normal pulses.      Heart sounds: Normal heart sounds.   Pulmonary:      Effort: Pulmonary effort is normal.      Breath sounds: Normal breath sounds.   Abdominal:      Palpations: Abdomen is soft.      Tenderness: There is abdominal tenderness in the left lower quadrant.   Musculoskeletal:      Cervical back: No rigidity or tenderness.   Lymphadenopathy:      Cervical: No cervical adenopathy.   Skin:     Capillary Refill: Capillary refill takes 2 to 3 seconds.      Coloration: Skin is not jaundiced.   Neurological:       General: No focal deficit present.      Mental Status: He is alert and oriented to person, place, and time.       Assessment/Plan   Giuseppe Davila is a 88-year-old male with a past medical history of hypertension, atrial fibrillation s/p watchman, CAD s/p 2 stents, HFpEF, prior CVA 2018, aortic valve stenosis s/p TAVR 7/9/2024, prostate CA s/p radical prostatectomy with right seminal vesicle lesion and left fifth anterior rib osseous lesion, diverticulosis, past diverticulitis(?) per Care Everywhere presented to the ED on 8/5/2024 with complaints of dizziness, nausea, vomiting, diarrhea, abdominal pain, malaise of several months duration.     #Colitis, undifferentiated  #Orthostatic hypotension  #Presyncope 2/2 orthostasis  #Concern for POTS syndrome  Patient has extensive PMHx of GI related pain, disturbances as well as recurrent dizziness despite adequate oral hydration per patient and wife  CTAP w/ contrast demonstrating moderate ascending, transverse and descending colonic wall thickening  EGD was done in 2021 for epigastric abdominal pain with findings of erosions, moderate inflammation, edema in the gastric body  Sigmoidoscopy was done in 2023 for hematochezia with findings of diverticulosis, ulcer in distal rectum with oozing, Hemoclip, HemoSplit, injected, no malignancy on biopsy  Orthostatic vitals positive on admission, same as before  -GI consulted for evaluation in the context of patient's recurrent and chronic GI disturbances, n.p.o. at midnight for potential scoping  -Neurology consulted for concern for POTS  -Scheduled Tylenol TID, low-dose PPI, PRN simethicone,  cc/h for 15 hours (~2L) in addition to 500 cc bolus given in ED  -Fall precautions, aspiration precautions in place  -N.p.o. at midnight for potential procedural GI evaluation, full liquid diet for now  -Telemetry ordered    Chronic medical conditions:  Diverticulosis-Metamucil once diarrhea resolves  Hx of gastritis-PPI  Hx of GI  erosions-PPI  CBD dilation, chronic  HTN-continue losartan 50  HLD-continue home Crestor 24 times weekly (Sunday, Tuesday, Thursday, Saturday)  A-fib s/p watchman  CAD s/p 2 stents-continue Plavix  HFpEF-holding home PRN torsemide 10  Prior CVA 2018-continue Plavix  TIA-continue Plavix  Aortic valve stenosis s/p TAVR 7/9/2024    Incidental findings:   CTAP w IV contrast demonstrated the following  LAD coronary artery calcifications, RCA coronary stent, aortic valve replacement, stable elevation of left hemidiaphragm with adjacent compressive atelectasis, moderate left atrial enlargement, mild bilateral gynecomastia, grossly unchanged mild intrahepatic and marked extrahepatic bile duct dilation dating back to 1/14/2010, s/p cholecystectomy, left kidney with cortical scarring, moderate to severe abdominal aortic atherosclerotic calcifications, stable circumferential bladder wall thickening may be 2/2 chronic urinary obstruction, prostatic brachytherapy seeds, multilevel degenerative disc disease    Checklist:  Feeding: Full liquid diet, n.p.o. midnight  Fluids:  cc/h for 15 hours  DVT ppx: Hep SQ  Ulcer ppx: PPI  Bowel care: None for now  Lines: PIV  Consults: GI, neuro, PT/OT  Code Status: DNR/DNI    Dr. Ciro Dykes, DO   Internal Medicine PGY-2    This is a preliminary note written by the resident. Please wait for attending addendum for finalization of note and recommendations.

## 2024-09-30 NOTE — PROGRESS NOTES
Pharmacy Medication History Review    Giuseppe Davila is a 88 y.o. male admitted for No Principal Problem: There is no principal problem currently on the Problem List. Please update the Problem List and refresh.. Pharmacy reviewed the patient's [id number] medications and allergies for accuracy.    The list below reflectives the updated PTA list. Please review each medication in order reconciliation for additional clarification and justification.  Prior to Admission medications    Medication Sig Start Date End Date Authorizing Provider   ashwagandha root extract 500 mg capsule Take 1 capsule by mouth once daily.   Historical Provider, MD   b complex vitamins capsule Take 1 capsule by mouth once daily.   Historical Provider, MD   calcium carbonate 600 mg calcium (1,500 mg) tablet Take 1 tablet (1,500 mg) by mouth once daily.   Historical Provider, MD   cholecalciferol (Vitamin D-3) 25 MCG (1000 UT) capsule Take 1 capsule (25 mcg) by mouth once daily.   Historical Provider, MD   cholestyramine (Questran) 4 gram packet Take 1 packet (4 g) by mouth once daily as needed.   Historical Provider, MD   clopidogrel (Plavix) 75 mg tablet Take 1 tablet (75 mg) by mouth once daily.   Donnie Carmichael MD   coenzyme Q-10 100 mg capsule Take 1 capsule (100 mg) by mouth once daily at bedtime.   Historical Provider, MD   docusate sodium (Colace) 100 mg capsule Take 1 capsule (100 mg) by mouth 2 times a day as needed.   Darren Garcia MD   losartan (Cozaar) 50 mg tablet Take 1 tablet (50 mg) by mouth once daily.   Donnie Carmichael MD   magnesium oxide (MagOx) 400 mg (241.3 mg magnesium) tablet Take 1 tablet (400 mg) by mouth once daily.   Historical Provider, MD   multivitamin tablet Take 1 tablet by mouth once daily.   Historical Provider, MD   ondansetron (Zofran) 4 mg tablet TAKE 1 TABLET (4 MG) BY MOUTH EVERY 8 HOURS IF NEEDED FOR NAUSEA.   Noah Parikh MD   rosuvastatin (Crestor) 20 mg tablet Take 1 tablet (20 mg) by mouth 4  times a week. On Monday, Wednesday, Friday, and Saturday   Donnie Carmichael MD   torsemide (Demadex) 20 mg tablet Take 0.5 tablets (10 mg) by mouth every other day.   Donnie Carmichael MD   turmeric-turmeric root extract 450-50 mg capsule Take 1 capsule by mouth once daily.   Historical Provider, MD   vitamin E acid succinate (vitamin E succinate) 268 mg (400 unit) tablet Take 1 capsule by mouth once daily.   Historical Provider, MD        The list below reflectives the updated allergy list. Please review each documented allergy for additional clarification and justification.  Allergies  Reviewed by Nolvia CaoD on 9/30/2024   No Known Allergies         Below are additional concerns with the patient's PTA list.      Caridad Mcnally

## 2024-10-01 ENCOUNTER — APPOINTMENT (OUTPATIENT)
Dept: RADIOLOGY | Facility: HOSPITAL | Age: 89
End: 2024-10-01
Payer: MEDICARE

## 2024-10-01 LAB — HOLD SPECIMEN: NORMAL

## 2024-10-01 PROCEDURE — 97165 OT EVAL LOW COMPLEX 30 MIN: CPT | Mod: GO

## 2024-10-01 PROCEDURE — 2500000002 HC RX 250 W HCPCS SELF ADMINISTERED DRUGS (ALT 637 FOR MEDICARE OP, ALT 636 FOR OP/ED)

## 2024-10-01 PROCEDURE — 97161 PT EVAL LOW COMPLEX 20 MIN: CPT | Mod: GP

## 2024-10-01 PROCEDURE — 2500000004 HC RX 250 GENERAL PHARMACY W/ HCPCS (ALT 636 FOR OP/ED)

## 2024-10-01 PROCEDURE — 2500000001 HC RX 250 WO HCPCS SELF ADMINISTERED DRUGS (ALT 637 FOR MEDICARE OP)

## 2024-10-01 PROCEDURE — 1200000002 HC GENERAL ROOM WITH TELEMETRY DAILY

## 2024-10-01 PROCEDURE — 2550000001 HC RX 255 CONTRASTS: Performed by: INTERNAL MEDICINE

## 2024-10-01 PROCEDURE — 96372 THER/PROPH/DIAG INJ SC/IM: CPT

## 2024-10-01 PROCEDURE — 51702 INSERT TEMP BLADDER CATH: CPT

## 2024-10-01 PROCEDURE — 99223 1ST HOSP IP/OBS HIGH 75: CPT | Performed by: PSYCHIATRY & NEUROLOGY

## 2024-10-01 PROCEDURE — 70498 CT ANGIOGRAPHY NECK: CPT

## 2024-10-01 RX ORDER — DIAZEPAM 5 MG/1
5 TABLET ORAL
Status: DISCONTINUED | OUTPATIENT
Start: 2024-10-01 | End: 2024-10-02

## 2024-10-01 RX ORDER — SODIUM CHLORIDE, SODIUM LACTATE, POTASSIUM CHLORIDE, CALCIUM CHLORIDE 600; 310; 30; 20 MG/100ML; MG/100ML; MG/100ML; MG/100ML
75 INJECTION, SOLUTION INTRAVENOUS CONTINUOUS
Status: DISCONTINUED | OUTPATIENT
Start: 2024-10-01 | End: 2024-10-04

## 2024-10-01 ASSESSMENT — COGNITIVE AND FUNCTIONAL STATUS - GENERAL
CLIMB 3 TO 5 STEPS WITH RAILING: A LITTLE
CLIMB 3 TO 5 STEPS WITH RAILING: TOTAL
DRESSING REGULAR LOWER BODY CLOTHING: A LITTLE
PERSONAL GROOMING: A LITTLE
DRESSING REGULAR UPPER BODY CLOTHING: A LITTLE
MOBILITY SCORE: 18
WALKING IN HOSPITAL ROOM: A LITTLE
TURNING FROM BACK TO SIDE WHILE IN FLAT BAD: A LITTLE
TOILETING: A LOT
TURNING FROM BACK TO SIDE WHILE IN FLAT BAD: A LITTLE
STANDING UP FROM CHAIR USING ARMS: A LITTLE
MOBILITY SCORE: 16
HELP NEEDED FOR BATHING: A LOT
MOVING TO AND FROM BED TO CHAIR: A LITTLE
STANDING UP FROM CHAIR USING ARMS: A LITTLE
DAILY ACTIVITIY SCORE: 18
MOVING FROM LYING ON BACK TO SITTING ON SIDE OF FLAT BED WITH BEDRAILS: A LITTLE
DRESSING REGULAR LOWER BODY CLOTHING: A LOT
PERSONAL GROOMING: A LITTLE
DRESSING REGULAR UPPER BODY CLOTHING: A LOT
WALKING IN HOSPITAL ROOM: A LITTLE
MOVING FROM LYING ON BACK TO SITTING ON SIDE OF FLAT BED WITH BEDRAILS: A LITTLE
DAILY ACTIVITIY SCORE: 16
TOILETING: A LITTLE
EATING MEALS: A LITTLE
MOVING TO AND FROM BED TO CHAIR: A LITTLE

## 2024-10-01 ASSESSMENT — PAIN - FUNCTIONAL ASSESSMENT: PAIN_FUNCTIONAL_ASSESSMENT: 0-10

## 2024-10-01 ASSESSMENT — PAIN SCALES - GENERAL
PAINLEVEL_OUTOF10: 0 - NO PAIN

## 2024-10-01 NOTE — PROGRESS NOTES
MEDICINE PROGRESS NOTE    Subjective     Patient seen and examined with medicine team. TORRI.  Patient continues to feel low-grade nausea but has not asked for PRN Zofran.  Still feels dizzy, albeit, to a lesser degree, after his fluid resuscitation overnight.    He mentions that his dizziness occurs in both left and right directions when he leans his head to the same side.  When asked if he feels that the room spinning around him when he feels dizzy, he replies no.  When asked if he feels as if he is drunk when he feels dizzy, he replies with yes.  Has taken meclizine per cardiologist Dr. Carmichael, was not able to tolerate it and his dizziness was exacerbated.      Assessment / Plan   Giuseppe Davila is a 88-year-old male with a past medical history of hypertension, atrial fibrillation s/p watchman, CAD s/p 2 stents, HFpEF, prior CVA 2018, aortic valve stenosis s/p TAVR 7/9/2024, prostate CA s/p radical prostatectomy with right seminal vesicle lesion and left fifth anterior rib osseous lesion, diverticulosis, past diverticulitis(?) per [facility] presented to the ED on 8/5/2024 with complaints of dizziness, nausea, vomiting, diarrhea, abdominal pain, malaise of several months duration. On day 0 of admission.      #Colitis, undifferentiated  #Orthostatic hypotension  #Presyncope 2/2 orthostasis  #Concern for BPPV vs POTS  CTAP w/ contrast demonstrating moderate ascending, transverse and descending colonic wall thickening  Orthostatic vitals positive on admission, same as before.  Patient's been having diarrhea and attempted oral rehydration in that context  -GI consulted for evaluation in the context of patient's recurrent and chronic GI disturbances  -Neurology consulted for concern for BPPV vs POTS  -Patient mentions taking meclizine prescribed by cardiologist Dr. Carmichael and not being able to tolerate it (exacerbated presyncope and dizziness).  Started patient on Valium 5 BID.  OP vestibular  therapy  -Scheduled Tylenol TID, low-dose PPI, PRN simethicone, ~2.5L crystalloids given thus far - continue with LR 75 cc/h   -Fall precautions, aspiration precautions in place  -Telemetry ordered     Chronic medical conditions:  Diverticulosis-Metamucil once diarrhea resolves  Hx of gastritis-PPI  Hx of GI erosions-PPI  CBD dilation, chronic  HTN-continue losartan 50  HLD-continue home Crestor 24 times weekly (Sunday, Tuesday, Thursday, Saturday)  A-fib s/p watchman  CAD s/p 2 stents-continue Plavix  HFpEF-holding home PRN torsemide 10  Prior CVA 2018-continue Plavix  TIA-continue Plavix  Aortic valve stenosis s/p TAVR 7/9/2024     Incidental findings:   CTAP w IV contrast demonstrated the following  LAD coronary artery calcifications, RCA coronary stent, aortic valve replacement, stable elevation of left hemidiaphragm with adjacent compressive atelectasis, moderate left atrial enlargement, mild bilateral gynecomastia, grossly unchanged mild intrahepatic and marked extrahepatic bile duct dilation dating back to 1/14/2010, s/p cholecystectomy, left kidney with cortical scarring, moderate to severe abdominal aortic atherosclerotic calcifications, stable circumferential bladder wall thickening may be 2/2 chronic urinary obstruction, prostatic brachytherapy seeds, multilevel degenerative disc disease     Checklist:  Feeding: Full liquid diet, n.p.o. midnight  Fluids:  cc/h for 15 hours  DVT ppx: Hep SQ  Ulcer ppx: PPI  Bowel care: None for now  Lines: PIV  Consults: GI, neuro, PT/OT  Code Status: DNR/DNI     Dr. Ciro Dykes, DO   Internal Medicine PGY-2     This is a preliminary note written by the resident. Please wait for attending addendum for finalization of note and recommendations.      Objective   Physical Exam  Constitutional:       General: He is not in acute distress.  HENT:      Head: Normocephalic and atraumatic.      Mouth/Throat:      Mouth: Mucous membranes are dry.      Pharynx: Oropharynx is  clear.   Eyes:      Extraocular Movements: Extraocular movements intact.      Pupils: Pupils are equal, round, and reactive to light.   Cardiovascular:      Rate and Rhythm: Normal rate and regular rhythm.      Pulses: Normal pulses.      Heart sounds: Normal heart sounds.   Pulmonary:      Effort: Pulmonary effort is normal.      Breath sounds: Normal breath sounds.   Abdominal:      Palpations: Abdomen is soft.      Tenderness: There is abdominal tenderness in the left lower quadrant.   Musculoskeletal:      Cervical back: No rigidity or tenderness.   Lymphadenopathy:      Cervical: No cervical adenopathy.   Skin:     Capillary Refill: Capillary refill takes 2 to 3 seconds.      Coloration: Skin is not jaundiced.   Neurological:      General: No focal deficit present.      Mental Status: He is alert and oriented to person, place, and time    Last Recorded Vitals  Vitals:    10/01/24 0857 10/01/24 0900 10/01/24 0902 10/01/24 1226   BP: 111/69 135/63 127/61 124/69   BP Location: Right arm Right arm Right arm    Patient Position: Lying Sitting Standing    Pulse: (!) 44 58 50 (!) 38   Resp: 18 17 17    Temp: 36 °C (96.8 °F) 36 °C (96.8 °F) 36 °C (96.8 °F) 36 °C (96.8 °F)   TempSrc: Temporal Temporal Temporal    SpO2:    96%   Weight:       Height:         Intake/Output last 3 Shifts:  I/O last 3 completed shifts:  In: 2172.7 (23.9 mL/kg) [I.V.:1672.7 (18.4 mL/kg); IV Piggyback:500]  Out: 3500 (38.6 mL/kg) [Urine:3500 (1.1 mL/kg/hr)]  Weight: 90.7 kg   Labs:   Results from last 7 days   Lab Units 09/30/24  1102   SODIUM mmol/L 140   POTASSIUM mmol/L 3.4*   CHLORIDE mmol/L 106   CO2 mmol/L 27   BUN mg/dL 11   CREATININE mg/dL 1.10   GLUCOSE mg/dL 107*   CALCIUM mg/dL 8.8     Results from last 7 days   Lab Units 09/30/24  1102   WBC AUTO x10*3/uL 3.9*   HEMOGLOBIN g/dL 12.9*   HEMATOCRIT % 38.4*   PLATELETS AUTO x10*3/uL 141*       CT head wo IV contrast   Final Result   No CT evidence of acute intracranial abnormality.         MACRO   None        Signed by: Kathy Chavez 9/30/2024 1:01 PM   Dictation workstation:   [id number]      CT abdomen pelvis w IV contrast   Final Result   Nonspecific colitis without bowel obstruction or pneumatosis.        MACRO:   None        Signed by: Kathy Chavez 9/30/2024 1:13 PM   Dictation workstation:   [id number]      XR chest 1 view   Final Result   1.  No evidence of acute cardiopulmonary process.             Signed by: Irving Breaux 9/30/2024 12:16 PM   Dictation workstation:   [id number]      CT angio head and neck w and wo IV contrast    (Results Pending)     Transthoracic Echo (TTE) Limited 07/10/2024  CONCLUSIONS:  1. The left ventricular systolic function is normal, with a visually estimated ejection fraction of 60-65%.  2. Spectral Doppler shows an abnormal pattern of left ventricular diastolic filling.  3. Abnormal septal motion consistent with left bundle branch block.  4. There is normal right ventricular global systolic function.  5. The left atrium is severely dilated.  6. RVSP within normal limits.  7. There is a transcatheter aortic valve replacement.

## 2024-10-01 NOTE — CONSULTS
Inpatient consult to Neurology  Consult performed by: Bridger Dee MD  Consult ordered by: Devin Michele MD          History Of Present Illness  Giuseppe Davila is a 88 y.o. male presenting with dizziness.  The patient states that since he had his aortic valve stenosis fixed via TAVR that he has had episodes where he will get dizzy, have nausea, vomiting and sweat all over.  He states that these episodes can last for up to a day.  The patient also has had diarrhea and abdominal pain.  In between the episodes the patient states that he is doing well.  The patient was recently admitted to [facility] for recurrent dizziness and nausea vomiting.   the patient did have positive orthostatics at that time.  Currently The patient denies any headache, nausea, vomiting, facial or extremity weakness or numbness.  The patient was admitted to the ER and had a CT scan of the brain done that was negative for any acute process.         Past Medical History  Past Medical History:   Diagnosis Date    Anorectal abscess     Anorectal abscess    Essential (primary) hypertension     Benign essential HTN    Old myocardial infarction     History of myocardial infarction    Other hyperlipidemia     Other hyperlipidemia    Personal history of malignant neoplasm, unspecified     History of malignant neoplasm    Personal history of other diseases of the circulatory system     History of coronary artery disease    Personal history of other diseases of the circulatory system     History of angina pectoris    Personal history of other diseases of the nervous system and sense organs 05/02/2019    History of polyneuropathy    Personal history of other specified conditions     History of chest pain    Personal history of other specified conditions     History of epistaxis    Personal history of other specified conditions     History of bradycardia    Personal history of transient ischemic attack (TIA), and cerebral infarction without residual  deficits     History of stroke    Personal history of transient ischemic attack (TIA), and cerebral infarction without residual deficits     History of stroke    Syncope and collapse     Near syncope    Thoracic aortic aneurysm, without rupture, unspecified (CMS-HCC)     Thoracic aortic aneurysm    Unspecified atrial fibrillation (Multi) 03/10/2020    Atrial fibrillation with slow ventricular response    Ventricular premature depolarization     Frequent PVCs     Surgical History  Past Surgical History:   Procedure Laterality Date    ANOMALOUS PULMONARY VENOUS RETURN REPAIR, 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    Procedure: TAVR-OR;  Surgeon: aJquan Larkin MD;  Location: [place] [address] Cardiac Cath Lab;  Service: Cardiac Surgery;  Laterality: N/A;    CARDIAC CATHETERIZATION N/A 5/24/2024    Procedure: Left And Right Heart Cath, With LV;  Surgeon: Stepan Jones MD PhD;  Location: [facility] Cardiac Cath Lab;  Service: Cardiovascular;  Laterality: N/A;  R/LHC poss PCI    CARDIAC CATHETERIZATION N/A 7/9/2024    Procedure: TAVR (Transcatheter AV Replacement);  Surgeon: Jackson Rutherford MD;  Location: [place] [address] Cardiac Cath Lab;  Service: Cardiovascular;  Laterality: N/A;  Schedule at 10am, day, Lex 29    CARDIAC CATHETERIZATION N/A 7/9/2024    Procedure: TVP for TAVR;  Surgeon: Jackson Rutherford MD;  Location: [place] [address] Cardiac Cath Lab;  Service: Cardiovascular;  Laterality: N/A;    CHOLECYSTECTOMY  02/05/2015    Cholecystectomy    COLONOSCOPY  02/05/2015    Colonoscopy (Fiberoptic)    CORONARY ANGIOPLASTY WITH STENT PLACEMENT  05/21/2018    Cath Placement Of Stent 1    CT ABDOMEN PELVIS ANGIOGRAM W AND/OR WO IV CONTRAST  3/17/2023    CT ABDOMEN PELVIS ANGIOGRAM W AND/OR WO IV CONTRAST PAR CT    KIDNEY SURGERY  02/05/2015    Kidney Surgery    OTHER SURGICAL HISTORY  12/13/2018    Prostatectomy    OTHER SURGICAL HISTORY  05/21/2018    Recent Surgery    TONSILLECTOMY  02/05/2015    Tonsillectomy     Social  History  Social History     Tobacco Use    Smoking status: Never    Smokeless tobacco: Never   Vaping Use    Vaping status: Never Used   Substance Use Topics    Alcohol use: Not Currently     Comment: rarely    Drug use: Not Currently     Allergies  Patient has no known allergies.  Medications Prior to Admission   Medication Sig Dispense Refill Last Dose    ashwagandha root extract 500 mg capsule Take 1 capsule by mouth once daily.   Unknown    b complex vitamins capsule Take 1 capsule by mouth once daily.   Unknown    calcium carbonate 600 mg calcium (1,500 mg) tablet Take 1 tablet (1,500 mg) by mouth once daily.   Unknown    cholecalciferol (Vitamin D-3) 25 MCG (1000 UT) capsule Take 1 capsule (25 mcg) by mouth once daily.   Unknown    cholestyramine (Questran) 4 gram packet Take 1 packet (4 g) by mouth once daily as needed.   Unknown    clopidogrel (Plavix) 75 mg tablet Take 1 tablet (75 mg) by mouth once daily. 90 tablet 3 Unknown    coenzyme Q-10 100 mg capsule Take 1 capsule (100 mg) by mouth once daily at bedtime.   Unknown    docusate sodium (Colace) 100 mg capsule Take 1 capsule (100 mg) by mouth 2 times a day. (Patient taking differently: Take 1 capsule (100 mg) by mouth 2 times a day as needed.) 60 capsule 2 Unknown    losartan (Cozaar) 50 mg tablet Take 1 tablet (50 mg) by mouth once daily. 90 tablet 3 Unknown    magnesium oxide (MagOx) 400 mg (241.3 mg magnesium) tablet Take 1 tablet (400 mg) by mouth once daily.   Unknown    multivitamin tablet Take 1 tablet by mouth once daily.   Unknown    ondansetron (Zofran) 4 mg tablet TAKE 1 TABLET (4 MG) BY MOUTH EVERY 8 HOURS IF NEEDED FOR NAUSEA. 30 tablet 1 Unknown    rosuvastatin (Crestor) 20 mg tablet Take 1 tablet (20 mg) by mouth 4 times a week. (Patient taking differently: Take 1 tablet (20 mg) by mouth 4 times a week. On Monday, Wednesday, Friday, and Saturday) 50 tablet 3 Unknown    torsemide (Demadex) 20 mg tablet 1 TAB Daily (Patient taking  "differently: Take 0.5 tablets (10 mg) by mouth every other day.) 90 tablet 3 Unknown    turmeric-turmeric root extract 450-50 mg capsule Take 1 capsule by mouth once daily.   Unknown    vitamin E acid succinate (vitamin E succinate) 268 mg (400 unit) tablet Take 1 capsule by mouth once daily.   Unknown     Family history  The patient's family history is significant for cardiac disease, TIA, malignant neoplasm, Alzheimer's disease and Parkinson's disease.      Review of Systems   All other systems reviewed and are negative.            Neurological Exam  Physical Exam  Last Recorded Vitals  Blood pressure 124/69, pulse (!) 38, temperature 36 °C (96.8 °F), resp. rate 17, height 1.803 m (5' 11\"), weight 90.7 kg (200 lb), SpO2 96%.    The patient is a well developed, well-nourished [] male in no acute distress.    The patient's funduscopic examination shows no papilledema bilaterally.    The patient's extremity examination shows that the pulses are 2+ in the upper and lower extremities bilaterally and there is no edema in the lower extremities bilaterally.    The patient's mental status testing is alert and oriented ×3 with no evidence of aphasia or dysarthria.  The patient's memory testing shows that he is unable to remember any objects out of 3 at 5 minutes but his fund of knowledge and concentration are within normal limits.  The patient's cranial nerves 2, 3, 4, 5, 6, 7, 8, 9, 10, 11 and 12 are all within normal limits.  The patient's motor testing shows normal tone, bulk, and power in the upper and lower extremities bilaterally but his dorsiflexion strength is 1 out of 5 in the lower extremities bilaterally and his plantar flexion strength is 3 out of 5 in the lower extremities bilaterally.  The patient's sensory testing is intact to light touch in the upper and lower extremities bilaterally.  The patient's cerebellar testing is intact in the upper and lower extremities bilaterally.  The patient's station and gait " were not tested as the patient is a high fall risk.   The patient's reflexes are 1+ in the upper and lower extremities and symmetrical.      Relevant Results      Scheduled medications  acetaminophen, 975 mg, oral, 3 times per day  clopidogrel, 75 mg, oral, Daily  diazePAM, 5 mg, oral, 2 times per day  heparin (porcine), 5,000 Units, subcutaneous, q8h CLAIRE  losartan, 50 mg, oral, Daily  melatonin, 10 mg, oral, Nightly  OLANZapine, 5 mg, intramuscular, Once  pantoprazole, 20 mg, oral, Daily before breakfast   Or  pantoprazole, 20 mg, intravenous, Daily before breakfast  perflutren lipid microspheres, 0.5-10 mL of dilution, intravenous, Once in imaging  rosuvastatin, 20 mg, oral, Once per day on Sunday Tuesday Thursday Saturday  [Held by provider] torsemide, 10 mg, oral, Every other day      Continuous medications  lactated Ringer's, 75 mL/hr, Last Rate: 75 mL/hr (10/01/24 1556)      PRN medications  PRN medications: ondansetron, simethicone    Results for orders placed or performed during the hospital encounter of 09/30/24 (from the past 96 hour(s))   ECG 12 lead   Result Value Ref Range    Ventricular Rate 78 BPM    QRS Duration 144 ms    QT Interval 420 ms    QTC Calculation(Bazett) 478 ms    R Axis 68 degrees    T Axis 268 degrees    QRS Count 12 beats    Q Onset 204 ms    T Offset 414 ms    QTC Fredericia 458 ms   CBC and Auto Differential   Result Value Ref Range    WBC 3.9 (L) 4.4 - 11.3 x10*3/uL    nRBC 0.0 0.0 - 0.0 /100 WBCs    RBC 4.18 (L) 4.50 - 5.90 x10*6/uL    Hemoglobin 12.9 (L) 13.5 - 17.5 g/dL    Hematocrit 38.4 (L) 41.0 - 52.0 %    MCV 92 80 - 100 fL    MCH 30.9 26.0 - 34.0 pg    MCHC 33.6 32.0 - 36.0 g/dL    RDW 13.8 11.5 - 14.5 %    Platelets 141 (L) 150 - 450 x10*3/uL    Neutrophils % 64.5 40.0 - 80.0 %    Immature Granulocytes %, Automated 0.3 0.0 - 0.9 %    Lymphocytes % 22.4 13.0 - 44.0 %    Monocytes % 8.2 2.0 - 10.0 %    Eosinophils % 3.8 0.0 - 6.0 %    Basophils % 0.8 0.0 - 2.0 %     Neutrophils Absolute 2.53 1.60 - 5.50 x10*3/uL    Immature Granulocytes Absolute, Automated 0.01 0.00 - 0.50 x10*3/uL    Lymphocytes Absolute 0.88 0.80 - 3.00 x10*3/uL    Monocytes Absolute 0.32 0.05 - 0.80 x10*3/uL    Eosinophils Absolute 0.15 0.00 - 0.40 x10*3/uL    Basophils Absolute 0.03 0.00 - 0.10 x10*3/uL   Comprehensive metabolic panel   Result Value Ref Range    Glucose 107 (H) 74 - 99 mg/dL    Sodium 140 136 - 145 mmol/L    Potassium 3.4 (L) 3.5 - 5.3 mmol/L    Chloride 106 98 - 107 mmol/L    Bicarbonate 27 21 - 32 mmol/L    Anion Gap 10 10 - 20 mmol/L    Urea Nitrogen 11 6 - 23 mg/dL    Creatinine 1.10 0.50 - 1.30 mg/dL    eGFR 65 >60 mL/min/1.73m*2    Calcium 8.8 8.6 - 10.3 mg/dL    Albumin 3.5 3.4 - 5.0 g/dL    Alkaline Phosphatase 52 33 - 136 U/L    Total Protein 6.0 (L) 6.4 - 8.2 g/dL    AST 22 9 - 39 U/L    Bilirubin, Total 1.0 0.0 - 1.2 mg/dL    ALT 10 10 - 52 U/L   Magnesium   Result Value Ref Range    Magnesium 1.95 1.60 - 2.40 mg/dL   Lactate   Result Value Ref Range    Lactate 1.7 0.4 - 2.0 mmol/L   Troponin I, High Sensitivity   Result Value Ref Range    Troponin I, High Sensitivity 18 0 - 20 ng/L   Sars-CoV-2 PCR   Result Value Ref Range    Coronavirus 2019, PCR Not Detected Not Detected   Urinalysis with Reflex Culture and Microscopic   Result Value Ref Range    Color, Urine Colorless (N) Light-Yellow, Yellow, Dark-Yellow    Appearance, Urine Clear Clear    Specific Gravity, Urine 1.013 1.005 - 1.035    pH, Urine 8.0 5.0, 5.5, 6.0, 6.5, 7.0, 7.5, 8.0    Protein, Urine NEGATIVE NEGATIVE, 10 (TRACE), 20 (TRACE) mg/dL    Glucose, Urine Normal Normal mg/dL    Blood, Urine NEGATIVE NEGATIVE    Ketones, Urine NEGATIVE NEGATIVE mg/dL    Bilirubin, Urine NEGATIVE NEGATIVE    Urobilinogen, Urine Normal Normal mg/dL    Nitrite, Urine NEGATIVE NEGATIVE    Leukocyte Esterase, Urine NEGATIVE NEGATIVE   Extra Urine Gray Tube   Result Value Ref Range    Extra Tube Hold for add-ons.    POCT GLUCOSE   Result  Value Ref Range    POCT Glucose 110 (H) 74 - 99 mg/dL       I have personally reviewed the following imaging results ECG 12 lead    Result Date: 10/1/2024  Wide QRS rhythm with frequent ventricular-paced complexes and Premature supraventricular complexes in a pattern of bigeminy Nonspecific intraventricular block T wave abnormality, consider inferior ischemia T wave abnormality, consider anterolateral ischemia Abnormal ECG When compared with ECG of 06-AUG-2024 03:29, PREVIOUS ECG IS PRESENT    CT abdomen pelvis w IV contrast    Result Date: 9/30/2024  Interpreted By:  Kathy Chavez, STUDY: CT ABDOMEN PELVIS W IV CONTRAST;  9/30/2024 12:37 pm   INDICATION: Signs/Symptoms:abdominal pain, nausea, vomiting, diarrhea.   COMPARISON: CT abdomen and pelvis 08/05/2024, 01/14/2010   ACCESSION NUMBER(S): [id number]   ORDERING CLINICIAN: RELL SHAW   TECHNIQUE: Axial CT of the abdomen and pelvis was performed following intravenous administration of 75 which ml of contrast Omnipaque 350 with coronal and sagittal reconstruction.   FINDINGS: Motion limited examination.   Lower chest: Stable elevation of left hemidiaphragm with adjacent compressive atelectasis. Aortic valve replacement. Moderate LAD coronary artery calcifications. RCA coronary stent. Moderate left atrial enlargement. Mild bilateral gynecomastia.   Liver: No mass.   Biliary: Grossly unchanged mild intrahepatic and marked extrahepatic bile duct dilation dating back to 01/14/2010. Cholecystectomy.   Spleen: No mass. No splenomegaly.   Pancreas: No mass or duct dilation.   Adrenals: Normal.   Kidneys: Left upper pole cortical scarring. Subcentimeter partially exophytic right lower pole lesion too small to characterize likely benign. No calculus or hydronephrosis.   GI tract: Moderate ascending, transverse and descending colonic wall thickening. No bowel obstruction.   Lymph nodes: No lymphadenopathy.   Mesentery/peritoneum: No free fluid, free air or fluid  collection.   Vasculature: Moderate to severe abdominal aortic atherosclerotic calcifications without aneurysmal dilation.   Pelvis: No free fluid, free air or fluid collection. Stable circumferential bladder wall thickening which could be related to chronic urinary obstruction. Prostatic brachytherapy seeds.   Bones/Soft tissues: Multilevel degenerative disc disease.       Nonspecific colitis without bowel obstruction or pneumatosis.   MACRO: None   Signed by: Kathy Chavez 9/30/2024 1:13 PM Dictation workstation:   [id number]    CT head wo IV contrast    Result Date: 9/30/2024  Interpreted By:  Kathy Chavez, STUDY: CT HEAD WO IV CONTRAST;  9/30/2024 12:37 pm   INDICATION: Signs/Symptoms:dizzy.   COMPARISON: CT head 08/05/2024.   ACCESSION NUMBER(S): [id number]   ORDERING CLINICIAN: RELL SHAW   TECHNIQUE: Noncontrast axial CT scan of head was performed.   FINDINGS: Parenchyma: No intracranial hemorrhage. The grey-white differentiation is intact. No mass effect or midline shift. Patchy periventricular white matter hypodensities, likely chronic microvascular ischemic change. Unchanged hypodensity in the right basal ganglia likely remote lacunar infarct. Moderate parenchymal atrophy.   CSF Spaces: The ventricles, sulci and basal cisterns are proportional to degree of parenchymal volume loss.   Extra-Axial Fluid: None.   Calvarium: Unremarkable.   Paranasal sinuses: Clear.   Mastoids: Clear.   Orbits: No acute abnormality.   Soft tissues: No acute abnormality.       No CT evidence of acute intracranial abnormality.   MACRO None   Signed by: Kathy Chavez 9/30/2024 1:01 PM Dictation workstation:   [id number]    XR chest 1 view    Result Date: 9/30/2024  Interpreted By:  Irving Breaux, STUDY: XR CHEST 1 VIEW;  9/30/2024 11:37 am   INDICATION: Signs/Symptoms:dizzy.   COMPARISON: Chest radiograph 08/05/2024   ACCESSION NUMBER(S): [id number]   ORDERING CLINICIAN: RELL SHAW   FINDINGS:      CARDIOMEDIASTINAL SILHOUETTE: Cardiomediastinal silhouette is normal in size and configuration.   LUNGS: No consolidation, pneumothorax, or significant effusion. Asymmetric left mid lung scar-like opacification.   ABDOMEN: No remarkable upper abdominal findings.   BONES: No acute osseous changes.       1.  No evidence of acute cardiopulmonary process.     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 *  Prostate Cancer Grade: Grade Group 1 (Varsha score 3 + 3) *  PSA: 3.64 ng/mL 08/20/2024 *  Previous Therapy: Radical prostatectomy. EXAM CATEGORY: Follow-up. TECHNIQUE: Radiopharmaceutical was administered intravenously followed later on by PET imaging from the skull vertex to thighs.  Free breathing, low dose CT of the same body region was acquired without IV contrast for attenuation correction and anatomic localization.  Unenhanced imaging is limited for the evaluation of some pathology and the acquired CT was not designed to produce diagnostic CT scan quality.   Physiologic/non-pathologic uptake in some body regions could confound or obscure some pathology. *  CT Dose-Length Product (DLP): 445 mGy*cm *  CT Dose Reduction Employed: Yes *  Injected activity: 9.6 mCi *  Uptake Time: 53 minutes *  Radiopharmaceutical: F-18 PSMA (Posluma) COMPARISON: PSMA PET/CT dated 11/29/2022 CORRELATION: No relevant prior imaging available RESULT: REFERENCES: Uptake by the injected radiopharmaceutical serves as a surrogate marker for prostate-specific  membrane antigen (PSMA) expression.  All reported standardized uptake values represent maximum SUV (SUV max) per body weight, unless otherwise specified.  SUV Reference Values: *  Background Salivary Gland: SUV max 29.1 *  Blood Pool (Descending Aorta): SUV max 2.9 *  Background Liver: SUV max 13.1 Localizer Images: Unremarkable. HEAD AND NECK: Head: No radiotracer avid lesion or mass effect in the intracranial compartment. Aerodigestive Tract: No radiotracer avid lesion. Lymph Nodes: No radiotracer avid lymphadenopathy. Neck Soft Tissues: No radiotracer avid thyroid nodule. CHEST: Lungs & Pleura: No radiotracer avid mass, nodule, or consolidation.  No pleural effusion. Lymph Nodes: No radiotracer avid lymphadenopathy. Mediastinum: No radiotracer avid mass. Cardiovascular: Blood pool activity.  No pericardial effusion.  Normal heart size. Chest Wall: No radiotracer avid soft tissue lesion. ABDOMEN AND PELVIS: Hepatobiliary: No radiotracer avid lesion.  No measurable mass. Spleen: No radiotracer avid lesion.  No splenomegaly. Pancreas: No radiotracer avid lesion. Adrenals: No radiotracer avid nodule. Urinary Tract: Physiologic radiotracer excretion in the urinary tract.   No hydronephrosis. GI Tract: No radiotracer avid lesion.  No bowel dilation. Peritoneum: No radiotracer avid lesion.  No ascites. Lymph Nodes: *  Abdomen (including common iliac): No radiotracer avid lymphadenopathy. *  Pelvis (below common iliac): No radiotracer avid lymphadenopathy. Vasculature: Blood pool activity. Prostate bed & Seminal Vesicles: prostatectomy and bilateral pelvic lymph node dissection without abnormal tracer uptake in the surgical bed.   Persistent stable right seminal vesicle bed uptake with SUV max of 27.5, prior SUV max of 27.2. Pelvis Soft Tissues: No radiotracer avid lesion. MUSCULOSKELETAL: Bones: Interval development of PSMA expressing osseous lesion in the LEFT fifth rib anteriorly SUV max of 9.3. Soft Tissues: No  radiotracer avid lesion.    IMPRESSION: PROSTATE: *  Persistent stable PSMA expressing right seminal vesicle lesion. GARRY DISEASE: *  No PSMA expressing pelvic lymphadenopathy. METASTATIC DISEASE: *  Interval development of new PSMA expressing LEFT fifth anterior rib   osseous lesion. : DELMAR   Transcribe Date/Time: Sep 17 2024  1:26P Dictated by : DAX JOHN MD This examination was interpreted and the report reviewed and electronically signed by: DAX JOHN MD on Sep 17 2024  1:51PM  EST       Assessment/Plan   Assessment & Plan  Dizziness        Impression: the patient is an 88-year-old male with multiple cardiac issues who  has had multiple episodes of dizziness.  His neurological examination is abnormal and noted above.  The differential diagnosis for his dizziness includes orthostatic hypotension, cardiac arrhythmia, aortic stenosis and TIA which I doubt.      Plan: The patient needs a CT angiogram of the brain and neck.  The patient needs a cardiology consultation.    The patient needs to stay well-hydrated.  The patient will need his pacemaker interrogated.   I would certainly treat any underlying infection and normalize any metabolic abnormalities.  The patient needs to continue stroke risk factor modification.   The patient needs a PT, OT, social service, rehab and speech therapy consult.  The patient needs DVT prophylaxis.  The patient needs neurochecks as per protocol.  I will send the note to Dr. Michele.  Thank you very much for sending me this very interesting consultation.  I discussed all these issues in detail with the patient and answered all their questions.  I will continue to follow the patient while they are in the hospital.  The patient needs follow-up with their primary care doctor within 2 weeks of discharge.            Bridger Dee MD

## 2024-10-01 NOTE — CONSULTS
Reason For Consult  Recurrent adm for abdominal pain, n/v/d    History Of Present Illness  Giuseppe Davila is a 88 y.o. male  with a past medical history of hypertension, atrial fibrillation s/p watchman, CAD s/p 2 stents, HFpEF, prior CVA 2018, aortic valve stenosis s/p TAVR 7/9/2024, prostate CA s/p radical prostatectomy with right seminal vesicle lesion and left fifth anterior rib osseous lesion, diverticulosis, past diverticulitis(?) per Care Everywhere presented to the [facility] ED on 9/30/2024 with complaints of dizziness, nausea, vomiting, diarrhea, abdominal pain, malaise of several months duration.     On examination reports that after aortic valve replacement in July of this year he started to have episodes with sudden onset of dizziness, diaphoresis, nausea, abdominal pain and diarrhea approximately twice weekly which would prompt him to lay down and resolve in about an hour.  He reports having loose stools for approximately 40 years after hearing cholecystectomy, does not believe he has diarrhea changed much.  States it is sudden onset dizziness, diaphoresis and nausea which bothers him the most..  However states that recently he started to have severe urgency where he has to use the restroom literally immediately after feeling urge to defecate to avoid losing control.  States at times he can have up to 5-6 episodes of nonbloody non-mucousy diarrhea, however most of the times it is 2-3 times daily.    Denies history of upper or lower GI bleed, dysphagia, vomiting or heartburn.    Last admission at [facility] recently for recurrent dizziness, nausea, nonbloody nonbilious emesis 8/5/2024 - 8/7/2024 and seen by GI, was diagnosed with viral gastroenteritis with consideration for EGD if symptoms were not improving and was discharged home with [facility] after his symptoms improved.      Denies NSAIDs    Reports history of stomach cancer in sister at age 80    Flexible sigmoidoscopy 3/20/2023 for hematochezia, diverticulosis  in the sigmoid colon, internal hemorrhoids, single ulcer in distal rectum, biopsied, injected, hemoclipped, Hemospray.  Rectal mucosa with mild active inflammation, fragments of fibrinopurulent exudate, negative for dysplasia or malignancy    Flexible sigmoidoscopy 2/6/2023, poor prep, stool in rectum, mucosal ulceration, biopsied    Laboratory data yesterday shows H&H 12.9, WBCs 3.9, platelets 141.  LFTs unremarkable, serum potassium 3.4.    CT of abdomen and pelvis with IV contrast showed nonspecific colitis without bowel obstruction or pneumatosis.     Past Medical History  He has a past medical history of Anorectal abscess, Essential (primary) hypertension, Old myocardial infarction, Other hyperlipidemia, Personal history of malignant neoplasm, unspecified, Personal history of other diseases of the circulatory system, Personal history of other diseases of the circulatory system, Personal history of other diseases of the nervous system and sense organs (05/02/2019), Personal history of other specified conditions, Personal history of other specified conditions, Personal history of other specified conditions, Personal history of transient ischemic attack (TIA), and cerebral infarction without residual deficits, Personal history of transient ischemic attack (TIA), and cerebral infarction without residual deficits, Syncope and collapse, Thoracic aortic aneurysm, without rupture, unspecified (CMS-HCC), Unspecified atrial fibrillation (Multi) (03/10/2020), and Ventricular premature depolarization.    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     Social History  He reports that he has never smoked. He has never used smokeless tobacco. He reports that he does not currently use alcohol. He reports that he does not currently use drugs.    Family History  Family History   Problem Relation Name Age of Onset    Other (cardiac disorder) Mother      Transient ischemic attack Mother      Cancer Father      Other (cardiac disorder) Sister      Alzheimer's disease Father's Brother      Parkinsonism Father's Brother          Allergies  Patient has no known allergies.    Review of Systems    A 10 point review of system is negative except for what is mentioned in the HPI     Physical Exam    The note was created using voice recognition transcription software. Despite proofreading, unintentional typographical errors may be present. Please contact the GI office with any questions or concerns.     Current Medications: reviewed    Vital Signs: Reviewed    Physical Exam:  General: no apparent distress, pleasant and cooperative  Skin:  Warm and dry, no jaundice  HEENT: No scleral icterus, no conjunctival pallor, normocephalic, atraumatic, mucous membranes moist  Neck:  atraumatic, trachea midline, no JVD  Chest:  decreased air entry to auscultation bilaterally. No wheezes, rales, or rhonchi  CV:  Regular rate and rhythm.  Positive S1/S2  Abdomen: no distension, +BS, soft, non-tender to palpation, no rebound tenderness, no guarding, no rigidity, no discernible ascites   Extremities: no lower extremity edema, Chronic pigmentary changes, no cyanosis  Neurological:  A&Ox3 , no asterixis  Psychiatric: cooperative     Investigations:  Labs, radiological imaging and cardiac work up were reviewed     Last Recorded Vitals  Blood pressure 127/61, pulse 50, temperature 36 °C (96.8 °F), temperature source Temporal, resp. rate 17, height 1.803 m (5' 11\"), weight " 90.7 kg (200 lb), SpO2 95%.    Relevant Results      Scheduled medications  acetaminophen, 975 mg, oral, 3 times per day  clopidogrel, 75 mg, oral, Daily  diazePAM, 5 mg, oral, 2 times per day  heparin (porcine), 5,000 Units, subcutaneous, q8h CLAIRE  losartan, 50 mg, oral, Daily  melatonin, 10 mg, oral, Nightly  OLANZapine, 5 mg, intramuscular, Once  pantoprazole, 20 mg, oral, Daily before breakfast   Or  pantoprazole, 20 mg, intravenous, Daily before breakfast  perflutren lipid microspheres, 0.5-10 mL of dilution, intravenous, Once in imaging  rosuvastatin, 20 mg, oral, Once per day on Sunday Tuesday Thursday Saturday  [Held by provider] torsemide, 10 mg, oral, Every other day      Continuous medications  lactated Ringer's, 75 mL/hr, Last Rate: 75 mL/hr (10/01/24 1647)      PRN medications  PRN medications: ondansetron, simethicone    Results for orders placed or performed during the hospital encounter of 09/30/24 (from the past 24 hour(s))   POCT GLUCOSE   Result Value Ref Range    POCT Glucose 110 (H) 74 - 99 mg/dL          Assessment/Plan     Giuseppe Davila is a 88 y.o. male  with a past medical history of hypertension, atrial fibrillation s/p watchman, CAD s/p 2 stents, HFpEF, prior CVA 2018, aortic valve stenosis s/p TAVR 7/9/2024, prostate CA s/p radical prostatectomy with right seminal vesicle lesion and left fifth anterior rib osseous lesion, diverticulosis, past diverticulitis(?) per Care Everywhere presented to the [facility] ED on 9/30/2024 with complaints of dizziness, nausea, vomiting, diarrhea, abdominal pain, malaise of several months duration.     #Diarrhea  #S/p cholecystectomy, remote    Neurology, cardiology consults ordered for patient's intense episodes of dizziness, diaphoresis and nausea.    Unlikely that patient has infectious diarrhea as loose stools are not new to him and about the same since he got cholecystectomy approximately 40 years ago.  Nevertheless to be thorough would order stool for  C. difficile and path panel as well as fecal calprotectin.  Will await for results.    If negative old otherwise outpatient follow-up with GI to discuss possibility of endoscopic studies for family history of stomach cancer and persistent diarrhea.    Medical management as per primary medicine.    Patient discussed with Dr. Fisher    I spent 60 minutes in the professional and overall care of this patient.      Jenifer Unger, APRN-CNP

## 2024-10-01 NOTE — CARE PLAN
The patient's goals for the shift include  comfort and safety    The clinical goals for the shift include remain HD stable

## 2024-10-01 NOTE — PROGRESS NOTES
Physical Therapy    Physical Therapy Evaluation    Patient Name: Giuseppe Davila  MRN: [id number]  Today's Date: 10/1/2024   Time Calculation  Start Time: 0854  Stop Time: 0902  Time Calculation (min): 8 min  635/635-A    Assessment/Plan   PT Assessment  PT Assessment Results: Decreased strength, Impaired balance  Rehab Prognosis: Poor  Evaluation/Treatment Tolerance: Patient tolerated treatment well  Medical Staff Made Aware: Yes  Strengths: Ability to acquire knowledge  End of Session Communication: Bedside nurse, PCT/NA/CTA  Assessment Comment: pt tolerated session. pt presented with decreased functional strength and balance. pt would benefit from mod int therapy in order to return to PLOF.  End of Session Patient Position: Bed, 2 rail up, Alarm on (call light in reach)  IP OR SWING BED PT PLAN  Inpatient or Swing Bed: Inpatient  PT Plan  Treatment/Interventions: Bed mobility, Transfer training, Gait training, Balance training, Strengthening, Therapeutic exercise, Therapeutic activity  PT Plan: Ongoing PT  PT Frequency: 3 times per week  PT Discharge Recommendations: Moderate intensity level of continued care  PT Recommended Transfer Status: Assist x1  PT - OK to Discharge: Yes (once medically cleared)    Subjective     Current Problem:  1. Dizziness        2. Diarrhea, unspecified type        3. Urinary retention          Patient Active Problem List   Diagnosis    Atrial fibrillation with slow ventricular response (Multi)    Cerebral infarction    Chest tightness    Episodic lightheadedness    Exertional angina (CMS-HCC)    History of claustrophobia    Hyperlipidemia    Hypertension    Intermittent claudication of both lower extremities due to atherosclerosis (CMS-HCC)    Leg edema    Moderate tricuspid regurgitation    Polyneuropathy    Presence of stent in coronary artery    Right inguinal hernia    Rotator cuff tear, right    Severe mitral regurgitation    Weakness of both lower extremities    Celiac artery  stenosis    History of radiation therapy    Iron deficiency anemia due to chronic blood loss    Hematochezia    Pacemaker    Sick sinus syndrome (Multi)    Vertigo    Unsteady gait    Ramon's esophagus    Cerumen impaction    CVA (cerebrovascular accident) (Multi)    Epistaxis    Foot drop, bilateral    Globus syndrome    Lip lesion    History of cerebrovascular accident    Neuropathy    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)    Rectal ulceration    Seborrhea    Sialoadenitis    Chronic diastolic (congestive) heart failure    CAD (coronary artery disease)    Back pain    Atypical chest pain    Infected sebaceous cyst    Nausea and vomiting    Pain of right shoulder region    Patent foramen ovale (HHS-HCC)    Tear of rotator cuff    Vitamin D deficiency    Abnormal nuclear stress test    COVID    History of myocardial infarction    S/P TAVR (transcatheter aortic valve replacement)    Generalized weakness    Dizziness       General Visit Information:  General  Reason for Referral: PT eval and tx  Referred By: Ryne  Past Medical History Relevant to Rehab: 88-year-old male with a past medical history of hypertension, atrial fibrillation s/p watchman, CAD s/p 2 stents, HFpEF, prior CVA 2018, aortic valve stenosis s/p TAVR 7/9/2024, prostate CA s/p radical prostatectomy with right seminal vesicle lesion and left fifth anterior rib osseous lesion, diverticulosis, past diverticulitis(?) per Care Everywhere presented to the ED  with complaints of dizziness, nausea, vomiting, diarrhea, abdominal pain, malaise of several months duration.  Co-Treatment: OT  Co-Treatment Reason: to maximize pt safety and mobility  Prior to Session Communication: Bedside nurse  Patient Position Received:  (standing with 2 PCNAs, vitals being taken)  General Comment: patient pleasant and cooperative to participate    Home Living:  Home Living  Home Living Comments: pt lives with spouse. pt has 1st floor set up. 1 LORI with rail. pt has  standard toilet, WIS with GBs, HHS, and shower seat.    Prior Level of Function:  Prior Function Per Pt/Caregiver Report  Prior Function Comments: pt IND in ADLs and IADLs. pt wife completes cooking, cleaning, and laundry. pt doesnt drive. wife drives and grocery shops. pt amb with w/w.    Precautions:  Precautions  Precautions Comment: alarms, npo, aspiration precautions, fall precautions, pacheco, IV, tele       Objective     Pain:  Pain Assessment  0-10 (Numeric) Pain Score: 0 - No pain    Cognition:  Cognition  Overall Cognitive Status: Within Functional Limits    General Assessments:         Sensation  Sensation Comment: reports chronic numbness/tingling in feet due to neurpathy    Strength  Strength Comments: BLE strength 4/5 grossly. BLE ROM WFL          Functional Assessments:     Bed Mobility  Bed Mobility:  (sup to sit with SBA and v/c for safety and sequencing)    Transfers  Transfer:  (STS from EOB and BSC with MIN A x 1 with WW and also completed SPT from EOB to BSC with MIN A x 1 with WW. did not have to have BM once on commode)    Ambulation/Gait Training  Ambulation/Gait Training Performed:  (amb of 15 ft with w/w and min Ax1. v/c for sequencing with the walker. v/c to maintain safety.)       Outcome Measures:     [facility] Basic Mobility  Turning from your back to your side while in a flat bed without using bedrails: A little  Moving from lying on your back to sitting on the side of a flat bed without using bedrails: A little  Moving to and from bed to chair (including a wheelchair): A little  Standing up from a chair using your arms (e.g. wheelchair or bedside chair): A little  To walk in hospital room: A little  Climbing 3-5 steps with railing: Total  Basic Mobility - Total Score: 16          Goals:  Encounter Problems       Encounter Problems (Active)       PT Problem       Pt will amb 150' using w/w with Jaqui  (Progressing)       Start:  10/01/24    Expected End:  10/15/24            STG - Pt will  perform a B LE ther ex program of 2-3 sets of 10  (Progressing)       Start:  10/01/24    Expected End:  10/15/24            STG - Pt will transfer STS IND  (Progressing)       Start:  10/01/24    Expected End:  10/15/24            STG - Pt will demonstrate good dynamic/static standing/seated balance with no LOB noted   (Progressing)       Start:  10/01/24    Expected End:  10/15/24               Pain - Adult            Education Documentation  Mobility Training, taught by Cristin Samaniego PT at 10/1/2024  3:06 PM.  Learner: Patient  Readiness: Acceptance  Method: Explanation  Response: Verbalizes Understanding    Education Comments  No comments found.

## 2024-10-01 NOTE — PROGRESS NOTES
Occupational Therapy    Evaluation    Patient Name: Giuseppe Davila  MRN: [id number]  Department: [facility]  Room: [address] [place] [address]-  Today's Date: 10/1/2024  Time Calculation  Start Time: 0853  Stop Time: 0902  Time Calculation (min): 9 min        Assessment:  End of Session Communication: Bedside nurse, PCT/NA/CTA  End of Session Patient Position: Bed, 2 rail up, Alarm on (call light in reach)  OT Assessment Results: Decreased ADL status, Decreased upper extremity strength, Decreased safe judgment during ADL, Decreased endurance, Decreased functional mobility  Strengths: Housing layout, Support and attitude of living partners  Barriers to Participation: Comorbidities  Plan:  Treatment Interventions: ADL retraining, Functional transfer training, UE strengthening/ROM, Endurance training, Patient/family training, Equipment evaluation/education, Compensatory technique education  OT Frequency: 3 times per week  OT Discharge Recommendations: Moderate intensity level of continued care  OT - OK to Discharge: Yes (once medically appropriate to next level of care)  Treatment Interventions: ADL retraining, Functional transfer training, UE strengthening/ROM, Endurance training, Patient/family training, Equipment evaluation/education, Compensatory technique education    Subjective   Current Problem:  1. Dizziness        2. Diarrhea, unspecified type        3. Urinary retention          General:  General  Reason for Referral: OT eval and tx; ADLs. dx: Colitis, undifferentiated,  Orthostatic hypotension, Presyncope 2/2 orthostasis,  Concern for POTS syndrome. CT A&P: Nonspecific colitis without bowel obstruction or pneumatosis. CTA head/neck ordered/pending at time of eval  Referred By: Ryne  Past Medical History Relevant to Rehab: 88-year-old male with a past medical history of hypertension, atrial fibrillation s/p watchman, CAD s/p 2 stents, HFpEF, prior CVA 2018, aortic valve stenosis s/p TAVR 7/9/2024, prostate CA s/p radical  prostatectomy with right seminal vesicle lesion and left fifth anterior rib osseous lesion, diverticulosis, past diverticulitis(?) per Care Everywhere presented to the ED  with complaints of dizziness, nausea, vomiting, diarrhea, abdominal pain, malaise of several months duration.  Co-Treatment: PT  Co-Treatment Reason: for safety and to maximize therapeutic performance  Prior to Session Communication: Bedside nurse  Patient Position Received:  (standing with 2 PCNAs, vitals being taken)  General Comment: patient pleasant and cooperative to participate  Precautions:  Medical Precautions: Fall precautions (alarms, npo, aspiration precautions, fall precautions, OOB with assist, pacheco, IV, tele)            Pain:  Pain Assessment  Pain Assessment: 0-10  0-10 (Numeric) Pain Score: 0 - No pain    Objective   Cognition:  Overall Cognitive Status: Within Functional Limits           Home Living:  Type of Home:  (per patient report; lives with spouse, 1 story home, 1 LORI with HR)  Bathroom Shower/Tub:  (stall shower with built in seat, , [facility])  Bathroom Toilet:  (standard toilet)  Prior Function:  Level of [place]:  (independent in ADLs, spouse does IADLs and driving due to patient neuropathy. use of WW. 1 recent fall, reports end of July. sleeps in standard bed)    ADL:  ADL Comments: anticipate MIN-MOD A x1 for ADLs based on performance with transfers and mobility this date    Bed Mobility/Transfers: Bed Mobility  Bed Mobility:  (completed sit to supine with SBA)    Transfers  Transfer:  (completed STS from EOB and BSC with MIN A x 1 with WW and also completed SPT from EOB to BSC with MIN A x 1 with WW. did not have to have BM once on commode)      Functional Mobility:  Functional Mobility  Functional Mobility Performed:  (engaged in simple mobility with MIN A x 1 with WW)     Sensation:  Sensation Comment: reports chronic numbness/tingling in feet due to neurpathy  Strength:  Strength Comments: BUE ROM/MMT WFL for  patient age as seen through transfers and mobility this date      Outcome Measures:[facility] Daily Activity  Putting on and taking off regular lower body clothing: A lot  Bathing (including washing, rinsing, drying): A lot  Putting on and taking off regular upper body clothing: A little  Toileting, which includes using toilet, bedpan or urinal: A lot  Taking care of personal grooming such as brushing teeth: A little  Eating Meals: None  Daily Activity - Total Score: 16        Education Documentation  Body Mechanics, taught by Angie Russell OT at 10/1/2024 11:34 AM.  Learner: Patient  Readiness: Acceptance  Method: Explanation  Response: Verbalizes Understanding, Needs Reinforcement    ADL Training, taught by Angie Russell OT at 10/1/2024 11:34 AM.  Learner: Patient  Readiness: Acceptance  Method: Explanation  Response: Verbalizes Understanding, Needs Reinforcement    Education Comments  No comments found.        OP EDUCATION:       Goals:  Encounter Problems       Encounter Problems (Active)       OT Goals       STG- patient will complete LB dressing at SBA with use of ae/ad/dme prn (Progressing)       Start:  10/01/24    Expected End:  10/15/24            STG- patient will complete toileting at SBA with use of ae/ad/dme prn (Progressing)       Start:  10/01/24    Expected End:  10/15/24            STG- Patient will complete transfers to/from bed, chair, commode at SBA with use of ae/ad/dme prn (Progressing)       Start:  10/01/24    Expected End:  10/15/24            STG- patient will complete simple mobility with SBA with use of ae/ad/dme prn (Progressing)       Start:  10/01/24    Expected End:  10/15/24            STG- patient will complete grooming with SBA with use of ae/ad/dme prn  (Progressing)       Start:  10/01/24    Expected End:  10/15/24

## 2024-10-01 NOTE — PROGRESS NOTES
10/01/24 1516   Discharge Planning   Living Arrangements Spouse/significant other   Support Systems Family members;Spouse/significant other;Children   Type of Residence Private residence   Expected Discharge Disposition Home H   Does the patient need discharge transport arranged? No     I met with patient at his bedside, verifed insurance and demographics.  Patient lives with his wife.  He ambulates with a walker and requires minimal assistance with ADLs which his wife provides.  Due to neuropathy, patient does not drive; his wife does the driving.  Patient reported a fall on 7/1/24 on the day he was released from [facility].  We discussed his AMPAC score, 16, and patient adamantly refused SNF but is agreeable to home health care.  He declined a list of [facility] agencies, verbalized his preference for [facility] as he has had them in the past and was pleased with the care he received.  Care Coordination team following for assistance with discharge planning.  Nehemiah PERES TCC

## 2024-10-02 LAB
ALBUMIN SERPL BCP-MCNC: 3.5 G/DL (ref 3.4–5)
ANION GAP SERPL CALC-SCNC: 11 MMOL/L (ref 10–20)
BUN SERPL-MCNC: 10 MG/DL (ref 6–23)
CALCIUM SERPL-MCNC: 8.6 MG/DL (ref 8.6–10.3)
CHLORIDE SERPL-SCNC: 107 MMOL/L (ref 98–107)
CO2 SERPL-SCNC: 27 MMOL/L (ref 21–32)
CREAT SERPL-MCNC: 0.92 MG/DL (ref 0.5–1.3)
EGFRCR SERPLBLD CKD-EPI 2021: 81 ML/MIN/1.73M*2
ERYTHROCYTE [DISTWIDTH] IN BLOOD BY AUTOMATED COUNT: 14.1 % (ref 11.5–14.5)
GLUCOSE SERPL-MCNC: 107 MG/DL (ref 74–99)
HCT VFR BLD AUTO: 38.3 % (ref 41–52)
HGB BLD-MCNC: 12.4 G/DL (ref 13.5–17.5)
MAGNESIUM SERPL-MCNC: 1.91 MG/DL (ref 1.6–2.4)
MCH RBC QN AUTO: 30.6 PG (ref 26–34)
MCHC RBC AUTO-ENTMCNC: 32.4 G/DL (ref 32–36)
MCV RBC AUTO: 95 FL (ref 80–100)
NRBC BLD-RTO: 0 /100 WBCS (ref 0–0)
PHOSPHATE SERPL-MCNC: 4 MG/DL (ref 2.5–4.9)
PLATELET # BLD AUTO: 121 X10*3/UL (ref 150–450)
POTASSIUM SERPL-SCNC: 3.4 MMOL/L (ref 3.5–5.3)
RBC # BLD AUTO: 4.05 X10*6/UL (ref 4.5–5.9)
SODIUM SERPL-SCNC: 142 MMOL/L (ref 136–145)
WBC # BLD AUTO: 5.2 X10*3/UL (ref 4.4–11.3)

## 2024-10-02 PROCEDURE — 83735 ASSAY OF MAGNESIUM: CPT

## 2024-10-02 PROCEDURE — 36415 COLL VENOUS BLD VENIPUNCTURE: CPT

## 2024-10-02 PROCEDURE — 99233 SBSQ HOSP IP/OBS HIGH 50: CPT | Performed by: PSYCHIATRY & NEUROLOGY

## 2024-10-02 PROCEDURE — 1200000002 HC GENERAL ROOM WITH TELEMETRY DAILY

## 2024-10-02 PROCEDURE — 2500000004 HC RX 250 GENERAL PHARMACY W/ HCPCS (ALT 636 FOR OP/ED)

## 2024-10-02 PROCEDURE — 2500000002 HC RX 250 W HCPCS SELF ADMINISTERED DRUGS (ALT 637 FOR MEDICARE OP, ALT 636 FOR OP/ED)

## 2024-10-02 PROCEDURE — 2500000001 HC RX 250 WO HCPCS SELF ADMINISTERED DRUGS (ALT 637 FOR MEDICARE OP)

## 2024-10-02 PROCEDURE — 84132 ASSAY OF SERUM POTASSIUM: CPT

## 2024-10-02 PROCEDURE — 99223 1ST HOSP IP/OBS HIGH 75: CPT | Performed by: INTERNAL MEDICINE

## 2024-10-02 PROCEDURE — 85027 COMPLETE CBC AUTOMATED: CPT

## 2024-10-02 RX ORDER — POTASSIUM CHLORIDE 1.5 G/1.58G
40 POWDER, FOR SOLUTION ORAL ONCE
Status: COMPLETED | OUTPATIENT
Start: 2024-10-02 | End: 2024-10-02

## 2024-10-02 ASSESSMENT — COGNITIVE AND FUNCTIONAL STATUS - GENERAL
DRESSING REGULAR UPPER BODY CLOTHING: A LITTLE
DAILY ACTIVITIY SCORE: 21
DRESSING REGULAR LOWER BODY CLOTHING: A LITTLE
EATING MEALS: A LITTLE
TOILETING: A LITTLE
DRESSING REGULAR UPPER BODY CLOTHING: A LOT
WALKING IN HOSPITAL ROOM: A LITTLE
MOVING TO AND FROM BED TO CHAIR: A LITTLE
DRESSING REGULAR LOWER BODY CLOTHING: A LITTLE
MOVING TO AND FROM BED TO CHAIR: A LITTLE
MOBILITY SCORE: 18
STANDING UP FROM CHAIR USING ARMS: A LITTLE
STANDING UP FROM CHAIR USING ARMS: A LITTLE
WALKING IN HOSPITAL ROOM: A LITTLE
MOVING FROM LYING ON BACK TO SITTING ON SIDE OF FLAT BED WITH BEDRAILS: A LITTLE
MOBILITY SCORE: 18
PERSONAL GROOMING: A LITTLE
DAILY ACTIVITIY SCORE: 18
CLIMB 3 TO 5 STEPS WITH RAILING: A LITTLE
TURNING FROM BACK TO SIDE WHILE IN FLAT BAD: A LITTLE
MOVING FROM LYING ON BACK TO SITTING ON SIDE OF FLAT BED WITH BEDRAILS: A LITTLE
CLIMB 3 TO 5 STEPS WITH RAILING: A LITTLE
TURNING FROM BACK TO SIDE WHILE IN FLAT BAD: A LITTLE
TOILETING: A LITTLE

## 2024-10-02 ASSESSMENT — PAIN SCALES - GENERAL
PAINLEVEL_OUTOF10: 2
PAINLEVEL_OUTOF10: 0 - NO PAIN

## 2024-10-02 NOTE — CARE PLAN
The patient's goals for the shift include      The clinical goals for the shift include maintain safety and comfort    Over the shift, the patient did not make progress toward the following goals. Barriers to progression include ***. Recommendations to address these barriers include ***.    Problem: Safety - Adult  Goal: Free from fall injury  Outcome: Not Progressing  Flowsheets (Taken 10/2/2024 0647)  Free from fall injury: Instruct family/caregiver on patient safety  Note: Poor safety awareness at times; requires fall risk interventions and education reinforcement

## 2024-10-02 NOTE — CONSULTS
"Consults    Reason For Consult  \"Orthostatic hypotension\"    History Of Present Illness  Giuseppe Davila is a 88 y.o. male with history of  HTN, atrial fibrillation s/p watchman, CAD s/p 2 stents, HFpEF, prior CVA (2018), aortic valve stenosis s/p TAVR (7/9/2024), and prostate CA s/p radical prostatectomy presenting with roughly twice monthly episodes of dizziness and nausea since his TAVR in July.    He had a similar presentation back on 8/5/24 with positive orthostatics at that time. He has a chronic history of issues with dizziness with head turning that has not resolved with prior Epley maneuvers and vestibular therapy. He also has a longstanding history of issues with extreme sensitivity to motion sickness. He has tried meclizine, as prescribed by his cardiologist (Dr. Carmichael), that only exacerbated his symptoms.     Upon interview today he was laying comfortably in bed and provided a salient history. While he has had similar issues with dizziness for much of his life, he feels he has worse single episodes occasionally since his TAVR in July. He notes that these episodes tend to happen after eating breakfast and are not associated with changing position or standing. He and his wife also take his blood pressure regularly and have noted that his blood pressure is usually high (in the 130s systolic) during these episodes.     Today, he says his symptoms are much improved from when he came in on 9/30/24. However, he says he still gets easily dizzy by turning his head to either side. Has has not had any recent vomiting. He says that he was started on valium last night and that he had an episode over night where he woke up confused, thinking he was at home, and pulled out his IV trying to go use the bathroom (while his urinary catheter was in). He says that is not normal for him and he is worried it is from the valium.     He denies any recent or current symptoms of chest pain, palpitations, changes in vision prior or " during episodes, new numbness/tingling, or dizziness while straining.      Past Medical History  He has a past medical history of Anorectal abscess, Essential (primary) hypertension, Old myocardial infarction, Other hyperlipidemia, Personal history of malignant neoplasm, unspecified, Personal history of other diseases of the circulatory system, Personal history of other diseases of the circulatory system, Personal history of other diseases of the nervous system and sense organs (05/02/2019), Personal history of other specified conditions, Personal history of other specified conditions, Personal history of other specified conditions, Personal history of transient ischemic attack (TIA), and cerebral infarction without residual deficits, Personal history of transient ischemic attack (TIA), and cerebral infarction without residual deficits, Syncope and collapse, Thoracic aortic aneurysm, without rupture, unspecified (CMS-HCC), Unspecified atrial fibrillation (Multi) (03/10/2020), and Ventricular premature depolarization.    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     Social History  He reports that he has never smoked. He has never used smokeless tobacco. He reports that he does not currently use alcohol. He reports that he does not currently use drugs.  He lives at home with his wife. He does most of his own ADLs and his wife helps with the rest. He does not drive. He is able to walk around the house using a walker.  Denies any substance use.     Family History  Family History   Problem Relation Name Age of Onset    Other (cardiac disorder) Mother      Transient ischemic attack Mother      Cancer Father      Other (cardiac disorder) Sister      Alzheimer's disease Father's Brother      Parkinsonism Father's Brother          Allergies  Patient has no known allergies.    Review of Systems  See HPI     Physical Exam  General: Pleasant older man who was mildly hard of hearing resting comfortably in bed. Demonstrated good understanding of his current health situation and prior history.     Cardiovascular: Normal rate and regular rhythm while I was auscultating. No S3 or S4 noted but there was a faint systolic murmur present. Auscultation was poor due to using the contact precautions stethoscope. Pulses were symmetric but weak in the b/l UE and LE. +1 LE edema bilaterally to the knees. No JVD noted. 1-2 second capillary refill.     Pulmonary: Patient breathing without evidence of excess effort. Anterior breath sounds without W/R/R.     Abdominal: Soft and non-tender to palpation.        Last Recorded Vitals  /88   Pulse 55   Temp 35.9 °C (96.6 °F)   Resp 18   Wt 90.7 kg (200 lb)   SpO2 92%        Assessment/Plan   Giuseppe Davila is a 88 y.o. male presenting with chronic episodes of dizziness.     Dizziness, likely non-cardiogenic  History of orthostatic hypotension  - Patient with decade long problem of dizziness with head movement that is reproducible on exam today, resistant to meclizine, and has not yet improved with vestibular therapy   - Current symptoms likely more related to the patient's chronic vestibular problems with underlying orthostatic risk  - While patient has had positive orthostatics in the past, recent episodes since TAVR have started without positional changes and his blood pressures have been elevated during these episodes rather than low based on home measurements  - Patient without chest pain,  palpitations, or other prodromal symptoms  - A portion of these symptoms could be due to orthostatic hypotension but does not explain the whole picture    PLAN  - Providing the patient with fluids was appropriate to ensure good volume status  - Stop use of scheduled furosemide to provide pressure buffer against orthostatic hypotension  - Patient should use furosemide as a PRN for when lower extremity edema worsens   - Utilize conservative management   - Continue to consider other causes of dizziness  - Consider medication side effects and lifestyle changes  - Follow up with Dr. Carmichael outpatient   - Continue home vestibular therapy      ALECIA ISAAC 4  This is a medical student note for education.

## 2024-10-02 NOTE — PROGRESS NOTES
"Giuseppe Davila is a 88 y.o. male on day 1 of admission presenting with Dizziness.      Subjective   The patient is an 88-year-old male who recently had his aortic valve stenosis fixed via TAVR.  The patient has continued to have episodes of dizziness.  The patient states that he has had dizziness for many years.  He was admitted through the ER and had a CT scan of the brain done that was negative for any acute process.       Objective     Last Recorded Vitals  Blood pressure 115/66, pulse 57, temperature 36.1 °C (97 °F), resp. rate 18, height 1.803 m (5' 11\"), weight 90.7 kg (200 lb), SpO2 94%.    Physical Exam  Neurological Exam  The patient's mental status testing shows that the patient is alert and oriented ×3 with no evidence of any aphasia or dysarthria.  The patient's cranial nerve testing 2, 3, 4, 5, 6, and 7 are all within normal limits.  The patient's motor testing shows normal tone, bulk and power in the upper and lower extremities bilaterally.    Relevant Results  Scheduled medications  acetaminophen, 975 mg, oral, 3 times per day  clopidogrel, 75 mg, oral, Daily  heparin (porcine), 5,000 Units, subcutaneous, q8h CLAIER  losartan, 50 mg, oral, Daily  melatonin, 10 mg, oral, Nightly  OLANZapine, 5 mg, intramuscular, Once  pantoprazole, 20 mg, oral, Daily before breakfast   Or  pantoprazole, 20 mg, intravenous, Daily before breakfast  perflutren lipid microspheres, 0.5-10 mL of dilution, intravenous, Once in imaging  rosuvastatin, 20 mg, oral, Once per day on Sunday Tuesday Thursday Saturday  [Held by provider] torsemide, 10 mg, oral, Every other day      Continuous medications  lactated Ringer's, 75 mL/hr, Last Rate: 75 mL/hr (10/02/24 0641)      PRN medications  PRN medications: ondansetron, simethicone    Results for orders placed or performed during the hospital encounter of 09/30/24 (from the past 96 hour(s))   ECG 12 lead   Result Value Ref Range    Ventricular Rate 78 BPM    QRS Duration 144 ms    QT " Interval 420 ms    QTC Calculation(Bazett) 478 ms    R Axis 68 degrees    T Axis 268 degrees    QRS Count 12 beats    Q Onset 204 ms    T Offset 414 ms    QTC Fredericia 458 ms   CBC and Auto Differential   Result Value Ref Range    WBC 3.9 (L) 4.4 - 11.3 x10*3/uL    nRBC 0.0 0.0 - 0.0 /100 WBCs    RBC 4.18 (L) 4.50 - 5.90 x10*6/uL    Hemoglobin 12.9 (L) 13.5 - 17.5 g/dL    Hematocrit 38.4 (L) 41.0 - 52.0 %    MCV 92 80 - 100 fL    MCH 30.9 26.0 - 34.0 pg    MCHC 33.6 32.0 - 36.0 g/dL    RDW 13.8 11.5 - 14.5 %    Platelets 141 (L) 150 - 450 x10*3/uL    Neutrophils % 64.5 40.0 - 80.0 %    Immature Granulocytes %, Automated 0.3 0.0 - 0.9 %    Lymphocytes % 22.4 13.0 - 44.0 %    Monocytes % 8.2 2.0 - 10.0 %    Eosinophils % 3.8 0.0 - 6.0 %    Basophils % 0.8 0.0 - 2.0 %    Neutrophils Absolute 2.53 1.60 - 5.50 x10*3/uL    Immature Granulocytes Absolute, Automated 0.01 0.00 - 0.50 x10*3/uL    Lymphocytes Absolute 0.88 0.80 - 3.00 x10*3/uL    Monocytes Absolute 0.32 0.05 - 0.80 x10*3/uL    Eosinophils Absolute 0.15 0.00 - 0.40 x10*3/uL    Basophils Absolute 0.03 0.00 - 0.10 x10*3/uL   Comprehensive metabolic panel   Result Value Ref Range    Glucose 107 (H) 74 - 99 mg/dL    Sodium 140 136 - 145 mmol/L    Potassium 3.4 (L) 3.5 - 5.3 mmol/L    Chloride 106 98 - 107 mmol/L    Bicarbonate 27 21 - 32 mmol/L    Anion Gap 10 10 - 20 mmol/L    Urea Nitrogen 11 6 - 23 mg/dL    Creatinine 1.10 0.50 - 1.30 mg/dL    eGFR 65 >60 mL/min/1.73m*2    Calcium 8.8 8.6 - 10.3 mg/dL    Albumin 3.5 3.4 - 5.0 g/dL    Alkaline Phosphatase 52 33 - 136 U/L    Total Protein 6.0 (L) 6.4 - 8.2 g/dL    AST 22 9 - 39 U/L    Bilirubin, Total 1.0 0.0 - 1.2 mg/dL    ALT 10 10 - 52 U/L   Magnesium   Result Value Ref Range    Magnesium 1.95 1.60 - 2.40 mg/dL   Lactate   Result Value Ref Range    Lactate 1.7 0.4 - 2.0 mmol/L   Troponin I, High Sensitivity   Result Value Ref Range    Troponin I, High Sensitivity 18 0 - 20 ng/L   Sars-CoV-2 PCR   Result Value  Ref Range    Coronavirus 2019, PCR Not Detected Not Detected   Urinalysis with Reflex Culture and Microscopic   Result Value Ref Range    Color, Urine Colorless (N) Light-Yellow, Yellow, Dark-Yellow    Appearance, Urine Clear Clear    Specific Gravity, Urine 1.013 1.005 - 1.035    pH, Urine 8.0 5.0, 5.5, 6.0, 6.5, 7.0, 7.5, 8.0    Protein, Urine NEGATIVE NEGATIVE, 10 (TRACE), 20 (TRACE) mg/dL    Glucose, Urine Normal Normal mg/dL    Blood, Urine NEGATIVE NEGATIVE    Ketones, Urine NEGATIVE NEGATIVE mg/dL    Bilirubin, Urine NEGATIVE NEGATIVE    Urobilinogen, Urine Normal Normal mg/dL    Nitrite, Urine NEGATIVE NEGATIVE    Leukocyte Esterase, Urine NEGATIVE NEGATIVE   Extra Urine Gray Tube   Result Value Ref Range    Extra Tube Hold for add-ons.    POCT GLUCOSE   Result Value Ref Range    POCT Glucose 110 (H) 74 - 99 mg/dL   CBC   Result Value Ref Range    WBC 5.2 4.4 - 11.3 x10*3/uL    nRBC 0.0 0.0 - 0.0 /100 WBCs    RBC 4.05 (L) 4.50 - 5.90 x10*6/uL    Hemoglobin 12.4 (L) 13.5 - 17.5 g/dL    Hematocrit 38.3 (L) 41.0 - 52.0 %    MCV 95 80 - 100 fL    MCH 30.6 26.0 - 34.0 pg    MCHC 32.4 32.0 - 36.0 g/dL    RDW 14.1 11.5 - 14.5 %    Platelets 121 (L) 150 - 450 x10*3/uL   Renal Function Panel   Result Value Ref Range    Glucose 107 (H) 74 - 99 mg/dL    Sodium 142 136 - 145 mmol/L    Potassium 3.4 (L) 3.5 - 5.3 mmol/L    Chloride 107 98 - 107 mmol/L    Bicarbonate 27 21 - 32 mmol/L    Anion Gap 11 10 - 20 mmol/L    Urea Nitrogen 10 6 - 23 mg/dL    Creatinine 0.92 0.50 - 1.30 mg/dL    eGFR 81 >60 mL/min/1.73m*2    Calcium 8.6 8.6 - 10.3 mg/dL    Phosphorus 4.0 2.5 - 4.9 mg/dL    Albumin 3.5 3.4 - 5.0 g/dL   Magnesium   Result Value Ref Range    Magnesium 1.91 1.60 - 2.40 mg/dL     I did review the images of the CT angiogram of the neck and brain.    CT angio head and neck w and wo IV contrast    Result Date: 10/1/2024  Interpreted By:  Malgorzata Staley, STUDY: CT ANGIO HEAD AND NECK W AND WO IV CONTRAST;  10/1/2024 4:25  pm   INDICATION: Signs/Symptoms:eval for stenoic vessels and/or thrombosis.     COMPARISON: CTA 06/15/2018   ACCESSION NUMBER(S): [id number]   ORDERING CLINICIAN: MARCIAL UMANZOR   TECHNIQUE: Unenhanced CT images of the head were obtained. Subsequently, N/A of N/A was administered intravenously and axial images of the head and neck were acquired. Coronal and  sagittal,  reconstructions were provided for review.   FINDINGS:     CTA HEAD FINDINGS:   Anterior circulation: The bilateral intracranial internal carotid arteries, bilateral carotid terminals, bilateral proximal anterior and middle cerebral arteries are patent. There is atherosclerotic calcification along the cavernous segment of the carotid artery..   Posterior circulation: Bilateral intracranial vertebral arteries, vertebrobasilar junction, basilar artery and proximal posterior cerebral arteries are patent. There is atherosclerotic calcification along the intradural segment of the right vertebral artery. The right vertebral artery is dominant. There is narrowing of the left vertebral artery at the vertebrobasilar junction (image 199 series 403).   The basilar artery is markedly diminutive and irregular. There may be a congenitally small basilar artery secondary to robust posterior communicating arteries.   The posterior cerebral arteries are predominantly supplied by robust posterior communicating arteries.   CTA NECK FINDINGS:   The ascending aorta measures 4.3 cm in AP dimension. There is atherosclerotic calcification along the aortic arch. No significant stenosis at the origins of the great vessels.   Right carotid vessels: The common carotid artery is patent. Mild atherosclerotic calcification at the carotid bifurcation without stenosis. The internal carotid artery in the neck is patent without stenosis.   Left carotid vessels: The common carotid artery is patent. Atherosclerotic calcification at the carotid bifurcation without stenosis. The internal  carotid artery in the neck is patent without stenosis.   Vertebral vessels:  The visualized segments of the cervical vertebral arteries are patent.         CTA neck:   No evidence for significant stenosis of the cervical vessels. Mild calcification at the carotid bifurcation without stenosis.   The ascending aorta is prominent measuring 4.3 cm in AP dimension.   CTA head:   There is a markedly diminutive and irregular basilar artery which may be congenitally small secondary to robust posterior communicating arteries.   There is narrowing at the vertebrobasilar junction on the left.   The posterior cerebral arteries are predominantly supplied by robust posterior communicating arteries.   These findings are unchanged compared to the prior CTA 06/15/2018.   Otherwise, No evidence for significant stenosis or large branch vessel cutoffs of the intracranial vessels.   MACRO: None   Signed by: Malgorzata Staley 10/1/2024 4:44 PM Dictation workstation:   [id number]          Assessment/Plan      Assessment & Plan  Dizziness    The patient is still having episodes of dizziness.  I did review the cardiology consult.  I would certainly treat any underlying infections and normalize any metabolic abnormalities.  I would certainly continue all of his medicines and stroke risk factor modification.  On discharge, I would like the patient to call my office and I will order an ENG and vestibular rehab as an outpatient.  The patient will also need to see ENT.  I discussed all these issues in detail with the patient and his wife who is in the room and answered all their questions.  I will sign off for now.  The patient can follow-up with me in the office in 3 to 4 months.    Bridger Dee MD

## 2024-10-02 NOTE — PROGRESS NOTES
MEDICINE PROGRESS NOTE    Subjective     Patient seen and examined with medicine team. NAEO.  Still feels dizzy despite being started on Valium - will discontinue.  CT angio brain and neck recommended by neuro - negative.  Cardiology consulted by neurology, awaiting recs.       Assessment / Plan   Giuseppe Davila is a 88-year-old male with a past medical history of hypertension, atrial fibrillation s/p watchman, CAD s/p 2 stents, HFpEF, prior CVA 2018, aortic valve stenosis s/p TAVR 7/9/2024, prostate CA s/p radical prostatectomy with right seminal vesicle lesion and left fifth anterior rib osseous lesion, diverticulosis, past diverticulitis(?) per Care Everywhere presented to the ED on 8/5/2024 with complaints of dizziness, nausea, vomiting, diarrhea, abdominal pain, malaise of several months duration. On day 1 of admission.      #Colitis, undifferentiated  #Orthostatic hypotension  #Presyncope 2/2 orthostasis  #Concern for BPPV vs POTS  CTAP w/ contrast demonstrating moderate ascending, transverse and descending colonic wall thickening  Orthostatic vitals positive on admission, same as before.  Patient's been having diarrhea and attempted oral rehydration in that context. Repeat orthostats on 10/2 negative after IV hydration with maintenance fluids  CT angio brain and neck unremarkable    -GI consulted for evaluation in the context of patient's recurrent and chronic GI disturbances   C.difficile PCR, stool pathogen panel, fecal calprotectin pending  -Neurology consulted for concern for BPPV vs POTS  Recommended for CT angiogram of the brain and neck (negative), cardiology consult, hydration, pacemaker interrogation   -Cardiology consulted, awaiting recs  -Patient mentions taking meclizine prescribed by cardiologist Dr. Carmichael and not being able to tolerate it (exacerbated presyncope and dizziness).  Discontinued Valium 5 BID, dizziness is refractory to Valium.  OP vestibular therapy  -Scheduled Tylenol TID,  low-dose PPI, PRN simethicone, LR 75 cc/h   -Fall precautions, aspiration precautions in place  -Telemetry ordered     Chronic medical conditions:  Diverticulosis-Metamucil once diarrhea resolves  S/p cholecystectomy  Hx of gastritis-PPI  Hx of GI erosions-PPI  CBD dilation, chronic  HTN-continue losartan 50  HLD-continue home Crestor 24 times weekly (Sunday, Tuesday, Thursday, Saturday)  A-fib s/p watchman  CAD s/p 2 stents-continue Plavix  HFpEF-holding home PRN torsemide 10  Prior CVA 2018-continue Plavix  TIA-continue Plavix  Aortic valve stenosis s/p TAVR 7/9/2024     Incidental findings:   CTAP w IV contrast demonstrated the following  LAD coronary artery calcifications, RCA coronary stent, aortic valve replacement, stable elevation of left hemidiaphragm with adjacent compressive atelectasis, moderate left atrial enlargement, mild bilateral gynecomastia, grossly unchanged mild intrahepatic and marked extrahepatic bile duct dilation dating back to 1/14/2010, s/p cholecystectomy, left kidney with cortical scarring, moderate to severe abdominal aortic atherosclerotic calcifications, stable circumferential bladder wall thickening may be 2/2 chronic urinary obstruction, prostatic brachytherapy seeds, multilevel degenerative disc disease     Checklist:  Feeding: Full liquid diet, n.p.o. midnight  Fluids:  cc/h for 15 hours  DVT ppx: Hep SQ  Ulcer ppx: PPI  Bowel care: None for now  Lines: PIV  Consults: GI, neuro, PT/OT  Code Status: DNR/DNI     Dr. Ciro Dykes, DO   Internal Medicine PGY-2     This is a preliminary note written by the resident. Please wait for attending addendum for finalization of note and recommendations.      Objective   Physical Exam  Constitutional:       General: He is not in acute distress.  HENT:      Head: Normocephalic and atraumatic.      Pharynx: Oropharynx is clear.   Eyes:      Extraocular Movements: Extraocular movements intact.      Pupils: Pupils are equal, round, and  reactive to light.   Cardiovascular:      Rate and Rhythm: Normal rate and regular rhythm.      Pulses: Normal pulses.      Heart sounds: Normal heart sounds.   Pulmonary:      Effort: Pulmonary effort is normal.      Breath sounds: Normal breath sounds.   Abdominal:      Palpations: Abdomen is soft.      Tenderness: There is abdominal tenderness in the left lower quadrant.   Musculoskeletal:      Cervical back: No rigidity or tenderness.   Lymphadenopathy:      Cervical: No cervical adenopathy.   Skin:     Coloration: Skin is not jaundiced.   Neurological:      General: No focal deficit present.      Mental Status: He is alert and oriented to person, place, and time    Last Recorded Vitals  Vitals:    10/02/24 1028 10/02/24 1030 10/02/24 1034 10/02/24 1122   BP: 133/70 132/73 140/88 162/74   Patient Position: Lying Sitting Standing    Pulse: 75 60 55 62   Resp:       Temp:   35.9 °C (96.6 °F) 36.2 °C (97.2 °F)   TempSrc:       SpO2:   92% 94%   Weight:       Height:         Intake/Output last 3 Shifts:  I/O last 3 completed shifts:  In: 2063.8 (22.7 mL/kg) [I.V.:2063.8 (22.7 mL/kg)]  Out: 2800 (30.9 mL/kg) [Urine:2800 (0.9 mL/kg/hr)]  Weight: 90.7 kg   Labs:   Results from last 7 days   Lab Units 10/02/24  0836 09/30/24  1102   SODIUM mmol/L 142 140   POTASSIUM mmol/L 3.4* 3.4*   CHLORIDE mmol/L 107 106   CO2 mmol/L 27 27   BUN mg/dL 10 11   CREATININE mg/dL 0.92 1.10   GLUCOSE mg/dL 107* 107*   CALCIUM mg/dL 8.6 8.8     Results from last 7 days   Lab Units 10/02/24  0836   WBC AUTO x10*3/uL 5.2   HEMOGLOBIN g/dL 12.4*   HEMATOCRIT % 38.3*   PLATELETS AUTO x10*3/uL 121*       CT angio head and neck w and wo IV contrast   Final Result   CTA neck:        No evidence for significant stenosis of the cervical vessels. Mild   calcification at the carotid bifurcation without stenosis.        The ascending aorta is prominent measuring 4.3 cm in AP dimension.        CTA head:        There is a markedly diminutive and  irregular basilar artery which may   be congenitally small secondary to robust posterior communicating   arteries.        There is narrowing at the vertebrobasilar junction on the left.        The posterior cerebral arteries are predominantly supplied by robust   posterior communicating arteries.        These findings are unchanged compared to the prior CTA 06/15/2018.        Otherwise, No evidence for significant stenosis or large branch   vessel cutoffs of the intracranial vessels.        MACRO:   None        Signed by: Malgorzata Staley 10/1/2024 4:44 PM   Dictation workstation:   [id number]      CT head wo IV contrast   Final Result   No CT evidence of acute intracranial abnormality.        MACRO   None        Signed by: Kathy Chavez 9/30/2024 1:01 PM   Dictation workstation:   [id number]      CT abdomen pelvis w IV contrast   Final Result   Nonspecific colitis without bowel obstruction or pneumatosis.        MACRO:   None        Signed by: Kathy Chavez 9/30/2024 1:13 PM   Dictation workstation:   [id number]      XR chest 1 view   Final Result   1.  No evidence of acute cardiopulmonary process.             Signed by: Irving Breaux 9/30/2024 12:16 PM   Dictation workstation:   [id number]        Transthoracic Echo (TTE) Limited 07/10/2024  CONCLUSIONS:  1. The left ventricular systolic function is normal, with a visually estimated ejection fraction of 60-65%.  2. Spectral Doppler shows an abnormal pattern of left ventricular diastolic filling.  3. Abnormal septal motion consistent with left bundle branch block.  4. There is normal right ventricular global systolic function.  5. The left atrium is severely dilated.  6. RVSP within normal limits.  7. There is a transcatheter aortic valve replacement.

## 2024-10-02 NOTE — CARE PLAN
The patient's goals for the shift include      The clinical goals for the shift include safety and comfort    Over the shift, the patient did not make progress toward the following goals. Barriers to progression include . Recommendations to address these barriers include .

## 2024-10-02 NOTE — CARE PLAN
The patient's goals for the shift include n/a     The clinical goals for the shift include maintain safety and comfort    Problem: Safety - Adult  Goal: Free from fall injury  10/2/2024 0549 by Breanna Lowe RN  Outcome: Progressing  Flowsheets (Taken 10/2/2024 0545)  Free from fall injury: Instruct family/caregiver on patient safety  Note: Poor safety awareness at times; requires fall risk interventions and education reinforcement to maintain safety

## 2024-10-03 ENCOUNTER — APPOINTMENT (OUTPATIENT)
Dept: CARDIOLOGY | Facility: CLINIC | Age: 89
End: 2024-10-03
Payer: MEDICARE

## 2024-10-03 LAB
ALBUMIN SERPL BCP-MCNC: 3.4 G/DL (ref 3.4–5)
ANION GAP SERPL CALC-SCNC: 10 MMOL/L (ref 10–20)
BUN SERPL-MCNC: 10 MG/DL (ref 6–23)
CALCIUM SERPL-MCNC: 8.5 MG/DL (ref 8.6–10.3)
CHLORIDE SERPL-SCNC: 108 MMOL/L (ref 98–107)
CO2 SERPL-SCNC: 27 MMOL/L (ref 21–32)
CREAT SERPL-MCNC: 0.84 MG/DL (ref 0.5–1.3)
EGFRCR SERPLBLD CKD-EPI 2021: 85 ML/MIN/1.73M*2
ERYTHROCYTE [DISTWIDTH] IN BLOOD BY AUTOMATED COUNT: 13.7 % (ref 11.5–14.5)
GLUCOSE SERPL-MCNC: 100 MG/DL (ref 74–99)
HCT VFR BLD AUTO: 38.5 % (ref 41–52)
HGB BLD-MCNC: 12.8 G/DL (ref 13.5–17.5)
MAGNESIUM SERPL-MCNC: 2 MG/DL (ref 1.6–2.4)
MCH RBC QN AUTO: 30.8 PG (ref 26–34)
MCHC RBC AUTO-ENTMCNC: 33.2 G/DL (ref 32–36)
MCV RBC AUTO: 93 FL (ref 80–100)
NRBC BLD-RTO: 0 /100 WBCS (ref 0–0)
PHOSPHATE SERPL-MCNC: 3.9 MG/DL (ref 2.5–4.9)
PLATELET # BLD AUTO: 118 X10*3/UL (ref 150–450)
POTASSIUM SERPL-SCNC: 3.2 MMOL/L (ref 3.5–5.3)
Q ONSET: 204 MS
QRS COUNT: 12 BEATS
QRS DURATION: 144 MS
QT INTERVAL: 420 MS
QTC CALCULATION(BAZETT): 478 MS
QTC FREDERICIA: 458 MS
R AXIS: 68 DEGREES
RBC # BLD AUTO: 4.15 X10*6/UL (ref 4.5–5.9)
SODIUM SERPL-SCNC: 142 MMOL/L (ref 136–145)
T AXIS: 268 DEGREES
T OFFSET: 414 MS
VENTRICULAR RATE: 78 BPM
WBC # BLD AUTO: 4.3 X10*3/UL (ref 4.4–11.3)

## 2024-10-03 PROCEDURE — 87329 GIARDIA AG IA: CPT

## 2024-10-03 PROCEDURE — 99232 SBSQ HOSP IP/OBS MODERATE 35: CPT | Performed by: INTERNAL MEDICINE

## 2024-10-03 PROCEDURE — 83993 ASSAY FOR CALPROTECTIN FECAL: CPT | Performed by: NURSE PRACTITIONER

## 2024-10-03 PROCEDURE — 87328 CRYPTOSPORIDIUM AG IA: CPT

## 2024-10-03 PROCEDURE — 36415 COLL VENOUS BLD VENIPUNCTURE: CPT

## 2024-10-03 PROCEDURE — 1200000002 HC GENERAL ROOM WITH TELEMETRY DAILY

## 2024-10-03 PROCEDURE — 80069 RENAL FUNCTION PANEL: CPT

## 2024-10-03 PROCEDURE — 85027 COMPLETE CBC AUTOMATED: CPT

## 2024-10-03 PROCEDURE — 2500000002 HC RX 250 W HCPCS SELF ADMINISTERED DRUGS (ALT 637 FOR MEDICARE OP, ALT 636 FOR OP/ED)

## 2024-10-03 PROCEDURE — 2500000001 HC RX 250 WO HCPCS SELF ADMINISTERED DRUGS (ALT 637 FOR MEDICARE OP)

## 2024-10-03 PROCEDURE — 83735 ASSAY OF MAGNESIUM: CPT

## 2024-10-03 PROCEDURE — 2500000004 HC RX 250 GENERAL PHARMACY W/ HCPCS (ALT 636 FOR OP/ED)

## 2024-10-03 RX ORDER — TORSEMIDE 20 MG/1
TABLET ORAL
Qty: 90 TABLET | Refills: 3 | Status: CANCELLED | OUTPATIENT
Start: 2024-10-03

## 2024-10-03 RX ORDER — POTASSIUM CHLORIDE 1.5 G/1.58G
20 POWDER, FOR SOLUTION ORAL ONCE
Status: COMPLETED | OUTPATIENT
Start: 2024-10-03 | End: 2024-10-03

## 2024-10-03 RX ORDER — POTASSIUM CHLORIDE 1.5 G/1.58G
40 POWDER, FOR SOLUTION ORAL ONCE
Status: COMPLETED | OUTPATIENT
Start: 2024-10-03 | End: 2024-10-03

## 2024-10-03 ASSESSMENT — COGNITIVE AND FUNCTIONAL STATUS - GENERAL
WALKING IN HOSPITAL ROOM: A LITTLE
STANDING UP FROM CHAIR USING ARMS: A LITTLE
MOVING TO AND FROM BED TO CHAIR: A LITTLE
CLIMB 3 TO 5 STEPS WITH RAILING: A LITTLE
DAILY ACTIVITIY SCORE: 21
DRESSING REGULAR UPPER BODY CLOTHING: A LITTLE
TURNING FROM BACK TO SIDE WHILE IN FLAT BAD: A LITTLE
DRESSING REGULAR UPPER BODY CLOTHING: A LITTLE
MOBILITY SCORE: 18
MOBILITY SCORE: 18
TOILETING: A LITTLE
DAILY ACTIVITIY SCORE: 21
STANDING UP FROM CHAIR USING ARMS: A LITTLE
DRESSING REGULAR LOWER BODY CLOTHING: A LITTLE
CLIMB 3 TO 5 STEPS WITH RAILING: A LITTLE
TURNING FROM BACK TO SIDE WHILE IN FLAT BAD: A LITTLE
WALKING IN HOSPITAL ROOM: A LITTLE
MOVING TO AND FROM BED TO CHAIR: A LITTLE
MOVING FROM LYING ON BACK TO SITTING ON SIDE OF FLAT BED WITH BEDRAILS: A LITTLE
TOILETING: A LITTLE
MOVING FROM LYING ON BACK TO SITTING ON SIDE OF FLAT BED WITH BEDRAILS: A LITTLE
DRESSING REGULAR LOWER BODY CLOTHING: A LITTLE

## 2024-10-03 ASSESSMENT — PAIN SCALES - GENERAL
PAINLEVEL_OUTOF10: 3
PAINLEVEL_OUTOF10: 0 - NO PAIN

## 2024-10-03 NOTE — CARE PLAN
The patient's goals for the shift include      Problem: Skin  Goal: Prevent/minimize sheer/friction injuries  Outcome: Progressing  Flowsheets (Taken 10/3/2024 1755)  Prevent/minimize sheer/friction injuries:   HOB 30 degrees or less   Turn/reposition every 2 hours/use positioning/transfer devices     Problem: Fall/Injury  Goal: Not fall by end of shift  Outcome: Progressing     The clinical goals for the shift include pt will remain hemodynamically stable throughout shift

## 2024-10-03 NOTE — CARE PLAN
The patient's goals for the shift include      Problem: Fall/Injury  Goal: Not fall by end of shift  Outcome: Progressing     The clinical goals for the shift include pt will remain hemodynamically stable throughout shift

## 2024-10-03 NOTE — PROGRESS NOTES
MEDICINE PROGRESS NOTE    Subjective     Patient seen and examined with medicine team. TORRI, slept soundly. Feels symptomatically better and is asking when he will go home. He still has to get his pacemaker interrogation. Spoke with daughter Nicanor over the phone, she is updated on his clinical status and agrees on the plan for his dizziness workup.       Assessment / Plan   Giuseppe Davila is a 88-year-old male with a past medical history of hypertension, atrial fibrillation s/p watchman, CAD s/p 2 stents, HFpEF, prior CVA 2018, aortic valve stenosis s/p TAVR 7/9/2024, prostate CA s/p radical prostatectomy with right seminal vesicle lesion and left fifth anterior rib osseous lesion, diverticulosis, past diverticulitis(?) per Care Everywhere presented to the ED on 8/5/2024 with complaints of dizziness, nausea, vomiting, diarrhea, abdominal pain, malaise of several months duration. On day 2 of admission.      #Colitis, undifferentiated  #Orthostatic hypotension  #Presyncope (orthostatic vs vestibular)  #Encephalopathy 2/2 Valium    -GI consulted for evaluation in the context of patient's recurrent and chronic GI disturbances   C.difficile PCR, stool pathogen panel, fecal calprotectin pending  OP follow-up with GI to discuss possibility of endoscopic studies for family history of stomach cancer and persistent diarrhea  -Neurology consulted for concern for vestibular dizziness  Recommended for CT angiogram of the brain and neck (negative), cardiology consult, hydration, pacemaker interrogation (ordered), OP ENT appointment, OP vestibular rehab, neurology follow up in 3-4 months  -Cardiology consulted  Recommended for stopping scheduled furosemide and taking it only PRN for leg swelling   -Patient tolerating solid diet  -Patient mentions taking meclizine prescribed by cardiologist Dr. Carmichael and not being able to tolerate it (exacerbated presyncope and dizziness).  Discontinued Valium 5 BID due to confusion and due  to dizziness being refractory to Valium  -Scheduled Tylenol TID, low-dose PPI, PRN simethicone, LR 75 cc/h   -Fall precautions, aspiration precautions in place  -Telemetry ordered     Chronic medical conditions:  Diverticulosis  S/p cholecystectomy  Hx of gastritis-PPI  Hx of GI erosions-PPI  CBD dilation, chronic  HTN-continue losartan 50  HLD-continue home Crestor 24 times weekly (Sunday, Tuesday, Thursday, Saturday)  A-fib s/p watchman  CAD s/p 2 stents-continue Plavix  HFpEF-holding home PRN torsemide 10  Prior CVA 2018-continue Plavix  TIA-continue Plavix  Aortic valve stenosis s/p TAVR 7/9/2024     Incidental findings:   CTAP w IV contrast demonstrated the following  LAD coronary artery calcifications, RCA coronary stent, aortic valve replacement, stable elevation of left hemidiaphragm with adjacent compressive atelectasis, moderate left atrial enlargement, mild bilateral gynecomastia, grossly unchanged mild intrahepatic and marked extrahepatic bile duct dilation dating back to 1/14/2010, s/p cholecystectomy, left kidney with cortical scarring, moderate to severe abdominal aortic atherosclerotic calcifications, stable circumferential bladder wall thickening may be 2/2 chronic urinary obstruction, prostatic brachytherapy seeds, multilevel degenerative disc disease     Checklist:  Feeding: Adult diet regular  Fluids: LR 75cc/h  DVT ppx: Hep SQ  Ulcer ppx: PPI  Lines: PIV  Consults: GI, cardio, neuro, PT/OT  Code Status: DNR/DNI     Dr. Ciro Dykes, DO   Internal Medicine PGY-2     This is a preliminary note written by the resident. Please wait for attending addendum for finalization of note and recommendations.      Objective   Physical Exam  Constitutional:       General: He is not in acute distress.  HENT:      Head: Normocephalic and atraumatic.      Pharynx: Oropharynx is clear.   Eyes:      Extraocular Movements: Extraocular movements intact.      Pupils: Pupils are equal, round, and reactive to light.    Cardiovascular:      Rate and Rhythm: Normal rate and regular rhythm.      Pulses: Normal pulses.      Heart sounds: Normal heart sounds.   Pulmonary:      Effort: Pulmonary effort is normal.      Breath sounds: Normal breath sounds.   Abdominal:      Palpations: Abdomen is soft. Nontender.    Musculoskeletal:      Cervical back: No rigidity or tenderness.   Lymphadenopathy:      Cervical: No cervical adenopathy.   Skin:     Coloration: Skin is not jaundiced.   Neurological:      General: No focal deficit present.      Mental Status: He is alert and oriented to person, place, and time    Last Recorded Vitals  Vitals:    10/03/24 0023 10/03/24 0336 10/03/24 0854 10/03/24 1159   BP: 141/79 132/71 158/70 135/80   BP Location:   Left arm Right arm   Patient Position:   Lying Lying   Pulse: (!) 36 (!) 46 99 60   Resp:    16   Temp: 35.9 °C (96.6 °F) 35.9 °C (96.6 °F) 36.3 °C (97.3 °F) 36.8 °C (98.2 °F)   TempSrc:   Temporal Temporal   SpO2: 97% 94% 95% 98%   Weight:       Height:         Intake/Output last 3 Shifts:  I/O last 3 completed shifts:  In: - (0 mL/kg)   Out: 2400 (26.5 mL/kg) [Urine:2400 (0.7 mL/kg/hr)]  Weight: 90.7 kg   Labs:   Results from last 7 days   Lab Units 10/03/24  0651 10/02/24  0836 09/30/24  1102   SODIUM mmol/L 142 142 140   POTASSIUM mmol/L 3.2* 3.4* 3.4*   CHLORIDE mmol/L 108* 107 106   CO2 mmol/L 27 27 27   BUN mg/dL 10 10 11   CREATININE mg/dL 0.84 0.92 1.10   GLUCOSE mg/dL 100* 107* 107*   CALCIUM mg/dL 8.5* 8.6 8.8     Results from last 7 days   Lab Units 10/03/24  0651   WBC AUTO x10*3/uL 4.3*   HEMOGLOBIN g/dL 12.8*   HEMATOCRIT % 38.5*   PLATELETS AUTO x10*3/uL 118*       CT angio head and neck w and wo IV contrast   Final Result   CTA neck:        No evidence for significant stenosis of the cervical vessels. Mild   calcification at the carotid bifurcation without stenosis.        The ascending aorta is prominent measuring 4.3 cm in AP dimension.        CTA head:        There is a  markedly diminutive and irregular basilar artery which may   be congenitally small secondary to robust posterior communicating   arteries.        There is narrowing at the vertebrobasilar junction on the left.        The posterior cerebral arteries are predominantly supplied by robust   posterior communicating arteries.        These findings are unchanged compared to the prior CTA 06/15/2018.        Otherwise, No evidence for significant stenosis or large branch   vessel cutoffs of the intracranial vessels.        MACRO:   None        Signed by: Malgorzata Staley 10/1/2024 4:44 PM   Dictation workstation:   [id number]      CT head wo IV contrast   Final Result   No CT evidence of acute intracranial abnormality.        MACRO   None        Signed by: Kathy Chavez 9/30/2024 1:01 PM   Dictation workstation:   [id number]      CT abdomen pelvis w IV contrast   Final Result   Nonspecific colitis without bowel obstruction or pneumatosis.        MACRO:   None        Signed by: Kathy Chavez 9/30/2024 1:13 PM   Dictation workstation:   [id number]      XR chest 1 view   Final Result   1.  No evidence of acute cardiopulmonary process.             Signed by: Irving Breaux 9/30/2024 12:16 PM   Dictation workstation:   [id number]      Holter or Event Cardiac Monitor    (Results Pending)     Transthoracic Echo (TTE) Limited 07/10/2024  CONCLUSIONS:  1. The left ventricular systolic function is normal, with a visually estimated ejection fraction of 60-65%.  2. Spectral Doppler shows an abnormal pattern of left ventricular diastolic filling.  3. Abnormal septal motion consistent with left bundle branch block.  4. There is normal right ventricular global systolic function.  5. The left atrium is severely dilated.  6. RVSP within normal limits.  7. There is a transcatheter aortic valve replacement.

## 2024-10-03 NOTE — PROGRESS NOTES
Subjective Data:  Denies any chest discomfort, shortness of breath, lightheadedness.    Overnight Events:    Gets bradycardic while he sleeps at night.  Heart rate and blood pressure are normal during the day.     Objective Data:  Last Recorded Vitals:  Vitals:    10/02/24 2007 10/03/24 0023 10/03/24 0336 10/03/24 0854   BP: 94/61 141/79 132/71 158/70   Patient Position:       Pulse: 57 (!) 36 (!) 46 99   Resp:       Temp: 36.3 °C (97.3 °F) 35.9 °C (96.6 °F) 35.9 °C (96.6 °F) 36.3 °C (97.3 °F)   TempSrc:       SpO2: 95% 97% 94% 95%   Weight:       Height:           Last Labs:  CBC - 10/3/2024:  6:51 AM  4.3 12.8 118    38.5      CMP - 10/3/2024:  6:51 AM  8.5 6.0 22 --- 1.0   3.9 3.4 10 52      PTT - 8/5/2024:  7:30 PM  1.0   11.4 27     TROPHS   Date/Time Value Ref Range Status   09/30/2024 11:02 AM 18 0 - 20 ng/L Final   08/05/2024 09:26 PM 36 0 - 20 ng/L Final   08/05/2024 07:30 PM 40 0 - 20 ng/L Final     BNP   Date/Time Value Ref Range Status   08/05/2024 07:30  0 - 99 pg/mL Final   07/16/2024 10:52  0 - 99 pg/mL Final     LDLCALC   Date/Time Value Ref Range Status   08/05/2024 09:26 PM 63 <=99 mg/dL Final     Comment:                                 Near   Borderline      AGE      Desirable  Optimal    High     High     Very High     0-19 Y     0 - 109     ---    110-129   >/= 130     ----    20-24 Y     0 - 119     ---    120-159   >/= 160     ----      >24 Y     0 -  99   100-129  130-159   160-189     >/=190       VLDL   Date/Time Value Ref Range Status   08/05/2024 09:26 PM 16 0 - 40 mg/dL Final   09/11/2020 10:41 AM 16 0 - 40 mg/dL Final   09/12/2019 08:36 AM 13 0 - 40 mg/dL Final      Last I/O:  I/O last 3 completed shifts:  In: - (0 mL/kg)   Out: 2400 (26.5 mL/kg) [Urine:2400 (0.7 mL/kg/hr)]  Weight: 90.7 kg     Past Cardiology Tests (Last 3 Years):  EKG:  ECG 12 lead 09/30/2024 (Preliminary)      ECG 12 lead 08/05/2024      Electrocardiogram, 12-lead PRN ACS symptoms  07/17/2024      Electrocardiogram, 12-lead PRN ACS symptoms 07/17/2024      ECG 12 lead daily 07/10/2024      ECG 12 lead daily 07/09/2024      ECG 12 lead 07/09/2024      ECG 12 lead STAT 05/01/2024      Electrocardiogram, 12-lead PRN ACS symptoms 05/01/2024      Electrocardiogram, 12-lead PRN ACS symptoms 04/30/2024    Echo:  Transthoracic Echo (TTE) Limited 07/10/2024      Transthoracic Echo (TTE) Limited 07/09/2024      Transthoracic echo (TTE) complete 04/18/2024    Ejection Fractions:  EF   Date/Time Value Ref Range Status   07/10/2024 09:33 AM 63 %    07/09/2024 01:11 PM 60 %      Cath:  Cardiac Catheterization Procedure 07/09/2024      Cardiac Catheterization Procedure 05/24/2024    Stress Test:  Nuclear Stress Test 04/18/2024    Cardiac Imaging:  No results found for this or any previous visit from the past 1095 days.      Inpatient Medications:  Scheduled medications   Medication Dose Route Frequency    acetaminophen  975 mg oral 3 times per day    clopidogrel  75 mg oral Daily    heparin (porcine)  5,000 Units subcutaneous q8h CLAIRE    losartan  50 mg oral Daily    melatonin  10 mg oral Nightly    OLANZapine  5 mg intramuscular Once    pantoprazole  20 mg oral Daily before breakfast    Or    pantoprazole  20 mg intravenous Daily before breakfast    perflutren lipid microspheres  0.5-10 mL of dilution intravenous Once in imaging    potassium chloride  20 mEq oral Once    potassium chloride  40 mEq oral Once    rosuvastatin  20 mg oral Once per day on Sunday Tuesday Thursday Saturday    [Held by provider] torsemide  10 mg oral Every other day     PRN medications   Medication    ondansetron    simethicone     Continuous Medications   Medication Dose Last Rate    lactated Ringer's  75 mL/hr 75 mL/hr (10/02/24 9338)       Physical Exam:  Physical Exam  Vitals reviewed.   Constitutional:       Appearance: Normal appearance.   HENT:      Head: Normocephalic and atraumatic.      Mouth/Throat:      Mouth: Mucous  membranes are moist.      Pharynx: Oropharynx is clear.   Eyes:      Extraocular Movements: Extraocular movements intact.      Conjunctiva/sclera: Conjunctivae normal.   Cardiovascular:      Rate and Rhythm: Normal rate and regular rhythm.      Pulses: Normal pulses.      Heart sounds: Normal heart sounds.   Pulmonary:      Effort: Pulmonary effort is normal.      Breath sounds: Normal breath sounds.   Abdominal:      General: Bowel sounds are normal.      Palpations: Abdomen is soft.   Musculoskeletal:         General: No swelling.      Cervical back: Neck supple.   Skin:     General: Skin is warm and dry.   Neurological:      General: No focal deficit present.      Mental Status: He is alert.   Psychiatric:         Mood and Affect: Mood normal.         Behavior: Behavior normal.           Assessment/Plan   10/30/2024  1.  Dizziness and orthostasis: Patient likely has 2 etiologies of his symptoms.  1 seems to be vertigo.  Meclizine actually made things worse.  Agree with vestibular therapy as an outpatient.  Patient also has a history of orthostasis.  Probably multifactorial in etiology.  Would recommend stopping scheduled diuretic and using it as needed for lower extremity edema.  Otherwise, the patient is only on losartan 50 mg daily for hypertension control.  2.  Hypertension: As above.  3.  Atrial fibrillation status post watchman  4.  CAD status post revascularization  5.  History of HFpEF  6.  AAS status post TAVR  7.  Prostate CA status post prostatectomy.  8.  Disposition: Patient is otherwise stable for discharge from a cardiac standpoint.  He can follow-up with Dr. Carmichael as an outpatient.  Peripheral IV 10/02/24 Distal;Left;Anterior Forearm (Active)   Site Assessment Clean;Dry;Intact 10/03/24 0757   Dressing Status Clean;Dry;Occlusive 10/03/24 0757   Number of days: 1       Urethral Catheter Straight-tip 16 Fr. (Active)   Site Assessment Clean;Skin intact 10/03/24 0603   Number of days: 3       Code  Status:  DNR and No Intubation      Anand Maya MD

## 2024-10-03 NOTE — CARE PLAN
The patient's goals for the shift include  safety and comfort    The clinical goals for the shift include safety and comfort

## 2024-10-03 NOTE — DISCHARGE SUMMARY
Discharge diagnosis:  #Colitis, undifferentiated  #Orthostatic hypotension  #Presyncope (orthostatic vs vestibular)  #Encephalopathy 2/2 [facility] course:  Giuseppe Davila is a 88-year-old male with a past medical history of hypertension, atrial fibrillation s/p watchman, CAD s/p 2 stents, HFpEF, prior CVA 2018, aortic valve stenosis s/p TAVR 7/9/2024, prostate CA s/p radical prostatectomy with right seminal vesicle lesion and left fifth anterior rib osseous lesion, diverticulosis, past diverticulitis(?) per Care Everywhere presented to the ED on 8/5/2024 with complaints of dizziness, nausea, vomiting, diarrhea, abdominal pain, malaise of several months duration.      Last admission at [facility] recently for recurrent dizziness, nausea, nonbloody nonbilious emesis 8/5/2024 - 8/7/2024; orthostatic positive, CT abdomen with IV contrast at that time showed dilation of the common bile duct, RUQ US negative for stones.  He admitted to poor p.o. intake at [facility].  GI was consulted, was diagnosed with viral gastroenteritis with consideration for EGD if symptoms were not improving.  He was discharged home with [facility] after his symptoms improved.  He was educated to wear compression stockings and to increase his PO water intake. Patient mentions taking meclizine for dizziness prescribed by cardiologist Dr. Carmichael and not being able to tolerate it (exacerbated presyncope and dizziness).     In the ED, HDS with rest of VS WNL, hypokalemic 3.4, thrombocytopenic 141, Cr at 1.1 (baseline ~ 0.9-1.1), lactate 1.7, troponin 18, negative COVID, bland UA, no acute intracranial abnormality per CT head, nonspecific colitis on CT AP, unremarkable CXR.  Had difficulty urinating in ED.  Patient became agitated and was given IM Zyprexa.  Bladder scan showed >1 L urine, Ferrari catheter subsequently placed.  Patient was admitted for colitis.  Orthostats positive.   Started on scheduled Tylenol TID, low-dose PPI, PRN simethicone, fluid  resuscitated with 2.5 L crystalloids then LR 75 cc/h.  Trialed Valium 5 BID for dizziness, dizziness is refractory to Valium, it was discontinued after patient became confused on it overnight.  GI consulted for evaluation in the context of patient's recurrent and chronic GI disturbances, ordered C.difficile PCR, stool pathogen panel, fecal calprotectin, but diarrhea resolved inpatient with results pending.  GI recommended for OP follow-up.  His abdominal pain resolved inpatient as well.  Neurology consulted for concern for dizziness, recommended for CT angiogram of the brain and neck (negative for significant stenosis), pacemaker interrogation, neuro OP FU in 3-4 months, OP ENG, OP vestibular rehab, cardiology consult (recommended for stopping scheduled furosemide and taking it only PRN for leg swelling with regular cardio FU OP), continued oral hydration.  Pacemaker was reconciled by [employer] (greater than 8 years of longevity remaining, ventricular pacing 80%, no observations based on current interrogation, ventricular rate usually in the 60s).    Patient was discharged in stable condition to follow-up with GI, with neurology in 3 to 4 months, Dr. Carmichael his regular cardiologist, ENT specialist, PCP.  EMG and vestibular rehab to be done OP.  PPM interrogation.  No new meds on DC.  Changing his scheduled torsemide to PRN for leg swelling.     Vitals:    10/02/24 2007 10/03/24 0023 10/03/24 0336 10/03/24 0854   BP: 94/61 141/79 132/71 158/70   BP Location:    Left arm   Patient Position:    Lying   Pulse: 57 (!) 36 (!) 46 99   Resp:       Temp: 36.3 °C (97.3 °F) 35.9 °C (96.6 °F) 35.9 °C (96.6 °F) 36.3 °C (97.3 °F)   TempSrc:    Temporal   SpO2: 95% 97% 94% 95%   Weight:       Height:          Physical Exam:   Constitutional:       General: He is not in acute distress.  HENT:      Head: Normocephalic and atraumatic.      Pharynx: Oropharynx is clear.   Eyes:      Extraocular Movements: Extraocular movements intact.       Pupils: Pupils are equal, round, and reactive to light.   Cardiovascular:      Rate and Rhythm: Normal rate and regular rhythm.      Pulses: Normal pulses.      Heart sounds: Normal heart sounds.   Pulmonary:      Effort: Pulmonary effort is normal.      Breath sounds: Normal breath sounds.   Abdominal:      Palpations: Abdomen is soft.   Musculoskeletal:      Cervical back: No rigidity or tenderness.   Lymphadenopathy:      Cervical: No cervical adenopathy.   Skin:     Coloration: Skin is not jaundiced.   Neurological:      General: No focal deficit present.      Mental Status: He is alert and oriented to person, place, and time    Outpatient follow-up instructions and medication changes:  Medication changes:        Follow-up appointments:  -Please follow-up with your PCP for continued management of your chronic medical conditions  -Please follow-up with your regular cardiologist Dr. Carmichael for continued management of your cardiac conditions  -Please follow-up with neurologist Dr. Dee in 3 to 4 months to follow-up on your recurrent dizziness.  Further further testing will be done to investigate into potential neurological causes of your dizziness  -Please follow-up with an ENT specialist as recommended by the neurologist while you were admitted  -Please follow-up with a gastroenterologist for continued management of your GI conditions      Dr. Ciro Dykes, DO   Internal Medicine, PGY-2

## 2024-10-04 ENCOUNTER — HOME HEALTH ADMISSION (OUTPATIENT)
Dept: HOME HEALTH SERVICES | Facility: HOME HEALTH | Age: 89
End: 2024-10-04
Payer: MEDICARE

## 2024-10-04 VITALS
HEIGHT: 71 IN | DIASTOLIC BLOOD PRESSURE: 85 MMHG | OXYGEN SATURATION: 96 % | BODY MASS INDEX: 28 KG/M2 | SYSTOLIC BLOOD PRESSURE: 133 MMHG | HEART RATE: 90 BPM | TEMPERATURE: 97 F | RESPIRATION RATE: 18 BRPM | WEIGHT: 200 LBS

## 2024-10-04 LAB
ALBUMIN SERPL BCP-MCNC: 3.4 G/DL (ref 3.4–5)
ANION GAP SERPL CALC-SCNC: 12 MMOL/L (ref 10–20)
BUN SERPL-MCNC: 9 MG/DL (ref 6–23)
CALCIUM SERPL-MCNC: 8.6 MG/DL (ref 8.6–10.3)
CHLORIDE SERPL-SCNC: 109 MMOL/L (ref 98–107)
CO2 SERPL-SCNC: 24 MMOL/L (ref 21–32)
CREAT SERPL-MCNC: 0.86 MG/DL (ref 0.5–1.3)
EGFRCR SERPLBLD CKD-EPI 2021: 84 ML/MIN/1.73M*2
ERYTHROCYTE [DISTWIDTH] IN BLOOD BY AUTOMATED COUNT: 13.8 % (ref 11.5–14.5)
GLUCOSE SERPL-MCNC: 136 MG/DL (ref 74–99)
HCT VFR BLD AUTO: 40.2 % (ref 41–52)
HGB BLD-MCNC: 12.9 G/DL (ref 13.5–17.5)
HOLD SPECIMEN: NORMAL
MAGNESIUM SERPL-MCNC: 1.75 MG/DL (ref 1.6–2.4)
MCH RBC QN AUTO: 30.9 PG (ref 26–34)
MCHC RBC AUTO-ENTMCNC: 32.1 G/DL (ref 32–36)
MCV RBC AUTO: 96 FL (ref 80–100)
NRBC BLD-RTO: 0 /100 WBCS (ref 0–0)
PHOSPHATE SERPL-MCNC: 3.6 MG/DL (ref 2.5–4.9)
PLATELET # BLD AUTO: 147 X10*3/UL (ref 150–450)
POTASSIUM SERPL-SCNC: 3.7 MMOL/L (ref 3.5–5.3)
RBC # BLD AUTO: 4.18 X10*6/UL (ref 4.5–5.9)
SODIUM SERPL-SCNC: 141 MMOL/L (ref 136–145)
WBC # BLD AUTO: 4.5 X10*3/UL (ref 4.4–11.3)

## 2024-10-04 PROCEDURE — 85027 COMPLETE CBC AUTOMATED: CPT

## 2024-10-04 PROCEDURE — 80069 RENAL FUNCTION PANEL: CPT

## 2024-10-04 PROCEDURE — 97535 SELF CARE MNGMENT TRAINING: CPT | Mod: CO,GO

## 2024-10-04 PROCEDURE — 83735 ASSAY OF MAGNESIUM: CPT

## 2024-10-04 PROCEDURE — 97116 GAIT TRAINING THERAPY: CPT | Mod: GP,CQ

## 2024-10-04 PROCEDURE — 99233 SBSQ HOSP IP/OBS HIGH 50: CPT | Performed by: NURSE PRACTITIONER

## 2024-10-04 PROCEDURE — 2500000001 HC RX 250 WO HCPCS SELF ADMINISTERED DRUGS (ALT 637 FOR MEDICARE OP)

## 2024-10-04 PROCEDURE — 36415 COLL VENOUS BLD VENIPUNCTURE: CPT

## 2024-10-04 PROCEDURE — 2500000004 HC RX 250 GENERAL PHARMACY W/ HCPCS (ALT 636 FOR OP/ED)

## 2024-10-04 PROCEDURE — 99232 SBSQ HOSP IP/OBS MODERATE 35: CPT

## 2024-10-04 RX ORDER — TORSEMIDE 10 MG/1
10 TABLET ORAL DAILY PRN
Qty: 30 TABLET | Refills: 0 | Status: SHIPPED | OUTPATIENT
Start: 2024-10-04 | End: 2024-11-03

## 2024-10-04 RX ORDER — LOPERAMIDE HYDROCHLORIDE 2 MG/1
2 CAPSULE ORAL 4 TIMES DAILY PRN
Status: CANCELLED | OUTPATIENT
Start: 2024-10-04

## 2024-10-04 RX ORDER — CHOLESTYRAMINE 4 G/9G
1 POWDER, FOR SUSPENSION ORAL 2 TIMES DAILY
Qty: 60 PACKET | Refills: 0 | Status: SHIPPED | OUTPATIENT
Start: 2024-10-04 | End: 2024-11-03

## 2024-10-04 RX ORDER — SIMETHICONE 80 MG
120 TABLET,CHEWABLE ORAL 4 TIMES DAILY PRN
Qty: 30 TABLET | Refills: 0 | Status: SHIPPED | OUTPATIENT
Start: 2024-10-04 | End: 2024-11-03

## 2024-10-04 RX ORDER — CHOLESTYRAMINE 4 G/4.8G
4 POWDER, FOR SUSPENSION ORAL 2 TIMES DAILY
Status: DISCONTINUED | OUTPATIENT
Start: 2024-10-04 | End: 2024-10-04 | Stop reason: HOSPADM

## 2024-10-04 ASSESSMENT — COGNITIVE AND FUNCTIONAL STATUS - GENERAL
MOBILITY SCORE: 15
DRESSING REGULAR LOWER BODY CLOTHING: A LITTLE
MOVING FROM LYING ON BACK TO SITTING ON SIDE OF FLAT BED WITH BEDRAILS: A LITTLE
STANDING UP FROM CHAIR USING ARMS: A LOT
DRESSING REGULAR UPPER BODY CLOTHING: A LITTLE
MOBILITY SCORE: 17
DAILY ACTIVITIY SCORE: 18
TURNING FROM BACK TO SIDE WHILE IN FLAT BAD: A LITTLE
DRESSING REGULAR UPPER BODY CLOTHING: A LITTLE
PERSONAL GROOMING: A LITTLE
MOVING FROM LYING ON BACK TO SITTING ON SIDE OF FLAT BED WITH BEDRAILS: A LITTLE
WALKING IN HOSPITAL ROOM: A LOT
MOVING TO AND FROM BED TO CHAIR: A LITTLE
HELP NEEDED FOR BATHING: A LITTLE
TURNING FROM BACK TO SIDE WHILE IN FLAT BAD: A LITTLE
STANDING UP FROM CHAIR USING ARMS: A LITTLE
CLIMB 3 TO 5 STEPS WITH RAILING: A LOT
TOILETING: A LITTLE
WALKING IN HOSPITAL ROOM: A LITTLE
EATING MEALS: A LITTLE
HELP NEEDED FOR BATHING: A LITTLE
MOVING TO AND FROM BED TO CHAIR: A LITTLE
DRESSING REGULAR LOWER BODY CLOTHING: A LITTLE
TOILETING: A LITTLE
PERSONAL GROOMING: A LITTLE
CLIMB 3 TO 5 STEPS WITH RAILING: A LOT
DAILY ACTIVITIY SCORE: 19

## 2024-10-04 ASSESSMENT — PAIN SCALES - GENERAL
PAINLEVEL_OUTOF10: 0 - NO PAIN
PAINLEVEL_OUTOF10: 0 - NO PAIN

## 2024-10-04 ASSESSMENT — ACTIVITIES OF DAILY LIVING (ADL): HOME_MANAGEMENT_TIME_ENTRY: 15

## 2024-10-04 ASSESSMENT — PAIN - FUNCTIONAL ASSESSMENT: PAIN_FUNCTIONAL_ASSESSMENT: 0-10

## 2024-10-04 NOTE — PROGRESS NOTES
Occupational Therapy    OT Treatment    Patient Name: Giuseppe Davila  MRN: [id number]  Department: [facility]  Room: [address] [place] [address]  Today's Date: 10/4/2024  Time Calculation  Start Time: 1320  Stop Time: 1344  Time Calculation (min): 24 min        Assessment:  End of Session Communication: Bedside nurse  End of Session Patient Position: Bed, 2 rail up, Alarm off, not on at start of session     Plan:  Treatment Interventions: ADL retraining, Functional transfer training, UE strengthening/ROM, Endurance training, Patient/family training, Equipment evaluation/education, Compensatory technique education  OT Frequency: 3 times per week  OT Discharge Recommendations: Moderate intensity level of continued care  OT - OK to Discharge: Yes (once medically appropriate to next level of care)  Treatment Interventions: ADL retraining, Functional transfer training, UE strengthening/ROM, Endurance training, Patient/family training, Equipment evaluation/education, Compensatory technique education    Subjective   Previous Visit Info:  OT Last Visit  OT Received On: 10/04/24  General:  General  Co-Treatment: PT  Co-Treatment Reason: to maximize pt safety and mobility  Prior to Session Communication: Bedside nurse  Patient Position Received: Bed, 2 rail up, Alarm off, not on at start of session  General Comment: patient pleasant and cooperative to participate  Precautions:  Medical Precautions: Fall precautions, Infection precautions (r/o cdiff, contact plus precautions)  Precautions Comment: tara           Objective      Activities of Daily Living: Grooming  Grooming Level of Assistance: Contact guard  Grooming Where Assessed: Standing sinkside  Grooming Comments: wash hands, pt leaning on countertop to maintain balance    UE Dressing  UE Dressing Level of Assistance: Close supervision  UE Dressing Where Assessed: Edge of bed  UE Dressing Comments: doff/don gown    LE Dressing  LE Dressing: Yes  Pants Level of Assistance: Minimum  assistance  LE Dressing Where Assessed: Edge of bed  LE Dressing Comments: pt required increased time to complete task angel to pull up pants    Toileting  Toileting Level of Assistance: Minimum assistance  Where Assessed: Bedside commode  Functional Standing Tolerance:  Time: 4:00 standing at FWW  Bed Mobility/Transfers: Bed Mobility  Bed Mobility: Yes  Bed Mobility 1  Bed Mobility 1: Sitting to supine  Level of Assistance 1:  (SBA)    Transfers  Transfer: Yes (pt completed STS from EOB with Min A x1)    Toilet Transfers  Toilet Transfer From: Rolling walker  Toilet Transfer Type: To and from  Toilet Transfer to: Standard bedside commode  Toilet Transfer Technique: Ambulating  Toilet Transfers: Minimal assistance    Functional Mobility:  Functional Mobility  Functional Mobility Performed: Yes  Functional Mobility 1  Device 1: Rolling walker  Assistance 1: Minimum assistance  Comments 1: pt able to ambulate to [facility] and short distance in room with min vc for increased safety      Outcome Measures:[facility] Daily Activity  Putting on and taking off regular lower body clothing: A little  Bathing (including washing, rinsing, drying): A little  Putting on and taking off regular upper body clothing: A little  Toileting, which includes using toilet, bedpan or urinal: A little  Taking care of personal grooming such as brushing teeth: A little  Eating Meals: None  Daily Activity - Total Score: 19        Education Documentation  Body Mechanics, taught by ROSEMARY Villanueva at 10/4/2024  2:54 PM.  Learner: Patient  Readiness: Acceptance  Method: Explanation  Response: Verbalizes Understanding, Needs Reinforcement    Precautions, taught by ROSEMARY Villanueva at 10/4/2024  2:54 PM.  Learner: Patient  Readiness: Acceptance  Method: Explanation  Response: Verbalizes Understanding, Needs Reinforcement    ADL Training, taught by ROSEMARY Villanueva at 10/4/2024  2:54 PM.  Learner: Patient  Readiness: Acceptance  Method:  Explanation  Response: Verbalizes Understanding, Needs Reinforcement    Education Comments  No comments found.               Goals:  Encounter Problems       Encounter Problems (Active)       OT Goals       STG- patient will complete LB dressing at SBA with use of ae/ad/dme prn (Progressing)       Start:  10/01/24    Expected End:  10/15/24            STG- patient will complete toileting at SBA with use of ae/ad/dme prn (Progressing)       Start:  10/01/24    Expected End:  10/15/24            STG- Patient will complete transfers to/from bed, chair, commode at SBA with use of ae/ad/dme prn (Progressing)       Start:  10/01/24    Expected End:  10/15/24            STG- patient will complete simple mobility with SBA with use of ae/ad/dme prn (Progressing)       Start:  10/01/24    Expected End:  10/15/24            STG- patient will complete grooming with SBA with use of ae/ad/dme prn  (Progressing)       Start:  10/01/24    Expected End:  10/15/24

## 2024-10-04 NOTE — PROGRESS NOTES
This note was created using voice recognition transcription software. Despite proofreading, unintentional typographical errors may be present. Please contact the GI office with any questions or concerns.       Subjective  Patient resting.        Physical Exam:  General: Polite, calm, resting  Skin:  Warm and dry, no jaundice  HEENT: No scleral icterus, no conjunctival pallor, normocephalic, atraumatic, mucous membranes moist  Neck:  atraumatic, trachea midline, no JVD  Chest:  Clear to auscultation bilaterally. No wheezes, rales, or rhonchi  CV:  Regular rate and rhythm.  Positive S1/S2  Abdomen: no distension, +BS, soft, non-tender to palpation, no rebound tenderness, no guarding, no rigidity, no discernible ascites   Extremities: chronic pigmentary changes, no cyanosis  Neurological:  A&Ox3  Psychiatric: cooperative     Investigations:  Labs, radiological imaging and cardiac work up were reviewed        Objective:         10/3/2024     3:36 AM 10/3/2024     8:54 AM 10/3/2024    11:59 AM 10/3/2024     3:17 PM 10/3/2024     9:18 PM 10/3/2024    11:46 PM 10/4/2024     4:51 AM   Vitals   Systolic 132 158 135 142 126 134 133   Diastolic 71 70 80 82 71 81 76   Heart Rate 46 99 60 60 48 60 59   Temp 35.9 °C (96.6 °F) 36.3 °C (97.3 °F) 36.8 °C (98.2 °F) 36.7 °C (98.1 °F) 36.8 °C (98.2 °F) 35.5 °C (95.9 °F) 35.5 °C (95.9 °F)   Resp   16 18             Medications:  acetaminophen, 975 mg, oral, 3 times per day  clopidogrel, 75 mg, oral, Daily  heparin (porcine), 5,000 Units, subcutaneous, q8h CLAIRE  losartan, 50 mg, oral, Daily  melatonin, 10 mg, oral, Nightly  pantoprazole, 20 mg, oral, Daily before breakfast   Or  pantoprazole, 20 mg, intravenous, Daily before breakfast  perflutren lipid microspheres, 0.5-10 mL of dilution, intravenous, Once in imaging  rosuvastatin, 20 mg, oral, Once per day on Sunday Tuesday Thursday Saturday  [Held by provider] torsemide, 10 mg, oral, Every other day         Recent Results (from the past  72 hour(s))   CBC    Collection Time: 10/02/24  8:36 AM   Result Value Ref Range    WBC 5.2 4.4 - 11.3 x10*3/uL    nRBC 0.0 0.0 - 0.0 /100 WBCs    RBC 4.05 (L) 4.50 - 5.90 x10*6/uL    Hemoglobin 12.4 (L) 13.5 - 17.5 g/dL    Hematocrit 38.3 (L) 41.0 - 52.0 %    MCV 95 80 - 100 fL    MCH 30.6 26.0 - 34.0 pg    MCHC 32.4 32.0 - 36.0 g/dL    RDW 14.1 11.5 - 14.5 %    Platelets 121 (L) 150 - 450 x10*3/uL   Renal Function Panel    Collection Time: 10/02/24  8:36 AM   Result Value Ref Range    Glucose 107 (H) 74 - 99 mg/dL    Sodium 142 136 - 145 mmol/L    Potassium 3.4 (L) 3.5 - 5.3 mmol/L    Chloride 107 98 - 107 mmol/L    Bicarbonate 27 21 - 32 mmol/L    Anion Gap 11 10 - 20 mmol/L    Urea Nitrogen 10 6 - 23 mg/dL    Creatinine 0.92 0.50 - 1.30 mg/dL    eGFR 81 >60 mL/min/1.73m*2    Calcium 8.6 8.6 - 10.3 mg/dL    Phosphorus 4.0 2.5 - 4.9 mg/dL    Albumin 3.5 3.4 - 5.0 g/dL   Magnesium    Collection Time: 10/02/24  8:36 AM   Result Value Ref Range    Magnesium 1.91 1.60 - 2.40 mg/dL   CBC    Collection Time: 10/03/24  6:51 AM   Result Value Ref Range    WBC 4.3 (L) 4.4 - 11.3 x10*3/uL    nRBC 0.0 0.0 - 0.0 /100 WBCs    RBC 4.15 (L) 4.50 - 5.90 x10*6/uL    Hemoglobin 12.8 (L) 13.5 - 17.5 g/dL    Hematocrit 38.5 (L) 41.0 - 52.0 %    MCV 93 80 - 100 fL    MCH 30.8 26.0 - 34.0 pg    MCHC 33.2 32.0 - 36.0 g/dL    RDW 13.7 11.5 - 14.5 %    Platelets 118 (L) 150 - 450 x10*3/uL   Renal Function Panel    Collection Time: 10/03/24  6:51 AM   Result Value Ref Range    Glucose 100 (H) 74 - 99 mg/dL    Sodium 142 136 - 145 mmol/L    Potassium 3.2 (L) 3.5 - 5.3 mmol/L    Chloride 108 (H) 98 - 107 mmol/L    Bicarbonate 27 21 - 32 mmol/L    Anion Gap 10 10 - 20 mmol/L    Urea Nitrogen 10 6 - 23 mg/dL    Creatinine 0.84 0.50 - 1.30 mg/dL    eGFR 85 >60 mL/min/1.73m*2    Calcium 8.5 (L) 8.6 - 10.3 mg/dL    Phosphorus 3.9 2.5 - 4.9 mg/dL    Albumin 3.4 3.4 - 5.0 g/dL   Magnesium    Collection Time: 10/03/24  6:51 AM   Result Value Ref  Range    Magnesium 2.00 1.60 - 2.40 mg/dL          Assessment:  This is an 89 yo Male with a PMH of Afib s/p Watchman, HTN, CAD s/p 2 stents, HFpEF, CVA (2018), aortic valve stenosis s/p TAVR (7/9/24), prostate CA s/p radical prostatectomy, and diverticulosis who presented to [facility] on 9/30/24 with reports of dizziness/nausea/vomiting/diarrhea/abdominal pain/malaise.  GI was consulted for recurrent abdominal pain/n/v/d.  CT imaging shows nonspecific colitis.  Not having diarrhea any longer.  Last BM was yesterday and had 4-5 BM's then.  No elevated white count or fevers.          Plan  1.)  Recurrent n/v/d-Stool tests pending, recommend low fiber/lactose free diet.  Will have my office schedule and outpatient follow-up.        Discussed patient with Dr. Parikh.  Will sign off.    I spent 30 minutes in the professional and overall care of this patient.      10/04/24 at 6:50 AM - CANDY Alexander-CNP

## 2024-10-04 NOTE — PROGRESS NOTES
"Giuseppe Davila is a 88 y.o. male on day 2 of admission presenting with Dizziness.    Subjective   10/3/2024 no significant changes.  No diarrhea on the floor.  Abdomen soft, nontender, bowel sounds present.  Tolerates diet well.    Objective     A 10 point review of system is negative except for what is mentioned in the HPI     Physical Exam     The note was created using voice recognition transcription software. Despite proofreading, unintentional typographical errors may be present. Please contact the GI office with any questions or concerns.      Current Medications: reviewed     Vital Signs: Reviewed     Physical Exam:  General: no apparent distress, pleasant and cooperative  Skin:  Warm and dry, no jaundice  HEENT: No scleral icterus, no conjunctival pallor, normocephalic, atraumatic, mucous membranes moist  Neck:  atraumatic, trachea midline, no JVD  Chest:  decreased air entry to auscultation bilaterally. No wheezes, rales, or rhonchi  CV:  Regular rate and rhythm.  Positive S1/S2  Abdomen: no distension, +BS, soft, non-tender to palpation, no rebound tenderness, no guarding, no rigidity, no discernible ascites   Extremities: no lower extremity edema, Chronic pigmentary changes, no cyanosis  Neurological:  A&Ox3 , no asterixis  Psychiatric: cooperative      Investigations:  Labs, radiological imaging and cardiac work up were reviewed    Last Recorded Vitals  Blood pressure 142/82, pulse 60, temperature 36.7 °C (98.1 °F), temperature source Temporal, resp. rate 18, height 1.803 m (5' 11\"), weight 90.7 kg (200 lb), SpO2 96%.  Intake/Output last 3 Shifts:  I/O last 3 completed shifts:  In: - (0 mL/kg)   Out: 2200 (24.3 mL/kg) [Urine:2200 (0.7 mL/kg/hr)]  Weight: 90.7 kg     Relevant Results        Scheduled medications  acetaminophen, 975 mg, oral, 3 times per day  clopidogrel, 75 mg, oral, Daily  heparin (porcine), 5,000 Units, subcutaneous, q8h CLAIRE  losartan, 50 mg, oral, Daily  melatonin, 10 mg, oral, " Nightly  pantoprazole, 20 mg, oral, Daily before breakfast   Or  pantoprazole, 20 mg, intravenous, Daily before breakfast  perflutren lipid microspheres, 0.5-10 mL of dilution, intravenous, Once in imaging  rosuvastatin, 20 mg, oral, Once per day on Sunday Tuesday Thursday Saturday  [Held by provider] torsemide, 10 mg, oral, Every other day      Continuous medications  lactated Ringer's, 75 mL/hr, Last Rate: 75 mL/hr (10/03/24 1237)      PRN medications  PRN medications: ondansetron, simethicone    Results for orders placed or performed during the hospital encounter of 09/30/24 (from the past 24 hour(s))   CBC   Result Value Ref Range    WBC 4.3 (L) 4.4 - 11.3 x10*3/uL    nRBC 0.0 0.0 - 0.0 /100 WBCs    RBC 4.15 (L) 4.50 - 5.90 x10*6/uL    Hemoglobin 12.8 (L) 13.5 - 17.5 g/dL    Hematocrit 38.5 (L) 41.0 - 52.0 %    MCV 93 80 - 100 fL    MCH 30.8 26.0 - 34.0 pg    MCHC 33.2 32.0 - 36.0 g/dL    RDW 13.7 11.5 - 14.5 %    Platelets 118 (L) 150 - 450 x10*3/uL   Renal Function Panel   Result Value Ref Range    Glucose 100 (H) 74 - 99 mg/dL    Sodium 142 136 - 145 mmol/L    Potassium 3.2 (L) 3.5 - 5.3 mmol/L    Chloride 108 (H) 98 - 107 mmol/L    Bicarbonate 27 21 - 32 mmol/L    Anion Gap 10 10 - 20 mmol/L    Urea Nitrogen 10 6 - 23 mg/dL    Creatinine 0.84 0.50 - 1.30 mg/dL    eGFR 85 >60 mL/min/1.73m*2    Calcium 8.5 (L) 8.6 - 10.3 mg/dL    Phosphorus 3.9 2.5 - 4.9 mg/dL    Albumin 3.4 3.4 - 5.0 g/dL   Magnesium   Result Value Ref Range    Magnesium 2.00 1.60 - 2.40 mg/dL         * Cannot find OR log *  Last relevant procedure:               Assessment/Plan   Assessment & Plan  Dizziness    Giuseppe Davila is a 88 y.o. male  with a past medical history of hypertension, atrial fibrillation s/p watchman, CAD s/p 2 stents, HFpEF, prior CVA 2018, aortic valve stenosis s/p TAVR 7/9/2024, prostate CA s/p radical prostatectomy with right seminal vesicle lesion and left fifth anterior rib osseous lesion, diverticulosis, past  diverticulitis(?) per Care Everywhere presented to the [facility] ED on 9/30/2024 with complaints of dizziness, nausea, vomiting, diarrhea, abdominal pain, malaise of several months duration.      #Diarrhea  #S/p cholecystectomy, remote     Neurology, cardiology consults ordered for patient's intense episodes of dizziness, diaphoresis and nausea, following     Unlikely that patient has infectious diarrhea as loose stools are not new to him and about the same since he got cholecystectomy approximately 40 years ago.     Stool for Giardia/Cryptosporidium, ova/parasites, calprotectin pending.  Stool for infectious panel got rejected due to formed stool.    Awaiting for stool results  If returned negative might treat as functional diarrhea, patient to follow-up with GI outpatient to discuss possibility of endoscopic studies for family history of stomach cancer and persistent diarrhea.     Medical management as per primary medicine.     Patient discussed with Dr. Fisher       I spent 30 minutes in the professional and overall care of this patient.      Jenifer Unger, APRN-CNP

## 2024-10-04 NOTE — PROGRESS NOTES
"   MEDICINE PROGRESS NOTE    Subjective     Patient seen and examined with medicine team.  Patient started experiencing loose stool passage and diffuse abdominal tenderness again.     He mentions that he was on cholestyramine years ago.  Per patient, chronic diarrhea started sometime after it was \"stopped a long time ago\".  However, per chart review, patient was prescribed and dispensed cholestyramine this past 06/25/2024 for a 90 day supply and is one of his home meds.  Started cholestyramine today.  Given simethicone.      He still has to get his pacemaker interrogation by [employer]; a representative from [employer] will be coming in at some point today for pacemaker check per nurse and unit secretary.       Assessment / Plan   Giuseppe Davila is a 88-year-old male with a past medical history of hypertension, atrial fibrillation s/p watchman, CAD s/p 2 stents, HFpEF, prior CVA 2018, aortic valve stenosis s/p TAVR 7/9/2024, prostate CA s/p radical prostatectomy with right seminal vesicle lesion and left fifth anterior rib osseous lesion, diverticulosis, past diverticulitis(?) per Care Everywhere presented to the ED on 8/5/2024 with complaints of dizziness, nausea, vomiting, diarrhea, abdominal pain, malaise of several months duration. On day 3 of admission.      #Colitis, undifferentiated  #Chronic and recurrent diarrhea, functional  #S/p cholecystectomy  #Orthostatic hypotension  #Presyncope (orthostatic vs vestibular)  #Encephalopathy 2/2 Valium    -GI consulted for evaluation in the context of patient's recurrent and chronic GI disturbances   Stool pathogen panel, fecal calprotectin pending  OP follow-up with GI to discuss possibility of endoscopic studies for family history of stomach cancer and persistent diarrhea. Awaiting final recs  -Neurology consulted for concern for vestibular dizziness  Recommended for CT angiogram of the brain and neck (negative), cardiology consult, hydration, pacemaker " interrogation (ordered), OP ENT appointment, OP vestibular rehab, OP ENG, neurology follow up in 3-4 months  -Cardiology consulted  Recommended for stopping scheduled furosemide and taking it only PRN for leg swelling   -Started cholestyramine 4g BID  -Patient tolerating solid diet  -Patient mentions taking meclizine prescribed by cardiologist Dr. Carmichael and not being able to tolerate it (exacerbated presyncope and dizziness).  Discontinued Valium 5 BID due to confusion and due to dizziness being refractory to Valium  -Scheduled Tylenol TID, low-dose PPI, PRN simethicone  -LR 75 cc/h discontinued  -Fall precautions, aspiration precautions in place     Chronic medical conditions:  Diverticulosis  S/p cholecystectomy  Hx of gastritis-PPI  Hx of GI erosions-PPI  CBD dilation, chronic  HTN-continue losartan 50  HLD-continue home Crestor 24 times weekly (Sunday, Tuesday, Thursday, Saturday)  A-fib s/p watchman  CAD s/p 2 stents-continue Plavix  HFpEF-holding home PRN torsemide 10  Prior CVA 2018-continue Plavix  TIA-continue Plavix  Aortic valve stenosis s/p TAVR 7/9/2024     Incidental findings:   CTAP w IV contrast demonstrated the following  LAD coronary artery calcifications, RCA coronary stent, aortic valve replacement, stable elevation of left hemidiaphragm with adjacent compressive atelectasis, moderate left atrial enlargement, mild bilateral gynecomastia, grossly unchanged mild intrahepatic and marked extrahepatic bile duct dilation dating back to 1/14/2010, s/p cholecystectomy, left kidney with cortical scarring, moderate to severe abdominal aortic atherosclerotic calcifications, stable circumferential bladder wall thickening may be 2/2 chronic urinary obstruction, prostatic brachytherapy seeds, multilevel degenerative disc disease     Checklist:  Feeding: Adult diet regular  DVT ppx: Hep SQ  Ulcer ppx: PPI  Lines: PIV  Consults: GI, cardio, neuro, PT/OT  Code Status: DNR/DNI     Dr. Ciro Dykes DO    Internal Medicine PGY-2     This is a preliminary note written by the resident. Please wait for attending addendum for finalization of note and recommendations.      Objective   Physical Exam  Constitutional:       General: He is not in acute distress.  HENT:      Head: Normocephalic and atraumatic.      Pharynx: Oropharynx is clear.   Eyes:      Extraocular Movements: Extraocular movements intact.      Pupils: Pupils are equal, round, and reactive to light.   Cardiovascular:      Rate and Rhythm: Normal rate and regular rhythm.      Pulses: Normal pulses.      Heart sounds: Normal heart sounds.   Pulmonary:      Effort: Pulmonary effort is normal.      Breath sounds: Normal breath sounds.   Abdominal:      Palpations: Abdomen is soft. TTP diffusely, no distension appreciated. No guarding. No rebound tenderness. No peritoneal signs.    Musculoskeletal:      Cervical back: No rigidity or tenderness.   Lymphadenopathy:      Cervical: No cervical adenopathy.   Skin:     Coloration: Skin is not jaundiced.   Neurological:      General: No focal deficit present.      Mental Status: He is alert and oriented to person, place, and time    Last Recorded Vitals  Vitals:    10/03/24 2346 10/04/24 0451 10/04/24 0739 10/04/24 1144   BP: 134/81 133/76 146/84 134/88   BP Location: Right arm Right arm     Patient Position: Lying Lying     Pulse: 60 59 65 59   Resp:       Temp: 35.5 °C (95.9 °F) 35.5 °C (95.9 °F) 35.5 °C (95.9 °F) 35.7 °C (96.3 °F)   TempSrc: Temporal Temporal     SpO2: 97% 96% 97% 95%   Weight:       Height:         Intake/Output last 3 Shifts:  I/O last 3 completed shifts:  In: - (0 mL/kg)   Out: 2750 (30.3 mL/kg) [Urine:2750 (0.8 mL/kg/hr)]  Weight: 90.7 kg   Labs:   Results from last 7 days   Lab Units 10/04/24  0850 10/03/24  0651 10/02/24  0836   SODIUM mmol/L 141 142 142   POTASSIUM mmol/L 3.7 3.2* 3.4*   CHLORIDE mmol/L 109* 108* 107   CO2 mmol/L 24 27 27   BUN mg/dL 9 10 10   CREATININE mg/dL 0.86 0.84  0.92   GLUCOSE mg/dL 136* 100* 107*   CALCIUM mg/dL 8.6 8.5* 8.6     Results from last 7 days   Lab Units 10/04/24  0850   WBC AUTO x10*3/uL 4.5   HEMOGLOBIN g/dL 12.9*   HEMATOCRIT % 40.2*   PLATELETS AUTO x10*3/uL 147*       CT angio head and neck w and wo IV contrast   Final Result   CTA neck:        No evidence for significant stenosis of the cervical vessels. Mild   calcification at the carotid bifurcation without stenosis.        The ascending aorta is prominent measuring 4.3 cm in AP dimension.        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        There is narrowing at the vertebrobasilar junction on the left.        The posterior cerebral arteries are predominantly supplied by robust   posterior communicating arteries.        These findings are unchanged compared to the prior CTA 06/15/2018.        Otherwise, No evidence for significant stenosis or large branch   vessel cutoffs of the intracranial vessels.        MACRO:   None        Signed by: Malgorzata Staley 10/1/2024 4:44 PM   Dictation workstation:   [id number]      CT head wo IV contrast   Final Result   No CT evidence of acute intracranial abnormality.        MACRO   None        Signed by: Kathy Chvaez 9/30/2024 1:01 PM   Dictation workstation:   [id number]      CT abdomen pelvis w IV contrast   Final Result   Nonspecific colitis without bowel obstruction or pneumatosis.        MACRO:   None        Signed by: Kathy Chavez 9/30/2024 1:13 PM   Dictation workstation:   [id number]      XR chest 1 view   Final Result   1.  No evidence of acute cardiopulmonary process.             Signed by: Irving Breaux 9/30/2024 12:16 PM   Dictation workstation:   [id number]      Holter or Event Cardiac Monitor    (Results Pending)   Cardiac device check - Inpatient    (Results Pending)     Transthoracic Echo (TTE) Limited  07/10/2024  CONCLUSIONS:  1. The left ventricular systolic function is normal, with a visually estimated ejection fraction of 60-65%.  2. Spectral Doppler shows an abnormal pattern of left ventricular diastolic filling.  3. Abnormal septal motion consistent with left bundle branch block.  4. There is normal right ventricular global systolic function.  5. The left atrium is severely dilated.  6. RVSP within normal limits.  7. There is a transcatheter aortic valve replacement.

## 2024-10-04 NOTE — PROGRESS NOTES
Physical Therapy    Physical Therapy Treatment    Patient Name: Giuseppe Davila  MRN: [id number]  Department: [facility]  Room: [address] [place] [address]  Today's Date: 10/4/2024  Time Calculation  Start Time: 1319  Stop Time: 1344  Time Calculation (min): 25 min       Assessment/Plan   PT Assessment  End of Session Communication: Bedside nurse  End of Session Patient Position: Bed, 2 rail up, Alarm off, not on at start of session     PT Plan  Treatment/Interventions: Bed mobility, Transfer training, Gait training, Balance training, Strengthening, Therapeutic exercise, Therapeutic activity  PT Plan: Ongoing PT  PT Frequency: 3 times per week  PT Discharge Recommendations: Moderate intensity level of continued care  PT Recommended Transfer Status: Assist x1  PT - OK to Discharge: Yes (once medically cleared)      General Visit Information:   PT  Visit  PT Received On: 10/04/24  General  Family/Caregiver Present:  (pt's spouse arriving near end of tx session)  Co-Treatment: co-tx with OT to ensure safe therapeutic tx to facilitate maximum participation with skilled intervention  Prior to Session Communication: Bedside nurse  Patient Position Received: Bed, 2 rail up, Alarm off, not on at start of session  General Comment:  (pt agreeable to participate in therapy.  wants to go home.)    Subjective   Precautions:  Precautions  Medical Precautions: Fall precautions, Infection precautions (contact plus: cdiff r/o)  Precautions Comment:  (pacheco catheter)          Objective   Pain:  Pain Assessment  Pain Assessment: 0-10  0-10 (Numeric) Pain Score: 0 - No pain     Treatments:  Bed Mobility  Bed Mobility:  (sup <> sit with SBA)    Ambulation/Gait Training  Ambulation/Gait Training Performed:  (pt ambulates ~50 ft total in small laps around room, including to BSC and to sink where pt performs 1-2 min. stance to wash hands, as well as sidestepping along EOB.  FWW and CGA/min A throughout.  somewhat unsteady at times though no episodes LOB.  steppage  gait noted d/t bilat foot drop.)    Transfers  Transfer:  (sit <> stand multiple trials, including from different surface levels, with FWW and min A x 1 progressing to CGA.  repeated cuing for safe hand placement and sequencing, though pt with difficulty adhering.)    Outcome Measures:  [facility] Basic Mobility  Turning from your back to your side while in a flat bed without using bedrails: A little  Moving from lying on your back to sitting on the side of a flat bed without using bedrails: A little  Moving to and from bed to chair (including a wheelchair): A little  Standing up from a chair using your arms (e.g. wheelchair or bedside chair): A little  To walk in hospital room: A little  Climbing 3-5 steps with railing: A lot  Basic Mobility - Total Score: 17    Education Documentation  Precautions, taught by Mary Beth Doll PTA at 10/4/2024  2:56 PM.  Learner: Patient  Readiness: Acceptance  Method: Explanation  Response: Verbalizes Understanding, Needs Reinforcement    Mobility Training, taught by Mary Beth Doll PTA at 10/4/2024  2:56 PM.  Learner: Patient  Readiness: Acceptance  Method: Explanation  Response: Verbalizes Understanding, Needs Reinforcement    Education Comments  No comments found.        EDUCATION:       Encounter Problems       Encounter Problems (Active)       PT Problem       Pt will amb 150' using w/w with Jaqui  (Progressing)       Start:  10/01/24    Expected End:  10/15/24            STG - Pt will perform a B LE ther ex program of 2-3 sets of 10  (Progressing)       Start:  10/01/24    Expected End:  10/15/24            STG - Pt will transfer STS IND  (Progressing)       Start:  10/01/24    Expected End:  10/15/24            STG - Pt will demonstrate good dynamic/static standing/seated balance with no LOB noted   (Progressing)       Start:  10/01/24    Expected End:  10/15/24

## 2024-10-04 NOTE — DISCHARGE INSTRUCTIONS
Thank you for choosing [facility] - it has been a pleasure taking part in your medical care. Please follow up with your Primary Medical Physician within 1 week of discharge and any specialists as noted within 1-2 weeks for further workup. If your symptoms should persist or worsen, please return immediately to the nearest Emergency Room for further care.      Medication changes:        Follow ups:  -Please follow-up with your PCP for continued management of your chronic medical conditions  -Please follow-up with your regular cardiologist Dr. Carmichael for continued management of your cardiac conditions  -Please follow-up with neurologist Dr. Dee in 3 to 4 months to follow-up on your recurrent dizziness.  Further further testing will be done to investigate into potential neurological causes of your dizziness  -Please follow-up with an ENT specialist as recommended by the neurologist while you were admitted  -Please follow-up with a gastroenterologist for continued management of your GI conditions    Additional instructions:  -Engage with physical therapy and vestibular therapy for weakness and dizziness respectively

## 2024-10-04 NOTE — CARE PLAN
The patient's goals for the shift include      Problem: Safety - Adult  Goal: Free from fall injury  Outcome: Progressing  Flowsheets (Taken 10/4/2024 1350)  Free from fall injury: Instruct family/caregiver on patient safety     Problem: Skin  Goal: Prevent/minimize sheer/friction injuries  Outcome: Progressing  Flowsheets (Taken 10/4/2024 1350)  Prevent/minimize sheer/friction injuries:   HOB 30 degrees or less   Turn/reposition every 2 hours/use positioning/transfer devices     The clinical goals for the shift include pt will remain hemodynamically stable throughout shift

## 2024-10-06 ENCOUNTER — HOME CARE VISIT (OUTPATIENT)
Dept: HOME HEALTH SERVICES | Facility: HOME HEALTH | Age: 89
End: 2024-10-06
Payer: MEDICARE

## 2024-10-06 VITALS — SYSTOLIC BLOOD PRESSURE: 132 MMHG | HEART RATE: 65 BPM | DIASTOLIC BLOOD PRESSURE: 51 MMHG

## 2024-10-06 LAB
CALPROTECTIN STL-MCNT: 80 UG/G
CRYPTOSP AG STL QL IA: NEGATIVE
G LAMBLIA AG STL QL IA: NEGATIVE

## 2024-10-06 PROCEDURE — G0151 HHCP-SERV OF PT,EA 15 MIN: HCPCS | Mod: HHH

## 2024-10-06 PROCEDURE — 1090000001 HH PPS REVENUE CREDIT

## 2024-10-06 PROCEDURE — 1090000002 HH PPS REVENUE DEBIT

## 2024-10-06 PROCEDURE — 169592 NO-PAY CLAIM PROCEDURE

## 2024-10-06 PROCEDURE — 0023 HH SOC

## 2024-10-06 ASSESSMENT — ENCOUNTER SYMPTOMS
HYPERTENSION: 1
FATIGUES EASILY: 1
PERSON REPORTING PAIN: PATIENT
OCCASIONAL FEELINGS OF UNSTEADINESS: 0
DENIES PAIN: 1

## 2024-10-06 ASSESSMENT — ACTIVITIES OF DAILY LIVING (ADL)
AMBULATION ASSISTANCE ON FLAT SURFACES: 1
ENTERING_EXITING_HOME: MINIMUM ASSIST
OASIS_M1830: 05

## 2024-10-06 NOTE — HOME HEALTH
patient seen for pt  admission into [facility] services today with with present .  he went to the hospital with multiple symptoms including nausea, vomiting , abdominal pain .  prior to this he walked with a ww , indep with dressing and bathing .  they live in a ranch home home with 1 step to exit . they have a friendly luna retriver . 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

## 2024-10-07 PROCEDURE — 1090000001 HH PPS REVENUE CREDIT

## 2024-10-07 PROCEDURE — 1090000002 HH PPS REVENUE DEBIT

## 2024-10-08 ENCOUNTER — HOME CARE VISIT (OUTPATIENT)
Dept: HOME HEALTH SERVICES | Facility: HOME HEALTH | Age: 89
End: 2024-10-08
Payer: MEDICARE

## 2024-10-08 PROCEDURE — G0157 HHC PT ASSISTANT EA 15: HCPCS | Mod: CQ,HHH

## 2024-10-08 PROCEDURE — 1090000001 HH PPS REVENUE CREDIT

## 2024-10-08 PROCEDURE — 1090000002 HH PPS REVENUE DEBIT

## 2024-10-08 ASSESSMENT — ENCOUNTER SYMPTOMS
PERSON REPORTING PAIN: PATIENT
DENIES PAIN: 1

## 2024-10-09 ENCOUNTER — TELEPHONE (OUTPATIENT)
Dept: GASTROENTEROLOGY | Facility: CLINIC | Age: 89
End: 2024-10-09
Payer: MEDICARE

## 2024-10-09 PROCEDURE — 1090000001 HH PPS REVENUE CREDIT

## 2024-10-09 PROCEDURE — 1090000002 HH PPS REVENUE DEBIT

## 2024-10-09 NOTE — TELEPHONE ENCOUNTER
----- Message from Sana Sebastian sent at 10/4/2024  2:57 PM EDT -----  Hi,  Please schedule outpatient follow-up with whomever within a few weeks in regards to diarrhea/colitis/n/v/abdominal pain.    Thx.

## 2024-10-10 PROCEDURE — 1090000001 HH PPS REVENUE CREDIT

## 2024-10-10 PROCEDURE — 1090000002 HH PPS REVENUE DEBIT

## 2024-10-11 ENCOUNTER — OFFICE VISIT (OUTPATIENT)
Dept: GASTROENTEROLOGY | Facility: CLINIC | Age: 89
End: 2024-10-11
Payer: MEDICARE

## 2024-10-11 VITALS
HEIGHT: 71 IN | WEIGHT: 197 LBS | DIASTOLIC BLOOD PRESSURE: 73 MMHG | SYSTOLIC BLOOD PRESSURE: 137 MMHG | HEART RATE: 77 BPM | BODY MASS INDEX: 27.58 KG/M2

## 2024-10-11 DIAGNOSIS — K22.70 BARRETT'S ESOPHAGUS WITHOUT DYSPLASIA: ICD-10-CM

## 2024-10-11 DIAGNOSIS — R11.0 NAUSEA: ICD-10-CM

## 2024-10-11 DIAGNOSIS — K62.6 RECTAL ULCERATION: ICD-10-CM

## 2024-10-11 DIAGNOSIS — D50.9 IRON DEFICIENCY ANEMIA, UNSPECIFIED IRON DEFICIENCY ANEMIA TYPE: ICD-10-CM

## 2024-10-11 DIAGNOSIS — A04.8 HELICOBACTER PYLORI (H. PYLORI) INFECTION: ICD-10-CM

## 2024-10-11 DIAGNOSIS — Z11.59 ENCOUNTER FOR SCREENING FOR OTHER VIRAL DISEASES: Primary | ICD-10-CM

## 2024-10-11 DIAGNOSIS — R19.7 DIARRHEA, UNSPECIFIED TYPE: ICD-10-CM

## 2024-10-11 PROCEDURE — 99215 OFFICE O/P EST HI 40 MIN: CPT | Performed by: STUDENT IN AN ORGANIZED HEALTH CARE EDUCATION/TRAINING PROGRAM

## 2024-10-11 PROCEDURE — 1157F ADVNC CARE PLAN IN RCRD: CPT | Performed by: STUDENT IN AN ORGANIZED HEALTH CARE EDUCATION/TRAINING PROGRAM

## 2024-10-11 PROCEDURE — 1036F TOBACCO NON-USER: CPT | Performed by: STUDENT IN AN ORGANIZED HEALTH CARE EDUCATION/TRAINING PROGRAM

## 2024-10-11 PROCEDURE — 1090000001 HH PPS REVENUE CREDIT

## 2024-10-11 PROCEDURE — 1111F DSCHRG MED/CURRENT MED MERGE: CPT | Performed by: STUDENT IN AN ORGANIZED HEALTH CARE EDUCATION/TRAINING PROGRAM

## 2024-10-11 PROCEDURE — 3078F DIAST BP <80 MM HG: CPT | Performed by: STUDENT IN AN ORGANIZED HEALTH CARE EDUCATION/TRAINING PROGRAM

## 2024-10-11 PROCEDURE — 3075F SYST BP GE 130 - 139MM HG: CPT | Performed by: STUDENT IN AN ORGANIZED HEALTH CARE EDUCATION/TRAINING PROGRAM

## 2024-10-11 PROCEDURE — 1160F RVW MEDS BY RX/DR IN RCRD: CPT | Performed by: STUDENT IN AN ORGANIZED HEALTH CARE EDUCATION/TRAINING PROGRAM

## 2024-10-11 PROCEDURE — 1159F MED LIST DOCD IN RCRD: CPT | Performed by: STUDENT IN AN ORGANIZED HEALTH CARE EDUCATION/TRAINING PROGRAM

## 2024-10-11 PROCEDURE — 1090000002 HH PPS REVENUE DEBIT

## 2024-10-11 RX ORDER — FERROUS SULFATE 325(65) MG
325 TABLET, DELAYED RELEASE (ENTERIC COATED) ORAL
Qty: 60 TABLET | Refills: 11 | Status: SHIPPED | OUTPATIENT
Start: 2024-10-11 | End: 2025-10-11

## 2024-10-11 RX ORDER — PANTOPRAZOLE SODIUM 40 MG/1
40 TABLET, DELAYED RELEASE ORAL
Qty: 60 TABLET | Refills: 11 | Status: SHIPPED | OUTPATIENT
Start: 2024-10-11 | End: 2025-10-11

## 2024-10-11 NOTE — PROGRESS NOTES
8/2023  87-year-old gentleman with history of atrial fibrillation on no anticoagulation because of GI bleed, CAD/PCI, hypertension dyslipidemia PFO CVA, Watchman procedure, sick sinus syndrome status post pacemaker, presenting to the emergency room on 8/2023 with recurrent epigastric pain in addition to nausea and vomiting. The patient's wife Jessica reports that the patient complains of severe periumbilical pain especially after he is walking or standing up or having heavy meals, the pain is ill-defined, severe, on a daily basis, lasting for seconds to minutes, associated with nausea, positive at night.     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 pylori, extensive intestinal metaplasia  EGD 9/2007 gastric biopsy extensive intestinal metaplasia, negative for H. pylori, positive Ramon's esophagus  EGD 6/2019 normal EGD  Colonoscopy 6/2018 good prep, sigmoid diverticulosis  Flexible sigmoidoscopy 3/2023 showing sigmoid diverticulosis, hemorrhoids, rectal ulcer status post clip and Hemospray, biopsies no malignancy  Family history reviewed, not pertinent to chief complaint  Initially 1 bowel movement every few days, prefers to take prune juice, currently 5-10 bowel movements per day, Glenn 6-7  No reported alcohol use                The note was created using voice recognition transcription software. Despite proofreading, unintentional typographical errors may be present. Please contact the GI office with any questions or concerns.     Current Medications: reviewed    A 10 point review of system is negative except for what is mentioned in the HPI    Follow up with GI was advised       Vital Signs: Reviewed    Physical Exam:  General: no  apparent distress, pleasant and cooperative  Skin:  Warm and dry, no jaundice  HEENT: No scleral icterus, no conjunctival pallor, normocephalic, atraumatic, mucous membranes moist  Neck:  atraumatic, trachea midline, no JVD  Chest:  decreased air entry to auscultation bilaterally. No wheezes, rales, or rhonchi  CV: Positive heart murmur regular rate and rhythm.  Positive S1/S2  Abdomen: no distension, +BS, soft, non-tender to palpation, no rebound tenderness, no guarding, no rigidity, no discernible ascites   Extremities: lower extremity edema, Chronic pigmentary changes, no cyanosis  Neurological:  A&Ox3 , no asterixis  Psychiatric: cooperative     Investigations:  Labs, radiological imaging and cardiac work up were reviewed        1- screen for hepatitis C     2-BMI 27, healthy lifestyle advised     3-Healthcare maintenance, PET scan 9/2024 no GI lesions, patient at higher risk for endoscopic interventions, currently refusing endoscopic interventions, risks and benefits explained, risks of not undergoing endoscopic procedures explained including risk of missing lesions among others that can lead to multiple complications and death, patient understands and verbalizes understanding     4-chronic intermittent periumbilical discomfort described as above, currently denies, chronic diarrhea previously maintained on cholestyramine daily, patient with recurrent admissions to the emergency room for the same complaints, last admission 8/2023 CAT scan showing possible focal small bowel ileus, abdominal aortic aneurysm see report, CAT scan from 3/2023 showing celiac artery narrowing, possible vascular component of the patient's pain, did not complete referral to vascular, currently following with cardiology, resume Protonix twice daily, resume cholestyramine daily    CAT scan 9/2020 4 aortic calcification, diffuse colonic thickening     5-prior bright red blood per rectum status post flexible sigmoidoscopy on 3/2023 as  above    6-iron deficiency anemia, no current signs of active GI bleed, currently refusing endoscopic interventions,  risks and benefits explained, risks of not undergoing endoscopic procedures explained including risk of missing lesions among others that can lead to multiple complications and death, patient understands and verbalizes understanding, start iron supplementation twice a day    7-previous Ramon's esophagus, resume Protonix, lifestyle changes advised, refusing endoscopic interventions as above,  risks and benefits explained, risks of not undergoing endoscopic procedures explained including risk of missing lesions among others that can lead to multiple complications and death, patient understands and verbalizes understanding    8-positive H. pylori in 2006, negative in 2007, extensive intestinal metaplasia, advised to have repeat upper endoscopy, patient currently refusing, risks and benefits explained, risks of not undergoing endoscopic procedures explained including risk of missing lesions among others that can lead to multiple complications and death, patient understands and verbalizes understanding    9-nausea and vertigo not related to food, happen during head movements, in the setting of recent stroke and TAVR, advised to follow with cardiology and neurology, on Zofran as needed

## 2024-10-11 NOTE — PATIENT INSTRUCTIONS
1-to help with your bowel movements please increase cholestyramine to once daily  2-please follow-up with your cardiologist and neurologist regarding the dizziness  3-for your anemia please start taking ferrous sulfate twice a day, for stomach protection Protonix 40 mg twice a day half an hour before food

## 2024-10-12 PROCEDURE — 1090000001 HH PPS REVENUE CREDIT

## 2024-10-12 PROCEDURE — 1090000002 HH PPS REVENUE DEBIT

## 2024-10-16 ENCOUNTER — HOME CARE VISIT (OUTPATIENT)
Dept: HOME HEALTH SERVICES | Facility: HOME HEALTH | Age: 89
End: 2024-10-16
Payer: MEDICARE

## 2024-10-16 PROCEDURE — G0157 HHC PT ASSISTANT EA 15: HCPCS | Mod: CQ,HHH

## 2024-10-16 ASSESSMENT — ENCOUNTER SYMPTOMS
PERSON REPORTING PAIN: PATIENT
DENIES PAIN: 1

## 2024-10-17 ENCOUNTER — TELEPHONE (OUTPATIENT)
Dept: GASTROENTEROLOGY | Facility: CLINIC | Age: 89
End: 2024-10-17
Payer: MEDICARE

## 2024-10-18 ENCOUNTER — HOME CARE VISIT (OUTPATIENT)
Dept: HOME HEALTH SERVICES | Facility: HOME HEALTH | Age: 89
End: 2024-10-18
Payer: MEDICARE

## 2024-10-18 PROCEDURE — G0157 HHC PT ASSISTANT EA 15: HCPCS | Mod: CQ,HHH

## 2024-10-18 ASSESSMENT — ENCOUNTER SYMPTOMS
PERSON REPORTING PAIN: PATIENT
DENIES PAIN: 1

## 2024-10-21 ENCOUNTER — HOME CARE VISIT (OUTPATIENT)
Dept: HOME HEALTH SERVICES | Facility: HOME HEALTH | Age: 89
End: 2024-10-21
Payer: MEDICARE

## 2024-10-21 PROCEDURE — G0157 HHC PT ASSISTANT EA 15: HCPCS | Mod: CQ,HHH

## 2024-10-21 ASSESSMENT — ENCOUNTER SYMPTOMS
PERSON REPORTING PAIN: PATIENT
DENIES PAIN: 1

## 2024-10-23 ENCOUNTER — HOME CARE VISIT (OUTPATIENT)
Dept: HOME HEALTH SERVICES | Facility: HOME HEALTH | Age: 89
End: 2024-10-23
Payer: MEDICARE

## 2024-10-23 PROCEDURE — G0157 HHC PT ASSISTANT EA 15: HCPCS | Mod: CQ,HHH

## 2024-10-23 ASSESSMENT — ENCOUNTER SYMPTOMS
PERSON REPORTING PAIN: PATIENT
DENIES PAIN: 1

## 2024-10-28 ENCOUNTER — HOME CARE VISIT (OUTPATIENT)
Dept: HOME HEALTH SERVICES | Facility: HOME HEALTH | Age: 89
End: 2024-10-28
Payer: MEDICARE

## 2024-10-28 PROCEDURE — G0157 HHC PT ASSISTANT EA 15: HCPCS | Mod: CQ,HHH

## 2024-10-28 ASSESSMENT — ENCOUNTER SYMPTOMS
DENIES PAIN: 1
PERSON REPORTING PAIN: PATIENT

## 2024-10-30 ENCOUNTER — HOME CARE VISIT (OUTPATIENT)
Dept: HOME HEALTH SERVICES | Facility: HOME HEALTH | Age: 89
End: 2024-10-30
Payer: MEDICARE

## 2024-10-30 PROCEDURE — G0151 HHCP-SERV OF PT,EA 15 MIN: HCPCS | Mod: HHH

## 2024-10-30 SDOH — HEALTH STABILITY: PHYSICAL HEALTH: EXERCISE TYPE: SITTING EXS X 15 REPS

## 2024-10-30 ASSESSMENT — ENCOUNTER SYMPTOMS
DENIES PAIN: 1
OCCASIONAL FEELINGS OF UNSTEADINESS: 0
PERSON REPORTING PAIN: PATIENT

## 2024-10-30 ASSESSMENT — ACTIVITIES OF DAILY LIVING (ADL)
HOME_HEALTH_OASIS: 00
OASIS_M1830: 01
AMBULATION ASSISTANCE ON FLAT SURFACES: 1

## 2024-11-08 ENCOUNTER — APPOINTMENT (OUTPATIENT)
Dept: GASTROENTEROLOGY | Facility: CLINIC | Age: 89
End: 2024-11-08
Payer: MEDICARE

## 2024-11-18 DIAGNOSIS — R11.0 NAUSEA: ICD-10-CM

## 2024-11-20 RX ORDER — ONDANSETRON 4 MG/1
4 TABLET, FILM COATED ORAL EVERY 8 HOURS PRN
Qty: 30 TABLET | Refills: 1 | Status: SHIPPED | OUTPATIENT
Start: 2024-11-20

## 2024-12-12 DIAGNOSIS — E78.49 OTHER HYPERLIPIDEMIA: ICD-10-CM

## 2024-12-12 RX ORDER — ROSUVASTATIN CALCIUM 20 MG/1
20 TABLET, COATED ORAL
Qty: 50 TABLET | Refills: 3 | Status: SHIPPED | OUTPATIENT
Start: 2024-12-12

## 2024-12-19 ENCOUNTER — APPOINTMENT (OUTPATIENT)
Dept: CARDIOLOGY | Facility: CLINIC | Age: 89
End: 2024-12-19
Payer: MEDICARE

## 2024-12-19 VITALS
OXYGEN SATURATION: 100 % | WEIGHT: 194 LBS | HEIGHT: 71 IN | DIASTOLIC BLOOD PRESSURE: 80 MMHG | HEART RATE: 50 BPM | SYSTOLIC BLOOD PRESSURE: 130 MMHG | BODY MASS INDEX: 27.16 KG/M2

## 2024-12-19 DIAGNOSIS — R42 EPISODIC LIGHTHEADEDNESS: ICD-10-CM

## 2024-12-19 DIAGNOSIS — I07.1 MODERATE TRICUSPID REGURGITATION: ICD-10-CM

## 2024-12-19 DIAGNOSIS — Z95.2 S/P TAVR (TRANSCATHETER AORTIC VALVE REPLACEMENT): Primary | ICD-10-CM

## 2024-12-19 DIAGNOSIS — I20.89 EXERTIONAL ANGINA (CMS-HCC): ICD-10-CM

## 2024-12-19 DIAGNOSIS — Z95.0 PACEMAKER: ICD-10-CM

## 2024-12-19 DIAGNOSIS — I70.213 INTERMITTENT CLAUDICATION OF BOTH LOWER EXTREMITIES DUE TO ATHEROSCLEROSIS (CMS-HCC): ICD-10-CM

## 2024-12-19 DIAGNOSIS — Z95.5 PRESENCE OF STENT IN CORONARY ARTERY: ICD-10-CM

## 2024-12-19 DIAGNOSIS — R26.81 UNSTEADY GAIT: ICD-10-CM

## 2024-12-19 DIAGNOSIS — Q21.12 PATENT FORAMEN OVALE (HHS-HCC): ICD-10-CM

## 2024-12-19 DIAGNOSIS — I48.91 ATRIAL FIBRILLATION WITH SLOW VENTRICULAR RESPONSE (MULTI): ICD-10-CM

## 2024-12-19 DIAGNOSIS — I25.2 HISTORY OF MYOCARDIAL INFARCTION: ICD-10-CM

## 2024-12-19 DIAGNOSIS — I25.10 CORONARY ARTERY DISEASE INVOLVING NATIVE CORONARY ARTERY OF NATIVE HEART WITHOUT ANGINA PECTORIS: ICD-10-CM

## 2024-12-19 DIAGNOSIS — I47.29 NONSUSTAINED VENTRICULAR TACHYCARDIA (MULTI): ICD-10-CM

## 2024-12-19 DIAGNOSIS — I49.5 SICK SINUS SYNDROME (MULTI): ICD-10-CM

## 2024-12-19 DIAGNOSIS — I10 PRIMARY HYPERTENSION: ICD-10-CM

## 2024-12-19 DIAGNOSIS — I50.32 CHRONIC DIASTOLIC (CONGESTIVE) HEART FAILURE: ICD-10-CM

## 2024-12-19 DIAGNOSIS — E78.49 OTHER HYPERLIPIDEMIA: ICD-10-CM

## 2024-12-19 DIAGNOSIS — R60.0 LEG EDEMA: ICD-10-CM

## 2024-12-19 DIAGNOSIS — I34.0 MILD MITRAL REGURGITATION: ICD-10-CM

## 2024-12-19 DIAGNOSIS — R42 DIZZINESS: ICD-10-CM

## 2024-12-19 PROBLEM — R07.89 CHEST TIGHTNESS: Status: RESOLVED | Noted: 2023-10-17 | Resolved: 2024-12-19

## 2024-12-19 LAB
ATRIAL RATE: 74 BPM
Q ONSET: 203 MS
QRS COUNT: 13 BEATS
QRS DURATION: 138 MS
QT INTERVAL: 276 MS
QTC CALCULATION(BAZETT): 318 MS
QTC FREDERICIA: 304 MS
R AXIS: 83 DEGREES
T AXIS: 257 DEGREES
T OFFSET: 341 MS
VENTRICULAR RATE: 80 BPM

## 2024-12-19 PROCEDURE — 3079F DIAST BP 80-89 MM HG: CPT | Performed by: INTERNAL MEDICINE

## 2024-12-19 PROCEDURE — 93005 ELECTROCARDIOGRAM TRACING: CPT | Performed by: INTERNAL MEDICINE

## 2024-12-19 PROCEDURE — 1157F ADVNC CARE PLAN IN RCRD: CPT | Performed by: INTERNAL MEDICINE

## 2024-12-19 PROCEDURE — 99214 OFFICE O/P EST MOD 30 MIN: CPT | Performed by: INTERNAL MEDICINE

## 2024-12-19 PROCEDURE — 1036F TOBACCO NON-USER: CPT | Performed by: INTERNAL MEDICINE

## 2024-12-19 PROCEDURE — 3075F SYST BP GE 130 - 139MM HG: CPT | Performed by: INTERNAL MEDICINE

## 2024-12-19 PROCEDURE — 1159F MED LIST DOCD IN RCRD: CPT | Performed by: INTERNAL MEDICINE

## 2024-12-19 RX ORDER — POTASSIUM CHLORIDE 750 MG/1
1 TABLET, EXTENDED RELEASE ORAL
COMMUNITY
Start: 2024-11-21

## 2024-12-19 NOTE — PROGRESS NOTES
Subjective   Giuseppe Davila is a 89 y.o. male.    Chief Complaint:  Shortness of breath and lightheadedness.    HPI    His chief complaints are that of shortness of breath and lightheadedness.  He has had no syncopal events.  Denies palpitations or tachycardia.  Continues to have episodes of dizziness.  No chest discomfort or chest pressure.  Has difficulty getting around because of arthritis issues.    Cardiac catheterization on 5/24/2024 demonstrated mild disease in the left anterior descending and circumflex coronary artery.  There was a patent stent in the right coronary artery.  Right-sided pressures were normal.     The patient underwent a Watchman procedure on April 21, 2023 at [facility]. Prior to the procedure he had a CT of the chest which showed a 4.7 cm ascending aortic aneurysm. On her last echo he measured 4.9 cm which was quite similar.     Cardiac problems include a history of permanent atrial fibrillation, pacemaker, aortic stenosis, diastolic heart failure, hypertension, tricuspid regurgitation, and mitral regurgitation.     In October 2022, he presented to us with symptoms of near syncope. He was seen and evaluated by the electrophysiology service. He was deemed to be a candidate for a pacemaker. Because of his atrial fibrillation, we elected to go with a Micra device. He tolerated the procedure well.      Cardiac catheterization was performed on July 26, 2022. This demonstrated nonobstructive coronary artery disease. Right heart cath demonstrated normal pulmonary artery pressures and normal left ventricular end-diastolic pressure. Cardiac output was normal.     A transesophageal echocardiographic study demonstrated severe mitral regurgitation.     An echocardiographic study done on July 26, 2022 demonstrated normal left ventricular systolic function. There was moderate to severe mitral regurgitation and moderate severe tricuspid regurgitation with mild aortic valve regurgitation.  The ascending aorta was moderately dilated.     His last Holter monitor done in May 2022 demonstrated an average heart rate of 53. He is in chronic atrial fibrillation. He is low heart rate was approximately 34. He had pauses up to 3 seconds.     He presented on 6/15/18 with symptoms of a stroke. Became very confused and disoriented. He presented to the hospital where he was noted have evidence of an acute stroke. He received TPA.     Allergies  Medication    · No Known Drug Allergies   Recorded By: Fabiola Rivas; 2/5/2015 9:58:44 AM     Family History  Mother    · Family history of cardiac disorder (V17.49) (Z82.49)   · Family history of TIA (transient ischemic attack)  Father    · Family history of malignant neoplasm (V16.9) (Z80.9)     Social History  Problems    · Never a smoker   · No alcohol use   · No drug use     Review of Systems   Constitutional: Positive for malaise/fatigue.   Cardiovascular:  Positive for chest pain and dyspnea on exertion.   Musculoskeletal:  Positive for arthritis and joint pain.   Neurological:  Positive for vertigo.  Positive for memory loss.    Current Outpatient Medications   Medication Sig Dispense Refill    ashwagandha root extract 500 mg capsule Take 1 capsule by mouth once daily.      b complex vitamins capsule Take 1 capsule by mouth once daily.      calcium carbonate 600 mg calcium (1,500 mg) tablet Take 1 tablet (1,500 mg) by mouth once daily.      cholecalciferol (Vitamin D-3) 25 MCG (1000 UT) capsule Take 1 capsule (25 mcg) by mouth once daily.      cholestyramine (Questran) 4 gram packet Take 1 packet (4 g) by mouth 2 times a day. 60 packet 0    clopidogrel (Plavix) 75 mg tablet Take 1 tablet (75 mg) by mouth once daily. 90 tablet 3    coenzyme Q-10 100 mg capsule Take 1 capsule (100 mg) by mouth once daily at bedtime.      docusate sodium (Colace) 100 mg capsule Take 1 capsule (100 mg) by mouth 2 times a day. (Patient taking differently: Take 1 capsule (100 mg) by  mouth 2 times a day as needed.) 60 capsule 2    ferrous sulfate 325 (65 Fe) MG EC tablet Take 1 tablet by mouth 2 times daily (morning and late afternoon). Do not crush, chew, or split. 60 tablet 11    losartan (Cozaar) 50 mg tablet Take 1 tablet (50 mg) by mouth once daily. 90 tablet 3    magnesium oxide (MagOx) 400 mg (241.3 mg magnesium) tablet Take 1 tablet (400 mg) by mouth once daily.      multivitamin tablet Take 1 tablet by mouth once daily.      ondansetron (Zofran) 4 mg tablet TAKE 1 TABLET (4 MG) BY MOUTH EVERY 8 HOURS IF NEEDED FOR NAUSEA. 30 tablet 1    pantoprazole (ProtoNix) 40 mg EC tablet Take 1 tablet (40 mg) by mouth 2 times a day before meals. Do not crush, chew, or split. 60 tablet 11    rosuvastatin (Crestor) 20 mg tablet TAKE 1 TABLET (20 MG) BY MOUTH 4 TIMES A WEEK. 50 tablet 3    torsemide (Demadex) 10 mg tablet Take 1 tablet (10 mg) by mouth once daily as needed (As needed for increased leg swelling, do NOT take more than one per day). 1 TAB Daily 30 tablet 0    turmeric-turmeric root extract 450-50 mg capsule Take 1 capsule by mouth once daily.      vitamin E acid succinate (vitamin E succinate) 268 mg (400 unit) tablet Take 1 capsule by mouth once daily.       No current facility-administered medications for this visit.        Visit Vitals  Smoking Status Never        Objective     Constitutional:       Appearance: Not in distress.   Neck:      Vascular: JVD normal.   Pulmonary:      Breath sounds: Normal breath sounds.   Cardiovascular:      Normal rate. Regularly irregular rhythm. S1 with normal intensity. S2 with normal intensity.       Murmurs: There is a grade 1/6 systolic murmur.      No gallop.    Pulses:     Intact distal pulses.   Edema:     Peripheral edema absent.   Abdominal:      General: Bowel sounds are normal.   Neurological:      Mental Status: Alert and oriented to person, place and time.         Lab Review:   Lab Results   Component Value Date     10/04/2024    K  3.7 10/04/2024     (H) 10/04/2024    CO2 24 10/04/2024    BUN 9 10/04/2024    CREATININE 0.86 10/04/2024    GLUCOSE 136 (H) 10/04/2024    CALCIUM 8.6 10/04/2024     Lab Results   Component Value Date    WBC 4.5 10/04/2024    HGB 12.9 (L) 10/04/2024    HCT 40.2 (L) 10/04/2024    MCV 96 10/04/2024     (L) 10/04/2024     Lab Results   Component Value Date    CHOL 126 08/05/2024    TRIG 82 08/05/2024    HDL 46.7 08/05/2024       Assessment:    1.  Dizziness and lightheadedness.  Difficult to assess orthostatic changes because he is somewhat unstable while standing.  Blood pressures are reasonably good.    2.  Diastolic heart failure.  Latest echo shows normal left ventricular systolic function.    3.  Status post pacer.  Has a Micra device.  Having frequent bigeminal and trigeminal PVCs.  Could not access data on his latest device check.  We have discussed with the electrophysiology service.    4.  Atrial fibrillation.  Underlying rhythm is atrial fibrillation.  Has a Watchman device in place.    5.  Lower extremity edema.  Mild lower extremity edema present.

## 2025-01-02 ENCOUNTER — HOSPITAL ENCOUNTER (OUTPATIENT)
Dept: CARDIOLOGY | Facility: CLINIC | Age: 89
Discharge: HOME | End: 2025-01-02
Payer: MEDICARE

## 2025-01-02 DIAGNOSIS — I49.5 SICK SINUS SYNDROME (MULTI): ICD-10-CM

## 2025-01-02 DIAGNOSIS — Z95.0 PRESENCE OF CARDIAC PACEMAKER: ICD-10-CM

## 2025-01-02 PROCEDURE — 93279 PRGRMG DEV EVAL PM/LDLS PM: CPT

## 2025-01-02 PROCEDURE — 93279 PRGRMG DEV EVAL PM/LDLS PM: CPT | Performed by: STUDENT IN AN ORGANIZED HEALTH CARE EDUCATION/TRAINING PROGRAM

## 2025-01-03 ENCOUNTER — OFFICE VISIT (OUTPATIENT)
Dept: CARDIOLOGY | Facility: CLINIC | Age: 89
End: 2025-01-03
Payer: MEDICARE

## 2025-01-03 VITALS
HEART RATE: 54 BPM | WEIGHT: 194.5 LBS | OXYGEN SATURATION: 96 % | DIASTOLIC BLOOD PRESSURE: 80 MMHG | BODY MASS INDEX: 27.23 KG/M2 | SYSTOLIC BLOOD PRESSURE: 122 MMHG | HEIGHT: 71 IN

## 2025-01-03 DIAGNOSIS — I25.10 CORONARY ARTERY DISEASE INVOLVING NATIVE CORONARY ARTERY OF NATIVE HEART WITHOUT ANGINA PECTORIS: ICD-10-CM

## 2025-01-03 DIAGNOSIS — I10 PRIMARY HYPERTENSION: ICD-10-CM

## 2025-01-03 DIAGNOSIS — I70.213 INTERMITTENT CLAUDICATION OF BOTH LOWER EXTREMITIES DUE TO ATHEROSCLEROSIS (CMS-HCC): ICD-10-CM

## 2025-01-03 DIAGNOSIS — I07.1 MODERATE TRICUSPID REGURGITATION: ICD-10-CM

## 2025-01-03 DIAGNOSIS — Q21.12 PATENT FORAMEN OVALE (HHS-HCC): ICD-10-CM

## 2025-01-03 DIAGNOSIS — I48.91 ATRIAL FIBRILLATION WITH SLOW VENTRICULAR RESPONSE (MULTI): Primary | ICD-10-CM

## 2025-01-03 DIAGNOSIS — Z95.0 PACEMAKER: ICD-10-CM

## 2025-01-03 DIAGNOSIS — I25.2 HISTORY OF MYOCARDIAL INFARCTION: ICD-10-CM

## 2025-01-03 DIAGNOSIS — I95.1 ORTHOSTATIC HYPOTENSION: ICD-10-CM

## 2025-01-03 DIAGNOSIS — I20.89 EXERTIONAL ANGINA (CMS-HCC): ICD-10-CM

## 2025-01-03 DIAGNOSIS — E78.49 OTHER HYPERLIPIDEMIA: ICD-10-CM

## 2025-01-03 DIAGNOSIS — Z95.2 S/P TAVR (TRANSCATHETER AORTIC VALVE REPLACEMENT): ICD-10-CM

## 2025-01-03 DIAGNOSIS — I50.32 CHRONIC DIASTOLIC (CONGESTIVE) HEART FAILURE: ICD-10-CM

## 2025-01-03 DIAGNOSIS — Z95.5 PRESENCE OF STENT IN CORONARY ARTERY: ICD-10-CM

## 2025-01-03 DIAGNOSIS — I34.0 MILD MITRAL REGURGITATION: ICD-10-CM

## 2025-01-03 PROCEDURE — 1157F ADVNC CARE PLAN IN RCRD: CPT | Performed by: INTERNAL MEDICINE

## 2025-01-03 PROCEDURE — 3074F SYST BP LT 130 MM HG: CPT | Performed by: INTERNAL MEDICINE

## 2025-01-03 PROCEDURE — 99214 OFFICE O/P EST MOD 30 MIN: CPT | Performed by: INTERNAL MEDICINE

## 2025-01-03 PROCEDURE — 3079F DIAST BP 80-89 MM HG: CPT | Performed by: INTERNAL MEDICINE

## 2025-01-03 PROCEDURE — 1159F MED LIST DOCD IN RCRD: CPT | Performed by: INTERNAL MEDICINE

## 2025-01-03 PROCEDURE — 1036F TOBACCO NON-USER: CPT | Performed by: INTERNAL MEDICINE

## 2025-01-03 RX ORDER — RANOLAZINE 500 MG/1
500 TABLET, EXTENDED RELEASE ORAL 2 TIMES DAILY
Qty: 60 TABLET | Refills: 1 | Status: CANCELLED | OUTPATIENT
Start: 2025-01-03 | End: 2026-01-03

## 2025-01-03 RX ORDER — RANOLAZINE 500 MG/1
500 TABLET, EXTENDED RELEASE ORAL 2 TIMES DAILY
Qty: 60 TABLET | Refills: 1 | Status: SHIPPED | OUTPATIENT
Start: 2025-01-03 | End: 2026-01-03

## 2025-01-03 RX ORDER — CLOPIDOGREL BISULFATE 75 MG/1
75 TABLET ORAL DAILY
Qty: 90 TABLET | Refills: 3 | Status: SHIPPED | OUTPATIENT
Start: 2025-01-03

## 2025-01-03 NOTE — PROGRESS NOTES
Subjective   Giuseppe Davila is a 89 y.o. male.    Chief Complaint:  Dizziness and lightheadedness.  Chest pain.  Fatigue.    HPI    Since his last visit he complains of chest pain.  He has had this pain for some time.  It is in the left lateral chest and axillary area.  He describes it as a sharp pain lasting for less than 10 seconds.  Not associate with activities.  Also notes episodes of dizziness and lightheadedness.  Feels like he might faint at times.    Cardiac catheterization on 5/24/2024 demonstrated mild disease in the left anterior descending and circumflex coronary artery.  There was a patent stent in the right coronary artery.  Right-sided pressures were normal.     The patient underwent a Watchman procedure on April 21, 2023 at [facility]. Prior to the procedure he had a CT of the chest which showed a 4.7 cm ascending aortic aneurysm. On her last echo he measured 4.9 cm which was quite similar.     Cardiac problems include a history of permanent atrial fibrillation, pacemaker, aortic stenosis, diastolic heart failure, hypertension, tricuspid regurgitation, and mitral regurgitation.     In October 2022, he presented to us with symptoms of near syncope. He was seen and evaluated by the electrophysiology service. He was deemed to be a candidate for a pacemaker. Because of his atrial fibrillation, we elected to go with a Micra device. He tolerated the procedure well.      Cardiac catheterization was performed on July 26, 2022. This demonstrated nonobstructive coronary artery disease. Right heart cath demonstrated normal pulmonary artery pressures and normal left ventricular end-diastolic pressure. Cardiac output was normal.     A transesophageal echocardiographic study demonstrated severe mitral regurgitation.     An echocardiographic study done on July 26, 2022 demonstrated normal left ventricular systolic function. There was moderate to severe mitral regurgitation and moderate severe  tricuspid regurgitation with mild aortic valve regurgitation. The ascending aorta was moderately dilated.     His last Holter monitor done in May 2022 demonstrated an average heart rate of 53. He is in chronic atrial fibrillation. He is low heart rate was approximately 34. He had pauses up to 3 seconds.     He presented on 6/15/18 with symptoms of a stroke. Became very confused and disoriented. He presented to the hospital where he was noted have evidence of an acute stroke. He received TPA.     Allergies  Medication    · No Known Drug Allergies   Recorded By: Fabiola Rivas; 2/5/2015 9:58:44 AM     Family History  Mother    · Family history of cardiac disorder (V17.49) (Z82.49)   · Family history of TIA (transient ischemic attack)  Father    · Family history of malignant neoplasm (V16.9) (Z80.9)     Social History  Problems    · Never a smoker   · No alcohol use   · No drug use     Review of Systems   Constitutional: Positive for malaise/fatigue.   Cardiovascular:  Positive for chest pain and dyspnea on exertion.   Musculoskeletal:  Positive for arthritis and joint pain.   Neurological:  Positive for vertigo.  Positive for memory loss.    Current Outpatient Medications   Medication Sig Dispense Refill    docusate sodium (Colace) 100 mg capsule Take 1 capsule (100 mg) by mouth 2 times a day. (Patient taking differently: Take 1 capsule (100 mg) by mouth 2 times a day as needed.) 60 capsule 2    enzalutamide (Xtandi) 40 mg tablet Take 1 tablet (40 mg total) by mouth once daily.      enzalutamide (Xtandi) 80 mg tablet Take 1 tablet (80 mg total) by mouth once daily.      ferrous sulfate 325 (65 Fe) MG EC tablet Take 1 tablet by mouth 2 times daily (morning and late afternoon). Do not crush, chew, or split. 60 tablet 11    ondansetron (Zofran) 4 mg tablet TAKE 1 TABLET (4 MG) BY MOUTH EVERY 8 HOURS IF NEEDED FOR NAUSEA. 30 tablet 1    pantoprazole (ProtoNix) 40 mg EC tablet Take 1 tablet (40 mg) by mouth 2 times a  "day before meals. Do not crush, chew, or split. 60 tablet 11    potassium chloride CR 10 mEq ER tablet Take 1 tablet (10 mEq) by mouth early in the morning..      rosuvastatin (Crestor) 20 mg tablet TAKE 1 TABLET (20 MG) BY MOUTH 4 TIMES A WEEK. 50 tablet 3    cholestyramine (Questran) 4 gram packet Take 1 packet (4 g) by mouth 2 times a day. 60 packet 0    clopidogrel (Plavix) 75 mg tablet Take 1 tablet (75 mg) by mouth once daily. 90 tablet 3    ranolazine (Ranexa) 500 mg 12 hr tablet Take 1 tablet (500 mg) by mouth 2 times a day. Do not crush, chew, or split. 60 tablet 1    torsemide (Demadex) 10 mg tablet Take 1 tablet (10 mg) by mouth once daily as needed (As needed for increased leg swelling, do NOT take more than one per day). 1 TAB Daily 30 tablet 0     No current facility-administered medications for this visit.        Visit Vitals  /80 (BP Location: Right arm)   Pulse 54   Ht 1.803 m (5' 11\")   Wt 88.2 kg (194 lb 8 oz)   SpO2 96%   BMI 27.13 kg/m²   Smoking Status Never   BSA 2.1 m²        Objective     Constitutional:       Appearance: Not in distress.   Neck:      Vascular: JVD normal.   Pulmonary:      Breath sounds: Normal breath sounds.   Cardiovascular:      Normal rate. Regular rhythm. S1 with normal intensity. S2 with normal intensity.       Murmurs: There is a grade 1/6 systolic murmur.      No gallop.    Pulses:     Intact distal pulses.   Edema:     Peripheral edema present.     Pretibial: bilateral 1+ edema of the pretibial area.  Abdominal:      General: Bowel sounds are normal.   Neurological:      Mental Status: Alert and oriented to person, place and time.         Lab Review:   Lab Results   Component Value Date     10/04/2024    K 3.7 10/04/2024     (H) 10/04/2024    CO2 24 10/04/2024    BUN 9 10/04/2024    CREATININE 0.86 10/04/2024    GLUCOSE 136 (H) 10/04/2024    CALCIUM 8.6 10/04/2024     Lab Results   Component Value Date    CHOL 126 08/05/2024    TRIG 82 08/05/2024 "    HDL 46.7 08/05/2024       Assessment:    1.  Orthostatic hypotension.  Quite orthostatic on today's visit.  We are going to stop his losartan.  Continue low-dose torsemide 5 mg daily.  May need an inotropic drug in the future such as midodrine to maintain blood pressures.    2.  Chest pain.  Atypical for angina.  Cardiac catheterization in May 2024 demonstrated no significant obstructive disease.  We are going to give him a trial of ranolazine to see if this helps his chest pain symptoms.    3.  Atrial fibrillation.  Controlled ventricular response.  Has a Watchman device in place.     4.  Permanent pacemaker.  Pacemaker is functioning normally with greater than 8 years of life left.    5.  Lower extremity edema.  Minimal lower extremity edema is noted on today's visit.

## 2025-01-03 NOTE — PATIENT INSTRUCTIONS
Stop Losartan 50 mg daily    Start Ranolazine 500 mg twice daily for 30 days.    We will see you back at the end of February.    ENT Physician   Dr. Armin Bryan.  [phone]

## 2025-01-17 ENCOUNTER — HOSPITAL ENCOUNTER (EMERGENCY)
Facility: HOSPITAL | Age: 89
Discharge: HOME | End: 2025-01-17
Attending: EMERGENCY MEDICINE
Payer: MEDICARE

## 2025-01-17 ENCOUNTER — APPOINTMENT (OUTPATIENT)
Dept: RADIOLOGY | Facility: HOSPITAL | Age: 89
End: 2025-01-17
Payer: MEDICARE

## 2025-01-17 ENCOUNTER — APPOINTMENT (OUTPATIENT)
Dept: CARDIOLOGY | Facility: HOSPITAL | Age: 89
End: 2025-01-17
Payer: MEDICARE

## 2025-01-17 VITALS
TEMPERATURE: 97.2 F | RESPIRATION RATE: 19 BRPM | BODY MASS INDEX: 24.5 KG/M2 | OXYGEN SATURATION: 96 % | WEIGHT: 175 LBS | HEIGHT: 71 IN | SYSTOLIC BLOOD PRESSURE: 121 MMHG | DIASTOLIC BLOOD PRESSURE: 62 MMHG | HEART RATE: 60 BPM

## 2025-01-17 DIAGNOSIS — I20.89 EXERTIONAL ANGINA (CMS-HCC): ICD-10-CM

## 2025-01-17 DIAGNOSIS — K57.92 DIVERTICULITIS: Primary | ICD-10-CM

## 2025-01-17 DIAGNOSIS — I25.10 CORONARY ARTERY DISEASE INVOLVING NATIVE CORONARY ARTERY OF NATIVE HEART WITHOUT ANGINA PECTORIS: ICD-10-CM

## 2025-01-17 LAB
ALBUMIN SERPL BCP-MCNC: 3.7 G/DL (ref 3.4–5)
ALP SERPL-CCNC: 62 U/L (ref 33–136)
ALT SERPL W P-5'-P-CCNC: 9 U/L (ref 10–52)
ANION GAP SERPL CALC-SCNC: 14 MMOL/L (ref 10–20)
APPEARANCE UR: CLEAR
AST SERPL W P-5'-P-CCNC: 18 U/L (ref 9–39)
BACTERIA #/AREA URNS AUTO: ABNORMAL /HPF
BASOPHILS # BLD AUTO: 0.03 X10*3/UL (ref 0–0.1)
BASOPHILS NFR BLD AUTO: 0.4 %
BILIRUB SERPL-MCNC: 1.6 MG/DL (ref 0–1.2)
BILIRUB UR STRIP.AUTO-MCNC: NEGATIVE MG/DL
BUN SERPL-MCNC: 11 MG/DL (ref 6–23)
CALCIUM SERPL-MCNC: 9.2 MG/DL (ref 8.6–10.3)
CARDIAC TROPONIN I PNL SERPL HS: 19 NG/L (ref 0–20)
CARDIAC TROPONIN I PNL SERPL HS: 19 NG/L (ref 0–20)
CHLORIDE SERPL-SCNC: 105 MMOL/L (ref 98–107)
CO2 SERPL-SCNC: 24 MMOL/L (ref 21–32)
COLOR UR: YELLOW
CREAT SERPL-MCNC: 0.9 MG/DL (ref 0.5–1.3)
EGFRCR SERPLBLD CKD-EPI 2021: 83 ML/MIN/1.73M*2
EOSINOPHIL # BLD AUTO: 0.14 X10*3/UL (ref 0–0.4)
EOSINOPHIL NFR BLD AUTO: 1.8 %
ERYTHROCYTE [DISTWIDTH] IN BLOOD BY AUTOMATED COUNT: 13.3 % (ref 11.5–14.5)
FLUAV RNA RESP QL NAA+PROBE: NOT DETECTED
FLUBV RNA RESP QL NAA+PROBE: NOT DETECTED
GLUCOSE SERPL-MCNC: 97 MG/DL (ref 74–99)
GLUCOSE UR STRIP.AUTO-MCNC: NORMAL MG/DL
HCT VFR BLD AUTO: 40.7 % (ref 41–52)
HGB BLD-MCNC: 13.3 G/DL (ref 13.5–17.5)
IMM GRANULOCYTES # BLD AUTO: 0.03 X10*3/UL (ref 0–0.5)
IMM GRANULOCYTES NFR BLD AUTO: 0.4 % (ref 0–0.9)
INR PPP: 1.2 (ref 0.9–1.1)
KETONES UR STRIP.AUTO-MCNC: NEGATIVE MG/DL
LACTATE SERPL-SCNC: 1 MMOL/L (ref 0.4–2)
LEUKOCYTE ESTERASE UR QL STRIP.AUTO: ABNORMAL
LYMPHOCYTES # BLD AUTO: 0.87 X10*3/UL (ref 0.8–3)
LYMPHOCYTES NFR BLD AUTO: 11.1 %
MAGNESIUM SERPL-MCNC: 2 MG/DL (ref 1.6–2.4)
MCH RBC QN AUTO: 30.4 PG (ref 26–34)
MCHC RBC AUTO-ENTMCNC: 32.7 G/DL (ref 32–36)
MCV RBC AUTO: 93 FL (ref 80–100)
MONOCYTES # BLD AUTO: 0.64 X10*3/UL (ref 0.05–0.8)
MONOCYTES NFR BLD AUTO: 8.2 %
MUCOUS THREADS #/AREA URNS AUTO: ABNORMAL /LPF
NEUTROPHILS # BLD AUTO: 6.11 X10*3/UL (ref 1.6–5.5)
NEUTROPHILS NFR BLD AUTO: 78.1 %
NITRITE UR QL STRIP.AUTO: NEGATIVE
NRBC BLD-RTO: 0 /100 WBCS (ref 0–0)
PH UR STRIP.AUTO: 6 [PH]
PLATELET # BLD AUTO: 193 X10*3/UL (ref 150–450)
POTASSIUM SERPL-SCNC: 3.5 MMOL/L (ref 3.5–5.3)
PROT SERPL-MCNC: 7 G/DL (ref 6.4–8.2)
PROT UR STRIP.AUTO-MCNC: ABNORMAL MG/DL
PROTHROMBIN TIME: 13.4 SECONDS (ref 9.8–12.8)
RBC # BLD AUTO: 4.38 X10*6/UL (ref 4.5–5.9)
RBC # UR STRIP.AUTO: ABNORMAL /UL
RBC #/AREA URNS AUTO: ABNORMAL /HPF
SARS-COV-2 RNA RESP QL NAA+PROBE: NOT DETECTED
SODIUM SERPL-SCNC: 139 MMOL/L (ref 136–145)
SP GR UR STRIP.AUTO: >1.05
UROBILINOGEN UR STRIP.AUTO-MCNC: NORMAL MG/DL
WBC # BLD AUTO: 7.8 X10*3/UL (ref 4.4–11.3)
WBC #/AREA URNS AUTO: ABNORMAL /HPF

## 2025-01-17 PROCEDURE — 71045 X-RAY EXAM CHEST 1 VIEW: CPT | Mod: FOREIGN READ | Performed by: RADIOLOGY

## 2025-01-17 PROCEDURE — 93005 ELECTROCARDIOGRAM TRACING: CPT

## 2025-01-17 PROCEDURE — 85610 PROTHROMBIN TIME: CPT | Performed by: HEALTH CARE PROVIDER

## 2025-01-17 PROCEDURE — 84484 ASSAY OF TROPONIN QUANT: CPT | Performed by: HEALTH CARE PROVIDER

## 2025-01-17 PROCEDURE — 2500000001 HC RX 250 WO HCPCS SELF ADMINISTERED DRUGS (ALT 637 FOR MEDICARE OP): Performed by: EMERGENCY MEDICINE

## 2025-01-17 PROCEDURE — 71045 X-RAY EXAM CHEST 1 VIEW: CPT

## 2025-01-17 PROCEDURE — 2500000004 HC RX 250 GENERAL PHARMACY W/ HCPCS (ALT 636 FOR OP/ED): Performed by: EMERGENCY MEDICINE

## 2025-01-17 PROCEDURE — 36415 COLL VENOUS BLD VENIPUNCTURE: CPT | Performed by: HEALTH CARE PROVIDER

## 2025-01-17 PROCEDURE — 87086 URINE CULTURE/COLONY COUNT: CPT | Mod: PARLAB | Performed by: HEALTH CARE PROVIDER

## 2025-01-17 PROCEDURE — 87040 BLOOD CULTURE FOR BACTERIA: CPT | Mod: PARLAB | Performed by: HEALTH CARE PROVIDER

## 2025-01-17 PROCEDURE — 99285 EMERGENCY DEPT VISIT HI MDM: CPT | Mod: 25 | Performed by: EMERGENCY MEDICINE

## 2025-01-17 PROCEDURE — 2550000001 HC RX 255 CONTRASTS: Performed by: EMERGENCY MEDICINE

## 2025-01-17 PROCEDURE — 83605 ASSAY OF LACTIC ACID: CPT | Performed by: HEALTH CARE PROVIDER

## 2025-01-17 PROCEDURE — 81001 URINALYSIS AUTO W/SCOPE: CPT | Performed by: HEALTH CARE PROVIDER

## 2025-01-17 PROCEDURE — 74177 CT ABD & PELVIS W/CONTRAST: CPT | Mod: FOREIGN READ | Performed by: RADIOLOGY

## 2025-01-17 PROCEDURE — 85025 COMPLETE CBC W/AUTO DIFF WBC: CPT | Performed by: HEALTH CARE PROVIDER

## 2025-01-17 PROCEDURE — 80053 COMPREHEN METABOLIC PANEL: CPT | Performed by: HEALTH CARE PROVIDER

## 2025-01-17 PROCEDURE — 87636 SARSCOV2 & INF A&B AMP PRB: CPT | Performed by: HEALTH CARE PROVIDER

## 2025-01-17 PROCEDURE — 74177 CT ABD & PELVIS W/CONTRAST: CPT

## 2025-01-17 PROCEDURE — 83735 ASSAY OF MAGNESIUM: CPT | Performed by: HEALTH CARE PROVIDER

## 2025-01-17 PROCEDURE — 96374 THER/PROPH/DIAG INJ IV PUSH: CPT | Mod: 59

## 2025-01-17 RX ORDER — METRONIDAZOLE 500 MG/1
500 TABLET ORAL 3 TIMES DAILY
Qty: 30 TABLET | Refills: 0 | Status: SHIPPED | OUTPATIENT
Start: 2025-01-17 | End: 2025-01-27

## 2025-01-17 RX ORDER — CIPROFLOXACIN 500 MG/1
500 TABLET ORAL 2 TIMES DAILY
Qty: 20 TABLET | Refills: 0 | Status: SHIPPED | OUTPATIENT
Start: 2025-01-17 | End: 2025-01-27

## 2025-01-17 RX ORDER — METRONIDAZOLE 500 MG/1
500 TABLET ORAL ONCE
Status: COMPLETED | OUTPATIENT
Start: 2025-01-17 | End: 2025-01-17

## 2025-01-17 RX ORDER — MORPHINE SULFATE 4 MG/ML
4 INJECTION, SOLUTION INTRAMUSCULAR; INTRAVENOUS ONCE
Status: DISCONTINUED | OUTPATIENT
Start: 2025-01-17 | End: 2025-01-17 | Stop reason: HOSPADM

## 2025-01-17 RX ORDER — CIPROFLOXACIN 500 MG/1
500 TABLET ORAL ONCE
Status: COMPLETED | OUTPATIENT
Start: 2025-01-17 | End: 2025-01-17

## 2025-01-17 RX ORDER — ONDANSETRON HYDROCHLORIDE 2 MG/ML
4 INJECTION, SOLUTION INTRAVENOUS ONCE
Status: COMPLETED | OUTPATIENT
Start: 2025-01-17 | End: 2025-01-17

## 2025-01-17 RX ADMIN — ONDANSETRON 4 MG: 2 INJECTION INTRAMUSCULAR; INTRAVENOUS at 18:30

## 2025-01-17 RX ADMIN — CIPROFLOXACIN 500 MG: 500 TABLET ORAL at 19:53

## 2025-01-17 RX ADMIN — METRONIDAZOLE 500 MG: 500 TABLET ORAL at 19:53

## 2025-01-17 RX ADMIN — IOHEXOL 75 ML: 350 INJECTION, SOLUTION INTRAVENOUS at 18:27

## 2025-01-17 ASSESSMENT — PAIN DESCRIPTION - DESCRIPTORS: DESCRIPTORS: ACHING

## 2025-01-17 ASSESSMENT — COLUMBIA-SUICIDE SEVERITY RATING SCALE - C-SSRS
1. IN THE PAST MONTH, HAVE YOU WISHED YOU WERE DEAD OR WISHED YOU COULD GO TO SLEEP AND NOT WAKE UP?: NO
2. HAVE YOU ACTUALLY HAD ANY THOUGHTS OF KILLING YOURSELF?: NO
6. HAVE YOU EVER DONE ANYTHING, STARTED TO DO ANYTHING, OR PREPARED TO DO ANYTHING TO END YOUR LIFE?: NO

## 2025-01-17 ASSESSMENT — PAIN DESCRIPTION - ORIENTATION: ORIENTATION: RIGHT

## 2025-01-17 ASSESSMENT — PAIN DESCRIPTION - FREQUENCY: FREQUENCY: RARELY

## 2025-01-17 ASSESSMENT — PAIN - FUNCTIONAL ASSESSMENT: PAIN_FUNCTIONAL_ASSESSMENT: 0-10

## 2025-01-17 ASSESSMENT — PAIN DESCRIPTION - LOCATION: LOCATION: HEAD

## 2025-01-17 ASSESSMENT — PAIN SCALES - GENERAL: PAINLEVEL_OUTOF10: 0 - NO PAIN

## 2025-01-17 NOTE — ED TRIAGE NOTES
89-year-old male who presents to ED complaining of generalized weakness, fatigue, generalized bodyaches, nausea, diarrhea for the past 40 to 72 hours patient also reports intermittent transient episodes of chest discomfort.  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 chemotherapy stopped 2 weeks ago, status post TAVR, A-fib, ICD pacemaker, Watchman procedure.  Patient denies having any fevers chills or rigors, he denies any vomiting, no hemoptysis hematemesis hematochezia or melanotic stools.  He reports several episodes of loose, watery, brown diarrhea, generalized diffuse abdominal tenderness cramping.    General: Vitals noted, no distress. Afebrile. Alert and oriented  x 3.     EENT: TMs clear. Posterior oropharynx unremarkable. No meningismus. No LAD.     Cardiac: Regular, rate, rhythm, no murmurs rubs or gallops.     Pulmonary: Lungs clear bilaterally with good aeration. No adventitious breath sounds. No wheezes rales or rhonchi.     Abdomen: Soft, nonsurgical. . No peritoneal signs. Normoactive bowel sounds. No pulsatile masses.     Extremities: No peripheral edema. Negative Homans bilaterally, no cords.    Skin: No rash. Intact.     Neuro: No focal neurologic deficits, NIH score of 0. Cranial nerves normal as tested from II through XII.

## 2025-01-17 NOTE — ED PROVIDER NOTES
HPI   Chief Complaint   Patient presents with    Abdominal Pain     Patient arrives from home with complaints of severe abdominal pain with diarrhea that has progressively getting worse over the course of the past 2-3 days.  Patient states he does have a history of cancer to prostate that metastasized behind his right rib cage.  Patient also complains of chest discomfort for about a week.     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  According to the patient, he has been having lower abdominal pain over the past 48 to 72 hours.  The patient states that the pain is relatively constant and located in his lower abdomen.  The patient denies any dysuria, hematuria or flank pain.  The patient states that he has been having watery diarrhea he denies any recent antibiotic use or travel he denies any recent sick contacts.  Concern for the symptoms, the patient presents to the emergency department for further evaluation he states the pain is becoming intolerable.      History provided by:  Patient   used: No            Patient History   Past Medical History:   Diagnosis Date    Anorectal abscess     Anorectal abscess    Essential (primary) hypertension     Benign essential HTN    Old myocardial infarction     History of myocardial infarction    Other hyperlipidemia     Other hyperlipidemia    Personal history of malignant neoplasm, unspecified     History of malignant neoplasm    Personal history of other diseases of the circulatory system     History of coronary artery disease    Personal history of other diseases of the circulatory system     History of angina pectoris    Personal history of other  diseases of the nervous system and sense organs 05/02/2019    History of polyneuropathy    Personal history of other specified conditions     History of chest pain    Personal history of other specified conditions     History of epistaxis    Personal history of other specified conditions     History of bradycardia    Personal history of transient ischemic attack (TIA), and cerebral infarction without residual deficits     History of stroke    Personal history of transient ischemic attack (TIA), and cerebral infarction without residual deficits     History of stroke    Syncope and collapse     Near syncope    Thoracic aortic aneurysm, without rupture, unspecified (CMS-HCC)     Thoracic aortic aneurysm    Unspecified atrial fibrillation (Multi) 03/10/2020    Atrial fibrillation with slow ventricular response    Ventricular premature depolarization     Frequent PVCs     Past Surgical History:   Procedure Laterality Date    ANOMALOUS PULMONARY VENOUS RETURN REPAIR, 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    Procedure: TAVR-OR;  Surgeon: Jaquan Larkin MD;  Location: [place] [address] Cardiac Cath Lab;  Service: Cardiac Surgery;  Laterality: N/A;    CARDIAC CATHETERIZATION N/A 5/24/2024    Procedure: Left And Right Heart Cath, With LV;  Surgeon: Stepan Jones MD PhD;  Location: [facility] Cardiac Cath Lab;  Service: Cardiovascular;  Laterality: N/A;  R/LHC poss PCI    CARDIAC CATHETERIZATION N/A 7/9/2024    Procedure: TAVR (Transcatheter AV Replacement);  Surgeon: Jackson Rutherford MD;  Location: [place] [address] Cardiac Cath Lab;  Service: Cardiovascular;  Laterality: N/A;  Schedule at 10am, day, Lex 29    CARDIAC CATHETERIZATION N/A 7/9/2024    Procedure: TVP for TAVR;  Surgeon: Jackson Rutherford MD;  Location: [place] [address] Cardiac Cath Lab;  Service: Cardiovascular;  Laterality: N/A;    CHOLECYSTECTOMY  02/05/2015    Cholecystectomy    COLONOSCOPY  02/05/2015    Colonoscopy (Fiberoptic)    CORONARY ANGIOPLASTY WITH STENT  PLACEMENT  05/21/2018    Cath Placement Of Stent 1    CT ABDOMEN PELVIS ANGIOGRAM W AND/OR WO IV CONTRAST  3/17/2023    CT ABDOMEN PELVIS ANGIOGRAM W AND/OR WO IV CONTRAST PAR CT    KIDNEY SURGERY  02/05/2015    Kidney Surgery    OTHER SURGICAL HISTORY  12/13/2018    Prostatectomy    OTHER SURGICAL HISTORY  05/21/2018    Recent Surgery    TONSILLECTOMY  02/05/2015    Tonsillectomy     Family History   Problem Relation Name Age of Onset    Other (cardiac disorder) Mother      Transient ischemic attack Mother      Cancer Father      Other (cardiac disorder) Sister      Alzheimer's disease Father's Brother      Parkinsonism Father's Brother       Social History     Tobacco Use    Smoking status: Never    Smokeless tobacco: Never   Vaping Use    Vaping status: Never Used   Substance Use Topics    Alcohol use: Not Currently     Comment: rarely    Drug use: Not Currently       Physical Exam   ED Triage Vitals [01/17/25 1502]   Temperature Heart Rate Respirations BP   36.2 °C (97.2 °F) 55 18 118/65      Pulse Ox Temp Source Heart Rate Source Patient Position   97 % Temporal Monitor --      BP Location FiO2 (%)     Right arm --       Physical Exam  Vitals and nursing note reviewed.   Constitutional:       General: He is not in acute distress.     Appearance: He is well-developed.   HENT:      Head: Normocephalic and atraumatic.   Eyes:      Conjunctiva/sclera: Conjunctivae normal.   Cardiovascular:      Rate and Rhythm: Normal rate and regular rhythm.      Heart sounds: No murmur heard.  Pulmonary:      Effort: Pulmonary effort is normal. No respiratory distress.      Breath sounds: Normal breath sounds.   Abdominal:      Palpations: Abdomen is soft.      Tenderness: There is generalized abdominal tenderness and tenderness in the left lower quadrant. There is rebound.   Musculoskeletal:         General: No swelling.      Cervical back: Neck supple.   Skin:     General: Skin is warm and dry.      Capillary Refill: Capillary  refill takes less than 2 seconds.   Neurological:      Mental Status: He is alert.   Psychiatric:         Mood and Affect: Mood normal.           ED Course & MDM   Diagnoses as of 01/17/25 1950   Diverticulitis                 No data recorded     [place] Coma Scale Score: 15 (01/17/25 1506 : Suhas Meyer RN)                           Medical Decision Making  Medical Decision Making: Patient remained stable during his time in the emergency department.  CBC demonstrated no significant abnormalities Chem-7 and LFTs were essentially within normal limits.  INR is 1.2 lactate is 1 magnesium is normal troponin and delta troponin were both within normal limits COVID influenza were both within normal limits.  Chest x-ray demonstrated no significant acute abnormalities while CT of the patient's abdomen pelvis with IV contrast demonstrated a uncomplicated diverticulitis in the proximal sigmoid and colon. Patient's EKG demonstrated a combination left bundle branch block with a paced bigeminy with a rate of 71 bpm no evidence of ischemia based on Sgarbossa's criteria and a QTc of 417. Patient's urinalysis demonstrated 25 leuks but no other significant acute abnormalities.    Patient presents to the emergency department complaints of abdominal pain.  Workup was performed as above.  Patient was given his first dose of Cipro and Flagyl here in the emergency department he was given Zofran.  The patient declined morphine.    His emphasized the patient he presents with diverticulitis.  The patient was given a prescription for ciprofloxacin and metronidazole for home.  He was instructed to return for any worsening symptoms patient expressed understanding and agreement.  The patient was then discharged home in otherwise stable condition.    Amount and/or Complexity of Data Reviewed  Labs: ordered. Decision-making details documented in ED Course.  Radiology: ordered. Decision-making details documented in ED Course.  ECG/medicine tests:  ordered and independent interpretation performed. Decision-making details documented in ED Course.        Procedure  Procedures     Doroteo Gan MD  01/18/25 4021

## 2025-01-18 LAB — HOLD SPECIMEN: NORMAL

## 2025-01-19 LAB
BACTERIA BLD CULT: NORMAL
BACTERIA BLD CULT: NORMAL
BACTERIA UR CULT: NORMAL

## 2025-01-20 LAB
ATRIAL RATE: 375 BPM
Q ONSET: 221 MS
QRS COUNT: 12 BEATS
QRS DURATION: 86 MS
QT INTERVAL: 384 MS
QTC CALCULATION(BAZETT): 417 MS
QTC FREDERICIA: 406 MS
R AXIS: 3 DEGREES
T AXIS: 44 DEGREES
T OFFSET: 413 MS
VENTRICULAR RATE: 71 BPM

## 2025-01-20 RX ORDER — RANOLAZINE 500 MG/1
500 TABLET, EXTENDED RELEASE ORAL 2 TIMES DAILY
Qty: 180 TABLET | Refills: 1 | Status: SHIPPED | OUTPATIENT
Start: 2025-01-20 | End: 2026-01-20

## 2025-01-21 LAB
BACTERIA BLD CULT: NORMAL
BACTERIA BLD CULT: NORMAL

## 2025-03-01 ENCOUNTER — HOSPITAL ENCOUNTER (EMERGENCY)
Facility: HOSPITAL | Age: 89
Discharge: HOME | End: 2025-03-02
Attending: STUDENT IN AN ORGANIZED HEALTH CARE EDUCATION/TRAINING PROGRAM
Payer: MEDICARE

## 2025-03-01 ENCOUNTER — APPOINTMENT (OUTPATIENT)
Dept: RADIOLOGY | Facility: HOSPITAL | Age: 89
End: 2025-03-01
Payer: MEDICARE

## 2025-03-01 ENCOUNTER — APPOINTMENT (OUTPATIENT)
Dept: CARDIOLOGY | Facility: HOSPITAL | Age: 89
End: 2025-03-01
Payer: MEDICARE

## 2025-03-01 DIAGNOSIS — R51.9 ACUTE NONINTRACTABLE HEADACHE, UNSPECIFIED HEADACHE TYPE: Primary | ICD-10-CM

## 2025-03-01 LAB
BASOPHILS # BLD AUTO: 0.02 X10*3/UL (ref 0–0.1)
BASOPHILS NFR BLD AUTO: 0.5 %
EOSINOPHIL # BLD AUTO: 0.13 X10*3/UL (ref 0–0.4)
EOSINOPHIL NFR BLD AUTO: 3.4 %
ERYTHROCYTE [DISTWIDTH] IN BLOOD BY AUTOMATED COUNT: 13.7 % (ref 11.5–14.5)
HCT VFR BLD AUTO: 41.2 % (ref 41–52)
HGB BLD-MCNC: 13.9 G/DL (ref 13.5–17.5)
IMM GRANULOCYTES # BLD AUTO: 0 X10*3/UL (ref 0–0.5)
IMM GRANULOCYTES NFR BLD AUTO: 0 % (ref 0–0.9)
LYMPHOCYTES # BLD AUTO: 0.91 X10*3/UL (ref 0.8–3)
LYMPHOCYTES NFR BLD AUTO: 23.6 %
MCH RBC QN AUTO: 30.8 PG (ref 26–34)
MCHC RBC AUTO-ENTMCNC: 33.7 G/DL (ref 32–36)
MCV RBC AUTO: 91 FL (ref 80–100)
MONOCYTES # BLD AUTO: 0.33 X10*3/UL (ref 0.05–0.8)
MONOCYTES NFR BLD AUTO: 8.6 %
NEUTROPHILS # BLD AUTO: 2.46 X10*3/UL (ref 1.6–5.5)
NEUTROPHILS NFR BLD AUTO: 63.9 %
NRBC BLD-RTO: 0 /100 WBCS (ref 0–0)
PLATELET # BLD AUTO: 141 X10*3/UL (ref 150–450)
RBC # BLD AUTO: 4.51 X10*6/UL (ref 4.5–5.9)
WBC # BLD AUTO: 3.9 X10*3/UL (ref 4.4–11.3)

## 2025-03-01 PROCEDURE — 36415 COLL VENOUS BLD VENIPUNCTURE: CPT | Performed by: STUDENT IN AN ORGANIZED HEALTH CARE EDUCATION/TRAINING PROGRAM

## 2025-03-01 PROCEDURE — 83735 ASSAY OF MAGNESIUM: CPT | Performed by: STUDENT IN AN ORGANIZED HEALTH CARE EDUCATION/TRAINING PROGRAM

## 2025-03-01 PROCEDURE — 80053 COMPREHEN METABOLIC PANEL: CPT | Performed by: STUDENT IN AN ORGANIZED HEALTH CARE EDUCATION/TRAINING PROGRAM

## 2025-03-01 PROCEDURE — 85025 COMPLETE CBC W/AUTO DIFF WBC: CPT | Performed by: STUDENT IN AN ORGANIZED HEALTH CARE EDUCATION/TRAINING PROGRAM

## 2025-03-01 PROCEDURE — 70498 CT ANGIOGRAPHY NECK: CPT

## 2025-03-01 PROCEDURE — 70450 CT HEAD/BRAIN W/O DYE: CPT

## 2025-03-01 PROCEDURE — 93005 ELECTROCARDIOGRAM TRACING: CPT

## 2025-03-01 PROCEDURE — 99285 EMERGENCY DEPT VISIT HI MDM: CPT | Mod: 25 | Performed by: STUDENT IN AN ORGANIZED HEALTH CARE EDUCATION/TRAINING PROGRAM

## 2025-03-01 PROCEDURE — 2500000004 HC RX 250 GENERAL PHARMACY W/ HCPCS (ALT 636 FOR OP/ED): Performed by: STUDENT IN AN ORGANIZED HEALTH CARE EDUCATION/TRAINING PROGRAM

## 2025-03-01 PROCEDURE — 96374 THER/PROPH/DIAG INJ IV PUSH: CPT

## 2025-03-01 PROCEDURE — 84484 ASSAY OF TROPONIN QUANT: CPT | Performed by: STUDENT IN AN ORGANIZED HEALTH CARE EDUCATION/TRAINING PROGRAM

## 2025-03-01 RX ORDER — DIPHENHYDRAMINE HYDROCHLORIDE 50 MG/ML
25 INJECTION INTRAMUSCULAR; INTRAVENOUS ONCE
Status: COMPLETED | OUTPATIENT
Start: 2025-03-01 | End: 2025-03-01

## 2025-03-01 RX ORDER — PROCHLORPERAZINE EDISYLATE 5 MG/ML
5 INJECTION INTRAMUSCULAR; INTRAVENOUS ONCE
Status: COMPLETED | OUTPATIENT
Start: 2025-03-01 | End: 2025-03-02

## 2025-03-01 RX ADMIN — SODIUM CHLORIDE, POTASSIUM CHLORIDE, SODIUM LACTATE AND CALCIUM CHLORIDE 1000 ML: 600; 310; 30; 20 INJECTION, SOLUTION INTRAVENOUS at 23:57

## 2025-03-01 RX ADMIN — DIPHENHYDRAMINE HYDROCHLORIDE 25 MG: 50 INJECTION, SOLUTION INTRAMUSCULAR; INTRAVENOUS at 23:59

## 2025-03-01 ASSESSMENT — PAIN DESCRIPTION - DESCRIPTORS: DESCRIPTORS: ACHING

## 2025-03-01 ASSESSMENT — PAIN SCALES - GENERAL: PAINLEVEL_OUTOF10: 5 - MODERATE PAIN

## 2025-03-01 ASSESSMENT — PAIN DESCRIPTION - LOCATION: LOCATION: HEAD

## 2025-03-01 ASSESSMENT — PAIN - FUNCTIONAL ASSESSMENT: PAIN_FUNCTIONAL_ASSESSMENT: 0-10

## 2025-03-01 ASSESSMENT — PAIN DESCRIPTION - PAIN TYPE: TYPE: ACUTE PAIN

## 2025-03-02 VITALS
RESPIRATION RATE: 18 BRPM | SYSTOLIC BLOOD PRESSURE: 161 MMHG | WEIGHT: 200 LBS | TEMPERATURE: 96.1 F | HEIGHT: 71 IN | BODY MASS INDEX: 28 KG/M2 | HEART RATE: 61 BPM | DIASTOLIC BLOOD PRESSURE: 90 MMHG | OXYGEN SATURATION: 98 %

## 2025-03-02 LAB
ALBUMIN SERPL BCP-MCNC: 3.6 G/DL (ref 3.4–5)
ALP SERPL-CCNC: 58 U/L (ref 33–136)
ALT SERPL W P-5'-P-CCNC: 9 U/L (ref 10–52)
ANION GAP SERPL CALC-SCNC: 12 MMOL/L (ref 10–20)
AST SERPL W P-5'-P-CCNC: 17 U/L (ref 9–39)
BILIRUB SERPL-MCNC: 0.8 MG/DL (ref 0–1.2)
BUN SERPL-MCNC: 14 MG/DL (ref 6–23)
CALCIUM SERPL-MCNC: 8.8 MG/DL (ref 8.6–10.3)
CARDIAC TROPONIN I PNL SERPL HS: 16 NG/L (ref 0–20)
CHLORIDE SERPL-SCNC: 105 MMOL/L (ref 98–107)
CO2 SERPL-SCNC: 25 MMOL/L (ref 21–32)
CREAT SERPL-MCNC: 0.83 MG/DL (ref 0.5–1.3)
EGFRCR SERPLBLD CKD-EPI 2021: 85 ML/MIN/1.73M*2
GLUCOSE SERPL-MCNC: 90 MG/DL (ref 74–99)
MAGNESIUM SERPL-MCNC: 2.01 MG/DL (ref 1.6–2.4)
POTASSIUM SERPL-SCNC: 3.5 MMOL/L (ref 3.5–5.3)
PROT SERPL-MCNC: 6.1 G/DL (ref 6.4–8.2)
SODIUM SERPL-SCNC: 138 MMOL/L (ref 136–145)

## 2025-03-02 PROCEDURE — 70496 CT ANGIOGRAPHY HEAD: CPT | Performed by: RADIOLOGY

## 2025-03-02 PROCEDURE — 96375 TX/PRO/DX INJ NEW DRUG ADDON: CPT

## 2025-03-02 PROCEDURE — 2550000001 HC RX 255 CONTRASTS: Performed by: STUDENT IN AN ORGANIZED HEALTH CARE EDUCATION/TRAINING PROGRAM

## 2025-03-02 PROCEDURE — 70498 CT ANGIOGRAPHY NECK: CPT | Performed by: RADIOLOGY

## 2025-03-02 PROCEDURE — 96361 HYDRATE IV INFUSION ADD-ON: CPT

## 2025-03-02 PROCEDURE — 2500000004 HC RX 250 GENERAL PHARMACY W/ HCPCS (ALT 636 FOR OP/ED): Performed by: STUDENT IN AN ORGANIZED HEALTH CARE EDUCATION/TRAINING PROGRAM

## 2025-03-02 PROCEDURE — 70450 CT HEAD/BRAIN W/O DYE: CPT | Performed by: RADIOLOGY

## 2025-03-02 RX ADMIN — PROCHLORPERAZINE EDISYLATE 5 MG: 5 INJECTION INTRAMUSCULAR; INTRAVENOUS at 00:06

## 2025-03-02 RX ADMIN — IOHEXOL 100 ML: 350 INJECTION, SOLUTION INTRAVENOUS at 01:56

## 2025-03-02 NOTE — ED TRIAGE NOTES
Pt came BIBA with c/o headache started behind left eye then went to right eye and now behind his head, he gave a score of 5/10. Pt added he is also having dizziness and nausea but did not vomit at home. He said this started maybe an hour before he was brought in. He has Hx of stroke last July 2024, has PPM, stents and watchman all procedures done in .

## 2025-03-02 NOTE — ED PROVIDER NOTES
EMERGENCY DEPARTMENT ENCOUNTER      Pt Name: Giuseppe Davila  MRN: [id number]  Birthdate 1935  Date of evaluation: 3/1/2025  Provider: Devin Morley DO    CHIEF COMPLAINT       Chief Complaint   Patient presents with    Headache       HISTORY OF PRESENT ILLNESS    Giuseppe Davila is a 89 y.o. male who presents to the emergency department with EMS for mild diffuse headache.  Patient states headache started behind the right eye has moved to the left now.  This is not the worst headache of his life was not sudden onset.  He states as it has not resolved but did improve with aspirin he is presenting for further evaluation.  He does have a history of prostate cancer uncertain if this has resolved or not previously on Lupron.  He admits that since the headache onset he has had slight lightheadedness but no chest pain or associated shortness of breath.  Denies any history of metastatic disease to the brain.  He did feel slightly nauseated although this has since resolved denies any episodes of emesis.  He is otherwise been in his usual state of health.          Nursing Notes were reviewed.    REVIEW OF SYSTEMS     CONSTITUTIONAL: Denies fever, sweats, chills.   NEURO: Endorses headache.  Denies difficulty walking, numbness, weakness, tingling.   HEENT: Denies sore throat, rhinorrhea, changes in vision.   CARDIO: Denies chest pain, palpitations.  PULM: Denies shortness of breath, cough.   GI: Endorses nausea.  Denies abdominal pain, vomiting, diarrhea, constipation, melena, hematochezia.  : Denies painful urination, frequency, hematuria.   MSK: Denies recent trauma.   SKIN: Denies rash, lesions.   ENDOCRINE: Denies unexpected weight-loss.   HEME: Denies bleeding disorder.     PAST MEDICAL HISTORY     Past Medical History:   Diagnosis Date    Anorectal abscess     Anorectal abscess    Essential (primary) hypertension     Benign essential HTN    Old myocardial infarction     History of myocardial infarction    Other  hyperlipidemia     Other hyperlipidemia    Personal history of malignant neoplasm, unspecified     History of malignant neoplasm    Personal history of other diseases of the circulatory system     History of coronary artery disease    Personal history of other diseases of the circulatory system     History of angina pectoris    Personal history of other diseases of the nervous system and sense organs 05/02/2019    History of polyneuropathy    Personal history of other specified conditions     History of chest pain    Personal history of other specified conditions     History of epistaxis    Personal history of other specified conditions     History of bradycardia    Personal history of transient ischemic attack (TIA), and cerebral infarction without residual deficits     History of stroke    Personal history of transient ischemic attack (TIA), and cerebral infarction without residual deficits     History of stroke    Syncope and collapse     Near syncope    Thoracic aortic aneurysm, without rupture, unspecified (CMS-HCC)     Thoracic aortic aneurysm    Unspecified atrial fibrillation (Multi) 03/10/2020    Atrial fibrillation with slow ventricular response    Ventricular premature depolarization     Frequent PVCs       SURGICAL HISTORY       Past Surgical History:   Procedure Laterality Date    ANOMALOUS PULMONARY VENOUS RETURN REPAIR, 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    Procedure: TAVR-OR;  Surgeon: Jaquan Larkin MD;  Location: [place] [address] Cardiac Cath Lab;  Service: Cardiac Surgery;  Laterality: N/A;    CARDIAC CATHETERIZATION N/A 5/24/2024    Procedure: Left And Right Heart Cath, With LV;  Surgeon: Stepan Jones MD PhD;  Location: [facility] Cardiac [facility] Lab;  Service: Cardiovascular;  Laterality: N/A;  R/LHC poss PCI    CARDIAC CATHETERIZATION N/A 7/9/2024    Procedure: TAVR (Transcatheter AV Replacement);  Surgeon: Jackson Rutherford MD;  Location: [place] [address] Cardiac Cath Lab;  Service: Cardiovascular;   Laterality: N/A;  Schedule at 10am, day, [place] [address]    CARDIAC CATHETERIZATION N/A 7/9/2024    Procedure: TVP for TAVR;  Surgeon: Jackson Rutherford MD;  Location: [place] [address] Cardiac Cath Lab;  Service: Cardiovascular;  Laterality: N/A;    CHOLECYSTECTOMY  02/05/2015    Cholecystectomy    COLONOSCOPY  02/05/2015    Colonoscopy (Fiberoptic)    CORONARY ANGIOPLASTY WITH STENT PLACEMENT  05/21/2018    Cath Placement Of Stent 1    CT ABDOMEN PELVIS ANGIOGRAM W AND/OR WO IV CONTRAST  3/17/2023    CT ABDOMEN PELVIS ANGIOGRAM W AND/OR WO IV CONTRAST PAR CT    KIDNEY SURGERY  02/05/2015    Kidney Surgery    OTHER SURGICAL HISTORY  12/13/2018    Prostatectomy    OTHER SURGICAL HISTORY  05/21/2018    Recent Surgery    TONSILLECTOMY  02/05/2015    Tonsillectomy       ALLERGIES     Patient has no known allergies.    FAMILY HISTORY       Family History   Problem Relation Name Age of Onset    Other (cardiac disorder) Mother      Transient ischemic attack Mother      Cancer Father      Other (cardiac disorder) Sister      Alzheimer's disease Father's Brother      Parkinsonism Father's Brother          SOCIAL HISTORY       Social History     Socioeconomic History    Marital status:    Tobacco Use    Smoking status: Never    Smokeless tobacco: Never   Vaping Use    Vaping status: Never Used   Substance and Sexual Activity    Alcohol use: Not Currently     Comment: rarely    Drug use: Not Currently    Sexual activity: Defer     Social Drivers of Health     Financial Resource Strain: Low Risk  (9/30/2024)    Overall Financial Resource Strain (CARDIA)     Difficulty of Paying Living Expenses: Not very hard   Food Insecurity: No Food Insecurity (9/30/2024)    Hunger Vital Sign     Worried About Running Out of Food in the Last Year: Never true     Ran Out of Food in the Last Year: Never true   Transportation Needs: No Transportation Needs (10/30/2024)    OASIS : Transportation     Lack of Transportation (Medical): No      Lack of Transportation (Non-Medical): No     Patient Unable or Declines to Respond: No   Social Connections: Feeling Socially Integrated (10/30/2024)    OASIS : Social Isolation     Frequency of experiencing loneliness or isolation: Never   Intimate Partner Violence: Not At Risk (9/30/2024)    Humiliation, Afraid, Rape, and Kick questionnaire     Fear of Current or Ex-Partner: No     Emotionally Abused: No     Physically Abused: No     Sexually Abused: No   Housing Stability: Low Risk  (9/30/2024)    Housing Stability Vital Sign     Unable to Pay for Housing in the Last Year: No     Number of Times Moved in the Last Year: 1     Homeless in the Last Year: No       PHYSICAL EXAM   VS: As documented in the triage note from today's date and EMR flowsheet were reviewed.  Gen: Well developed. No acute distress. Seated in bed. Appears nontoxic.  GCS 15.  Alert and oriented to person time place.  Skin: Warm. Dry. Intact. No rashes or lesions.  Eyes: Pupils equally round and reactive to light. Clear sclera. EOMI.  HENT: Atraumatic appearance. Mucosal membranes moist. No oral lesions, uvula midline, airway patent.  TMs clear bilaterally nares clear bilaterally no meningismal signs trachea is midline.  CV: Regular rate and regular rhythm. S1, S2. No pedal edema. Warm extremities.  Resp: Nonlabored breathing Clear to auscultation bilaterally. No increased work of breathing.   GI: Soft and nontender. No rebound or guarding. Bowel sounds x4 present.   MSK: Symmetric muscle bulk. No joint swelling in the extremities. Compartments are soft. Neurovascularly intact x4 extremities. Radial pulses +2 equal bilaterally.  +2 equal bilaterally.  Neuro: Alert. CN II - XII intact. Speech fluent. Moving all extremities. No focal deficits. Gait normal.  NIH 0 Van negative.  Psych: Appropriate. Kempt.    DIAGNOSTIC RESULTS   RADIOLOGY:   Non-plain film images such as CT, Ultrasound and MRI are read by the radiologist. Plain  radiographic images are visualized and preliminarily interpreted by the emergency physician with the below findings:      Interpretation per the Radiologist below, if available at the time of this note:    CT head wo IV contrast   Final Result   No acute intracranial hemorrhage or mass effect.        Remote caudate lacunar infarct as well as nonspecific white matter   changes and parenchymal volume loss.             MACRO:   None.        Signed by: Fariha Layne 3/2/2025 2:15 AM   Dictation workstation:   [id number]      CT angio head and neck w and wo IV contrast   Final Result   No evidence for significant stenosis of the cervical ICA vessels.        Diffusely diminutive cervical vertebral arteries with suboptimal   opacification of the intradural vertebral arteries and basilar artery   which is diffusely stenotic. Findings appear progressed from prior   imaging. Fetal type origin of the bilateral PCAs noted with patent   appearance.        Aneurysmal ascending aortic again noted measuring up to 4.5 cm.        MACRO:   None.        Signed by: Fariha Layne 3/2/2025 2:33 AM   Dictation workstation:   [id number]            ED BEDSIDE ULTRASOUND:   Performed by ED Physician - none    LABS:  Labs Reviewed   CBC WITH AUTO DIFFERENTIAL - Abnormal       Result Value    WBC 3.9 (*)     nRBC 0.0      RBC 4.51      Hemoglobin 13.9      Hematocrit 41.2      MCV 91      MCH 30.8      MCHC 33.7      RDW 13.7      Platelets 141 (*)     Neutrophils % 63.9      Immature Granulocytes %, Automated 0.0      Lymphocytes % 23.6      Monocytes % 8.6      Eosinophils % 3.4      Basophils % 0.5      Neutrophils Absolute 2.46      Immature Granulocytes Absolute, Automated 0.00      Lymphocytes Absolute 0.91      Monocytes Absolute 0.33      Eosinophils Absolute 0.13      Basophils Absolute 0.02     COMPREHENSIVE METABOLIC PANEL - Abnormal    Glucose 90      Sodium 138      Potassium 3.5      Chloride 105      Bicarbonate 25       Anion Gap 12      Urea Nitrogen 14      Creatinine 0.83      eGFR 85      Calcium 8.8      Albumin 3.6      Alkaline Phosphatase 58      Total Protein 6.1 (*)     AST 17      Bilirubin, Total 0.8      ALT 9 (*)    MAGNESIUM - Normal    Magnesium 2.01     TROPONIN I, HIGH SENSITIVITY - Normal    Troponin I, High Sensitivity 16      Narrative:     Less than 99th percentile of normal range cutoff-  Female and children under 18 years old <14 ng/L; Male <21 ng/L: Negative  Repeat testing should be performed if clinically indicated.     Female and children under 18 years old 14-50 ng/L; Male 21-50 ng/L:  Consistent with possible cardiac damage and possible increased clinical   risk. Serial measurements may help to assess extent of myocardial damage.     >50 ng/L: Consistent with cardiac damage, increased clinical risk and  myocardial infarction. Serial measurements may help assess extent of   myocardial damage.      NOTE: Children less than 1 year old may have higher baseline troponin   levels and results should be interpreted in conjunction with the overall   clinical context.     NOTE: Troponin I testing is performed using a different   testing methodology at [facility] than at other   [facility] hospitals. Direct result comparisons should only   be made within the same method.       All other labs were within normal range or not returned as of this dictation.    EMERGENCY DEPARTMENT COURSE/MDM:   Vitals:    Vitals:    03/02/25 0300 03/02/25 0400 03/02/25 0500 03/02/25 0600   BP: 133/79 133/88 (!) 160/93    Pulse: 79 80 88 60   Resp: 18 19 20 18   Temp:  35.7 °C (96.3 °F)  35.6 °C (96.1 °F)   TempSrc:  Temporal  Temporal   SpO2: 95% 94% 96% 94%   Weight:       Height:           I reviewed the patient's triage vitals and they are hypertensive recommended follow-up with primary physician for repeat checks.    Due to the above findings the following was ordered Compazine Benadryl fluid bolus for headache CT  imaging of the head and neck as well as basic labs.    Lab work is grossly unremarkable no significant electrolyte derangements renal function is appropriate.  Cardiac troponin within normal range no acute injury pattern on EKG.  Minimal leukopenia although no evidence of infectious etiology on clinical exam.  No signs of anemia either.  CT imaging of the head as well as neck shows multiple incidental findings all thoroughly discussed with the patient recommended close outpatient follow-up.  He is provided a copy of the report.  Patient was reevaluated headache has completely resolved he is requesting discharge home I feel that this is reasonable at this time.  Patient is given strict return precautions appropriate follow-up and discharged in stable condition.    ED Course as of 03/02/25 0629   Sat Mar 01, 2025   2325 Interpreted by the Emergency Department Attending: ECG revealed paced rhythm at a rate of 62 beats per minute with AK interval * , QRS of 170 , QTc of 487.  No acute injury pattern. Previous EKG on January 17 revealed nonspecific changes.    [MG]      ED Course User Index  [MG] Devin Morley DO         Diagnoses as of 03/02/25 0629   Acute nonintractable headache, unspecified headache type       Patient was counseled regarding labs, imaging, likely diagnosis, and plan. All questions were answered.     ------------------------------------------------------------------  Information provided by the patient and EMS  Past medical history complicating workup prostate cancer history  Previous medical records reviewed office visit 1/3/2025  Considered LP although no indication extremely low concern for SAH.  Shared medical decision making patient prefers to be discharged with close outpatient follow-up feel that this is reasonable.  ------------------------------------------------------------------  ED Medications administered this visit:    Medications   prochlorperazine (Compazine) injection 5 mg (5 mg  intravenous Given 3/2/25 0006)   diphenhydrAMINE (BENADryl) injection 25 mg (25 mg intravenous Given 3/1/25 2359)   lactated Ringer's bolus 1,000 mL (0 mL intravenous Stopped 3/2/25 0100)   iohexol (OMNIPaque) 350 mg iodine/mL solution 100 mL (100 mL intravenous Given 3/2/25 0156)       New Prescriptions from this visit:    New Prescriptions    No medications on file       Follow-up:  Devin Michele MD  [address] [facility] [address] [phone]    Schedule an appointment as soon as possible for a visit in 2 days      [facility]  [address] [facility] [address] [phone]  Go to   If symptoms worsen        Final Impression:   1. Acute nonintractable headache, unspecified headache type          Devin Morley DO    (Please note that portions of this note were completed with a voice recognition program.  Efforts were made to edit the dictations but occasionally words are mis-transcribed.)     Devin Morley DO  03/02/25 0266

## 2025-03-02 NOTE — DISCHARGE INSTRUCTIONS
You are diagnosed with a benign headache it did resolve while you are in the emergency department you are requesting discharge home I feel that this is reasonable.  Please follow-up with your primary provider regarding incidental findings on CT imaging of the head as well as neck.  Should you been experiencing numbness tingling weakness new chest pain symptoms concerning to call 911 or return to the nearest emergency department immediately.

## 2025-03-03 ENCOUNTER — APPOINTMENT (OUTPATIENT)
Dept: NEUROLOGY | Facility: CLINIC | Age: 89
End: 2025-03-03
Payer: MEDICARE

## 2025-03-08 LAB
ATRIAL RATE: 63 BPM
Q ONSET: 198 MS
QRS COUNT: 10 BEATS
QRS DURATION: 170 MS
QT INTERVAL: 480 MS
QTC CALCULATION(BAZETT): 487 MS
QTC FREDERICIA: 485 MS
R AXIS: -19 DEGREES
T AXIS: 109 DEGREES
T OFFSET: 438 MS
VENTRICULAR RATE: 62 BPM

## 2025-04-07 DIAGNOSIS — E78.49 OTHER HYPERLIPIDEMIA: ICD-10-CM

## 2025-04-07 RX ORDER — CLOPIDOGREL BISULFATE 75 MG/1
75 TABLET ORAL DAILY
Qty: 90 TABLET | Refills: 3 | Status: SHIPPED | OUTPATIENT
Start: 2025-04-07

## 2025-06-24 PROBLEM — I49.5 SICK SINUS SYNDROME (MULTI): Status: RESOLVED | Noted: 2023-10-17 | Resolved: 2025-06-24

## 2025-06-25 ENCOUNTER — APPOINTMENT (OUTPATIENT)
Dept: CARDIOLOGY | Facility: CLINIC | Age: 89
End: 2025-06-25
Payer: MEDICARE

## 2025-07-08 ENCOUNTER — APPOINTMENT (OUTPATIENT)
Dept: CARDIOLOGY | Facility: CLINIC | Age: 89
End: 2025-07-08
Payer: MEDICARE

## 2025-08-14 ENCOUNTER — APPOINTMENT (OUTPATIENT)
Dept: RADIOLOGY | Facility: HOSPITAL | Age: 89
End: 2025-08-14
Payer: MEDICARE

## 2025-08-14 ENCOUNTER — HOSPITAL ENCOUNTER (EMERGENCY)
Facility: HOSPITAL | Age: 89
Discharge: HOME | End: 2025-08-15
Attending: EMERGENCY MEDICINE
Payer: MEDICARE

## 2025-08-14 VITALS
DIASTOLIC BLOOD PRESSURE: 77 MMHG | SYSTOLIC BLOOD PRESSURE: 121 MMHG | RESPIRATION RATE: 16 BRPM | TEMPERATURE: 98.1 F | BODY MASS INDEX: 29.53 KG/M2 | OXYGEN SATURATION: 94 % | HEIGHT: 69 IN | HEART RATE: 57 BPM

## 2025-08-14 DIAGNOSIS — S09.90XA INJURY OF HEAD, INITIAL ENCOUNTER: ICD-10-CM

## 2025-08-14 DIAGNOSIS — R07.81 RIB PAIN: ICD-10-CM

## 2025-08-14 DIAGNOSIS — M25.532 LEFT WRIST PAIN: Primary | ICD-10-CM

## 2025-08-14 DIAGNOSIS — S20.212A CONTUSION OF RIB ON LEFT SIDE, INITIAL ENCOUNTER: ICD-10-CM

## 2025-08-14 PROCEDURE — 72125 CT NECK SPINE W/O DYE: CPT

## 2025-08-14 PROCEDURE — 99284 EMERGENCY DEPT VISIT MOD MDM: CPT | Mod: 25 | Performed by: EMERGENCY MEDICINE

## 2025-08-14 PROCEDURE — 72125 CT NECK SPINE W/O DYE: CPT | Performed by: RADIOLOGY

## 2025-08-14 PROCEDURE — 70450 CT HEAD/BRAIN W/O DYE: CPT | Performed by: RADIOLOGY

## 2025-08-14 PROCEDURE — 70450 CT HEAD/BRAIN W/O DYE: CPT

## 2025-08-14 PROCEDURE — G0390 TRAUMA RESPONS W/HOSP CRITI: HCPCS

## 2025-08-14 PROCEDURE — 71101 X-RAY EXAM UNILAT RIBS/CHEST: CPT | Mod: LT

## 2025-08-14 ASSESSMENT — PAIN SCALES - GENERAL: PAINLEVEL_OUTOF10: 2

## 2025-08-14 ASSESSMENT — PAIN DESCRIPTION - LOCATION: LOCATION: RIB CAGE

## 2025-08-14 ASSESSMENT — PAIN - FUNCTIONAL ASSESSMENT: PAIN_FUNCTIONAL_ASSESSMENT: 0-10

## 2025-08-14 ASSESSMENT — PAIN DESCRIPTION - ORIENTATION: ORIENTATION: LEFT

## 2025-08-15 ENCOUNTER — APPOINTMENT (OUTPATIENT)
Dept: RADIOLOGY | Facility: HOSPITAL | Age: 89
End: 2025-08-15
Payer: MEDICARE

## 2025-08-15 PROCEDURE — 73110 X-RAY EXAM OF WRIST: CPT | Mod: LEFT SIDE | Performed by: RADIOLOGY

## 2025-08-15 PROCEDURE — 90715 TDAP VACCINE 7 YRS/> IM: CPT | Performed by: EMERGENCY MEDICINE

## 2025-08-15 PROCEDURE — 90471 IMMUNIZATION ADMIN: CPT | Performed by: EMERGENCY MEDICINE

## 2025-08-15 PROCEDURE — 2500000004 HC RX 250 GENERAL PHARMACY W/ HCPCS (ALT 636 FOR OP/ED): Performed by: EMERGENCY MEDICINE

## 2025-08-15 PROCEDURE — 73110 X-RAY EXAM OF WRIST: CPT | Mod: LT

## 2025-08-15 PROCEDURE — 71101 X-RAY EXAM UNILAT RIBS/CHEST: CPT | Mod: LEFT SIDE | Performed by: RADIOLOGY

## 2025-08-15 RX ADMIN — TETANUS TOXOID, REDUCED DIPHTHERIA TOXOID AND ACELLULAR PERTUSSIS VACCINE, ADSORBED 0.5 ML: 5; 2.5; 8; 8; 2.5 SUSPENSION INTRAMUSCULAR at 01:20

## 2025-08-19 DIAGNOSIS — K59.00 CONSTIPATION, UNSPECIFIED CONSTIPATION TYPE: ICD-10-CM

## 2025-08-19 DIAGNOSIS — I10 PRIMARY HYPERTENSION: ICD-10-CM

## 2025-08-19 RX ORDER — DOCUSATE SODIUM 100 MG/1
100 CAPSULE, LIQUID FILLED ORAL 2 TIMES DAILY PRN
Qty: 60 CAPSULE | Refills: 11 | Status: SHIPPED | OUTPATIENT
Start: 2025-08-19 | End: 2026-08-19

## 2025-08-20 RX ORDER — TORSEMIDE 20 MG/1
20 TABLET ORAL DAILY
Qty: 90 TABLET | Refills: 3 | Status: SHIPPED | OUTPATIENT
Start: 2025-08-20

## 2025-09-15 ENCOUNTER — APPOINTMENT (OUTPATIENT)
Dept: NEUROLOGY | Facility: CLINIC | Age: 89
End: 2025-09-15
Payer: MEDICARE

## (undated) DEVICE — INTRODUCER SET, PERC SHEATH, SUPER, 6FR, 35CM, LF

## (undated) DEVICE — TUBING, CONTRAST INJECTION, 500PSI, 10IN, 25CM

## (undated) DEVICE — CABLE, PACING PATIENT BIPOLAR 8'

## (undated) DEVICE — ACCESS KIT, MINI MAK, 4FR X 10CML, 0.018 X 40CM, SS/SS, ECHO ENHANCED 7CM NDL

## (undated) DEVICE — Device

## (undated) DEVICE — GUIDEWIRE, WHOLEY, 0.035 IN X 145 CM, STRAIGHT/SHAPEABLE TIP

## (undated) DEVICE — DEVICE, CLOSURE, PERCLOSE, PROSTYLE

## (undated) DEVICE — PAD, ELECTRODE DEFIB PADPRO ADULT STRL W/ADAPTER

## (undated) DEVICE — 7 FR X 12CM FAST-CATH HEMOSTASIS INTRODUCER,W/LUER LOCK HUB, HEMO VALVE AND SIDEPORT, DILATOR, 50 CM DOUBLE DISTAL GUIDEWIRE WITH J AND STRAIGHT ENDS, 60 CM REPOSITIONING SLEEVE, SYRINGE AND 18 GA. XTW NEEDLE

## (undated) DEVICE — GUIDE WIRE, 035/190CM, HI-TORGUE SUPRA CORE

## (undated) DEVICE — CATHETER, DIAGNOSTIC, DXTERITY, 6FR PIG155, 110CM.6 SH

## (undated) DEVICE — CATHETER, ANGIO, IMPULSE, FR4, 6 FR X 100 CM

## (undated) DEVICE — GUIDEWIRE, LUNDERQUIST, EXTRA STIFF, DBL CURVE, .035 X 260

## (undated) DEVICE — SHEATH, GLIDESHEATH, SLENDER, 6FR 10CM

## (undated) DEVICE — SHEATH, PINNACLE, W/.038 GW 10CM, 5FR INTRODUCER, 2.5 CM DIALATOR

## (undated) DEVICE — CATHETER, GUIDING LAUNCHER 6FR, 3DRC

## (undated) DEVICE — CATHETER, OPTITORQUE, 6FR 110CM, TIGER RADIAL 4.0

## (undated) DEVICE — GUIDEWIRE, INQWIRE, 3MM J, .035 X 210CM, FIXED

## (undated) DEVICE — VALVE, CONTROL BLEEDBACK

## (undated) DEVICE — TR BAND, RADIAL COMPRESSION, STANDARD, 24CM

## (undated) DEVICE — CATHETER, ANGIO, IMPULSE, PIGTAIL, 6 FR X 110 CM

## (undated) DEVICE — INTRODUCER, GLIDESHEATH SLENDER A-KIT, 5FR 10CM

## (undated) DEVICE — GUIDEWIRE, SAFARI2, EXTRA SUPPORT, EXTRA SMALL CURVE

## (undated) DEVICE — CATHETER, ANGIO, IMPULSE, FL4, 5 FR X 100 CM

## (undated) DEVICE — CATHETER, PACING, PACEL, FLOW DIRECTED, 5 FR X 110 CM

## (undated) DEVICE — CATHETER, WEDGE PRESSURE, BALLOON, DOUBLE LUMEN, 5 FR, 110 CM